# Patient Record
Sex: FEMALE | Race: WHITE | Employment: OTHER | URBAN - METROPOLITAN AREA
[De-identification: names, ages, dates, MRNs, and addresses within clinical notes are randomized per-mention and may not be internally consistent; named-entity substitution may affect disease eponyms.]

---

## 2017-01-07 DIAGNOSIS — F41.1 GENERALIZED ANXIETY DISORDER: Primary | Chronic | ICD-10-CM

## 2017-01-09 RX ORDER — LORAZEPAM 1 MG/1
TABLET ORAL
Qty: 30 TABLET | Refills: 0 | Status: SHIPPED | OUTPATIENT
Start: 2017-01-09 | End: 2017-02-07

## 2017-01-09 NOTE — TELEPHONE ENCOUNTER
Controlled Substance Refill Request for LORazepam (ATIVAN) 1 MG tablet  Problem List Complete:  Yes    Last Written Prescription Date:  12/9/2016  Last Fill Quantity: 30,   # refills: 0    Last Office Visit with [facility] primary care provider: 11/10/2016    Clinic visit frequency required: Q 6  months     Future Office visit:   Next 5 appointments (look out 90 days)     Mar 03, 2017  8:15 AM   Return Visit with Nikita Cedillo MD   [facility] ([facility])    [address] [place] [id number] [phone]                  Controlled substance agreement on file: Yes:  Date 7/8/2015.     Processing:  [facility]ax Rx to [facility]   checked in past 6 months?  No, route to RN

## 2017-01-09 NOTE — TELEPHONE ENCOUNTER
Pt is due for follow up with PCP. Please help her schedule then route back to refill pool. Thank you.     Swati Cano RN

## 2017-01-09 NOTE — TELEPHONE ENCOUNTER
Routing refill request to provider for review/approval because:  Patient has been contacted to schedule, message left.  Gloria Nowak RN

## 2017-01-27 ENCOUNTER — TELEPHONE (OUTPATIENT)
Dept: FAMILY MEDICINE | Facility: CLINIC | Age: 77
End: 2017-01-27

## 2017-01-27 DIAGNOSIS — M51.369 DDD (DEGENERATIVE DISC DISEASE), LUMBAR: Chronic | ICD-10-CM

## 2017-01-27 DIAGNOSIS — Z79.899 CONTROLLED SUBSTANCE AGREEMENT SIGNED: Primary | Chronic | ICD-10-CM

## 2017-01-27 RX ORDER — OXYCODONE HYDROCHLORIDE 5 MG/1
5-10 TABLET ORAL 2 TIMES DAILY PRN
Qty: 120 TABLET | Refills: 0 | Status: SHIPPED | OUTPATIENT
Start: 2017-01-27 | End: 2017-02-07

## 2017-01-27 NOTE — TELEPHONE ENCOUNTER
Controlled Substance Refill Request for   oxyCODONE (ROXICODONE) 5 MG IR tablet 120 tablet 0 12/14/2016       Problem List Complete:  Yes   checked in past 6 months?  No, route to RN done on 6/7/16      Swati Wheeler MA

## 2017-01-27 NOTE — TELEPHONE ENCOUNTER
See below, pt is asking for refills for going out of town.    Checked  today, and no issues.  Mariela Asher RN

## 2017-01-27 NOTE — TELEPHONE ENCOUNTER
Reason for Call:  Medication or medication refill:    Name of the medication requested:oxyCODONE (ROXICODONE) 5 MG IR tablet    Other request: Pt is asking for 2-3 extra refills because will be going to [place] for a   Few months.     Can we leave a detailed message on this number? YES    Phone number patient can be reached at: Home number on file [phone] (home)    Best Time: anytime    Call taken on 1/27/2017 at 1:04 PM by Lily Escoto

## 2017-02-07 ENCOUNTER — OFFICE VISIT (OUTPATIENT)
Dept: FAMILY MEDICINE | Facility: CLINIC | Age: 77
End: 2017-02-07
Payer: COMMERCIAL

## 2017-02-07 ENCOUNTER — HOSPITAL ENCOUNTER (OUTPATIENT)
Dept: MAMMOGRAPHY | Facility: CLINIC | Age: 77
Discharge: HOME | End: 2017-02-07
Attending: INTERNAL MEDICINE | Admitting: INTERNAL MEDICINE
Payer: COMMERCIAL

## 2017-02-07 VITALS
HEIGHT: 65 IN | SYSTOLIC BLOOD PRESSURE: 139 MMHG | OXYGEN SATURATION: 98 % | WEIGHT: 179 LBS | RESPIRATION RATE: 16 BRPM | DIASTOLIC BLOOD PRESSURE: 76 MMHG | TEMPERATURE: 97.4 F | BODY MASS INDEX: 29.82 KG/M2 | HEART RATE: 56 BPM

## 2017-02-07 DIAGNOSIS — I44.7 LBBB (LEFT BUNDLE BRANCH BLOCK): ICD-10-CM

## 2017-02-07 DIAGNOSIS — I10 BENIGN ESSENTIAL HYPERTENSION: ICD-10-CM

## 2017-02-07 DIAGNOSIS — F41.1 GENERALIZED ANXIETY DISORDER: Primary | Chronic | ICD-10-CM

## 2017-02-07 DIAGNOSIS — Z12.31 VISIT FOR SCREENING MAMMOGRAM: ICD-10-CM

## 2017-02-07 DIAGNOSIS — E78.5 HYPERLIPIDEMIA LDL GOAL <100: ICD-10-CM

## 2017-02-07 DIAGNOSIS — Z13.89 SCREENING FOR DIABETIC PERIPHERAL NEUROPATHY: ICD-10-CM

## 2017-02-07 DIAGNOSIS — M51.369 DDD (DEGENERATIVE DISC DISEASE), LUMBAR: Chronic | ICD-10-CM

## 2017-02-07 DIAGNOSIS — F51.01 PRIMARY INSOMNIA: Chronic | ICD-10-CM

## 2017-02-07 PROCEDURE — 99214 OFFICE O/P EST MOD 30 MIN: CPT | Performed by: INTERNAL MEDICINE

## 2017-02-07 PROCEDURE — G0202 SCR MAMMO BI INCL CAD: HCPCS

## 2017-02-07 PROCEDURE — 99207 C FOOT EXAM  NO CHARGE: CPT | Performed by: INTERNAL MEDICINE

## 2017-02-07 PROCEDURE — 77063 BREAST TOMOSYNTHESIS BI: CPT

## 2017-02-07 RX ORDER — LORAZEPAM 1 MG/1
.5-1 TABLET ORAL DAILY PRN
Qty: 30 TABLET | Refills: 0 | Status: SHIPPED | OUTPATIENT
Start: 2017-02-07 | End: 2017-05-18

## 2017-02-07 RX ORDER — OXYCODONE HYDROCHLORIDE 5 MG/1
5-10 TABLET ORAL 2 TIMES DAILY PRN
Qty: 120 TABLET | Refills: 0 | Status: SHIPPED | OUTPATIENT
Start: 2017-02-20 | End: 2017-02-07

## 2017-02-07 RX ORDER — OXYCODONE HYDROCHLORIDE 5 MG/1
5-10 TABLET ORAL 2 TIMES DAILY PRN
Qty: 120 TABLET | Refills: 0 | Status: SHIPPED | OUTPATIENT
Start: 2017-03-20 | End: 2017-05-18

## 2017-02-07 RX ORDER — LORAZEPAM 1 MG/1
TABLET ORAL
Qty: 30 TABLET | Refills: 0 | Status: CANCELLED | OUTPATIENT
Start: 2017-02-07

## 2017-02-07 RX ORDER — TRAZODONE HYDROCHLORIDE 100 MG/1
TABLET ORAL
Qty: 90 TABLET | Refills: 3 | Status: SHIPPED | OUTPATIENT
Start: 2017-02-07 | End: 2017-12-05

## 2017-02-07 RX ORDER — HYDROCHLOROTHIAZIDE 25 MG/1
25 TABLET ORAL DAILY
Qty: 90 TABLET | Refills: 3 | Status: SHIPPED | OUTPATIENT
Start: 2017-02-07 | End: 2018-05-10

## 2017-02-07 RX ORDER — EZETIMIBE 10 MG/1
10 TABLET ORAL DAILY
Qty: 90 TABLET | Refills: 3 | Status: SHIPPED | OUTPATIENT
Start: 2017-02-07 | End: 2017-08-02

## 2017-02-07 RX ORDER — METOPROLOL SUCCINATE 100 MG/1
100 TABLET, EXTENDED RELEASE ORAL DAILY
Qty: 90 TABLET | Refills: 3 | Status: SHIPPED | OUTPATIENT
Start: 2017-02-07 | End: 2018-03-08

## 2017-02-07 RX ORDER — ESCITALOPRAM OXALATE 20 MG/1
20 TABLET ORAL DAILY
Qty: 90 TABLET | Refills: 3 | Status: SHIPPED | OUTPATIENT
Start: 2017-02-07 | End: 2018-02-02

## 2017-02-07 ASSESSMENT — ANXIETY QUESTIONNAIRES
1. FEELING NERVOUS, ANXIOUS, OR ON EDGE: MORE THAN HALF THE DAYS
7. FEELING AFRAID AS IF SOMETHING AWFUL MIGHT HAPPEN: NOT AT ALL
3. WORRYING TOO MUCH ABOUT DIFFERENT THINGS: SEVERAL DAYS
5. BEING SO RESTLESS THAT IT IS HARD TO SIT STILL: NOT AT ALL
2. NOT BEING ABLE TO STOP OR CONTROL WORRYING: SEVERAL DAYS
6. BECOMING EASILY ANNOYED OR IRRITABLE: NOT AT ALL
IF YOU CHECKED OFF ANY PROBLEMS ON THIS QUESTIONNAIRE, HOW DIFFICULT HAVE THESE PROBLEMS MADE IT FOR YOU TO DO YOUR WORK, TAKE CARE OF THINGS AT HOME, OR GET ALONG WITH OTHER PEOPLE: NOT DIFFICULT AT ALL
GAD7 TOTAL SCORE: 4

## 2017-02-07 ASSESSMENT — PATIENT HEALTH QUESTIONNAIRE - PHQ9: 5. POOR APPETITE OR OVEREATING: NOT AT ALL

## 2017-02-07 NOTE — PROGRESS NOTES
"  SUBJECTIVE:                                                    Dimple Baxter is a 76 year old female who presents to clinic today for the following health issues:      Alcoholic grandson moved out b/c doing better so that is a relief to her and her daughter whom she still lives with  Daughter has multiple sclerosis so Althea has to help her as well  Back pain flares up if lifts heavy things  Taking Oxycodone still 4 tablets per day - she admits they give her a \"boost of energy\" which we then discussed is not the reason to take them which she agrees  She and I had discussed tapering off of them at last OV but she says it was SO stressful at home with her grandson she didn't have the energy to work on a taper so continued on same dose  Now is going to [place] for a couple months later this week so needs Rx filled - will be with son who is reportedly stable and she is looking forward to it very much  Senna advice from pharmacist for periodic constipation - she is about to start  Generally feeling really pretty well  Will be starting Zetia now this calendar year as is generic - follows with cardiology      Depression and Anxiety Follow-Up    Status since last visit: Improved    Other associated symptoms:None    Complicating factors:     Significant life event: Yes, grandson moved out as noted above in HPI     Current substance abuse: None    PHQ-9 SCORE 1/26/2016 10/11/2016 2/7/2017   Total Score - - -   Total Score 0 5 3     BRITTNI-7 SCORE 6/23/2015 10/19/2015 2/7/2017   Total Score 0 - -   Total Score - 3 4        PHQ-9  English      PHQ-9   Any Language     GAD7         Amount of exercise or physical activity: walking    Problems taking medications regularly: No    Medication side effects: none    Diet: regular (no restrictions)      Problem list and histories reviewed & adjusted, as indicated.    ROS:  Detailed as above     OBJECTIVE:                                                    /76  Pulse 56  Temp 97.4 " " F (36.3  C) (Oral)  Resp 16  Ht 5' 4.75\" (1.645 m)  Wt 179 lb (81.2 kg)  SpO2 98%  BMI 30.02 kg/m2  Body mass index is 30.02 kg/(m^2).  MUCH brighter and mentally sharper   Seems to know her meds well  No ulcers or lesions on feet, normal monofilament testing  PHQ-9 SCORE 1/26/2016 10/11/2016 2/7/2017   Total Score - - -   Total Score 0 5 3     BRITTNI-7 SCORE 6/23/2015 10/19/2015 2/7/2017   Total Score 0 - -   Total Score - 3 4        ASSESSMENT/PLAN:                                                      1. Generalized anxiety disorder  Try to reduce Lorazepam - discussed  Continue Lexapro  - escitalopram (LEXAPRO) 20 MG tablet; Take 1 tablet (20 mg) by mouth daily  Dispense: 90 tablet; Refill: 3  - LORazepam (ATIVAN) 1 MG tablet; Take 0.5-1 tablets (0.5-1 mg) by mouth daily as needed for anxiety  Dispense: 30 tablet; Refill: 0    2. Benign essential hypertension  At goal off of Amlodipine which she felt led to dry mouth  - hydrochlorothiazide (HYDRODIURIL) 25 MG tablet; Take 1 tablet (25 mg) by mouth daily  Dispense: 90 tablet; Refill: 3  - metoprolol (TOPROL-XL) 100 MG 24 hr tablet; Take 1 tablet (100 mg) by mouth daily  Dispense: 90 tablet; Refill: 3    3. LBBB (left bundle branch block)  - metoprolol (TOPROL-XL) 100 MG 24 hr tablet; Take 1 tablet (100 mg) by mouth daily  Dispense: 90 tablet; Refill: 3  Follows with cardiology    4. Primary insomnia  - traZODone (DESYREL) 100 MG tablet; TAKE ONE TABLET BY MOUTH ONCE DAILY AT BEDTIME  Dispense: 90 tablet; Refill: 3    5. Screening for diabetic peripheral neuropathy  - FOOT EXAM  NO CHARGE [49448.114]    6. DDD (degenerative disc disease), lumbar  She is not ready to taper yet - see OV note last visit when she was considering taper  2 scripts given to her today  She is looking forward to being with her son in [place] for 2 months and so will give her 2 scripts (she will check with [place] pharmacy about fills) and then continue to work on taper when she comes back to " "[place]  - oxyCODONE (ROXICODONE) 5 MG IR tablet; Take 1-2 tablets (5-10 mg) by mouth 2 times daily as needed for breakthrough pain  Dispense: 120 tablet; Refill: 0    7. Hyperlipidemia LDL goal <100  Will restart it per cardiology as should be more affordable this year  - ezetimibe (ZETIA) 10 MG tablet; Take 1 tablet (10 mg) by mouth daily  Dispense: 90 tablet; Refill: 3    8. Visit for screening mammogram  Denies breast changes and she'd like a mammogram  - MA Screen Bilateral w/[place]; Future    Patient Instructions   One Lorazepam prescription given to you today  Two Oxycodone prescriptions given to you today  STAY OFF OF Amlodipine  START Zetia  Get calcium in your diet rather than supplement  Enjoy [place]  See me in the spring/early summer for \"physical\"        Sandra Mayer, DO  [facility]      "

## 2017-02-07 NOTE — NURSING NOTE
"Chief Complaint   Patient presents with     Depression     Anxiety     Pain Management       Initial /76 mmHg  Pulse 56  Temp(Src) 97.4  F (36.3  C) (Oral)  Resp 16  Ht 5' 4.75\" (1.645 m)  Wt 179 lb (81.194 kg)  BMI 30.00 kg/m2  SpO2 98% Estimated body mass index is 30 kg/(m^2) as calculated from the following:    Height as of this encounter: 5' 4.75\" (1.645 m).    Weight as of this encounter: 179 lb (81.194 kg).  Medication Reconciliation: complete   Michela Wong CMA (AAMA)      "

## 2017-02-07 NOTE — MR AVS SNAPSHOT
"              After Visit Summary   2/7/2017    Dimple Baxter    MRN: [id number]           Patient Information     Date Of Birth          1940        Visit Information        Provider Department      2/7/2017 1:30 PM Sandra Mayer,  [facility]        Today's Diagnoses     Generalized anxiety disorder    -  1     Benign essential hypertension         LBBB (left bundle branch block)         Primary insomnia         Screening for diabetic peripheral neuropathy         DDD (degenerative disc disease), lumbar         Hyperlipidemia LDL goal <100           Care Instructions    One Lorazepam prescription given to you today  Two Oxycodone prescriptions given to you today  STAY OFF OF Amlodipine  START Zetia  Get calcium in your diet rather than supplement  Enjoy [place]  See me in the spring/early summer for \"physical\"          Follow-ups after your visit        Your next 10 appointments already scheduled     Apr 26, 2017  9:30 AM   LAB with RU LAB   [facility] HEART [facility] ([facility])    [address]  [facility] [address] [phone]           Patient must bring picture ID.  Patient should be prepared to give a urine specimen  Please do not eat 10-12 hours before your appointment if you are coming in fasting for labs on lipids, cholesterol, or glucose (sugar).  Pregnant women should follow their Care Team instructions. Water with medications is okay. Do not drink coffee or other fluids.   If you have concerns about taking  your medications, please ask at office or if scheduling via EditGridt, send a message by clicking on Secure Messaging, Message Your Care Team.            Apr 27, 2017  9:15 AM   Return Visit with Nikita Cedillo MD   [facility]YSICIANS HEART AT [place] ([facility])    [address] [place] [address] [address]  [facility] [id number] [phone]              Who to contact     If you have questions or " "need follow up information about today's clinic visit or your schedule please contact [facility] directly at [phone].  Normal or non-critical lab and imaging results will be communicated to you by OneNamehart, letter or phone within 4 business days after the clinic has received the results. If you do not hear from us within 7 days, please contact the clinic through OneNamehart or phone. If you have a critical or abnormal lab result, we will notify you by phone as soon as possible.  Submit refill requests through StudyTube or call your pharmacy and they will forward the refill request to us. Please allow 3 business days for your refill to be completed.          Additional Information About Your Visit        OneNamehar[employer] Information     StudyTube lets you send messages to your doctor, view your test results, renew your prescriptions, schedule appointments and more. To sign up, go to [email or web address] . Click on \"Log in\" on the left side of the screen, which will take you to the Welcome page. Then click on \"Sign up Now\" on the right side of the page.     You will be asked to enter the access code listed below, as well as some personal information. Please follow the directions to create your username and password.     Your access code is: [email or web address]  Expires: 2017  2:20 PM     Your access code will  in 90 days. If you need help or a new code, please call your [place] clinic or [phone].        Care EveryWhere ID     This is your Care EveryWhere ID. This could be used by other organizations to access your [place] medical records  [id number]        Your Vitals Were     Pulse Temperature Respirations Height BMI (Body Mass Index) Pulse Oximetry    56 97.4  F (36.3  C) (Oral) 16 5' 4.75\" (1.645 m) 30.00 kg/m2 98%       Blood Pressure from Last 3 Encounters:   17 139/76   10/11/16 148/78   16 112/70    Weight from Last 3 Encounters:   17 179 lb (81.194 kg)   10/11/16 182 lb 4.8 oz " (82.691 kg)   07/26/16 179 lb (81.194 kg)              We Performed the Following     FOOT EXAM  NO CHARGE [90669.114]          Today's Medication Changes          These changes are accurate as of: 2/7/17  2:20 PM.  If you have any questions, ask your nurse or doctor.               Start taking these medicines.        Dose/Directions    oxyCODONE 5 MG IR tablet   Commonly known as:  ROXICODONE   Used for:  DDD (degenerative disc disease), lumbar   Started by:  Sandra Mayer DO        Dose:  5-10 mg   Start taking on:  3/20/2017   Take 1-2 tablets (5-10 mg) by mouth 2 times daily as needed for breakthrough pain   Quantity:  120 tablet   Refills:  0         These medicines have changed or have updated prescriptions.        Dose/Directions    LORazepam 1 MG tablet   Commonly known as:  ATIVAN   This may have changed:  See the new instructions.   Used for:  Generalized anxiety disorder   Changed by:  Sandra Mayer DO        Dose:  0.5-1 mg   Take 0.5-1 tablets (0.5-1 mg) by mouth daily as needed for anxiety   Quantity:  30 tablet   Refills:  0       traZODone 100 MG tablet   Commonly known as:  DESYREL   This may have changed:  See the new instructions.   Used for:  Primary insomnia   Changed by:  Sandra Mayer DO        TAKE ONE TABLET BY MOUTH ONCE DAILY AT BEDTIME   Quantity:  90 tablet   Refills:  3            Where to get your medicines      These medications were sent to [facility] [address] [place] [address] - [address] [place] [address]     Phone:  [phone]    - escitalopram 20 MG tablet  - ezetimibe 10 MG tablet  - hydrochlorothiazide 25 MG tablet  - metoprolol 100 MG 24 hr tablet  - traZODone 100 MG tablet      Some of these will need a paper prescription and others can be bought over the counter.  Ask your nurse if you have questions.     Bring a paper prescription for each of these medications    - LORazepam 1 MG tablet  - oxyCODONE 5 MG IR tablet              Primary Care Provider Office Phone # Fax #    Sandra Mayer [phone]       [facility] [address] [facility] [address]  [facility] [address]        Thank you!     Thank you for choosing [facility]  for your care. Our goal is always to provide you with excellent care. Hearing back from our patients is one way we can continue to improve our services. Please take a few minutes to complete the written survey that you may receive in the mail after your visit with us. Thank you!             Your Updated Medication List - Protect others around you: Learn how to safely use, store and throw away your medicines at www.disposemymeds.org.          This list is accurate as of: 2/7/17  2:20 PM.  Always use your most recent med list.                   Brand Name Dispense Instructions for use    acetaminophen 325 MG tablet    TYLENOL     Take 1-2 tablets by mouth every 6 hours as needed for pain.       aspirin 81 MG tablet     0    take 1 x daily with food       cholecalciferol 1000 UNIT tablet    vitamin D    100 tablet    Take 1 tablet (1,000 Units) by mouth daily       escitalopram 20 MG tablet    LEXAPRO    90 tablet    Take 1 tablet (20 mg) by mouth daily       ezetimibe 10 MG tablet    ZETIA    90 tablet    Take 1 tablet (10 mg) by mouth daily       famotidine 20 MG tablet    PEPCID     Take 1 tablet by mouth 2 times daily as needed.       fish oil-omega-3 fatty acids 1000 MG capsule     0    take three daily       fluticasone 50 MCG/ACT spray    FLONASE    16 g    Spray 2 sprays into both nostrils daily       hydrochlorothiazide 25 MG tablet    HYDRODIURIL    90 tablet    Take 1 tablet (25 mg) by mouth daily       LORazepam 1 MG tablet    ATIVAN    30 tablet    Take 0.5-1 tablets (0.5-1 mg) by mouth daily as needed for anxiety       metoprolol 100 MG 24 hr tablet    TOPROL-XL    90 tablet    Take 1 tablet (100 mg) by mouth daily       NITROSTAT 0.4 MG sublingual tablet   Generic  drug:  nitroglycerin     25 tablet    PLACE 1 TABLET UNDER THE TONGUE EVERY 5 MINUTES AS NEEDED UP TO 3 TABLETS PER EPISODE       oxyCODONE 5 MG IR tablet   Start taking on:  3/20/2017    ROXICODONE    120 tablet    Take 1-2 tablets (5-10 mg) by mouth 2 times daily as needed for breakthrough pain       quinapril 40 MG Tab    ACCUPRIL    90 tablet    Take 1 tablet (40 mg) by mouth daily       traZODone 100 MG tablet    DESYREL    90 tablet    TAKE ONE TABLET BY MOUTH ONCE DAILY AT BEDTIME       tretinoin 0.1 % cream    RETIN-A    20 g    Apply sparingly to face at bedtime; do not apply near eyes

## 2017-02-08 ASSESSMENT — PATIENT HEALTH QUESTIONNAIRE - PHQ9: SUM OF ALL RESPONSES TO PHQ QUESTIONS 1-9: 3

## 2017-02-08 ASSESSMENT — ANXIETY QUESTIONNAIRES: GAD7 TOTAL SCORE: 4

## 2017-03-17 DIAGNOSIS — R94.39 ABNORMAL STRESS TEST: ICD-10-CM

## 2017-03-17 RX ORDER — NITROGLYCERIN 0.4 MG/1
TABLET SUBLINGUAL
Qty: 25 TABLET | Refills: 1 | Status: SHIPPED | OUTPATIENT
Start: 2017-03-17 | End: 2019-07-16

## 2017-03-17 NOTE — TELEPHONE ENCOUNTER
NITROSTAT 0.4 MG sublingual tablet        Last Written Prescription Date: 6/1/2016  Last Fill Quantity: 25,  # refills: 0   Last Office Visit with [facility], [facility] or [facility] prescribing provider: 2/7/2017                                         Next 5 appointments (look out 90 days)     Apr 27, 2017  9:15 AM CDT   Return Visit with Nikita Cedillo MD   [facility] PHYSICIANS [facility] AT [place] ([facility])    [address] [place] [id number] [phone]

## 2017-03-17 NOTE — TELEPHONE ENCOUNTER
Prescription approved per [facility], [facility] or [facility] refill protocol.  Cathie Cunningham RN  [facility] [employer]

## 2017-04-11 ENCOUNTER — PRE VISIT (OUTPATIENT)
Dept: CARDIOLOGY | Facility: CLINIC | Age: 77
End: 2017-04-11

## 2017-05-18 DIAGNOSIS — M51.369 DDD (DEGENERATIVE DISC DISEASE), LUMBAR: Chronic | ICD-10-CM

## 2017-05-18 DIAGNOSIS — R94.39 ABNORMAL STRESS TEST: ICD-10-CM

## 2017-05-18 DIAGNOSIS — F41.1 GENERALIZED ANXIETY DISORDER: Chronic | ICD-10-CM

## 2017-05-18 DIAGNOSIS — Z79.899 CONTROLLED SUBSTANCE AGREEMENT SIGNED: Chronic | ICD-10-CM

## 2017-05-18 NOTE — TELEPHONE ENCOUNTER
Reason for call: Medication   If this is a refill request, has the caller requested the refill from the pharmacy already? N/A  Will the patient be using a [facility] [facility]? No  Name of the pharmacy and phone number for the current request: [facility] [address]    Name of the medication requested: LORazepam (ATIVAN) 1 MG tablet and oxyCODONE (ROXICODONE) 5 MG IR tablet    Phone Number Pt can be reached at: Home number on file [phone] (home)  Best Time: When meds are ready  Can we leave a detailed message on this number? YES

## 2017-05-19 NOTE — TELEPHONE ENCOUNTER
Controlled Substance Refill Request for Oxycodone  Problem List Complete:  Yes    Last Written Prescription Date:  3/20/2017  Last Fill Quantity: 120,   # refills: 0    Patient is followed by [facility] for ongoing prescription of pain medication.  All refills should be approved by this provider, or covering partner.    1) Medication: Oxycodone.   Maximum quantity per month: 60    2) Medication: Lorazepam  Maximum quantity per month: 30    Clinic visit frequency required: Q 6  months     Controlled substance agreement on file: Yes       Date(s): 6/23/15    Pain Clinic evaluation in the past: No    DIRE Total Score(s):  No flowsheet data found.    Last [facility] website verification: 1/27/17  Future Office visit:   Next 5 appointments (look out 90 days)     Aug 02, 2017 10:15 AM CDT   Return Visit with Nikita Cedillo MD   [facility]YSICIANS [facility] ([facility])    [address] [place], [place] [id number]  [facility] [address]   [phone]                Ativan      Last Written Prescription Date: 2/7/2017  Last Fill Quantity: 30,  # refills: 0   Last Office Visit with [facility], [facility] or [facility] prescribing provider: 2/7/2017                                         Next 5 appointments (look out 90 days)     Aug 02, 2017 10:15 AM CDT   Return Visit with Nikita Cedillo MD   [facility] PHYSICIANS [facility] [place] ([facility])    [address] [place], [place] [address]  [facility] [address]   [phone]

## 2017-05-22 RX ORDER — LORAZEPAM 1 MG/1
.5-1 TABLET ORAL DAILY PRN
Qty: 30 TABLET | Refills: 0 | Status: SHIPPED | OUTPATIENT
Start: 2017-05-22 | End: 2017-06-27

## 2017-05-22 RX ORDER — OXYCODONE HYDROCHLORIDE 5 MG/1
5-10 TABLET ORAL 2 TIMES DAILY PRN
Qty: 120 TABLET | Refills: 0 | Status: SHIPPED | OUTPATIENT
Start: 2017-05-22 | End: 2017-07-11

## 2017-06-27 DIAGNOSIS — F41.1 GENERALIZED ANXIETY DISORDER: Chronic | ICD-10-CM

## 2017-06-27 NOTE — TELEPHONE ENCOUNTER
Pending Prescriptions:                       Disp   Refills    LORazepam (ATIVAN) 1 MG tablet [Pharmacy *30 tab*0            Sig: TAKE ONE-HALF TO ONE TABLET BY MOUTH ONCE DAILY           AS NEEDED FOR  ANXIETY          Last Written Prescription Date:  5/22/17  Last Fill Quantity: 30,   # refills: 0  Last Office Visit with [facility], [facility] or [facility] prescribing provider: 2/7/17  Future Office visit:    Next 5 appointments (look out 90 days)     Aug 02, 2017 10:15 AM CDT   Return Visit with Nikita Cedillo MD   [facility] AT [place] ([facility])    [address] [place] [place] [address]   [phone]                   Routing refill request to provider for review/approval because:  Drug not on the [facility], [facility] or  [facility] refill protocol or controlled substance

## 2017-06-28 RX ORDER — LORAZEPAM 1 MG/1
TABLET ORAL
Qty: 30 TABLET | Refills: 0 | Status: SHIPPED | OUTPATIENT
Start: 2017-06-28 | End: 2017-09-07

## 2017-06-29 ENCOUNTER — TELEPHONE (OUTPATIENT)
Dept: FAMILY MEDICINE | Facility: CLINIC | Age: 77
End: 2017-06-29

## 2017-06-29 NOTE — TELEPHONE ENCOUNTER
Reason for call:  Other   [facility] called regarding (reason for call): Patient's Rx for LORazepam (ATIVAN) 1 MG tablet, they did not receive the authorization from yesterday. Please re-send.     Additional comments:  [facility] [address] [place], [place] - [address] [facility]    Phone number to reach pharmacy: Other phone number: [phone]    Best Time: With any questions.     Can we leave a detailed message on this number?  N/A

## 2017-07-03 ENCOUNTER — TELEPHONE (OUTPATIENT)
Dept: FAMILY MEDICINE | Facility: CLINIC | Age: 77
End: 2017-07-03

## 2017-07-03 DIAGNOSIS — F41.1 GENERALIZED ANXIETY DISORDER: Chronic | ICD-10-CM

## 2017-07-03 RX ORDER — LORAZEPAM 1 MG/1
TABLET ORAL
Qty: 30 TABLET | Refills: 0 | Status: CANCELLED | OUTPATIENT
Start: 2017-07-03

## 2017-07-03 NOTE — TELEPHONE ENCOUNTER
Reason for call:  Medication   If this is a refill request, has the caller requested the refill from the pharmacy already? Yes  Will the patient be using a [place] [facility]? No  Name of the pharmacy and phone number for the current request: [facility] [address] [place] [address]     Name of the medication requested: LORazepam (ATIVAN) 1 MG tablet    Other request: Pharmacy still has not received the faxed request for this medication. Please call and give a verbal.    Phone number to reach Pharmacy:  Other phone number: [phone]  Best Time:  Today    Can we leave a detailed message on this number?  YES

## 2017-07-03 NOTE — TELEPHONE ENCOUNTER
LORazepam (ATIVAN) 1 MG tablet 30 tablet 0 6/28/2017           Last Written Prescription Date: 6/28/17  Last Fill Quantity: 30,  # refills: 0   Last Office Visit with [facility], [facility] or [facility] prescribing provider: 2/7/17                                         Next 5 appointments (look out 90 days)     Aug 02, 2017 10:15 AM CDT   Return Visit with Nikita Cedillo MD   [facility] PHYSICIANS [facility] AT [place] ([facility])    [address] [place] [id number] [phone]

## 2017-07-05 NOTE — TELEPHONE ENCOUNTER
Calling back for refill of Lorazepam     [facility] [address] [place], [place] - [address] [facility]  [phone]

## 2017-07-05 NOTE — TELEPHONE ENCOUNTER
Spoke with [facility], who did not receive the fax for Lorazepam we sent on 6-. Faxed rx again, pharmacy notified.  Joy De Anda MA

## 2017-07-06 NOTE — TELEPHONE ENCOUNTER
Verbally called in script for Ativan for patient to [facility] pharmacy pharmacist dc.  He confirms they are having problems with incoming fax.  Judy Maurer RN

## 2017-07-06 NOTE — TELEPHONE ENCOUNTER
Please call pharmacy and give a verbal for the Lorazepam, they are having trouble with their inbound faxes. Pharmacy is [facility] in [place] @ [phone], ask for Carla

## 2017-07-11 ENCOUNTER — PRE VISIT (OUTPATIENT)
Dept: CARDIOLOGY | Facility: CLINIC | Age: 77
End: 2017-07-11

## 2017-07-11 DIAGNOSIS — M51.369 DDD (DEGENERATIVE DISC DISEASE), LUMBAR: Chronic | ICD-10-CM

## 2017-07-11 NOTE — TELEPHONE ENCOUNTER
Reason for Call:  Other prescription    Detailed comments: Pt would like a refill on her current oxycodone medication. Once this prescription is ready to be picked up, please give pt a call so she can come to the clinic and  the written prescription.    Phone Number Patient can be reached at: Home number on file [phone] (home)    Best Time:     Can we leave a detailed message on this number? YES    Call taken on 7/11/2017 at 10:14 AM by Pamela Lott

## 2017-07-12 RX ORDER — OXYCODONE HYDROCHLORIDE 5 MG/1
5-10 TABLET ORAL 2 TIMES DAILY PRN
Qty: 120 TABLET | Refills: 0 | Status: SHIPPED | OUTPATIENT
Start: 2017-07-12 | End: 2017-08-15

## 2017-07-12 NOTE — TELEPHONE ENCOUNTER
Controlled Substance Refill Request for oxyCODONE (ROXICODONE) 5 MG IR tablet    Problem List Complete:  Yes    Last Written Prescription Date:  5/22/2017  Last Fill Quantity: 120,   # refills: 0    Last Office Visit with [facility] primary care provider: 2/7/2017    Clinic visit frequency required: Q 6  months     Future Office visit:   Next 5 appointments (look out 90 days)     Aug 02, 2017 10:15 AM CDT   Return Visit with Nikita Cedillo MD   [facility] ([facility])    [address] [place] [address]   [phone]                  Controlled substance agreement on file: Yes:  Date 7/8/2015.     Processing:  Patient will  in clinic   checked in past 6 months?  Yes 5/22/2017

## 2017-08-01 DIAGNOSIS — I44.7 LBBB (LEFT BUNDLE BRANCH BLOCK): ICD-10-CM

## 2017-08-01 DIAGNOSIS — E78.5 HYPERLIPIDEMIA LDL GOAL <70: ICD-10-CM

## 2017-08-01 DIAGNOSIS — I10 BENIGN ESSENTIAL HYPERTENSION: ICD-10-CM

## 2017-08-01 LAB
ALT SERPL W P-5'-P-CCNC: 68 U/L (ref 0–50)
ANION GAP SERPL CALCULATED.3IONS-SCNC: 3 MMOL/L (ref 3–14)
BUN SERPL-MCNC: 22 MG/DL (ref 7–30)
CALCIUM SERPL-MCNC: 9.1 MG/DL (ref 8.5–10.1)
CHLORIDE SERPL-SCNC: 103 MMOL/L (ref 94–109)
CHOLEST SERPL-MCNC: 233 MG/DL
CO2 SERPL-SCNC: 34 MMOL/L (ref 20–32)
CREAT SERPL-MCNC: 1.3 MG/DL (ref 0.52–1.04)
GFR SERPL CREATININE-BSD FRML MDRD: 40 ML/MIN/1.7M2
GLUCOSE SERPL-MCNC: 132 MG/DL (ref 70–99)
HDLC SERPL-MCNC: 27 MG/DL
LDLC SERPL CALC-MCNC: 140 MG/DL
NONHDLC SERPL-MCNC: 206 MG/DL
POTASSIUM SERPL-SCNC: 3.7 MMOL/L (ref 3.4–5.3)
SODIUM SERPL-SCNC: 140 MMOL/L (ref 133–144)
TRIGL SERPL-MCNC: 331 MG/DL

## 2017-08-01 PROCEDURE — 84460 ALANINE AMINO (ALT) (SGPT): CPT | Performed by: INTERNAL MEDICINE

## 2017-08-01 PROCEDURE — 80061 LIPID PANEL: CPT | Performed by: INTERNAL MEDICINE

## 2017-08-01 PROCEDURE — 80048 BASIC METABOLIC PNL TOTAL CA: CPT | Performed by: INTERNAL MEDICINE

## 2017-08-01 PROCEDURE — 36415 COLL VENOUS BLD VENIPUNCTURE: CPT | Performed by: INTERNAL MEDICINE

## 2017-08-02 ENCOUNTER — OFFICE VISIT (OUTPATIENT)
Dept: CARDIOLOGY | Facility: CLINIC | Age: 77
End: 2017-08-02
Attending: INTERNAL MEDICINE
Payer: COMMERCIAL

## 2017-08-02 VITALS
DIASTOLIC BLOOD PRESSURE: 78 MMHG | HEIGHT: 65 IN | SYSTOLIC BLOOD PRESSURE: 165 MMHG | WEIGHT: 186 LBS | BODY MASS INDEX: 30.99 KG/M2 | HEART RATE: 60 BPM

## 2017-08-02 DIAGNOSIS — E78.5 HYPERLIPIDEMIA LDL GOAL <70: ICD-10-CM

## 2017-08-02 DIAGNOSIS — I10 BENIGN ESSENTIAL HYPERTENSION: ICD-10-CM

## 2017-08-02 DIAGNOSIS — I44.7 LBBB (LEFT BUNDLE BRANCH BLOCK): ICD-10-CM

## 2017-08-02 PROCEDURE — 99214 OFFICE O/P EST MOD 30 MIN: CPT | Performed by: INTERNAL MEDICINE

## 2017-08-02 RX ORDER — AMLODIPINE BESYLATE 5 MG/1
5 TABLET ORAL DAILY
Qty: 90 TABLET | Refills: 3 | Status: SHIPPED | OUTPATIENT
Start: 2017-08-02 | End: 2017-12-28

## 2017-08-02 NOTE — MR AVS SNAPSHOT
After Visit Summary   8/2/2017    Dimple Baxter    MRN: [id number]           Patient Information     Date Of Birth          1940        Visit Information        Provider Department      8/2/2017 10:15 AM Nikita Ceidllo MD [facility]        Today's Diagnoses     LBBB (left bundle branch block)        Benign essential hypertension        Hyperlipidemia LDL goal <70           Follow-ups after your visit        Additional Services     Follow-Up with Cardiac Advanced Practice Provider           Follow-Up with Cardiologist                 Future tests that were ordered for you today     Open Future Orders        Priority Expected Expires Ordered    Basic metabolic panel Routine 11/30/2017 8/2/2018 8/2/2017    Lipid Profile Routine 11/30/2017 8/2/2018 8/2/2017    ALT Routine 11/30/2017 8/2/2018 8/2/2017    Echocardiogram Routine 11/30/2017 8/2/2018 8/2/2017    Follow-Up with Cardiologist Routine 11/30/2017 8/2/2018 8/2/2017    Basic metabolic panel Routine 9/1/2017 8/2/2018 8/2/2017    Follow-Up with Cardiac Advanced Practice Provider Routine 9/1/2017 8/2/2018 8/2/2017            Who to contact     If you have questions or need follow up information about today's clinic visit or your schedule please contact [facility] directly at [phone].  Normal or non-critical lab and imaging results will be communicated to you by MyChart, letter or phone within 4 business days after the clinic has received the results. If you do not hear from us within 7 days, please contact the clinic through MyChart or phone. If you have a critical or abnormal lab result, we will notify you by phone as soon as possible.  Submit refill requests through "[employer] or call your pharmacy and they will forward the refill request to us. Please allow 3 business days for your refill to be completed.          Additional Information About Your  "Visit        [employer] Information     [employer] lets you send messages to your doctor, view your test results, renew your prescriptions, schedule appointments and more. To sign up, go to [email or web address] . Click on \"Log in\" on the left side of the screen, which will take you to the Welcome page. Then click on \"Sign up Now\" on the right side of the page.     You will be asked to enter the access code listed below, as well as some personal information. Please follow the directions to create your username and password.     Your access code is: [email or web address]  Expires: 10/31/2017 11:12 AM     Your access code will  in 90 days. If you need help or a new code, please call your [facility] clinic or [phone].        Care EveryWhere ID     This is your Care EveryWhere ID. This could be used by other organizations to access your [facility] medical records  [id number]        Your Vitals Were     Pulse Height BMI (Body Mass Index)             60 1.645 m (5' 4.75\") 31.19 kg/m2          Blood Pressure from Last 3 Encounters:   17 165/78   17 139/76   10/11/16 148/78    Weight from Last 3 Encounters:   17 84.4 kg (186 lb)   17 81.2 kg (179 lb)   10/11/16 82.7 kg (182 lb 4.8 oz)              We Performed the Following     Follow-Up with Cardiologist          Today's Medication Changes          These changes are accurate as of: 17 11:12 AM.  If you have any questions, ask your nurse or doctor.               Start taking these medicines.        Dose/Directions    amLODIPine 5 MG tablet   Commonly known as:  NORVASC   Used for:  Benign essential hypertension   Started by:  Nikita Cedillo MD        Dose:  5 mg   Take 1 tablet (5 mg) by mouth daily   Quantity:  90 tablet   Refills:  3            Where to get your medicines      These medications were sent to [facility] [address] [place] [address] [id number] [place] [address]  [id number] [place] [address]     Phone:  [phone] "     amLODIPine 5 MG tablet                Primary Care Provider Office Phone # Fax #    Sandra Mayer [phone]       [facility] [address] [facility] [address]  [facility] [address]        Equal Access to Services     CLARI VEGA : Hadii aad ku hadasho Soomaali, waaxda luqadaha, qaybta kaalmada adeegyada, waxay idiin hayaan adeeg kharash lacece frausto. So [facility] [phone].    ATENCIÓN: Si habla español, tiene a estrada disposición servicios gratuitos de asistencia lingüística. Fionaame al [phone].    We comply with applicable federal civil rights laws and [place] laws. We do not discriminate on the basis of race, color, national origin, age, disability sex, sexual orientation or gender identity.            Thank you!     Thank you for choosing [facility] AT [place]  for your care. Our goal is always to provide you with excellent care. Hearing back from our patients is one way we can continue to improve our services. Please take a few minutes to complete the written survey that you may receive in the mail after your visit with us. Thank you!             Your Updated Medication List - Protect others around you: Learn how to safely use, store and throw away your medicines at www.disposemymeds.org.          This list is accurate as of: 8/2/17 11:12 AM.  Always use your most recent med list.                   Brand Name Dispense Instructions for use Diagnosis    acetaminophen 325 MG tablet    TYLENOL     Take 1-2 tablets by mouth every 6 hours as needed for pain.    Lumbago       amLODIPine 5 MG tablet    NORVASC    90 tablet    Take 1 tablet (5 mg) by mouth daily    Benign essential hypertension       aspirin 81 MG tablet     0    take 1 x daily with food        cholecalciferol 1000 UNIT tablet    vitamin D    100 tablet    Take 1 tablet (1,000 Units) by mouth daily    Postmenopausal       escitalopram 20 MG tablet    LEXAPRO    90 tablet    Take 1 tablet (20 mg) by  mouth daily    Generalized anxiety disorder       famotidine 20 MG tablet    PEPCID     Take 1 tablet by mouth 2 times daily as needed.        fish oil-omega-3 fatty acids 1000 MG capsule     0    take three daily    Other and unspecified hyperlipidemia       fluticasone 50 MCG/ACT spray    FLONASE    16 g    Spray 2 sprays into both nostrils daily    Seasonal allergic rhinitis, unspecified allergic rhinitis trigger       hydrochlorothiazide 25 MG tablet    HYDRODIURIL    90 tablet    Take 1 tablet (25 mg) by mouth daily    Benign essential hypertension       LORazepam 1 MG tablet    ATIVAN    30 tablet    TAKE ONE-HALF TO ONE TABLET BY MOUTH ONCE DAILY AS NEEDED FOR  ANXIETY    Generalized anxiety disorder       metoprolol 100 MG 24 hr tablet    TOPROL-XL    90 tablet    Take 1 tablet (100 mg) by mouth daily    Benign essential hypertension, LBBB (left bundle branch block)       NITROSTAT 0.4 MG sublingual tablet   Generic drug:  nitroGLYcerin     25 tablet    PLACE 1 TABLET UNDER THE TONGUE EVERY 5 MINUTES AS NEEDED. UP TO 3 TABLETS PER EPISODE    Abnormal stress test       oxyCODONE 5 MG IR tablet    ROXICODONE    120 tablet    Take 1-2 tablets (5-10 mg) by mouth 2 times daily as needed for breakthrough pain    DDD (degenerative disc disease), lumbar       quinapril 40 MG Tab    ACCUPRIL    90 tablet    Take 1 tablet (40 mg) by mouth daily    Type 2 diabetes mellitus with diabetic nephropathy, without long-term current use of insulin (H), Benign essential hypertension       traZODone 100 MG tablet    DESYREL    90 tablet    TAKE ONE TABLET BY MOUTH ONCE DAILY AT BEDTIME    Primary insomnia       tretinoin 0.1 % cream    RETIN-A    20 g    Apply sparingly to face at bedtime; do not apply near eyes    Wrinkles

## 2017-08-02 NOTE — PROGRESS NOTES
Office Visit     8/2/2017  [facility] AT [place]    Nikita Cedillo MD   Cardiology    LBBB (left bundle branch block) +2 more   Dx    Heart Problem , FU Cardiac testing , Results ; Referred by Nikita Cedillo MD   Reason for visit    Progress Notes      HISTORY OF PRESENT ILLNESS:  The patient is a 76-year-old overweight white female who presents to Cardiology Clinic for followup of an electrocardiogram showing left bundle branch block pattern, hypertension and hyperlipidemia.  She initially had an abnormal Cardiolite stress test followed by adenosine Cardiolite stress test in 2010 that was negative.  There was a questionable history of coronary disease with a negative family history of coronary disease and risk factors positive for hypertension, borderline diabetes mellitus, positive for hyperlipidemia, negative for recent cigarette smoking.  Since her last clinic visit, she apparently has done well.  She has had no significant symptoms of chest discomfort, shortness of breath, dizziness, palpitations, nausea, vomiting, diaphoresis or syncope.  She denies PND, orthopnea, fever, chills or sweats.  She has no documented history of myocardial infarction, congestive heart failure, congenital heart disease or heart murmur.  She does still have occasional lightheadedness if she moves too quickly.  Blood pressure has been problematic at times requiring adjustment of medications.       Her most recent echocardiogram demonstrated borderline concentric left ventricular hypertrophy with intact systolic function with right ventricle normal in structure, function and size.  The left atrium was mildly dilated.  There was mild aortic insufficiency, no pericardial effusion.  Pulmonary artery systolic pressures are mild to moderately increased.  There was mild tricuspid and mild aortic insufficiency noted.       MEDICATIONS:   1.  Lexapro 10 mg a day.   2.  Oxycodone as needed.   3.   Nitroglycerin p.r.n., which has not been used.   4.  Lorazepam 1 mg as needed.    6.  Metoprolol  mg a day.   7.  Hydrochlorothiazide 25 mg a day.   8.  Vitamin D 1000 units a day.   9.  Flonase as directed.   10.  Trazodone 100 mg at bedtime.   11.  Quinapril 40 mg a day.   12.  Pepcid 20 mg twice a day.   13.  Acetaminophen 650 mg every 6 hours as needed.   14.  Fish oil 1000 mg 3 times a day.   15.  Aspirin 81 mg a day.   16.  Zetia 10 mg a day.       LABORATORY DATA:  Demonstrates a cholesterol 233, HDL 27, , triglycerides 331, sodium 140, potassium 3.7, BUN 22, creatinine 1.3 and ALT of 68.  Again, the patient presents to Cardiology Clinic for followup of her hypertension and  hyperlipidemia in the setting of a left bundle branch block pattern.  She participates in activities without significant restriction.  She has no symptoms of PND, orthopnea, fevers, chills or sweats.    Since it was clinic visit, she has done reasonably well with no significant symptoms of chest discomfort, shortness of breath, palpitations, dizziness, or syncope; she occasionally has some epigastric discomfort but is relieved by antacids or Pepcid..       PHYSICAL EXAMINATION:   VITAL SIGNS:  Blood pressure 157/81 with a heart rate of 60-70 and regular.  Weight was 186 pounds, which is up approximately 7 pounds from previous clinic visit.   NECK:  Without jugular venous distention, carotid bruit or palpable thyroid.   CHEST:  Essentially clear to percussion and auscultation with decreased breath sounds at the bases.   CARDIAC:  Revealed a regular rhythm, an S4 gallop, a 1/6 systolic murmur at the left sternal border with minimal radiation.  No diastolic murmur, rub or S3.   EXTREMITIES:  Without cyanosis or edema.       CLINICAL IMPRESSION:   1.  Stable cardiac condition.   2.  Abnormal electrocardiogram showing left bundle branch block pattern.   3.  Hypertension - incomplete control   4.  Hyperlipidemia, not at goal but  off medications because of expense of Zetia.   5.  Overweight.       DISCUSSION:  The patient has done better since her last clinic visit.  Her blood pressure appears to be not adequately controlled, especially after discontinuation of amlodipine.   Her cholesterol still remains out of control because she was unable to afford the Zetia which should improve later this year when Zetia becomes generic.  She will need to continue avoiding caffeine and salt.  She has gained some weight and will attempt to lose 5-6 pounds over the next several months.  She will need close followup of serum lipids, basic metabolic panel and blood pressure.  Echocardiogram performed in four months to check left ventricular, function and valve function.   Otherwise, she appears to be doing well and is stable.       RECOMMENDATIONS:   1.  Continue present medications.   2.  Close followup of serum lipids, basic metabolic panel and blood pressure with follow-up with Suri Morrison in one month.   3.  Diet and exercise, avoiding caffeine and salt.   4.  Diabetic followup.   5.  Routine medical followup.   6.  Cardiology followup in 4 months  7.  Echocardiogram in four months.       cc:   Sandra Mayer, [facility]   [address], [address]   [place]  [address]           PRADIP ROONEY MD, FACC

## 2017-08-02 NOTE — LETTER
8/2/2017    Sandra Mayer, [facility]   [address]  [facility] [address]    RE: Dimple Baxter       Dear Colleague,    I had the pleasure of seeing Dimple Baxter in the [facility].    Office Visit     8/2/2017  [facility] AT [place]    Nikita Cedillo MD   Cardiology    LBBB (left bundle branch block) +2 more   Dx    Heart Problem , FU Cardiac testing , Results ; Referred by Nikita Cedillo MD   Reason for visit    Progress Notes      HISTORY OF PRESENT ILLNESS:  The patient is a 76-year-old overweight white female who presents to Cardiology Clinic for followup of an electrocardiogram showing left bundle branch block pattern, hypertension and hyperlipidemia.  She initially had an abnormal Cardiolite stress test followed by adenosine Cardiolite stress test in 2010 that was negative.  There was a questionable history of coronary disease with a negative family history of coronary disease and risk factors positive for hypertension, borderline diabetes mellitus, positive for hyperlipidemia, negative for recent cigarette smoking.  Since her last clinic visit, she apparently has done well.  She has had no significant symptoms of chest discomfort, shortness of breath, dizziness, palpitations, nausea, vomiting, diaphoresis or syncope.  She denies PND, orthopnea, fever, chills or sweats.  She has no documented history of myocardial infarction, congestive heart failure, congenital heart disease or heart murmur.  She does still have occasional lightheadedness if she moves too quickly.  Blood pressure has been problematic at times requiring adjustment of medications.       Her most recent echocardiogram demonstrated borderline concentric left ventricular hypertrophy with intact systolic function with right ventricle normal in structure, function and size.  The left atrium was mildly dilated.  There was mild aortic  insufficiency, no pericardial effusion.  Pulmonary artery systolic pressures are mild to moderately increased.  There was mild tricuspid and mild aortic insufficiency noted.       MEDICATIONS:   1.  Lexapro 10 mg a day.   2.  Oxycodone as needed.   3.  Nitroglycerin p.r.n., which has not been used.   4.  Lorazepam 1 mg as needed.    6.  Metoprolol  mg a day.   7.  Hydrochlorothiazide 25 mg a day.   8.  Vitamin D 1000 units a day.   9.  Flonase as directed.   10.  Trazodone 100 mg at bedtime.   11.  Quinapril 40 mg a day.   12.  Pepcid 20 mg twice a day.   13.  Acetaminophen 650 mg every 6 hours as needed.   14.  Fish oil 1000 mg 3 times a day.   15.  Aspirin 81 mg a day.   16.  Zetia 10 mg a day.       LABORATORY DATA:  Demonstrates a cholesterol 233, HDL 27, , triglycerides 331, sodium 140, potassium 3.7, BUN 22, creatinine 1.3 and ALT of 68.  Again, the patient presents to Cardiology Clinic for followup of her hypertension and  hyperlipidemia in the setting of a left bundle branch block pattern.  She participates in activities without significant restriction.  She has no symptoms of PND, orthopnea, fevers, chills or sweats.    Since it was clinic visit, she has done reasonably well with no significant symptoms of chest discomfort, shortness of breath, palpitations, dizziness, or syncope; she occasionally has some epigastric discomfort but is relieved by antacids or Pepcid..       PHYSICAL EXAMINATION:   VITAL SIGNS:  Blood pressure 157/81 with a heart rate of 60-70 and regular.  Weight was 186 pounds, which is up approximately 7 pounds from previous clinic visit.   NECK:  Without jugular venous distention, carotid bruit or palpable thyroid.   CHEST:  Essentially clear to percussion and auscultation with decreased breath sounds at the bases.   CARDIAC:  Revealed a regular rhythm, an S4 gallop, a 1/6 systolic murmur at the left sternal border with minimal radiation.  No diastolic murmur, rub or S3.    EXTREMITIES:  Without cyanosis or edema.       CLINICAL IMPRESSION:   1.  Stable cardiac condition.   2.  Abnormal electrocardiogram showing left bundle branch block pattern.   3.  Hypertension - incomplete control   4.  Hyperlipidemia, not at goal but off medications because of expense of Zetia.   5.  Overweight.       DISCUSSION:  The patient has done better since her last clinic visit.  Her blood pressure appears to be not adequately controlled, especially after discontinuation of amlodipine.   Her cholesterol still remains out of control because she was unable to afford the Zetia which should improve later this year when Zetia becomes generic.  She will need to continue avoiding caffeine and salt.  She has gained some weight and will attempt to lose 5-6 pounds over the next several months.  She will need close followup of serum lipids, basic metabolic panel and blood pressure.  Echocardiogram performed in four months to check left ventricular, function and valve function.   Otherwise, she appears to be doing well and is stable.       RECOMMENDATIONS:   1.  Continue present medications.   2.  Close followup of serum lipids, basic metabolic panel and blood pressure with follow-up with Suri Morrison in one month.   3.  Diet and exercise, avoiding caffeine and salt.   4.  Diabetic followup.   5.  Routine medical followup.   6.  Cardiology followup in 4 months  7.  Echocardiogram in four months.       cc:   Sandra Mayer, DO   [address], [address]   [place]  [address]           NIKITA CEDILLO MD, [facility]          Thank you for allowing me to participate in the care of your patient.    Sincerely,     Nikita Cedillo MD     [facility]

## 2017-08-15 ENCOUNTER — TELEPHONE (OUTPATIENT)
Dept: FAMILY MEDICINE | Facility: CLINIC | Age: 77
End: 2017-08-15

## 2017-08-15 DIAGNOSIS — M51.369 DDD (DEGENERATIVE DISC DISEASE), LUMBAR: Chronic | ICD-10-CM

## 2017-08-16 RX ORDER — OXYCODONE HYDROCHLORIDE 5 MG/1
5-10 TABLET ORAL 2 TIMES DAILY PRN
Qty: 120 TABLET | Refills: 0 | Status: SHIPPED | OUTPATIENT
Start: 2017-08-16 | End: 2017-09-18

## 2017-09-01 ENCOUNTER — OFFICE VISIT (OUTPATIENT)
Dept: CARDIOLOGY | Facility: CLINIC | Age: 77
End: 2017-09-01
Attending: INTERNAL MEDICINE
Payer: COMMERCIAL

## 2017-09-01 VITALS
DIASTOLIC BLOOD PRESSURE: 68 MMHG | HEIGHT: 65 IN | WEIGHT: 187.1 LBS | HEART RATE: 52 BPM | SYSTOLIC BLOOD PRESSURE: 122 MMHG | BODY MASS INDEX: 31.17 KG/M2

## 2017-09-01 DIAGNOSIS — I10 BENIGN ESSENTIAL HYPERTENSION: ICD-10-CM

## 2017-09-01 DIAGNOSIS — E78.5 HYPERLIPIDEMIA LDL GOAL <70: ICD-10-CM

## 2017-09-01 DIAGNOSIS — I44.7 LBBB (LEFT BUNDLE BRANCH BLOCK): ICD-10-CM

## 2017-09-01 LAB
ANION GAP SERPL CALCULATED.3IONS-SCNC: 14 MMOL/L (ref 6–17)
BUN SERPL-MCNC: 20 MG/DL (ref 7–30)
CALCIUM SERPL-MCNC: 9.8 MG/DL (ref 8.5–10.5)
CHLORIDE SERPL-SCNC: 101 MMOL/L (ref 98–107)
CO2 SERPL-SCNC: 29 MMOL/L (ref 23–29)
CREAT SERPL-MCNC: 1.25 MG/DL (ref 0.7–1.3)
GFR SERPL CREATININE-BSD FRML MDRD: 41 ML/MIN/1.7M2
GLUCOSE SERPL-MCNC: 139 MG/DL (ref 70–105)
POTASSIUM SERPL-SCNC: 4 MMOL/L (ref 3.5–5.1)
SODIUM SERPL-SCNC: 140 MMOL/L (ref 136–145)

## 2017-09-01 PROCEDURE — 36415 COLL VENOUS BLD VENIPUNCTURE: CPT | Performed by: INTERNAL MEDICINE

## 2017-09-01 PROCEDURE — 99214 OFFICE O/P EST MOD 30 MIN: CPT | Performed by: PHYSICIAN ASSISTANT

## 2017-09-01 PROCEDURE — 80048 BASIC METABOLIC PNL TOTAL CA: CPT | Performed by: INTERNAL MEDICINE

## 2017-09-01 RX ORDER — ROSUVASTATIN CALCIUM 5 MG/1
TABLET, COATED ORAL
Qty: 30 TABLET | Refills: 3 | Status: SHIPPED | OUTPATIENT
Start: 2017-09-01 | End: 2017-11-16

## 2017-09-01 NOTE — PROGRESS NOTES
PRIMARY CARDIOLOGIST:  Dr. Cedillo       REASON FOR VISIT:  Dyslipidemia, followup.      HISTORY OF PRESENT ILLNESS:  Ms. Baxter is a delightful 77-year-old woman who follows with our clinic for a history of left bundle branch block, hypertension, hyperlipidemia.  In 07/2009, she underwent a nuclear stress test and was found to have an anterior defect with some ischemia that may have been breast attenuation.  This was repeated in 2010 and at that point was a clearly normal stress test.  She does not have a personal history of coronary disease.  She has some small vessel atherosclerosis noted on MRA of the brain but has not had an MI.  She has had difficulties tolerating statins and she has markedly elevated LDL.  She previously has been on Zetia but stopped taking this when generic continued to be expensive.  She has recently checked the cost and it continues to cost $700 a month now that it is generic.        She comes in today and says she overall feels well.  She had some shortness of breath and chest discomfort around the time she saw Dr. Cedillo but that has completely resolved.  She has not been holding back.  She thinks she just feels better.  There is no orthopnea or PND.  She went to the [facility] and was able to walk around from 3 p.m. to 10 p.m. without difficulty.  She does not recall what her adverse effects were from the Crestor although on review of the records, she was started on what appears to be a 20 mg daily dose.  She is open to retrialing medications.  She fully admits she does not have a perfect diet and she is working on that as well.      SOCIAL HISTORY:  She has a 2-pack-year history of smoking, quit in 1963.  Occasional alcohol use.      PHYSICAL EXAMINATION:   GENERAL:  Well-developed, well-nourished, delightful woman in no acute distress.   HEENT:  Normocephalic, atraumatic.  Sclerae are anicteric.  Extraocular motions intact.     NECK:  Carotids are without bruit.   HEART:  Regular with a  very quiet 1/6 diastolic murmur heard best at the apex.   LUNGS:  Clear.  No wheezes, rales or rhonchi.   EXTREMITIES:  Without peripheral edema, 2+ radial, dorsalis pedis and posterior tibialis pulses bilaterally.      ASSESSMENT AND PLAN:   1.  Hyperlipidemia, likely some degree familial but also continued improvement in her diet.  We discussed options today.  Clearly, $700 a month for Zetia is not attainable for her.  Keeping her untreated does not seem wise given how high her LDL is at 140.  What we will do is try a very low dose of Crestor 2.5 mg twice a week on  and  and we should this see some results with this.  If she can tolerate it, I would go to an every-other-day dose and then hopefully eventually at one point 2.5 mg every day or even 5 mg every day.  I have had good success slowly up-titrating medications.  If she does have adverse effects, I would even try 2.5 mg of Crestor week.  She will stay off Zetia at this time and this can reconsidered as prices improved.   2.  Hypertension, well controlled.   3.  GERD, per primary.      Thank you for allowing me to participate in this delightful patient's care.  We will have her follow up with a lipid panel with ALT in 2 months.  If she is tolerating the med, I will go up to 2.5 mg every other day.  If not, will cut back.  She will see Dr. Cedillo in 3 months.      Suri Doyle PA-C      cc:   Nikita Cedillo MD, [facility] at [address], [address]   [place] [address]      Sandra Mayer, [facility]   [address]   [place] [address]         SURI DOYLE PA-C             D: 2017 11:26   T: 2017 12:34   MT: NEERAJ      Name:     ALEJANDRO GAMEZ   MRN:      [id number]        Account:      [id number]   :      1940           Service Date: 2017      Document: [id number]
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
## 2017-09-01 NOTE — LETTER
9/1/2017    Sandra Brunilda Mayer, DO  [address]  [facility] [address]    RE: Dimple BALDO Baxter       Dear Colleague,    I had the pleasure of seeing Dimple Baxter in the [facility].    PRIMARY CARDIOLOGIST:  Dr. Cedillo       REASON FOR VISIT:  Dyslipidemia, followup.      Ms. Baxter is a delightful 77-year-old woman who follows with our clinic for a history of left bundle branch block, hypertension, hyperlipidemia.  In 07/2009, she underwent a nuclear stress test and was found to have an anterior defect with some ischemia that may have been breast attenuation.  This was repeated in 2010 and at that point was a clearly normal stress test.  She does not have a personal history of coronary disease.  She has some small vessel atherosclerosis noted on MRA of the brain but has not had an MI.  She has had difficulties tolerating statins and she has markedly elevated LDL.  She previously has been on Zetia but stopped taking this when generic continued to be expensive.  She has recently checked the cost and it continues to cost $700 a month now that it is generic.        She comes in today and says she overall feels well.  She had some shortness of breath and chest discomfort around the time she saw Dr. Cedillo but that has completely resolved.  She has not been holding back.  She thinks she just feels better.  There is no orthopnea or PND.  She went to the state [facility] and was able to walk around from 3 p.m. to 10 p.m. without difficulty.  She does not recall what her adverse effects were from the Crestor although on review of the records, she was started on what appears to be a 20 mg daily dose.  She is open to retrialing medications.  She fully admits she does not have a perfect diet and she is working on that as well.      SOCIAL HISTORY:  She has a 2-pack-year history of smoking, quit in 1963.  Occasional alcohol use.      PHYSICAL EXAMINATION:   GENERAL:  Well-developed, well-nourished,  delightful woman in no acute distress.   HEENT:  Normocephalic, atraumatic.  Sclerae are anicteric.  Extraocular motions intact.     NECK:  Carotids are without bruit.   HEART:  Regular with a very quiet 1/6 diastolic murmur heard best at the apex.   LUNGS:  Clear.  No wheezes, rales or rhonchi.   EXTREMITIES:  Without peripheral edema, 2+ radial, dorsalis pedis and posterior tibialis pulses bilaterally.     Outpatient Encounter Prescriptions as of 9/1/2017   Medication Sig Dispense Refill     rosuvastatin (CRESTOR) 5 MG tablet Take 1/2 tablet at night twice a week on Mondays and Thursdays. 30 tablet 3     [DISCONTINUED] oxyCODONE (ROXICODONE) 5 MG IR tablet Take 1-2 tablets (5-10 mg) by mouth 2 times daily as needed for breakthrough pain (Due for office visit please) 120 tablet 0     amLODIPine (NORVASC) 5 MG tablet Take 1 tablet (5 mg) by mouth daily 90 tablet 3     [DISCONTINUED] LORazepam (ATIVAN) 1 MG tablet TAKE ONE-HALF TO ONE TABLET BY MOUTH ONCE DAILY AS NEEDED FOR  ANXIETY 30 tablet 0     NITROSTAT 0.4 MG sublingual tablet PLACE 1 TABLET UNDER THE TONGUE EVERY 5 MINUTES AS NEEDED. UP TO 3 TABLETS PER EPISODE 25 tablet 1     escitalopram (LEXAPRO) 20 MG tablet Take 1 tablet (20 mg) by mouth daily 90 tablet 3     hydrochlorothiazide (HYDRODIURIL) 25 MG tablet Take 1 tablet (25 mg) by mouth daily 90 tablet 3     metoprolol (TOPROL-XL) 100 MG 24 hr tablet Take 1 tablet (100 mg) by mouth daily 90 tablet 3     traZODone (DESYREL) 100 MG tablet TAKE ONE TABLET BY MOUTH ONCE DAILY AT BEDTIME 90 tablet 3     fluticasone (FLONASE) 50 MCG/ACT nasal spray Spray 2 sprays into both nostrils daily 16 g 5     quinapril (ACCUPRIL) 40 MG tablet Take 1 tablet (40 mg) by mouth daily 90 tablet 3     cholecalciferol (VITAMIN D) 1000 UNIT tablet Take 1 tablet (1,000 Units) by mouth daily 100 tablet 3     tretinoin (RETIN-A) 0.1 % cream Apply sparingly to face at bedtime; do not apply near eyes 20 g 1     famotidine (PEPCID) 20  MG tablet Take 1 tablet by mouth 2 times daily as needed.       acetaminophen (TYLENOL) 325 MG tablet Take 1-2 tablets by mouth every 6 hours as needed for pain.       FISH OIL 1000 MG OR CAPS take three daily  0 0     ASPIRIN 81 MG OR TABS take 1 x daily with food 0 0     No facility-administered encounter medications on file as of 9/1/2017.         ASSESSMENT AND PLAN:   1.  Hyperlipidemia, likely some degree familial but also continued improvement in her diet.  We discussed options today.  Clearly, $700 a month for Zetia is not attainable for her.  Keeping her untreated does not seem wise given how high her LDL is at 140.  What we will do is try a very low dose of Crestor 2.5 mg twice a week on Mondays and Thursdays and we should this see some results with this.  If she can tolerate it, I would go to an every-other-day dose and then hopefully eventually at one point 2.5 mg every day or even 5 mg every day.  I have had good success slowly up-titrating medications.  If she does have adverse effects, I would even try 2.5 mg of Crestor week.  She will stay off Zetia at this time and this can reconsidered as prices improved.   2.  Hypertension, well controlled.   3.  GERD, per primary.      Thank you for allowing me to participate in this delightful patient's care.  We will have her follow up with a lipid panel with ALT in 2 months.  If she is tolerating the med, I will go up to 2.5 mg every other day.  If not, will cut back.  She will see Dr. Cedillo in 3 months.     Sincerely,    Suri Morrison PA-C     [facility]    cc:   Nikita Cedillo MD, [facility] at [address], [address]   [place] [address]

## 2017-09-01 NOTE — MR AVS SNAPSHOT
After Visit Summary   9/1/2017    Dimple Baxter    MRN: [id number]           Patient Information     Date Of Birth          1940        Visit Information        Provider Department      9/1/2017 10:50 AM More, Suri Garsia PA-C [facility] [place]        Today's Diagnoses     LBBB (left bundle branch block)        Benign essential hypertension        Hyperlipidemia LDL goal <70          Care Instructions    [facility] for [facility] into [facility] today.    We discussed: that we'll try a very low dose of crestor/ rosuvastatin to help with your cholesterol.  Take 1/2 tablet on Monday and Thursday nights.  IF you get side effects like muscle aches please call.      Results: cholesterol is too high, kidney function looks good!  Component      Latest Ref Rng & Units 8/1/2017 9/1/2017   Sodium      136 - 145 mmol/L  140   Potassium      3.5 - 5.1 mmol/L  4.0   Chloride      98 - 107 mmol/L  101   Carbon Dioxide      23 - 29 mmol/L  29   Anion Gap      6 - 17 mmol/L  14   Glucose      70 - 105 mg/dL  139 (H)   Urea Nitrogen      7 - 30 mg/dL  20   Creatinine      0.70 - 1.30 mg/dL  1.25   GFR Estimate      >60 mL/min/1.7m2  41 (L)   GFR Estimate If Black      >60 mL/min/1.7m2  50 (L)   Calcium      8.5 - 10.5 mg/dL  9.8   Cholesterol      <200 mg/dL 233 (H)    Triglycerides      <150 mg/dL 331 (H)    HDL Cholesterol      >49 mg/dL 27 (L)    LDL Cholesterol Calculated      <100 mg/dL 140 (H)    Non HDL Cholesterol      <130 mg/dL 206 (H)    ALT      0 - 50 U/L 68 (H)      Follow up:cholesterol check in 2 months, and Dr. Cedillo in 3 months.     Please call my nurse at [phone] with any questions or concerns.    Scheduling phone number: [phone]  Reminder: Please bring in all current medications, over the counter supplements and vitamin bottles to your next appointment.                  Follow-ups after your visit        Additional Services  "    Follow-Up with Cardiologist                 Future tests that were ordered for you today     Open Future Orders        Priority Expected Expires Ordered    Follow-Up with Cardiologist Routine 2017    Basic metabolic panel Routine 2017    Lipid panel reflex to direct LDL Routine 10/31/2017 2018 2017    ALT Routine 10/31/2017 2018 2017            Who to contact     If you have questions or need follow up information about today's clinic visit or your schedule please contact [facility] [place] directly at [phone].  Normal or non-critical lab and imaging results will be communicated to you by MyChart, letter or phone within 4 business days after the clinic has received the results. If you do not hear from us within 7 days, please contact the clinic through [employer] or phone. If you have a critical or abnormal lab result, we will notify you by phone as soon as possible.  Submit refill requests through [employer] or call your pharmacy and they will forward the refill request to us. Please allow 3 business days for your refill to be completed.          Additional Information About Your Visit        MyChart Information     [employer] lets you send messages to your doctor, view your test results, renew your prescriptions, schedule appointments and more. To sign up, go to [email or web address] . Click on \"Log in\" on the left side of the screen, which will take you to the Welcome page. Then click on \"Sign up Now\" on the right side of the page.     You will be asked to enter the access code listed below, as well as some personal information. Please follow the directions to create your username and password.     Your access code is: [email or web address]  Expires: 10/31/2017 11:12 AM     Your access code will  in 90 days. If you need help or a new code, please call your [place] clinic or [phone].        Care EveryWhere ID     " "This is your Care EveryWhere ID. This could be used by other organizations to access your [place] medical records  [id number]        Your Vitals Were     Pulse Height BMI (Body Mass Index)             52 1.645 m (5' 4.75\") 31.38 kg/m2          Blood Pressure from Last 3 Encounters:   09/01/17 122/68   08/02/17 165/78   02/07/17 139/76    Weight from Last 3 Encounters:   09/01/17 84.9 kg (187 lb 1.6 oz)   08/02/17 84.4 kg (186 lb)   02/07/17 81.2 kg (179 lb)              We Performed the Following     Follow-Up with Cardiac Advanced Practice Provider          Today's Medication Changes          These changes are accurate as of: 9/1/17 11:14 AM.  If you have any questions, ask your nurse or doctor.               Start taking these medicines.        Dose/Directions    rosuvastatin 5 MG tablet   Commonly known as:  CRESTOR   Used for:  Hyperlipidemia LDL goal <70   Started by:  Suri Morrison PA-C        Take 1/2 tablet at night twice a week on Mondays and Thursdays.   Quantity:  30 tablet   Refills:  3            Where to get your medicines      These medications were sent to [facility] [address] [place] [address] [id number] [place] [address]  [address] [facility] [address]     Phone:  [phone]     rosuvastatin 5 MG tablet                Primary Care Provider Office Phone # Fax #    Sandra Mayer,  [phone] [address] [facility] [address]  [facility] [address]        Equal Access to Services     KAROL VEGA AH: Hadii martha ku hadasho Soomaali, waaxda luqadaha, qaybta kaalmada adeegyada, yvette frausto. So [facility] [phone].    ATENCIÓN: Si habla español, tiene a estrada disposición servicios gratuitos de asistencia lingüística. Llame al [phone].    We comply with applicable federal civil rights laws and [place] laws. We do not discriminate on the basis of race, color, national origin, age, disability sex, sexual orientation or gender identity.          "   Thank you!     Thank you for choosing [facility] [place]  for your care. Our goal is always to provide you with excellent care. Hearing back from our patients is one way we can continue to improve our services. Please take a few minutes to complete the written survey that you may receive in the mail after your visit with us. Thank you!             Your Updated Medication List - Protect others around you: Learn how to safely use, store and throw away your medicines at www.disposemymeds.org.          This list is accurate as of: 9/1/17 11:14 AM.  Always use your most recent med list.                   Brand Name Dispense Instructions for use Diagnosis    acetaminophen 325 MG tablet    TYLENOL     Take 1-2 tablets by mouth every 6 hours as needed for pain.    Lumbago       amLODIPine 5 MG tablet    NORVASC    90 tablet    Take 1 tablet (5 mg) by mouth daily    Benign essential hypertension       aspirin 81 MG tablet     0    take 1 x daily with food        cholecalciferol 1000 UNIT tablet    vitamin D    100 tablet    Take 1 tablet (1,000 Units) by mouth daily    Postmenopausal       escitalopram 20 MG tablet    LEXAPRO    90 tablet    Take 1 tablet (20 mg) by mouth daily    Generalized anxiety disorder       famotidine 20 MG tablet    PEPCID     Take 1 tablet by mouth 2 times daily as needed.        fish oil-omega-3 fatty acids 1000 MG capsule     0    take three daily    Other and unspecified hyperlipidemia       fluticasone 50 MCG/ACT spray    FLONASE    16 g    Spray 2 sprays into both nostrils daily    Seasonal allergic rhinitis, unspecified allergic rhinitis trigger       hydrochlorothiazide 25 MG tablet    HYDRODIURIL    90 tablet    Take 1 tablet (25 mg) by mouth daily    Benign essential hypertension       LORazepam 1 MG tablet    ATIVAN    30 tablet    TAKE ONE-HALF TO ONE TABLET BY MOUTH ONCE DAILY AS NEEDED FOR  ANXIETY    Generalized anxiety disorder       metoprolol 100  MG 24 hr tablet    TOPROL-XL    90 tablet    Take 1 tablet (100 mg) by mouth daily    Benign essential hypertension, LBBB (left bundle branch block)       NITROSTAT 0.4 MG sublingual tablet   Generic drug:  nitroGLYcerin     25 tablet    PLACE 1 TABLET UNDER THE TONGUE EVERY 5 MINUTES AS NEEDED. UP TO 3 TABLETS PER EPISODE    Abnormal stress test       oxyCODONE 5 MG IR tablet    ROXICODONE    120 tablet    Take 1-2 tablets (5-10 mg) by mouth 2 times daily as needed for breakthrough pain (Due for office visit please)    DDD (degenerative disc disease), lumbar       quinapril 40 MG Tab    ACCUPRIL    90 tablet    Take 1 tablet (40 mg) by mouth daily    Type 2 diabetes mellitus with diabetic nephropathy, without long-term current use of insulin (H), Benign essential hypertension       rosuvastatin 5 MG tablet    CRESTOR    30 tablet    Take 1/2 tablet at night twice a week on Mondays and Thursdays.    Hyperlipidemia LDL goal <70       traZODone 100 MG tablet    DESYREL    90 tablet    TAKE ONE TABLET BY MOUTH ONCE DAILY AT BEDTIME    Primary insomnia       tretinoin 0.1 % cream    RETIN-A    20 g    Apply sparingly to face at bedtime; do not apply near eyes    Wrinkles

## 2017-09-01 NOTE — PATIENT INSTRUCTIONS
Thanks for coming into [facility] today.    We discussed: that we'll try a very low dose of crestor/ rosuvastatin to help with your cholesterol.  Take 1/2 tablet on Monday and Thursday nights.  IF you get side effects like muscle aches please call.      Results: cholesterol is too high, kidney function looks good!  Component      Latest Ref Rng & Units 8/1/2017 9/1/2017   Sodium      136 - 145 mmol/L  140   Potassium      3.5 - 5.1 mmol/L  4.0   Chloride      98 - 107 mmol/L  101   Carbon Dioxide      23 - 29 mmol/L  29   Anion Gap      6 - 17 mmol/L  14   Glucose      70 - 105 mg/dL  139 (H)   Urea Nitrogen      7 - 30 mg/dL  20   Creatinine      0.70 - 1.30 mg/dL  1.25   GFR Estimate      >60 mL/min/1.7m2  41 (L)   GFR Estimate If Black      >60 mL/min/1.7m2  50 (L)   Calcium      8.5 - 10.5 mg/dL  9.8   Cholesterol      <200 mg/dL 233 (H)    Triglycerides      <150 mg/dL 331 (H)    HDL Cholesterol      >49 mg/dL 27 (L)    LDL Cholesterol Calculated      <100 mg/dL 140 (H)    Non HDL Cholesterol      <130 mg/dL 206 (H)    ALT      0 - 50 U/L 68 (H)      Follow up:cholesterol check in 2 months, and Dr. Cedillo in 3 months.     Please call my nurse at [phone] with any questions or concerns.    Scheduling phone number: [phone]  Reminder: Please bring in all current medications, over the counter supplements and vitamin bottles to your next appointment.

## 2017-09-18 DIAGNOSIS — M51.369 DDD (DEGENERATIVE DISC DISEASE), LUMBAR: Chronic | ICD-10-CM

## 2017-09-18 RX ORDER — OXYCODONE HYDROCHLORIDE 5 MG/1
5-10 TABLET ORAL 2 TIMES DAILY PRN
Qty: 120 TABLET | Refills: 0 | Status: SHIPPED | OUTPATIENT
Start: 2017-09-18 | End: 2017-10-26

## 2017-09-18 NOTE — TELEPHONE ENCOUNTER
Rx in outgoing fax box for her to come   Does need OV scheduled before more refills - please let her know to schedule when she picks up Rx

## 2017-09-18 NOTE — TELEPHONE ENCOUNTER
Fax received but no pharmacy name is listed - pended as Written Prescription Requested  Patient was notified on last refill 8-16-17 that OV was due, nothing is scheduled    Controlled Substance Refill Request for oxycodone  Problem List Complete:  Yes    Last Written Prescription Date:  8-16-17  Last Fill Quantity: 120,   # refills: 0    Last Office Visit with [facility] primary care provider: 2-7-17 Moreno    Clinic visit frequency required: Q 6  months     Future Office visit:   Next 5 appointments (look out 90 days)     Nov 28, 2017  1:45 PM CST   Return Visit with Nikita Cedillo MD   [facility] ([facility])    [address] [place] [address] [phone]                  Controlled substance agreement on file: Yes:  Date 6- Moreno.     Processing:  Patient will  in clinic     checked in past 6 months?  Yes 5-22-17  RT Jeremias (R)

## 2017-10-26 DIAGNOSIS — M51.369 DDD (DEGENERATIVE DISC DISEASE), LUMBAR: Chronic | ICD-10-CM

## 2017-10-27 RX ORDER — OXYCODONE HYDROCHLORIDE 5 MG/1
5-10 TABLET ORAL 2 TIMES DAILY PRN
Qty: 120 TABLET | Refills: 0 | Status: SHIPPED | OUTPATIENT
Start: 2017-10-27 | End: 2017-12-05

## 2017-10-27 NOTE — TELEPHONE ENCOUNTER
Future OV: 12/5/17      Controlled Substance Refill Request for Pending Prescriptions:                       Disp   Refills    oxyCODONE (ROXICODONE) 5 MG IR tablet     120 ta*0            Sig: Take 1-2 tablets (5-10 mg) by mouth 2 times daily           as needed for breakthrough pain (Due for office           visit please before more refills)      Problem List Complete:  Yes    Last Written Prescription Date:  9/18/17  Last Fill Quantity: 120,   # refills: 0    Last Office Visit with [facility] primary care provider: 2/7/17 Moreno    Clinic visit frequency required: Q 6  months     Future Office visit:   Next 5 appointments (look out 90 days)     Nov 28, 2017  1:45 PM CST   Return Visit with Nikita Cedillo MD   [facility] [place] ([facility])    [address] [place] [id number] [phone]            Dec 05, 2017 12:00 PM CST   PHYSICAL with Sandra Mayer,    [facility] ([address] [address] [phone]                  Controlled substance agreement on file: Yes:  Date 6/13/17.     Processing:  Patient will  in clinic     checked in past 6 months?  Yes 5/22/17  RT Jc(R)

## 2017-10-30 ENCOUNTER — TELEPHONE (OUTPATIENT)
Dept: FAMILY MEDICINE | Facility: CLINIC | Age: 77
End: 2017-10-30

## 2017-10-30 DIAGNOSIS — E78.5 HYPERLIPIDEMIA LDL GOAL <70: ICD-10-CM

## 2017-10-30 LAB
ALT SERPL W P-5'-P-CCNC: 84 U/L (ref 0–50)
CHOLEST SERPL-MCNC: 195 MG/DL
HDLC SERPL-MCNC: 27 MG/DL
LDLC SERPL CALC-MCNC: 112 MG/DL
NONHDLC SERPL-MCNC: 168 MG/DL
TRIGL SERPL-MCNC: 281 MG/DL

## 2017-10-30 PROCEDURE — 80061 LIPID PANEL: CPT | Performed by: PHYSICIAN ASSISTANT

## 2017-10-30 PROCEDURE — 36415 COLL VENOUS BLD VENIPUNCTURE: CPT | Performed by: PHYSICIAN ASSISTANT

## 2017-10-30 PROCEDURE — 84460 ALANINE AMINO (ALT) (SGPT): CPT | Performed by: PHYSICIAN ASSISTANT

## 2017-10-30 NOTE — TELEPHONE ENCOUNTER
Reason for Call:  Other Medication request      Detailed comments: Oxy   The patient has an appointment set up for 12/5 first available   But she still needs her medeication  Please call her and advise  The pharmacy called today    Phone Number Patient can be reached at: Home number on file [phone] (home)    Best Time: anytime    Can we leave a detailed message on this number? YES    Call taken on 10/30/2017 at 9:44 AM by Tasneem Hyde

## 2017-10-30 NOTE — TELEPHONE ENCOUNTER
Per documentation on 10/27/17, Rx is at  ready for .  Left another message on patient's VM with this information.  Judith Delgado RN

## 2017-11-01 ENCOUNTER — PRE VISIT (OUTPATIENT)
Dept: CARDIOLOGY | Facility: CLINIC | Age: 77
End: 2017-11-01

## 2017-11-06 ENCOUNTER — CARE COORDINATION (OUTPATIENT)
Dept: CARDIOLOGY | Facility: CLINIC | Age: 77
End: 2017-11-06

## 2017-11-06 DIAGNOSIS — E78.5 HYPERLIPIDEMIA LDL GOAL <70: ICD-10-CM

## 2017-11-06 NOTE — PROGRESS NOTES
Call attempted to reach patient to discuss Lipid/ALT results and Suri Morrison's recommendations: Cholesterol dropped a bunch with just twice weekly crestor!  LDL went from 140 to 112, total went from 233 to 195!  Great news.  I'd like her to try every other day dosing or even everyday of 2.5 mg if she can.  Repeat lipids and alt in 3 months.     Suri Morrison PA-C 11/6/2017 7:12 AM     Left message with call back number. Message sent to board that patient is to return call. Jamila Donnelly RN 2:19 PM 11/06/17

## 2017-11-08 DIAGNOSIS — F41.1 GENERALIZED ANXIETY DISORDER: Chronic | ICD-10-CM

## 2017-11-08 RX ORDER — LORAZEPAM 1 MG/1
TABLET ORAL
Qty: 30 TABLET | Refills: 0 | Status: SHIPPED | OUTPATIENT
Start: 2017-11-08 | End: 2017-12-05

## 2017-11-08 RX ORDER — LORAZEPAM 1 MG/1
TABLET ORAL
Qty: 30 TABLET | Refills: 0 | OUTPATIENT
Start: 2017-11-08

## 2017-11-09 NOTE — TELEPHONE ENCOUNTER
Pharmacy ordered 2 Lorazepam    Pending Prescriptions:                       Disp   Refills    LORazepam (ATIVAN) 1 MG tablet [Pharmacy *30 tab*0            Sig: TAKE 0.5-1 TABLETS BY MOUTH ONCE DAILY AS NEEDED           FOR ANXIETY    LORazepam (ATIVAN) 1 MG tablet [Pharmacy *30 tab*0            Sig: TAKE ONE HALF TO ONE TABLET BY MOUTH ONCE DAILY           AS NEEDED FOR  ANXIETY    LOV: 2/7/17 Moreno      Last Written Prescription Date:  9/7/17  Last Fill Quantity: 30,   # refills: 0  Future Office visit:    Next 5 appointments (look out 90 days)     Nov 28, 2017  1:45 PM CST   Return Visit with Nikita Cedillo MD   [facility] ([facility])    [address]  [facility] [address] [phone]            Dec 05, 2017 12:00 PM CST   PHYSICAL with Sandra Mayer DO   [facility]    [address] [address] [phone]                   Routing refill request to provider for review/approval because:  Drug not on the [facility], [facility] or [facility] refill protocol or controlled substance    Lisa Oliver, RT(R)

## 2017-11-16 RX ORDER — ROSUVASTATIN CALCIUM 5 MG/1
TABLET, COATED ORAL
Qty: 30 TABLET | Refills: 3 | COMMUNITY
Start: 2017-11-16 | End: 2017-12-28

## 2017-11-16 NOTE — PROGRESS NOTES
Received VM from pt requesting return call to review cholesterol lab results.      Called and spoke with pt.  Reviewed that EDI Pereira reviewed lipid panel done 10/30 showed improvement in LDL and total cholesterol on twice weekly crestor.  Also reviewed that Randall recommends pt try every other day dosing or even everyday dosing of 2.5mg crestor if she can and then repeat lipids and ALT in 3 months.  Pt verbalized understanding and agrees to increase crestor 2.5mg to every other day for one month and then if going well, will increased to 2.5mg daily. Med list updated to reflect 2.5mg every other day dosing.  Pt agrees to repeat FLP and ALT on 2/16/18 at 8:00am and she is aware to fast.  RADHA Painter 2:15 PM 11/16/2017

## 2017-11-21 ENCOUNTER — HOSPITAL ENCOUNTER (OUTPATIENT)
Dept: CARDIOLOGY | Facility: CLINIC | Age: 77
Discharge: HOME | End: 2017-11-21
Attending: INTERNAL MEDICINE | Admitting: INTERNAL MEDICINE
Payer: COMMERCIAL

## 2017-11-21 DIAGNOSIS — I44.7 LBBB (LEFT BUNDLE BRANCH BLOCK): ICD-10-CM

## 2017-11-21 DIAGNOSIS — E78.5 HYPERLIPIDEMIA LDL GOAL <70: ICD-10-CM

## 2017-11-21 DIAGNOSIS — I10 BENIGN ESSENTIAL HYPERTENSION: ICD-10-CM

## 2017-11-21 PROCEDURE — 93306 TTE W/DOPPLER COMPLETE: CPT

## 2017-11-21 PROCEDURE — 93306 TTE W/DOPPLER COMPLETE: CPT | Mod: 26 | Performed by: INTERNAL MEDICINE

## 2017-11-28 ENCOUNTER — OFFICE VISIT (OUTPATIENT)
Dept: CARDIOLOGY | Facility: CLINIC | Age: 77
End: 2017-11-28
Attending: PHYSICIAN ASSISTANT
Payer: COMMERCIAL

## 2017-11-28 VITALS
BODY MASS INDEX: 30.2 KG/M2 | HEART RATE: 58 BPM | DIASTOLIC BLOOD PRESSURE: 76 MMHG | SYSTOLIC BLOOD PRESSURE: 144 MMHG | WEIGHT: 187.9 LBS | HEIGHT: 66 IN

## 2017-11-28 DIAGNOSIS — I10 BENIGN ESSENTIAL HYPERTENSION: ICD-10-CM

## 2017-11-28 DIAGNOSIS — I44.7 LBBB (LEFT BUNDLE BRANCH BLOCK): Primary | ICD-10-CM

## 2017-11-28 DIAGNOSIS — E78.5 HYPERLIPIDEMIA LDL GOAL <70: ICD-10-CM

## 2017-11-28 DIAGNOSIS — I44.7 LBBB (LEFT BUNDLE BRANCH BLOCK): ICD-10-CM

## 2017-11-28 LAB
ANION GAP SERPL CALCULATED.3IONS-SCNC: 10.3 MMOL/L (ref 6–17)
BUN SERPL-MCNC: 16 MG/DL (ref 7–30)
CALCIUM SERPL-MCNC: 9.6 MG/DL (ref 8.5–10.5)
CHLORIDE SERPL-SCNC: 100 MMOL/L (ref 98–107)
CO2 SERPL-SCNC: 32 MMOL/L (ref 23–29)
CREAT SERPL-MCNC: 1.1 MG/DL (ref 0.7–1.3)
GFR SERPL CREATININE-BSD FRML MDRD: 48 ML/MIN/1.7M2
GLUCOSE SERPL-MCNC: 120 MG/DL (ref 70–105)
POTASSIUM SERPL-SCNC: 4.3 MMOL/L (ref 3.5–5.1)
SODIUM SERPL-SCNC: 138 MMOL/L (ref 136–145)

## 2017-11-28 PROCEDURE — 80048 BASIC METABOLIC PNL TOTAL CA: CPT | Performed by: INTERNAL MEDICINE

## 2017-11-28 PROCEDURE — 36415 COLL VENOUS BLD VENIPUNCTURE: CPT | Performed by: INTERNAL MEDICINE

## 2017-11-28 PROCEDURE — 99214 OFFICE O/P EST MOD 30 MIN: CPT | Performed by: INTERNAL MEDICINE

## 2017-11-28 NOTE — MR AVS SNAPSHOT
After Visit Summary   11/28/2017    Dimple Baxter    MRN: [id number]           Patient Information     Date Of Birth          1940        Visit Information        Provider Department      11/28/2017 1:45 PM Nikita Cedillo MD [facility]        Today's Diagnoses     LBBB (left bundle branch block)    -  1    Benign essential hypertension        Hyperlipidemia LDL goal <70           Follow-ups after your visit        Additional Services     Follow-Up with Cardiac Advanced Practice Provider           Follow-Up with Cardiologist                 Your next 10 appointments already scheduled     Dec 05, 2017 12:00 PM CST   PHYSICAL with Sandra Mayer, DO   [facility]    [address] [facility] [address] [address] [phone]              Future tests that were ordered for you today     Open Future Orders        Priority Expected Expires Ordered    Basic metabolic panel Routine 3/28/2018 11/28/2018 11/28/2017    Follow-Up with Cardiologist Routine 3/28/2018 11/28/2018 11/28/2017    Basic metabolic panel Routine 12/28/2017 11/28/2018 11/28/2017    Lipid Profile Routine 12/28/2017 11/28/2018 11/28/2017    ALT Routine 12/28/2017 11/28/2018 11/28/2017    Follow-Up with Cardiac Advanced Practice Provider Routine 12/28/2017 11/28/2018 11/28/2017            Who to contact     If you have questions or need follow up information about today's clinic visit or your schedule please contact [facility] directly at [phone].  Normal or non-critical lab and imaging results will be communicated to you by MyChart, letter or phone within 4 business days after the clinic has received the results. If you do not hear from us within 7 days, please contact the clinic through MyChart or phone. If you have a critical or abnormal lab result, we will notify you by phone as soon as possible.  Submit  "refill requests through [employer] or call your pharmacy and they will forward the refill request to us. Please allow 3 business days for your refill to be completed.          Additional Information About Your Visit        [employer] Information     [employer] lets you send messages to your doctor, view your test results, renew your prescriptions, schedule appointments and more. To sign up, go to [email or web address] . Click on \"Log in\" on the left side of the screen, which will take you to the Welcome page. Then click on \"Sign up Now\" on the right side of the page.     You will be asked to enter the access code listed below, as well as some personal information. Please follow the directions to create your username and password.     Your access code is: [email or web address]  Expires: 2018  2:19 PM     Your access code will  in 90 days. If you need help or a new code, please call your [facility] clinic or [phone].        Care EveryWhere ID     This is your Care EveryWhere ID. This could be used by other organizations to access your [facility] medical records  [id number]        Your Vitals Were     Pulse Height BMI (Body Mass Index)             58 1.676 m (5' 6\") 30.33 kg/m2          Blood Pressure from Last 3 Encounters:   17 144/76   17 122/68   17 165/78    Weight from Last 3 Encounters:   17 85.2 kg (187 lb 14.4 oz)   17 84.9 kg (187 lb 1.6 oz)   17 84.4 kg (186 lb)              We Performed the Following     Follow-Up with Cardiologist        Primary Care Provider Office Phone # Fax #    Sandra Brunilda Mayer,  [phone] [address] [address]        Equal Access to Services     CLARI VEGA : Erik leono Socuco, waaxda luqadaha, qaybta kaalmada adeegyajohn, yvette rfausto. [facility] [phone].    ATENCIÓN: Si habla español, tiene a estrada disposición servicios gratuitos de asistencia lingüística. Llame al " [phone].    We comply with applicable federal civil rights laws and [place] laws. We do not discriminate on the basis of race, color, national origin, age, disability, sex, sexual orientation, or gender identity.            Thank you!     Thank you for choosing [facility]  for your care. Our goal is always to provide you with excellent care. Hearing back from our patients is one way we can continue to improve our services. Please take a few minutes to complete the written survey that you may receive in the mail after your visit with us. Thank you!             Your Updated Medication List - Protect others around you: Learn how to safely use, store and throw away your medicines at www.disposemymeds.org.          This list is accurate as of: 11/28/17  2:19 PM.  Always use your most recent med list.                   Brand Name Dispense Instructions for use Diagnosis    acetaminophen 325 MG tablet    TYLENOL     Take 1-2 tablets by mouth every 6 hours as needed for pain.    Lumbago       amLODIPine 5 MG tablet    NORVASC    90 tablet    Take 1 tablet (5 mg) by mouth daily    Benign essential hypertension       aspirin 81 MG tablet     0    take 1 x daily with food        cholecalciferol 1000 UNIT tablet    vitamin D3    100 tablet    Take 1 tablet (1,000 Units) by mouth daily    Postmenopausal       CRESTOR 5 MG tablet   Generic drug:  rosuvastatin     30 tablet    Take 1/2 tablet at night every other day.    Hyperlipidemia LDL goal <70       escitalopram 20 MG tablet    LEXAPRO    90 tablet    Take 1 tablet (20 mg) by mouth daily    Generalized anxiety disorder       famotidine 20 MG tablet    PEPCID     Take 1 tablet by mouth 2 times daily as needed.        fish oil-omega-3 fatty acids 1000 MG capsule     0    take three daily    Other and unspecified hyperlipidemia       fluticasone 50 MCG/ACT spray    FLONASE    16 g    Spray 2 sprays into both nostrils daily    Seasonal  allergic rhinitis, unspecified allergic rhinitis trigger       hydrochlorothiazide 25 MG tablet    HYDRODIURIL    90 tablet    Take 1 tablet (25 mg) by mouth daily    Benign essential hypertension       LORazepam 1 MG tablet    ATIVAN    30 tablet    TAKE 0.5-1 TABLETS BY MOUTH ONCE DAILY AS NEEDED FOR ANXIETY    Generalized anxiety disorder       metoprolol 100 MG 24 hr tablet    TOPROL-XL    90 tablet    Take 1 tablet (100 mg) by mouth daily    Benign essential hypertension, LBBB (left bundle branch block)       NITROSTAT 0.4 MG sublingual tablet   Generic drug:  nitroGLYcerin     25 tablet    PLACE 1 TABLET UNDER THE TONGUE EVERY 5 MINUTES AS NEEDED. UP TO 3 TABLETS PER EPISODE    Abnormal stress test       oxyCODONE IR 5 MG tablet    ROXICODONE    120 tablet    Take 1-2 tablets (5-10 mg) by mouth 2 times daily as needed for breakthrough pain (Due for office visit please before more refills)    DDD (degenerative disc disease), lumbar       quinapril 40 MG Tab    ACCUPRIL    90 tablet    Take 1 tablet (40 mg) by mouth daily    Type 2 diabetes mellitus with diabetic nephropathy, without long-term current use of insulin (H), Benign essential hypertension       traZODone 100 MG tablet    DESYREL    90 tablet    TAKE ONE TABLET BY MOUTH ONCE DAILY AT BEDTIME    Primary insomnia       tretinoin 0.1 % cream    RETIN-A    20 g    Apply sparingly to face at bedtime; do not apply near eyes    Wrinkles

## 2017-11-28 NOTE — PROGRESS NOTES
HISTORY OF PRESENT ILLNESS:  The patient is a 77-year-old overweight white female who presents to Cardiology Clinic for followup electrocardiogram showing left bundle branch block pattern, hypertension and hyperlipidemia.  She initially had an abnormal Cardiolite stress test followed by an adenosine Cardiolite stress test in 2010 that was negative.  There was questionable history of coronary disease but negative family history of coronary disease.  Risk factors positive for hypertension, borderline diabetes mellitus, positive for hyperlipidemia, negative for recent cigarette smoking.      Since last clinic visit, she has apparently done well.  She has had no significant symptoms of chest discomfort, shortness of breath, dizziness, palpitations, nausea, vomiting, diaphoresis or syncope.  She denies PND, orthopnea, fevers, chills or sweats.  She has no documented history of myocardial infarction, congestive heart failure, congenital heart disease or heart murmur.  She does have occasional lightheadedness if she moves too quickly.  Blood pressure control has been problematic at times requiring multiple adjustments of medications.      Echocardiography was just done prior to this visit and showed intact left ventricular function, ejection fraction 60-65%, normal size right ventricle in size and function, mildly dilated left atrium, mild tricuspid insufficiency and no significant change from previous study.      MEDICATIONS:   1.  Rosuvastatin 2.5 mg every other day.   2.  Lorazepam 0.5 to 1.0 mg as needed.   3.  Oxycodone as needed.      4.  Amlodipine 5 mg a day.   5.  Nitroglycerin 0.4 mg sublingually p.r.n.   6.  Lexapro 20 mg a day.   7.  Hydrochlorothiazide 25 mg a day.   8.  Metoprolol  mg a day.   9.  Trazodone 100 mg at bedtime.   10.  Flonase inhaler as directed.   11.  Quinapril 40 mg a day.   12.  Vitamin D 1000 units a day.   13.  Famotidine 20 mg twice a day.     14.  Acetaminophen 650 mg every 6  hours as needed.   15.  Fish oil 1000 mg 3 times daily.   16.  Aspirin 81 mg a day.      LABORATORY DATA:  Demonstrated cholesterol 195, HDL 27, , triglycerides 281.  Sodium 138, potassium 4.3, BUN 16, creatinine 1.10.  ALT is 84.  It should be noted that these labs were done in October when she was apparently ill with an upper respiratory tract infection and not feeling well and not taking in her normal diet with her medications.        She has recovered from that illness and is feeling better at this time.  Denies symptoms of significant chest discomfort, shortness of breath, palpitations, dizziness or syncope.  She has had isolated epigastric discomfort that is relieved by antacids and Pepcid.      PHYSICAL EXAMINATION:   VITAL SIGNS:  Blood pressure 144/76, her heart rate 58 and regular, weight was 187 pounds which is stable.   NECK:  Without jugular venous distention, carotid bruit or palpable thyroid.   CHEST:  Essentially clear to percussion and auscultation with decreased breath sounds at the bases.   CARDIAC:  Regular rhythm, soft S4 gallop, 1/6 systolic murmur at the left sternal border with minimal radiation.  No diastolic murmur, rub or S3.   EXTREMITIES:  Without cyanosis or edema.      CLINICAL IMPRESSION:   1.  Stable cardiac condition.   2.  Abnormal electrocardiogram showing left bundle branch block pattern.   3.  Hypertension with slight elevation of systolic blood pressure.   4.  Hyperlipidemia, improved but not at goal, because of expense of Zetia.   5.  Overweight.      DISCUSSION:  The patient has done reasonably well since last clinic visit.  Blood pressure is improved but not as well controlled as one may want.  She still cannot afford the Zetia at this time even with it having recently gone generic.  She will continue to avoid caffeine and salt.  She would need close followup of serum lipids, basic metabolic panel and blood pressure.  Otherwise, she appears to be reasonably stable and  doing well.      RECOMMENDATIONS:   1.  Continue present medications.   2.  Close followup of serum lipids, basic metabolic panel and blood pressure with follow up with Suri Morrison in 1-2 months.   3.  Diet and exercise, avoiding caffeine and salt.   4.  Diabetic followup.   5.  Routine medical followup.   6.  Cardiology followup in 4-6 months.      cc:      Sandra Mayer, [facility]   [address]   [place] [address]         PRADIP ROONEY MD, [facility]             D: 2017 15:14   T: 2017 16:48   MT: GERBER      Name:     ALEJANDRO GAMEZ   MRN:      [id number]        Account:      [id number]   :      1940           Service Date: 2017      Document: [id number]

## 2017-11-28 NOTE — LETTER
11/28/2017    Sandra Worley Mayer, DO  [address]  [facility] [address]    RE: Dimple Baxter       Dear Colleague,    I had the pleasure of seeing Dimple Baxter in the [facility].    The patient is a 77-year-old overweight white female who presents to Cardiology Clinic for followup electrocardiogram showing left bundle branch block pattern, hypertension and hyperlipidemia.  She initially had an abnormal Cardiolite stress test followed by an adenosine Cardiolite stress test in 2010 that was negative.  There was questionable history of coronary disease but negative family history of coronary disease.  Risk factors positive for hypertension, borderline diabetes mellitus, positive for hyperlipidemia, negative for recent cigarette smoking.      Since last clinic visit, she has apparently done well.  She has had no significant symptoms of chest discomfort, shortness of breath, dizziness, palpitations, nausea, vomiting, diaphoresis or syncope.  She denies PND, orthopnea, fevers, chills or sweats.  She has no documented history of myocardial infarction, congestive heart failure, congenital heart disease or heart murmur.  She does have occasional lightheadedness if she moves too quickly.  Blood pressure control has been problematic at times requiring multiple adjustments of medications.      Echocardiography was just done prior to this visit and showed intact left ventricular function, ejection fraction 60-65%, normal size right ventricle in size and function, mildly dilated left atrium, mild tricuspid insufficiency and no significant change from previous study.      MEDICATIONS:   1.  Rosuvastatin 2.5 mg every other day.   2.  Lorazepam 0.5 to 1.0 mg as needed.   3.  Oxycodone as needed.      4.  Amlodipine 5 mg a day.   5.  Nitroglycerin 0.4 mg sublingually p.r.n.   6.  Lexapro 20 mg a day.   7.  Hydrochlorothiazide 25 mg a day.   8.  Metoprolol  mg a day.   9.  Trazodone 100  mg at bedtime.   10.  Flonase inhaler as directed.   11.  Quinapril 40 mg a day.   12.  Vitamin D 1000 units a day.   13.  Famotidine 20 mg twice a day.     14.  Acetaminophen 650 mg every 6 hours as needed.   15.  Fish oil 1000 mg 3 times daily.   16.  Aspirin 81 mg a day.      LABORATORY DATA:  Demonstrated cholesterol 195, HDL 27, , triglycerides 281.  Sodium 138, potassium 4.3, BUN 16, creatinine 1.10.  ALT is 84.  It should be noted that these labs were done in October when she was apparently ill with an upper respiratory tract infection and not feeling well and not taking in her normal diet with her medications.        She has recovered from that illness and is feeling better at this time.  Denies symptoms of significant chest discomfort, shortness of breath, palpitations, dizziness or syncope.  She has had isolated epigastric discomfort that is relieved by antacids and Pepcid.      PHYSICAL EXAMINATION:   VITAL SIGNS:  Blood pressure 144/76, her heart rate 58 and regular, weight was 187 pounds which is stable.   NECK:  Without jugular venous distention, carotid bruit or palpable thyroid.   CHEST:  Essentially clear to percussion and auscultation with decreased breath sounds at the bases.   CARDIAC:  Regular rhythm, soft S4 gallop, 1/6 systolic murmur at the left sternal border with minimal radiation.  No diastolic murmur, rub or S3.   EXTREMITIES:  Without cyanosis or edema.      CLINICAL IMPRESSION:   1.  Stable cardiac condition.   2.  Abnormal electrocardiogram showing left bundle branch block pattern.   3.  Hypertension with slight elevation of systolic blood pressure.   4.  Hyperlipidemia, improved but not at goal, because of expense of Zetia.   5.  Overweight.      DISCUSSION:  The patient has done reasonably well since last clinic visit.  Blood pressure is improved but not as well controlled as one may want.  She still cannot afford the Zetia at this time even with it having recently gone  generic.  She will continue to avoid caffeine and salt.  She would need close followup of serum lipids, basic metabolic panel and blood pressure.  Otherwise, she appears to be reasonably stable and doing well.      RECOMMENDATIONS:   1.  Continue present medications.   2.  Close followup of serum lipids, basic metabolic panel and blood pressure with follow up with Suri Morrison in 1-2 months.   3.  Diet and exercise, avoiding caffeine and salt.   4.  Diabetic followup.   5.  Routine medical followup.   6.  Cardiology followup in 4-6 months.          Again, thank you for allowing me to participate in the care of your patient.      Sincerely,    Nikita Cedillo MD     [facility]

## 2017-12-05 ENCOUNTER — OFFICE VISIT (OUTPATIENT)
Dept: FAMILY MEDICINE | Facility: CLINIC | Age: 77
End: 2017-12-05
Payer: COMMERCIAL

## 2017-12-05 VITALS
OXYGEN SATURATION: 96 % | HEIGHT: 66 IN | HEART RATE: 54 BPM | SYSTOLIC BLOOD PRESSURE: 126 MMHG | WEIGHT: 183 LBS | BODY MASS INDEX: 29.41 KG/M2 | TEMPERATURE: 98.3 F | DIASTOLIC BLOOD PRESSURE: 72 MMHG

## 2017-12-05 DIAGNOSIS — F41.1 GENERALIZED ANXIETY DISORDER: Chronic | ICD-10-CM

## 2017-12-05 DIAGNOSIS — F51.01 PRIMARY INSOMNIA: Chronic | ICD-10-CM

## 2017-12-05 DIAGNOSIS — M51.369 DDD (DEGENERATIVE DISC DISEASE), LUMBAR: Primary | Chronic | ICD-10-CM

## 2017-12-05 DIAGNOSIS — M25.561 RIGHT KNEE PAIN, UNSPECIFIED CHRONICITY: ICD-10-CM

## 2017-12-05 DIAGNOSIS — I10 BENIGN ESSENTIAL HYPERTENSION: ICD-10-CM

## 2017-12-05 DIAGNOSIS — E11.21 TYPE 2 DIABETES MELLITUS WITH DIABETIC NEPHROPATHY, WITHOUT LONG-TERM CURRENT USE OF INSULIN (H): Chronic | ICD-10-CM

## 2017-12-05 PROCEDURE — 99214 OFFICE O/P EST MOD 30 MIN: CPT | Performed by: INTERNAL MEDICINE

## 2017-12-05 RX ORDER — QUINAPRIL 40 MG/1
40 TABLET ORAL DAILY
Qty: 90 TABLET | Refills: 3 | Status: SHIPPED | OUTPATIENT
Start: 2017-12-05 | End: 2018-01-04

## 2017-12-05 RX ORDER — OXYCODONE HYDROCHLORIDE 5 MG/1
5-10 TABLET ORAL 2 TIMES DAILY PRN
Qty: 120 TABLET | Refills: 0 | Status: SHIPPED | OUTPATIENT
Start: 2017-12-05 | End: 2018-01-04

## 2017-12-05 RX ORDER — TRAZODONE HYDROCHLORIDE 100 MG/1
TABLET ORAL
Qty: 90 TABLET | Refills: 3 | Status: SHIPPED | OUTPATIENT
Start: 2017-12-05 | End: 2019-02-08

## 2017-12-05 RX ORDER — LORAZEPAM 1 MG/1
TABLET ORAL
Qty: 30 TABLET | Refills: 0 | Status: SHIPPED | OUTPATIENT
Start: 2017-12-05 | End: 2018-04-17

## 2017-12-05 ASSESSMENT — ANXIETY QUESTIONNAIRES
GAD7 TOTAL SCORE: 2
1. FEELING NERVOUS, ANXIOUS, OR ON EDGE: NOT AT ALL
6. BECOMING EASILY ANNOYED OR IRRITABLE: NOT AT ALL
7. FEELING AFRAID AS IF SOMETHING AWFUL MIGHT HAPPEN: NOT AT ALL
2. NOT BEING ABLE TO STOP OR CONTROL WORRYING: SEVERAL DAYS
3. WORRYING TOO MUCH ABOUT DIFFERENT THINGS: SEVERAL DAYS
5. BEING SO RESTLESS THAT IT IS HARD TO SIT STILL: NOT AT ALL
IF YOU CHECKED OFF ANY PROBLEMS ON THIS QUESTIONNAIRE, HOW DIFFICULT HAVE THESE PROBLEMS MADE IT FOR YOU TO DO YOUR WORK, TAKE CARE OF THINGS AT HOME, OR GET ALONG WITH OTHER PEOPLE: NOT DIFFICULT AT ALL

## 2017-12-05 ASSESSMENT — PATIENT HEALTH QUESTIONNAIRE - PHQ9
5. POOR APPETITE OR OVEREATING: NOT AT ALL
SUM OF ALL RESPONSES TO PHQ QUESTIONS 1-9: 3

## 2017-12-05 NOTE — MR AVS SNAPSHOT
After Visit Summary   12/5/2017    Dimple Baxter    MRN: [id number]           Patient Information     Date Of Birth          1940        Visit Information        Provider Department      12/5/2017 12:00 PM Sandra Mayer,  [facility]        Today's Diagnoses     DDD (degenerative disc disease), lumbar    -  1    Right knee pain, unspecified chronicity        Generalized anxiety disorder        Type 2 diabetes mellitus with diabetic nephropathy, without long-term current use of insulin (H)        Benign essential hypertension        Primary insomnia          Care Instructions    I handed you the Oxycodone and Lorazepam scripts  Call for refills  TRY to cut the Lorazepam in half to see if that is still as effective as the whole pill  Continue going to [facility] for activity  If your knee gets worse, call us for an orthopedic referral to consider a cortisone injection  Topical pain therapy can help your hands as needed (example: IcyHot or BenGay); occasionally an Ibuprofen(Advil) or Aleve(Naproxen) can be taken if significant pain but that can have an affect on kidneys, stomach lining and blood pressure  Ask with your cardiology labs to have MY LABS drawn too  Follow up in 6 months or sooner as needed          Follow-ups after your visit        Your next 10 appointments already scheduled     Dec 28, 2017  2:10 PM CST   LAB with [facility]   [facility] [place] ([facility])    [address] [place] [address] [phone]           Please do not eat 10-12 hours before your appointment if you are coming in fasting for labs on lipids, cholesterol, or glucose (sugar). This does not apply to pregnant women. Water, hot tea and black coffee (with nothing added) are okay. Do not drink other fluids, diet soda or chew gum.            Dec 28, 2017  3:10 PM CST   Return Visit with Suri Morrison PA-C   [facility]  "[facility] ([facility])    [address] [place], [place] [id number]  [place] [address] [phone]              Future tests that were ordered for you today     Open Future Orders        Priority Expected Expires Ordered    HEMOGLOBIN A1C Routine 2017    Albumin Random Urine Quantitative with Creat Ratio Routine 2017            Who to contact     If you have questions or need follow up information about today's clinic visit or your schedule please contact [facility] directly at [phone].  Normal or non-critical lab and imaging results will be communicated to you by [employer], letter or phone within 4 business days after the clinic has received the results. If you do not hear from us within 7 days, please contact the clinic through [employer] or phone. If you have a critical or abnormal lab result, we will notify you by phone as soon as possible.  Submit refill requests through [employer] or call your pharmacy and they will forward the refill request to us. Please allow 3 business days for your refill to be completed.          Additional Information About Your Visit        [employer] Information     [employer] lets you send messages to your doctor, view your test results, renew your prescriptions, schedule appointments and more. To sign up, go to [email or web address] . Click on \"Log in\" on the left side of the screen, which will take you to the Welcome page. Then click on \"Sign up Now\" on the right side of the page.     You will be asked to enter the access code listed below, as well as some personal information. Please follow the directions to create your username and password.     Your access code is: [email or web address]  Expires: 2018  2:19 PM     Your access code will  in 90 days. If you need help or a new code, please call your [facility] or [phone].        Care EveryWhere ID     This is your Care EveryWhere ID. This could be " "used by other organizations to access your [facility] medical records  [id number]        Your Vitals Were     Pulse Temperature Height Pulse Oximetry BMI (Body Mass Index)       54 98.3  F (36.8  C) (Oral) 5' 6\" (1.676 m) 96% 29.54 kg/m2        Blood Pressure from Last 3 Encounters:   12/05/17 126/72   11/28/17 144/76   09/01/17 122/68    Weight from Last 3 Encounters:   12/05/17 183 lb (83 kg)   11/28/17 187 lb 14.4 oz (85.2 kg)   09/01/17 187 lb 1.6 oz (84.9 kg)              We Performed the Following     DEPRESSION ACTION PLAN (DAP)          Today's Medication Changes          These changes are accurate as of: 12/5/17 12:51 PM.  If you have any questions, ask your nurse or doctor.               These medicines have changed or have updated prescriptions.        Dose/Directions    LORazepam 1 MG tablet   Commonly known as:  ATIVAN   This may have changed:  See the new instructions.   Used for:  Generalized anxiety disorder   Changed by:  Sandra Mayer DO        TAKE 0.5-1 TABLETS BY MOUTH ONCE DAILY AS NEEDED FOR ANXIETY   Quantity:  30 tablet   Refills:  0       oxyCODONE IR 5 MG tablet   Commonly known as:  ROXICODONE   This may have changed:  reasons to take this   Used for:  DDD (degenerative disc disease), lumbar   Changed by:  Sandra Mayer DO        Dose:  5-10 mg   Take 1-2 tablets (5-10 mg) by mouth 2 times daily as needed for breakthrough pain   Quantity:  120 tablet   Refills:  0            Where to get your medicines      These medications were sent to [facility] [address] [place] [address] [id number] [place] [address]  [id number] [place] [address]     Phone:  [phone]     quinapril 40 MG Tab    traZODone 100 MG tablet         Some of these will need a paper prescription and others can be bought over the counter.  Ask your nurse if you have questions.     Bring a paper prescription for each of these medications     LORazepam 1 MG tablet    oxyCODONE IR 5 MG tablet             "    Primary Care Provider Office Phone # Fax #    Sandra Mayer [phone] [address] [facility] [address]  [facility] [address]        Equal Access to Services     CLARI VEGA : Hadii martha ku hadrubyo Soomaali, waaxda luqadaha, qaybta kaalmada adeegyada, yvette alfonson grady tovar laIvettesuman frausto. So [facility] [phone].    ATENCIÓN: Si habla español, tiene a estrada disposición servicios gratuitos de asistencia lingüística. Llame al [phone].    We comply with applicable federal civil rights laws and [place] laws. We do not discriminate on the basis of race, color, national origin, age, disability, sex, sexual orientation, or gender identity.            Thank you!     Thank you for choosing [facility]  for your care. Our goal is always to provide you with excellent care. Hearing back from our patients is one way we can continue to improve our services. Please take a few minutes to complete the written survey that you may receive in the mail after your visit with us. Thank you!             Your Updated Medication List - Protect others around you: Learn how to safely use, store and throw away your medicines at www.disposemymeds.org.          This list is accurate as of: 12/5/17 12:51 PM.  Always use your most recent med list.                   Brand Name Dispense Instructions for use Diagnosis    acetaminophen 325 MG tablet    TYLENOL     Take 1-2 tablets by mouth every 6 hours as needed for pain.    Lumbago       amLODIPine 5 MG tablet    NORVASC    90 tablet    Take 1 tablet (5 mg) by mouth daily    Benign essential hypertension       aspirin 81 MG tablet     0    take 1 x daily with food        cholecalciferol 1000 UNIT tablet    vitamin D3    100 tablet    Take 1 tablet (1,000 Units) by mouth daily    Postmenopausal       CRESTOR 5 MG tablet   Generic drug:  rosuvastatin     30 tablet    Take 1/2 tablet at night every other day.    Hyperlipidemia LDL goal <70       escitalopram 20 MG  tablet    LEXAPRO    90 tablet    Take 1 tablet (20 mg) by mouth daily    Generalized anxiety disorder       famotidine 20 MG tablet    PEPCID     Take 1 tablet by mouth 2 times daily as needed.        fish oil-omega-3 fatty acids 1000 MG capsule     0    take three daily    Other and unspecified hyperlipidemia       fluticasone 50 MCG/ACT spray    FLONASE    16 g    Spray 2 sprays into both nostrils daily    Seasonal allergic rhinitis, unspecified allergic rhinitis trigger       hydrochlorothiazide 25 MG tablet    HYDRODIURIL    90 tablet    Take 1 tablet (25 mg) by mouth daily    Benign essential hypertension       LORazepam 1 MG tablet    ATIVAN    30 tablet    TAKE 0.5-1 TABLETS BY MOUTH ONCE DAILY AS NEEDED FOR ANXIETY    Generalized anxiety disorder       metoprolol 100 MG 24 hr tablet    TOPROL-XL    90 tablet    Take 1 tablet (100 mg) by mouth daily    Benign essential hypertension, LBBB (left bundle branch block)       NITROSTAT 0.4 MG sublingual tablet   Generic drug:  nitroGLYcerin     25 tablet    PLACE 1 TABLET UNDER THE TONGUE EVERY 5 MINUTES AS NEEDED. UP TO 3 TABLETS PER EPISODE    Abnormal stress test       oxyCODONE IR 5 MG tablet    ROXICODONE    120 tablet    Take 1-2 tablets (5-10 mg) by mouth 2 times daily as needed for breakthrough pain    DDD (degenerative disc disease), lumbar       quinapril 40 MG Tab    ACCUPRIL    90 tablet    Take 1 tablet (40 mg) by mouth daily    Type 2 diabetes mellitus with diabetic nephropathy, without long-term current use of insulin (H), Benign essential hypertension       traZODone 100 MG tablet    DESYREL    90 tablet    TAKE ONE TABLET BY MOUTH ONCE DAILY AT BEDTIME    Primary insomnia       tretinoin 0.1 % cream    RETIN-A    20 g    Apply sparingly to face at bedtime; do not apply near eyes    Wrinkles

## 2017-12-05 NOTE — NURSING NOTE
"Chief Complaint   Patient presents with     Follow Up For     Chronic pain syndrome (Chronic), Hypertension, Hyperlipidemia        Initial /74 (BP Location: Right arm, Cuff Size: Adult Large)  Pulse 54  Temp 98.3  F (36.8  C) (Oral)  Ht 5' 6\" (1.676 m)  Wt 183 lb (83 kg)  SpO2 96%  BMI 29.54 kg/m2 Estimated body mass index is 29.54 kg/(m^2) as calculated from the following:    Height as of this encounter: 5' 6\" (1.676 m).    Weight as of this encounter: 183 lb (83 kg).  Medication Reconciliation: complete       Glory Christopher CMA      "

## 2017-12-05 NOTE — LETTER
My Depression Action Plan  Name: Dimple Baxter   Date of Birth 1940  Date: 12/5/2017    My doctor: Sandra Mayer   My clinic: [address] [address] [phone]          GREEN    ZONE   Good Control    What it looks like:     Things are going generally well. You have normal up s and down s. You may even feel depressed from time to time, but bad moods usually last less than a day.   What you need to do:  1. Continue to care for yourself (see self care plan)  2. Check your depression survival kit and update it as needed  3. Follow your physician s recommendations including any medication.  4. Do not stop taking medication unless you consult with your physician first.           YELLOW         ZONE Getting Worse    What it looks like:     Depression is starting to interfere with your life.     It may be hard to get out of bed; you may be starting to isolate yourself from others.    Symptoms of depression are starting to last most all day and this has happened for several days.     You may have suicidal thoughts but they are not constant.   What you need to do:     1. Call your care team, your response to treatment will improve if you keep your care team informed of your progress. Yellow periods are signs an adjustment may need to be made.     2. Continue your self-care, even if you have to fake it!    3. Talk to someone in your support network    4. Open up your depression survival kit           RED    ZONE Medical Alert - Get Help    What it looks like:     Depression is seriously interfering with your life.     You may experience these or other symptoms: You can t get out of bed most days, can t work or engage in other necessary activities, you have trouble taking care of basic hygiene, or basic responsibilities, thoughts of suicide or death that will not go away, self-injurious behavior.     What you need to do:  1. Call your care team and request  a same-day appointment. If they are not available (weekends or after hours) call your local crisis line, emergency room or 911.      Electronically signed by: Sandra Mayer, December 5, 2017    Depression Self Care Plan / Survival Kit    Self-Care for Depression  Here s the deal. Your body and mind are really not as separate as most people think.  What you do and think affects how you feel and how you feel influences what you do and think. This means if you do things that people who feel good do, it will help you feel better.  Sometimes this is all it takes.  There is also a place for medication and therapy depending on how severe your depression is, so be sure to consult with your medical provider and/ or Behavioral Health Consultant if your symptoms are worsening or not improving.     In order to better manage my stress, I will:    Exercise  Get some form of exercise, every day. This will help reduce pain and release endorphins, the  feel good  chemicals in your brain. This is almost as good as taking antidepressants!  This is not the same as joining a gym and then never going! (they count on that by the way ) It can be as simple as just going for a walk or doing some gardening, anything that will get you moving.      Hygiene   Maintain good hygiene (Get out of bed in the morning, Make your bed, Brush your teeth, Take a shower, and Get dressed like you were going to work, even if you are unemployed).  If your clothes don't fit try to get ones that do.    Diet  I will strive to eat foods that are good for me, drink plenty of water, and avoid excessive sugar, caffeine, alcohol, and other mood-altering substances.  Some foods that are helpful in depression are: complex carbohydrates, B vitamins, flaxseed, fish or fish oil, fresh fruits and vegetables.    Psychotherapy  I agree to participate in Individual Therapy (if recommended).    Medication  If prescribed medications, I agree to take them.  Missing doses can  result in serious side effects.  I understand that drinking alcohol, or other illicit drug use, may cause potential side effects.  I will not stop my medication abruptly without first discussing it with my provider.    Staying Connected With Others  I will stay in touch with my friends, family members, and my primary care provider/team.    Use your imagination  Be creative.  We all have a creative side; it doesn t matter if it s oil painting, sand castles, or mud pies! This will also kick up the endorphins.    Witness Beauty  (AKA stop and smell the roses) Take a look outside, even in mid-winter. Notice colors, textures. Watch the squirrels and birds.     Service to others  Be of service to others.  There is always someone else in need.  By helping others we can  get out of ourselves  and remember the really important things.  This also provides opportunities for practicing all the other parts of the program.    Humor  Laugh and be silly!  Adjust your TV habits for less news and crime-drama and more comedy.    Control your stress  Try breathing deep, massage therapy, biofeedback, and meditation. Find time to relax each day.     My support system    Clinic Contact:  Phone number:    Contact 1:  Phone number:    Contact 2:  Phone number:    Alevism/:  Phone number:    Therapist:  Phone number:    Local crisis center:    Phone number:    Other community support:  Phone number:

## 2017-12-05 NOTE — PROGRESS NOTES
"  SUBJECTIVE:   Dimple Baxter is a 77 year old female who presents for Preventive Visit.  {PVP to remind patient that this is not necessarily a physical exam; physical exam may or may not be done:946767::\"click delete button to remove this line now\"}  {PVP to inform patient that additional E&M charge may apply, if additional problems addressed:497224::\"click delete button to remove this line now\"}  Are you in the first 12 months of your Medicare Part B coverage?  {No Yes:419549::\"No\"}    Healthy Habits:    Do you get at least three servings of calcium containing foods daily (dairy, green leafy vegetables, etc.)? {YES/NO, DAIRY INTAKE:578792::\"yes\"}    Amount of exercise or daily activities, outside of work: {AMOUNT EXERCISE:324694}    Problems taking medications regularly {Yes /No default:839047::\"No\"}    Medication side effects: {Yes /No default.:018663::\"No\"}    Have you had an eye exam in the past two years? {YESNOBLANK:275184}    Do you see a dentist twice per year? {YESNOBLANK:181183}    Do you have sleep apnea, excessive snoring or daytime drowsiness?{YESNOBLANK:869690}    {AWV Cognitive Screenin}    {Outside tests to abstract? :600465}    {additional problems to add (Optional):564146}    Reviewed and updated as needed this visit by clinical staff         Reviewed and updated as needed this visit by Provider        Social History   Substance Use Topics     Smoking status: Former Smoker     Packs/day: 1.00     Years: 2.00     Types: Cigarettes     Quit date: 1963     Smokeless tobacco: Never Used     Alcohol use 0.0 oz/week     0 Standard drinks or equivalent per week      Comment: occ       {ETOH AUDIT:544349}     Today's PHQ-2 Score:   PHQ-2 (  [employer]) 2017 10/11/2016   Q1: Little interest or pleasure in doing things 0 1   Q2: Feeling down, depressed or hopeless 0 1   PHQ-2 Score 0 2     {PHQ-2 LOOK IN ASSESSMENTS (Optional) :456290}  Do you feel safe in your environment - " "{YES/NO/NA:965197}    Do you have a Health Care Directive?: {HEALTHCARE DIRECTIVE STATUS:726827}    Current providers sharing in care for this patient include:   Patient Care Team:  Sandra Mayer DO as PCP - General (Internal Medicine)      Hearing impairment: {NO/YES:928777}    Ability to successfully perform activities of daily living: {YES/NO (MEDICARE):972129::\"Yes, no assistance needed\"}     Fall risk:  {Document Fall Risk in the Assessments Section of the Navigator:841834}    Home safety:  {IPPE SAFETY CONCERNS:429341::\"none identified\"}  {If any of the above assessments are answered yes, consider ordering appropriate referrals (Optional):356656::\"click delete button to remove this line now\"}    The following health maintenance items are reviewed in Epic and correct as of today:  Health Maintenance   Topic Date Due     URINE DRUG SCREEN Q1 YR  04/30/1955     EYE EXAM Q1 YEAR  09/15/2014     A1C Q6 MO  04/11/2017     PHQ-9 Q6 MONTHS  08/07/2017     INFLUENZA VACCINE (SYSTEM ASSIGNED)  09/01/2017     MICROALBUMIN Q1 YEAR  10/11/2017     DEPRESSION ACTION PLAN Q1 YR  10/11/2017     ADVANCE DIRECTIVE PLANNING Q5 YRS  11/06/2017     FOOT EXAM Q1 YEAR  02/07/2018     FALL RISK ASSESSMENT  02/07/2018     BRITTNI QUESTIONNAIRE 1 YEAR  02/07/2018     COLONOSCOPY Q5 YR  08/12/2018     TSH W/ FREE T4 REFLEX Q2 YEAR  10/11/2018     LIPID MONITORING Q1 YEAR  10/30/2018     CREATININE Q1 YEAR  11/28/2018     MAMMO Q2 YR  02/07/2019     TETANUS IMMUNIZATION (SYSTEM ASSIGNED)  07/02/2023     MIGRAINE ACTION PLAN  Completed     DEXA SCAN SCREENING (SYSTEM ASSIGNED)  Completed     PNEUMOCOCCAL  Completed     {Chronicprobdata (Optional):889261}    {Decision Support (Optional):682218}    ROS:  {ROS COMP:138756}    OBJECTIVE:   There were no vitals taken for this visit. Estimated body mass index is 30.33 kg/(m^2) as calculated from the following:    Height as of 11/28/17: 5' 6\" (1.676 m).    Weight as of 11/28/17: 187 lb 14.4 oz " "(85.2 kg).  EXAM:   {Exam :338048}    ASSESSMENT / PLAN:   {Diag Picklist:936545}    End of Life Planning:  Patient currently has an advanced directive: { :719472}    COUNSELING:  {Medicare Counselin}    {BP Counseling- Complete if BP >= 120/80  (Optional):713419}    Estimated body mass index is 30.33 kg/(m^2) as calculated from the following:    Height as of 17: 5' 6\" (1.676 m).    Weight as of 17: 187 lb 14.4 oz (85.2 kg).  {Weight Management Plan -- Complete if patient has an abnormal BMI (Optional):764598}   reports that she quit smoking about 54 years ago. Her smoking use included Cigarettes. She has a 2.00 pack-year smoking history. She has never used smokeless tobacco.  {Tobacco Cessation -- Complete if patient is a smoker (Optional):477225}    Appropriate preventive services were discussed with this patient, including applicable screening as appropriate for cardiovascular disease, diabetes, osteopenia/osteoporosis, and glaucoma.  As appropriate for age/gender, discussed screening for colorectal cancer, prostate cancer, breast cancer, and cervical cancer. Checklist reviewing preventive services available has been given to the patient.    Reviewed patients plan of care and provided an AVS. The {CarePlan:533838} for Dimple meets the Care Plan requirement. This Care Plan has been established and reviewed with the {PATIENT, FAMILY MEMBER, CAREGIVER:468720}.    Counseling Resources:  ATP IV Guidelines  Pooled Cohorts Equation Calculator  Breast Cancer Risk Calculator  FRAX Risk Assessment  ICSI Preventive Guidelines  Dietary Guidelines for Americans, 2010  USDA's MyPlate  ASA Prophylaxis  Lung CA Screening    Sandra Mayer, DO  [facility]  "

## 2017-12-05 NOTE — PROGRESS NOTES
"  SUBJECTIVE:   Dimple Baxter is a 77 year old female who presents to clinic today for the following health issues:    Chronic Pain Follow-Up         PHQ-9 SCORE 10/11/2016 2/7/2017 12/5/2017   Total Score - - -   Total Score 5 3 3     BRITTNI-7 SCORE 10/19/2015 2/7/2017 12/5/2017   Total Score - - -   Total Score 3 4 2     Encounter-Level CSA - 06/23/2015:          Controlled Substance Agreement - Scan on 7/8/2015  9:35 AM : CONTROLLED SUBSTANCE AGREEMENT 06/23/15 (below)            Encounter-Level CSA - 06/23/2015:          Controlled Substance Agreement - Scan on 3/15/2017  8:58 AM : CONTROLLED SUBSTANCE AGREEMENT (below)                Mood is good and grandson is getting treatment for alcohol and in half-way house  Daughter with MS and lost her job in March so Althea didn't go to [place] as she wanted to be with her daughter  Feels the pain medication is helpful for pain and helps her function and gives her relief  PRN Senna for constipation  1/2 tablet Crestor every other night without significant myalgias - Rx per cardiology  Takes Oxycodone 2-4 tablets per day based on pain  Active at [facility] with [employer] and walking the track - at most up to 5 times per week  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    ROS:  Detailed as above     OBJECTIVE:     /72  Pulse 54  Temp 98.3  F (36.8  C) (Oral)  Ht 5' 6\" (1.676 m)  Wt 183 lb (83 kg)  SpO2 96%  BMI 29.54 kg/m2  Body mass index is 29.54 kg/(m^2).  Alert, brighter appearing than previous visit, NAD  DIP hand nodes  Mild swelling right medial knee without erythema  BRITTNI-7 SCORE 10/19/2015 2/7/2017 12/5/2017   Total " Score - - -   Total Score 3 4 2       PHQ-9 SCORE 10/11/2016 2/7/2017 12/5/2017   Total Score - - -   Total Score 5 3 3       ASSESSMENT/PLAN:       1. DDD (degenerative disc disease), lumbar  Glad despite her pain it doesn't seem to get her down and she goes to exercise regularly   She says that she had wanted to taper off of oxycodone in the past b/c of news reports but then realized how much it helps her function  - oxyCODONE IR (ROXICODONE) 5 MG tablet; Take 1-2 tablets (5-10 mg) by mouth 2 times daily as needed for breakthrough pain  Dispense: 120 tablet; Refill: 0    2. Right knee pain, unspecified chronicity  OA therapy per patient instructions below    3. Generalized anxiety disorder  Discussed trying 1/2 pill and she will  - LORazepam (ATIVAN) 1 MG tablet; TAKE 0.5-1 TABLETS BY MOUTH ONCE DAILY AS NEEDED FOR ANXIETY  Dispense: 30 tablet; Refill: 0    4. Type 2 diabetes mellitus with diabetic nephropathy, without long-term current use of insulin (H)  Due for labs  - HEMOGLOBIN A1C; Future  - Albumin Random Urine Quantitative with Creat Ratio; Future  - quinapril (ACCUPRIL) 40 MG Tab; Take 1 tablet (40 mg) by mouth daily  Dispense: 90 tablet; Refill: 3    5. Benign essential hypertension  At goal on multiple meds - follows with cardiology  - quinapril (ACCUPRIL) 40 MG Tab; Take 1 tablet (40 mg) by mouth daily  Dispense: 90 tablet; Refill: 3    6. Primary insomnia  Sleeping ok  - traZODone (DESYREL) 100 MG tablet; TAKE ONE TABLET BY MOUTH ONCE DAILY AT BEDTIME  Dispense: 90 tablet; Refill: 3    Patient Instructions   I handed you the Oxycodone and Lorazepam scripts  Call for refills  TRY to cut the Lorazepam in half to see if that is still as effective as the whole pill  Continue going to [facility] for activity  If your knee gets worse, call us for an orthopedic referral to consider a cortisone injection  Topical pain therapy can help your hands as needed (example: IcyHot or BenGay); occasionally an  Ibuprofen(Advil) or Aleve(Naproxen) can be taken if significant pain but that can have an affect on kidneys, stomach lining and blood pressure  Ask with your cardiology labs to have MY LABS drawn too  Follow up in 6 months or sooner as needed    MDM: >25 minutes spent with patient, over 50% time counseling, coordinating care and explaining about nature of the patient's conditions as noted above (ovidio medications).    Sandra Mayer,   [facility]

## 2017-12-05 NOTE — PATIENT INSTRUCTIONS
I handed you the Oxycodone and Lorazepam scripts  Call for refills  TRY to cut the Lorazepam in half to see if that is still as effective as the whole pill  Continue going to [facility] for activity  If your knee gets worse, call us for an orthopedic referral to consider a cortisone injection  Topical pain therapy can help your hands as needed (example: IcyHot or BenGay); occasionally an Ibuprofen(Advil) or Aleve(Naproxen) can be taken if significant pain but that can have an affect on kidneys, stomach lining and blood pressure  Ask with your cardiology labs to have MY LABS drawn too  Follow up in 6 months or sooner as needed

## 2017-12-06 ASSESSMENT — ANXIETY QUESTIONNAIRES: GAD7 TOTAL SCORE: 2

## 2017-12-28 ENCOUNTER — OFFICE VISIT (OUTPATIENT)
Dept: CARDIOLOGY | Facility: CLINIC | Age: 77
End: 2017-12-28
Attending: INTERNAL MEDICINE
Payer: COMMERCIAL

## 2017-12-28 VITALS
HEART RATE: 55 BPM | WEIGHT: 185.6 LBS | DIASTOLIC BLOOD PRESSURE: 73 MMHG | HEIGHT: 66 IN | SYSTOLIC BLOOD PRESSURE: 179 MMHG | BODY MASS INDEX: 29.83 KG/M2

## 2017-12-28 DIAGNOSIS — E78.2 MIXED HYPERLIPIDEMIA: ICD-10-CM

## 2017-12-28 DIAGNOSIS — I10 BENIGN ESSENTIAL HYPERTENSION: ICD-10-CM

## 2017-12-28 DIAGNOSIS — I44.7 LEFT BUNDLE BRANCH BLOCK: Primary | Chronic | ICD-10-CM

## 2017-12-28 DIAGNOSIS — E78.5 HYPERLIPIDEMIA LDL GOAL <70: ICD-10-CM

## 2017-12-28 DIAGNOSIS — I27.20 PULMONARY HYPERTENSION (H): ICD-10-CM

## 2017-12-28 DIAGNOSIS — I44.7 LBBB (LEFT BUNDLE BRANCH BLOCK): ICD-10-CM

## 2017-12-28 LAB
ALT SERPL W P-5'-P-CCNC: 31 U/L (ref 5–30)
ANION GAP SERPL CALCULATED.3IONS-SCNC: 9.6 MMOL/L (ref 6–17)
BUN SERPL-MCNC: 20 MG/DL (ref 7–30)
CALCIUM SERPL-MCNC: 10.2 MG/DL (ref 8.5–10.5)
CHLORIDE SERPL-SCNC: 100 MMOL/L (ref 98–107)
CHOLEST SERPL-MCNC: 158 MG/DL
CO2 SERPL-SCNC: 32 MMOL/L (ref 23–29)
CREAT SERPL-MCNC: 1.6 MG/DL (ref 0.7–1.3)
GFR SERPL CREATININE-BSD FRML MDRD: 31 ML/MIN/1.7M2
GLUCOSE SERPL-MCNC: 100 MG/DL (ref 70–105)
HDLC SERPL-MCNC: 32 MG/DL
LDLC SERPL CALC-MCNC: 82 MG/DL
NONHDLC SERPL-MCNC: 126 MG/DL
POTASSIUM SERPL-SCNC: 3.6 MMOL/L (ref 3.5–5.1)
SODIUM SERPL-SCNC: 138 MMOL/L (ref 136–145)
TRIGL SERPL-MCNC: 221 MG/DL

## 2017-12-28 PROCEDURE — 80061 LIPID PANEL: CPT | Performed by: INTERNAL MEDICINE

## 2017-12-28 PROCEDURE — 84460 ALANINE AMINO (ALT) (SGPT): CPT | Performed by: INTERNAL MEDICINE

## 2017-12-28 PROCEDURE — 36415 COLL VENOUS BLD VENIPUNCTURE: CPT | Performed by: INTERNAL MEDICINE

## 2017-12-28 PROCEDURE — 80048 BASIC METABOLIC PNL TOTAL CA: CPT | Performed by: INTERNAL MEDICINE

## 2017-12-28 PROCEDURE — 99214 OFFICE O/P EST MOD 30 MIN: CPT | Performed by: PHYSICIAN ASSISTANT

## 2017-12-28 RX ORDER — AMLODIPINE BESYLATE 5 MG/1
5 TABLET ORAL DAILY
Qty: 30 TABLET | Refills: 3 | Status: SHIPPED | OUTPATIENT
Start: 2017-12-28 | End: 2018-08-24

## 2017-12-28 RX ORDER — ROSUVASTATIN CALCIUM 5 MG/1
2.5 TABLET, COATED ORAL AT BEDTIME
Qty: 90 TABLET | Refills: 3 | Status: SHIPPED | OUTPATIENT
Start: 2017-12-28 | End: 2019-02-08

## 2017-12-28 NOTE — MR AVS SNAPSHOT
After Visit Summary   12/28/2017    Dimple Baxter    MRN: [id number]           Patient Information     Date Of Birth          1940        Visit Information        Provider Department      12/28/2017 3:10 PM Tamiko, Suri Garsia PA-C [facility]   [place]        Today's Diagnoses     Left bundle branch block    -  1    Benign essential hypertension        Mixed hyperlipidemia        Pulmonary hypertension        Hyperlipidemia LDL goal <70        LBBB (left bundle branch block)          Care Instructions    Thanks for coming into [facility] clinic today.    We discussed: blood pressure is running too high.    Go back to exercising at the [facility].      Medication changes:  Start taking amlodipine 5 mg once a day in the morning.  Continue other medications.      Results: we'll call with your lab results.      Follow up: next week to recheck blood pressure.       Please call my nurse at [phone] with any questions or concerns.    Scheduling phone number: [phone]  Reminder: Please bring in all current medications, over the counter supplements and vitamin bottles to your next appointment.              Follow-ups after your visit        Additional Services     Follow-Up with Cardiac Advanced Practice Provider                 Future tests that were ordered for you today     Open Future Orders        Priority Expected Expires Ordered    Follow-Up with Cardiac Advanced Practice Provider Routine 1/4/2018 12/28/2018 12/28/2017            Who to contact     If you have questions or need follow up information about today's clinic visit or your schedule please contact [facility]   [facility] directly at [phone].  Normal or non-critical lab and imaging results will be communicated to you by MyChart, letter or phone within 4 business days after the clinic has received the results. If you do not hear from us within 7 days,  "please contact the clinic through [employer]ing[employer] or phone. If you have a critical or abnormal lab result, we will notify you by phone as soon as possible.  Submit refill requests through [employer]ing[employer] or call your pharmacy and they will forward the refill request to us. Please allow 3 business days for your refill to be completed.          Additional Information About Your Visit        Care EveryWhere ID     This is your Care EveryWhere ID. This could be used by other organizations to access your [facility] medical records  [id number]        Your Vitals Were     Pulse Height BMI (Body Mass Index)             55 1.676 m (5' 6\") 29.96 kg/m2          Blood Pressure from Last 3 Encounters:   12/28/17 179/73   12/05/17 126/72   11/28/17 144/76    Weight from Last 3 Encounters:   12/28/17 84.2 kg (185 lb 9.6 oz)   12/05/17 83 kg (183 lb)   11/28/17 85.2 kg (187 lb 14.4 oz)              We Performed the Following     Follow-Up with Cardiac Advanced Practice Provider          Today's Medication Changes          These changes are accurate as of: 12/28/17  3:34 PM.  If you have any questions, ask your nurse or doctor.               Start taking these medicines.        Dose/Directions    amLODIPine 5 MG tablet   Commonly known as:  NORVASC   Used for:  Benign essential hypertension   Started by:  Suri Morrison PA-C        Dose:  5 mg   Take 1 tablet (5 mg) by mouth daily   Quantity:  30 tablet   Refills:  3         These medicines have changed or have updated prescriptions.        Dose/Directions    rosuvastatin 5 MG tablet   Commonly known as:  CRESTOR   This may have changed:    - how much to take  - how to take this  - when to take this  - additional instructions   Used for:  Hyperlipidemia LDL goal <70   Changed by:  Suri Morrison PA-C        Dose:  2.5 mg   Take 0.5 tablets (2.5 mg) by mouth At Bedtime   Quantity:  90 tablet   Refills:  3            Where to get your medicines      These medications were sent to " [id number] - [place], [place] - [address] [address]  [address] [address], [facility] [address]     Phone:  [phone]     amLODIPine 5 MG tablet    rosuvastatin 5 MG tablet                Primary Care Provider Office Phone # Fax #    Sandra Mayer [phone] [address] [facility]   [facility] [address]        Equal Access to Services     CLARI VEGA : Hadii aad ku hadasho Soomaali, waaxda luqadaha, qaybta kaalmada adeegyada, waxay idiin hayaan adeeg kharash la'aan . So [facility] [phone].    ATENCIÓN: Si habla español, tiene a estrada disposición servicios gratuitos de asistencia lingüística. Aline al [phone].    We comply with applicable federal civil rights laws and [place] laws. We do not discriminate on the basis of race, color, national origin, age, disability, sex, sexual orientation, or gender identity.            Thank you!     Thank you for choosing [facility]  for your care. Our goal is always to provide you with excellent care. Hearing back from our patients is one way we can continue to improve our services. Please take a few minutes to complete the written survey that you may receive in the mail after your visit with us. Thank you!             Your Updated Medication List - Protect others around you: Learn how to safely use, store and throw away your medicines at www.disposemymeds.org.          This list is accurate as of: 12/28/17  3:34 PM.  Always use your most recent med list.                   Brand Name Dispense Instructions for use Diagnosis    acetaminophen 325 MG tablet    TYLENOL     Take 1-2 tablets by mouth every 6 hours as needed for pain.    Lumbago       amLODIPine 5 MG tablet    NORVASC    30 tablet    Take 1 tablet (5 mg) by mouth daily    Benign essential hypertension       aspirin 81 MG tablet     0    take 1 x daily with food        cholecalciferol 1000 UNIT tablet    vitamin D3    100 tablet    Take 1  tablet (1,000 Units) by mouth daily    Postmenopausal       escitalopram 20 MG tablet    LEXAPRO    90 tablet    Take 1 tablet (20 mg) by mouth daily    Generalized anxiety disorder       famotidine 20 MG tablet    PEPCID     Take 1 tablet by mouth 2 times daily as needed.        fish oil-omega-3 fatty acids 1000 MG capsule     0    take three daily    Other and unspecified hyperlipidemia       fluticasone 50 MCG/ACT spray    FLONASE    16 g    Spray 2 sprays into both nostrils daily    Seasonal allergic rhinitis, unspecified allergic rhinitis trigger       hydrochlorothiazide 25 MG tablet    HYDRODIURIL    90 tablet    Take 1 tablet (25 mg) by mouth daily    Benign essential hypertension       LORazepam 1 MG tablet    ATIVAN    30 tablet    TAKE 0.5-1 TABLETS BY MOUTH ONCE DAILY AS NEEDED FOR ANXIETY    Generalized anxiety disorder       metoprolol 100 MG 24 hr tablet    TOPROL-XL    90 tablet    Take 1 tablet (100 mg) by mouth daily    Benign essential hypertension, LBBB (left bundle branch block)       NITROSTAT 0.4 MG sublingual tablet   Generic drug:  nitroGLYcerin     25 tablet    PLACE 1 TABLET UNDER THE TONGUE EVERY 5 MINUTES AS NEEDED. UP TO 3 TABLETS PER EPISODE    Abnormal stress test       oxyCODONE IR 5 MG tablet    ROXICODONE    120 tablet    Take 1-2 tablets (5-10 mg) by mouth 2 times daily as needed for breakthrough pain    DDD (degenerative disc disease), lumbar       quinapril 40 MG Tab    ACCUPRIL    90 tablet    Take 1 tablet (40 mg) by mouth daily    Type 2 diabetes mellitus with diabetic nephropathy, without long-term current use of insulin (H), Benign essential hypertension       rosuvastatin 5 MG tablet    CRESTOR    90 tablet    Take 0.5 tablets (2.5 mg) by mouth At Bedtime    Hyperlipidemia LDL goal <70       traZODone 100 MG tablet    DESYREL    90 tablet    TAKE ONE TABLET BY MOUTH ONCE DAILY AT BEDTIME    Primary insomnia       tretinoin 0.1 % cream    RETIN-A    20 g    Apply sparingly  to face at bedtime; do not apply near eyes    Wrinkles

## 2017-12-28 NOTE — LETTER
12/28/2017      Sandra Mayer, DO  [address]  [facility] [address]      RE: Dimple BLAND Vee       Dear Colleague,    I had the pleasure of seeing Dimple Baxter in the [facility].    PRIMARY CARDIOLOGIST:  Dr. Cedillo.      REASON FOR VISIT:  Dyslipidemia and hypertension followup.      HISTORY OF PRESENT ILLNESS:  Ms. Baxter is a delightful 77-year-old woman who follows with our clinic for history of left bundle branch block, hypertension, hyperlipidemia and an abnormal stress test.  In 07/2009 she underwent a nuclear stress test which had an anterior defect versus breast attenuation.  This was repeated in 2010 and at that point it was normal.  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  She has had a difficult time tolerating statins and now has been on just 2.5 mg of Crestor daily.  Zetia has become cost prohibitive at about $700 a month.      Today, she comes in feeling well.  She gets a little short of breath at the top of the stairs but nothing more than she would expect.  She denies chest pain, orthopnea or PND.  She has not had pedal edema.  She is doing okay on the Crestor.      SOCIAL HISTORY:  She is under quite a bit of stress.  She has a daughter who has MS and is suffering with what sounds like some depression and delusions.  She has a grandson that they also live together that is an alcoholic.  She has a 2-pack-year history of smoking, quit in 1963.  Occasional alcohol use.  She is not exercising at this time, although has intentions to go back to the [facility].      PHYSICAL EXAMINATION:     VITAL SIGNS:  Blood pressure 179/73, pulse 55, weight 185, BMI of 30.    GENERAL:  She is normocephalic, atraumatic, in no acute distress.   HEENT:  Sclerae are anicteric, no xanthelasma.   HEART:  Regular in rate and rhythm with a 1/6 systolic murmur heard best at the left  sternal border.  Her carotids are without bruit.   LUNGS:  Clear bilaterally.   EXTREMITIES:  Without peripheral edema.      ASSESSMENT AND PLAN:   1.  Hypertension, markedly elevated today.  The patient is currently maintained on quinapril 40 mg, Toprol- mg and hydrochlorothiazide 25 mg daily.  She denies any pain today.  She is under significant stress at home, but she says that this is not even that high of a level for her.  She was previously on amlodipine, and she stopped it for unclear reasons -- she does not recall why.  At this point, we are going to restart her on 5 mg of amlodipine and, given that her blood pressure is so high, have her seen back in clinic next week.  There is no room to increase her Toprol, as her heart rate is already 55.  If she remains elevated, could consider 10 mg of amlodipine versus switching her to carvedilol 12.5 mg b.i.d.   2.  Hyperlipidemia.  Labs are still pending.  Will continue Crestor 2.5 mg daily for now.   3.  GERD per primary.      We will see her back in 1 week to reassess blood pressures.  Labs are still pending.  We will address those when they return.      Thank you for allowing me to participate in her care.       Outpatient Encounter Prescriptions as of 12/28/2017   Medication Sig Dispense Refill     amLODIPine (NORVASC) 5 MG tablet Take 1 tablet (5 mg) by mouth daily 30 tablet 3     rosuvastatin (CRESTOR) 5 MG tablet Take 0.5 tablets (2.5 mg) by mouth At Bedtime 90 tablet 3     LORazepam (ATIVAN) 1 MG tablet TAKE 0.5-1 TABLETS BY MOUTH ONCE DAILY AS NEEDED FOR ANXIETY 30 tablet 0     traZODone (DESYREL) 100 MG tablet TAKE ONE TABLET BY MOUTH ONCE DAILY AT BEDTIME 90 tablet 3     [DISCONTINUED] oxyCODONE IR (ROXICODONE) 5 MG tablet Take 1-2 tablets (5-10 mg) by mouth 2 times daily as needed for breakthrough pain 120 tablet 0     [DISCONTINUED] quinapril (ACCUPRIL) 40 MG Tab Take 1 tablet (40 mg) by mouth daily 90 tablet 3     NITROSTAT 0.4 MG sublingual  tablet PLACE 1 TABLET UNDER THE TONGUE EVERY 5 MINUTES AS NEEDED. UP TO 3 TABLETS PER EPISODE 25 tablet 1     escitalopram (LEXAPRO) 20 MG tablet Take 1 tablet (20 mg) by mouth daily 90 tablet 3     hydrochlorothiazide (HYDRODIURIL) 25 MG tablet Take 1 tablet (25 mg) by mouth daily 90 tablet 3     metoprolol (TOPROL-XL) 100 MG 24 hr tablet Take 1 tablet (100 mg) by mouth daily 90 tablet 3     fluticasone (FLONASE) 50 MCG/ACT nasal spray Spray 2 sprays into both nostrils daily 16 g 5     cholecalciferol (VITAMIN D) 1000 UNIT tablet Take 1 tablet (1,000 Units) by mouth daily 100 tablet 3     tretinoin (RETIN-A) 0.1 % cream Apply sparingly to face at bedtime; do not apply near eyes 20 g 1     famotidine (PEPCID) 20 MG tablet Take 1 tablet by mouth 2 times daily as needed.       acetaminophen (TYLENOL) 325 MG tablet Take 1-2 tablets by mouth every 6 hours as needed for pain.       FISH OIL 1000 MG OR CAPS take three daily  0 0     ASPIRIN 81 MG OR TABS take 1 x daily with food 0 0     [DISCONTINUED] rosuvastatin (CRESTOR) 5 MG tablet Take 1/2 tablet at night every other day. 30 tablet 3     [DISCONTINUED] amLODIPine (NORVASC) 5 MG tablet Take 1 tablet (5 mg) by mouth daily 90 tablet 3     No facility-administered encounter medications on file as of 12/28/2017.        Again, thank you for allowing me to participate in the care of your patient.      Sincerely,    Suri Morrison PA-C     [facility]
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
## 2017-12-28 NOTE — PATIENT INSTRUCTIONS
Thanks for coming into [facility] today.    We discussed: blood pressure is running too high.    Go back to exercising at the [facility].      Medication changes:  Start taking amlodipine 5 mg once a day in the morning.  Continue other medications.      Results: we'll call with your lab results.      Follow up: next week to recheck blood pressure.       Please call my nurse at [phone] with any questions or concerns.    Scheduling phone number: [phone]  Reminder: Please bring in all current medications, over the counter supplements and vitamin bottles to your next appointment.

## 2018-01-04 ENCOUNTER — TELEPHONE (OUTPATIENT)
Dept: FAMILY MEDICINE | Facility: CLINIC | Age: 78
End: 2018-01-04

## 2018-01-04 ENCOUNTER — CARE COORDINATION (OUTPATIENT)
Dept: CARDIOLOGY | Facility: CLINIC | Age: 78
End: 2018-01-04

## 2018-01-04 DIAGNOSIS — N28.9 RENAL INSUFFICIENCY: Primary | ICD-10-CM

## 2018-01-04 DIAGNOSIS — E11.21 TYPE 2 DIABETES MELLITUS WITH DIABETIC NEPHROPATHY, WITHOUT LONG-TERM CURRENT USE OF INSULIN (H): Chronic | ICD-10-CM

## 2018-01-04 DIAGNOSIS — I10 BENIGN ESSENTIAL HYPERTENSION: ICD-10-CM

## 2018-01-04 DIAGNOSIS — M51.369 DDD (DEGENERATIVE DISC DISEASE), LUMBAR: Chronic | ICD-10-CM

## 2018-01-04 RX ORDER — QUINAPRIL 40 MG/1
20 TABLET ORAL DAILY
Qty: 90 TABLET | Refills: 3 | COMMUNITY
Start: 2018-01-04 | End: 2019-02-08

## 2018-01-04 NOTE — TELEPHONE ENCOUNTER
Controlled Substance Refill Request for oxyCODONE IR (ROXICODONE) 5 MG tablet  Problem List Complete:  Yes    Patient is followed by COLLEEN VALDES for ongoing prescription of pain medication.  All refills should be approved by this provider, or covering partner.     1) Medication: Oxycodone.   Maximum quantity per month: 60     2) Medication: Lorazepam  Maximum quantity per month: 30     Clinic visit frequency required: Q 6  months      Controlled substance agreement on file: Yes       Date(s): 6/23/15     Pain Clinic evaluation in the past: No     DIRE Total Score(s):  No flowsheet data found.     Last [facility] website verification: 5/22/17   [email or web address]    Last Written Prescription Date:  12/5/17  Last Fill Quantity: 120 tablet,   # refills: 0     Last Office Visit with [facility] [place] provider: 12/5/17    Future Office visit:   Next 5 appointments (look out 90 days)     Jan 09, 2018  1:10 PM CST   Return Visit with MARCIAL Ceballos   [facility] ([facility])    [address] [place] [id number] [phone]                  Controlled substance agreement on file: No. Does have 2014 agreement with Irma Bell     Processing:  Patient will  in clinic     checked in past 6 months?  No, route to RN

## 2018-01-04 NOTE — PROGRESS NOTES
Called and spoke with pt.  Reviewed that Helen recommends she decrease Accupril (quinapril) to 20mg (1/2 tab) daily since Creat did jump from 1.10 to 1.60.  Pt verbalized understanding and agrees with this plan.  Med list updated in Epic. Also reviewed that if she happens to be out in the next few days, check BP on automatic cuff at drug/grocery store and call to let up know- clinic number provided.  Also reviewed that repeat BMP on 1/9 recommended and she agrees to lab appt at 12:10pm on 1/9 prior to OV with Helen at 1:10pm. Inocencio RN 1:32 PM 1/4/2018

## 2018-01-04 NOTE — PROGRESS NOTES
Unfortunately, the fact that she doesn't have a home BP cuff makes this a little more difficult. Especially in the light of the extreme temps.  IF she happens to leave her home in the next few days for errands, see if she can stop in a [employer], grocery store, etc and check her BP on one of those cuffs and call us.  If not, we will have to just check it next week when she comes in.    In the meantime. Let's cut down the Accupril to 20mg daily. Since she's only taken the amlodipine for 2 days I'm hesitant to increase the dose right away, and per Suri's note, we don't have much room to go on the BB due to her HR.   Either way, recheck a BMP prior to her appt with me again please. I put orders in, she just needs to be scheduled for a lab draw. I'll make further decisions on BP meds when she comes early next week.     Thanks,  Neelima

## 2018-01-04 NOTE — PROGRESS NOTES
Per EDI Cervantes, pt was supposed to have one week f/u appt today, 1/4/18; however, appt has been rescheduled to 1/9/18 due to pt not able to start car today.      Per chart review, pt was most recently seen by EDI Pereira on 12/28/17 for dyslipidemia and hypertension followup- pt's BP was 179/73 and was advised to restart amlodipine 5mg daily (continue quinapirl 40mg daily, Toprol XL 100mg daily, HCTZ 25mg daily and Crestor 2.5mg daily) and f/u in one week.  Pt had BMP and Lipids done on 12/28 (per Dr. Cedillo's order) that were not available for Randall to review at OV and note states that pt will be called with results.  Per chart review, it does not appear that pt was notified of results.      Called and spoke with pt and reviewed labs from 12/28/17 with her.  Pt states she was not able to  and start amlodipine 5mg daily until 1/2/18.  She denies noticing any side effects since restarting amlodipine. She does not have a home BP cuff, so has not been able to check her BP.  Discussed with pt that will review with Helen and call if any recommendations prior to OV on 1/9/18.  She verbalized understanding and agrees with this plan.     Will route to Helen for review, as Randall is out of the office this week and Dr. Cedillo is rounding in the hospital this week.      Component      Latest Ref Rng & Units 10/30/2017 11/28/2017 12/28/2017   Sodium      136 - 145 mmol/L  138 138   Potassium      3.5 - 5.1 mmol/L  4.3 3.6   Chloride      98 - 107 mmol/L  100 100   Carbon Dioxide      23 - 29 mmol/L  32 (H) 32 (H)   Anion Gap      6 - 17 mmol/L  10.3 9.6   Glucose      70 - 105 mg/dL  120 (H) 100   Urea Nitrogen      7 - 30 mg/dL  16 20   Creatinine      0.70 - 1.30 mg/dL  1.10 1.60 (H)   GFR Estimate      >60 mL/min/1.7m2  48 (L) 31 (L)   GFR Estimate If Black      >60 mL/min/1.7m2  58 (L) 38 (L)   Calcium      8.5 - 10.5 mg/dL  9.6 10.2   Cholesterol      <200 mg/dL 195  158   Triglycerides      <150 mg/dL 281 (H)   221 (H)   HDL Cholesterol      >49 mg/dL 27 (L)  32 (L)   LDL Cholesterol Calculated      <100 mg/dL 112 (H)  82   Non HDL Cholesterol      <130 mg/dL 168 (H)  126   ALT      5 - 30 U/L 84 (H)  31 (H)

## 2018-01-05 RX ORDER — OXYCODONE HYDROCHLORIDE 5 MG/1
5-10 TABLET ORAL 2 TIMES DAILY PRN
Qty: 120 TABLET | Refills: 0 | Status: SHIPPED | OUTPATIENT
Start: 2018-01-05 | End: 2018-02-07

## 2018-01-09 NOTE — PROGRESS NOTES
Per schedule review, Randlal's first opening for non-CORE pt is 1/30/18.      Called and spoke with pt.  Reviewed that she missed lab appt and OV this afternoon.  Pt states she forgot all about appts and is willing to come tomorrow for appt with Helen at 11:20am and lab at 10:20am.    Route to Helen as JAROD.    RADHA Painter 2:11 PM 1/9/2018

## 2018-01-10 ENCOUNTER — OFFICE VISIT (OUTPATIENT)
Dept: CARDIOLOGY | Facility: CLINIC | Age: 78
End: 2018-01-10
Payer: COMMERCIAL

## 2018-01-10 VITALS
HEIGHT: 66 IN | HEART RATE: 60 BPM | WEIGHT: 184 LBS | BODY MASS INDEX: 29.57 KG/M2 | SYSTOLIC BLOOD PRESSURE: 110 MMHG | DIASTOLIC BLOOD PRESSURE: 56 MMHG

## 2018-01-10 DIAGNOSIS — I27.20 PULMONARY HYPERTENSION (H): ICD-10-CM

## 2018-01-10 DIAGNOSIS — I10 BENIGN ESSENTIAL HYPERTENSION: Primary | ICD-10-CM

## 2018-01-10 DIAGNOSIS — N28.9 RENAL INSUFFICIENCY: ICD-10-CM

## 2018-01-10 DIAGNOSIS — E78.2 MIXED HYPERLIPIDEMIA: ICD-10-CM

## 2018-01-10 LAB
ANION GAP SERPL CALCULATED.3IONS-SCNC: 13.8 MMOL/L (ref 6–17)
BUN SERPL-MCNC: 17 MG/DL (ref 7–30)
CALCIUM SERPL-MCNC: 10 MG/DL (ref 8.5–10.5)
CHLORIDE SERPL-SCNC: 101 MMOL/L (ref 98–107)
CO2 SERPL-SCNC: 29 MMOL/L (ref 23–29)
CREAT SERPL-MCNC: 1.29 MG/DL (ref 0.7–1.3)
GFR SERPL CREATININE-BSD FRML MDRD: 40 ML/MIN/1.7M2
GLUCOSE SERPL-MCNC: 181 MG/DL (ref 70–105)
POTASSIUM SERPL-SCNC: 3.8 MMOL/L (ref 3.5–5.1)
SODIUM SERPL-SCNC: 140 MMOL/L (ref 136–145)

## 2018-01-10 PROCEDURE — 36415 COLL VENOUS BLD VENIPUNCTURE: CPT | Performed by: PHYSICIAN ASSISTANT

## 2018-01-10 PROCEDURE — 99213 OFFICE O/P EST LOW 20 MIN: CPT | Performed by: PHYSICIAN ASSISTANT

## 2018-01-10 PROCEDURE — 80048 BASIC METABOLIC PNL TOTAL CA: CPT | Performed by: PHYSICIAN ASSISTANT

## 2018-01-10 RX ORDER — NICOTINE POLACRILEX 2 MG
GUM BUCCAL DAILY
COMMUNITY
Start: 2018-01-10 | End: 2018-04-09

## 2018-01-10 RX ORDER — MV-MIN/FOLIC/VIT K/LYCOP/COQ10 200-100MCG
1 CAPSULE ORAL DAILY
COMMUNITY
Start: 2018-01-10 | End: 2018-04-09
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
## 2018-01-10 NOTE — PATIENT INSTRUCTIONS
Visit Summary:    Today we discussed:   Your blood pressure and labs look better.     Medication changes:    NONE    Test results:   Results for ALEJANDRO GAMEZ (MRN [id number]) as of 1/10/2018 11:40   Ref. Range 1/10/2018 10:38   Sodium Latest Ref Range: 136 - 145 mmol/L 140   Potassium Latest Ref Range: 3.5 - 5.1 mmol/L 3.8   Chloride Latest Ref Range: 98 - 107 mmol/L 101   Carbon Dioxide Latest Ref Range: 23 - 29 mmol/L 29   Urea Nitrogen Latest Ref Range: 7 - 30 mg/dL 17   Creatinine Latest Ref Range: 0.70 - 1.30 mg/dL 1.29   GFR Estimate Latest Ref Range: >60 mL/min/1.7m2 40         Calcium Latest Ref Range: 8.5 - 10.5 mg/dL 10.0       Follow up:   In March with Dr. Cedillo as previously planned    Please call my nurse Jacqui at [phone] with any questions or concerns. Scheduling phone number: [phone] if needed.

## 2018-01-10 NOTE — MR AVS SNAPSHOT
After Visit Summary   1/10/2018    Dimple Baxter    MRN: [id number]           Patient Information     Date Of Birth          1940        Visit Information        Provider Department      1/10/2018 11:20 AM Neelima Bose PA [facility]        Today's Diagnoses     Benign essential hypertension    -  1    Mixed hyperlipidemia        Pulmonary hypertension        Left ventricular diastolic dysfunction          Care Instructions    Visit Summary:    Today we discussed:   Your blood pressure and labs look better.     Medication changes:    NONE    Test results:   Results for DIMPLE BAXTER (MRN [id number]) as of 1/10/2018 11:40   Ref. Range 1/10/2018 10:38   Sodium Latest Ref Range: 136 - 145 mmol/L 140   Potassium Latest Ref Range: 3.5 - 5.1 mmol/L 3.8   Chloride Latest Ref Range: 98 - 107 mmol/L 101   Carbon Dioxide Latest Ref Range: 23 - 29 mmol/L 29   Urea Nitrogen Latest Ref Range: 7 - 30 mg/dL 17   Creatinine Latest Ref Range: 0.70 - 1.30 mg/dL 1.29   GFR Estimate Latest Ref Range: >60 mL/min/1.7m2 40         Calcium Latest Ref Range: 8.5 - 10.5 mg/dL 10.0       Follow up:   In March with Dr. Cedillo as previously planned    Please call my nurse Jacqui at [phone] with any questions or concerns. Scheduling phone number: [phone] if needed.                 Follow-ups after your visit        Who to contact     If you have questions or need follow up information about today's clinic visit or your schedule please contact [facility] directly at [phone].  Normal or non-critical lab and imaging results will be communicated to you by MyChart, letter or phone within 4 business days after the clinic has received the results. If you do not hear from us within 7 days, please contact the clinic through MyChart or phone. If you have a critical or abnormal lab result, we will notify you by phone as soon as  "possible.  Submit refill requests through [employer] or call your pharmacy and they will forward the refill request to us. Please allow 3 business days for your refill to be completed.          Additional Information About Your Visit        Care EveryWhere ID     This is your Care EveryWhere ID. This could be used by other organizations to access your [place] medical records  [id number]        Your Vitals Were     Pulse Height BMI (Body Mass Index)             60 1.676 m (5' 6\") 29.7 kg/m2          Blood Pressure from Last 3 Encounters:   01/10/18 110/56   12/28/17 179/73   12/05/17 126/72    Weight from Last 3 Encounters:   01/10/18 83.5 kg (184 lb)   12/28/17 84.2 kg (185 lb 9.6 oz)   12/05/17 83 kg (183 lb)              Today, you had the following     No orders found for display       Primary Care Provider Office Phone # Fax #    Sandra Mayer,  [phone] [address] [facility] [address]  [facility] [address]        Equal Access to Services     [facility]: Hadii aad ku hadasho Soomaali, waaxda luqadaha, qaybta kaalmada adeegyada, waxay blair haysuman tatum . So [facility] [phone].    ATENCIÓN: Si habla español, tiene a estrada disposición servicios gratuitos de asistencia lingüística. Aline al [phone].    We comply with applicable federal civil rights laws and [place] laws. We do not discriminate on the basis of race, color, national origin, age, disability, sex, sexual orientation, or gender identity.            Thank you!     Thank you for choosing [facility]  for your care. Our goal is always to provide you with excellent care. Hearing back from our patients is one way we can continue to improve our services. Please take a few minutes to complete the written survey that you may receive in the mail after your visit with us. Thank you!             Your Updated Medication List - Protect others around you: Learn how to safely use, store and " throw away your medicines at www.disposemymeds.org.          This list is accurate as of: 1/10/18 11:45 AM.  Always use your most recent med list.                   Brand Name Dispense Instructions for use Diagnosis    acetaminophen 325 MG tablet    TYLENOL     Take 1-2 tablets by mouth every 6 hours as needed for pain.    Lumbago       amLODIPine 5 MG tablet    NORVASC    30 tablet    Take 1 tablet (5 mg) by mouth daily    Benign essential hypertension       aspirin 81 MG tablet     0    take 1 x daily with food        cholecalciferol 1000 UNIT tablet    vitamin D3    100 tablet    Take 1 tablet (1,000 Units) by mouth daily    Postmenopausal       escitalopram 20 MG tablet    LEXAPRO    90 tablet    Take 1 tablet (20 mg) by mouth daily    Generalized anxiety disorder       famotidine 20 MG tablet    PEPCID     Take 1 tablet by mouth 2 times daily as needed.        fish oil-omega-3 fatty acids 1000 MG capsule     0    take three daily    Other and unspecified hyperlipidemia       fluticasone 50 MCG/ACT spray    FLONASE    16 g    Spray 2 sprays into both nostrils daily    Seasonal allergic rhinitis, unspecified allergic rhinitis trigger       hydrochlorothiazide 25 MG tablet    HYDRODIURIL    90 tablet    Take 1 tablet (25 mg) by mouth daily    Benign essential hypertension       LORazepam 1 MG tablet    ATIVAN    30 tablet    TAKE 0.5-1 TABLETS BY MOUTH ONCE DAILY AS NEEDED FOR ANXIETY    Generalized anxiety disorder       metoprolol 100 MG 24 hr tablet    TOPROL-XL    90 tablet    Take 1 tablet (100 mg) by mouth daily    Benign essential hypertension, LBBB (left bundle branch block)       NITROSTAT 0.4 MG sublingual tablet   Generic drug:  nitroGLYcerin     25 tablet    PLACE 1 TABLET UNDER THE TONGUE EVERY 5 MINUTES AS NEEDED. UP TO 3 TABLETS PER EPISODE    Abnormal stress test       oxyCODONE IR 5 MG tablet    ROXICODONE    120 tablet    Take 1-2 tablets (5-10 mg) by mouth 2 times daily as needed for  breakthrough pain    DDD (degenerative disc disease), lumbar       quinapril 40 MG Tab    ACCUPRIL    90 tablet    Take 0.5 tablets (20 mg) by mouth daily    Type 2 diabetes mellitus with diabetic nephropathy, without long-term current use of insulin (H), Benign essential hypertension       rosuvastatin 5 MG tablet    CRESTOR    90 tablet    Take 0.5 tablets (2.5 mg) by mouth At Bedtime    Hyperlipidemia LDL goal <70       traZODone 100 MG tablet    DESYREL    90 tablet    TAKE ONE TABLET BY MOUTH ONCE DAILY AT BEDTIME    Primary insomnia       tretinoin 0.1 % cream    RETIN-A    20 g    Apply sparingly to face at bedtime; do not apply near eyes    Wrinkles

## 2018-02-02 DIAGNOSIS — F41.1 GENERALIZED ANXIETY DISORDER: Chronic | ICD-10-CM

## 2018-02-02 NOTE — TELEPHONE ENCOUNTER
"Last Written Prescription Date:  2/7/17  Last Fill Quantity: 90,  # refills: 3   Last Office Visit with [facility] provider:  12/5/17   Future Office Visit:    Next 5 appointments (look out 90 days)     Apr 09, 2018 10:45 AM CDT   Return Visit with Nikita Cedillo MD   [facility] ([facility])    [address] [place] [id number] [phone]                 escitalopram (LEXAPRO) 20 MG tablet 90 tablet 3 2/7/2017       Requested Prescriptions   Pending Prescriptions Disp Refills     escitalopram (LEXAPRO) 20 MG tablet [Pharmacy Med Name: ESCITALOPRAM 20MG TAB] 90 tablet 3     Sig: TAKE ONE TABLET BY MOUTH ONCE DAILY.    SSRIs Protocol Passed    2/2/2018 12:09 PM       Passed - PHQ-9 score less than 5 in past 6 months    The PHQ-9 criteria is meant to fail. It requires a PHQ-9 score review         Passed - Recent or future visit with authorizing provider    Patient had office visit in the last year or has a visit in the next 30 days with authorizing provider.  See \"Patient Info\" tab in inbasket, or \"Choose Columns\" in Meds & Orders section of the refill encounter.            Passed - Patient is age 18 or older       Passed - No active pregnancy on record       Passed - No positive pregnancy test in last 12 months       Passed - Recent (6 mo) or future visit with authorizing provider's specialty    Patient had office visit in the last 6 months or has a visit in the next 30 days with authorizing provider.  See \"Patient Info\" tab in inbasket, or \"Choose Columns\" in Meds & Orders section of the refill encounter.            PHQ-9 SCORE 10/11/2016 2/7/2017 12/5/2017   Total Score - - -   Total Score 5 3 3       "

## 2018-02-05 RX ORDER — ESCITALOPRAM OXALATE 20 MG/1
TABLET ORAL
Qty: 30 TABLET | Refills: 0 | Status: SHIPPED | OUTPATIENT
Start: 2018-02-05 | End: 2018-03-20

## 2018-02-05 NOTE — TELEPHONE ENCOUNTER
Routing refill request to provider for review/approval because:  Drug interaction warning  Also due for visit- pended 1 month and added in pharm comments to schedule  Please authorize if appropriate.  Thanks,  Janelle Denton RN

## 2018-02-07 ENCOUNTER — TELEPHONE (OUTPATIENT)
Dept: FAMILY MEDICINE | Facility: CLINIC | Age: 78
End: 2018-02-07

## 2018-02-07 DIAGNOSIS — M51.369 DDD (DEGENERATIVE DISC DISEASE), LUMBAR: Chronic | ICD-10-CM

## 2018-02-07 RX ORDER — OXYCODONE HYDROCHLORIDE 5 MG/1
5-10 TABLET ORAL 2 TIMES DAILY PRN
Qty: 120 TABLET | Refills: 0 | Status: SHIPPED | OUTPATIENT
Start: 2018-02-07 | End: 2018-03-08

## 2018-02-07 NOTE — TELEPHONE ENCOUNTER
Reason for Call:  Medication or medication refill:    Do you use a [facility]?  Name of the pharmacy and phone number for the current request:      Name of the medication requested: oxycodone    Other request: patient said she called pharmacy last week to request    Can we leave a detailed message on this number? YES    Phone number patient can be reached at: Cell number on file:    Telephone Information:   Mobile [phone]       Best Time: asap    Call taken on 2/7/2018 at 1:42 PM by Tara Henriquez

## 2018-02-07 NOTE — TELEPHONE ENCOUNTER
Controlled Substance Refill Request for   Last Written Prescription Date:  01/05/2018  Last Fill Quantity: 120,  # refills: 0   Last Office Visit with [facility] provider:  12/05/2017  Future Office Visit:    Next 5 appointments (look out 90 days)     Apr 09, 2018 10:45 AM CDT   Return Visit with Nikita Cedillo MD   [facility] ([facility])    [address] [place] [address] [id number] [phone]                 Problem List Complete:  Yes   checked in past 6 months?  No, route to RN

## 2018-02-08 NOTE — TELEPHONE ENCOUNTER
Left a detail message to pt to let her know RX is ready to  with  staff.    Glory Christopher ,CMA

## 2018-03-08 DIAGNOSIS — I44.7 LBBB (LEFT BUNDLE BRANCH BLOCK): ICD-10-CM

## 2018-03-08 DIAGNOSIS — I10 BENIGN ESSENTIAL HYPERTENSION: ICD-10-CM

## 2018-03-08 DIAGNOSIS — M51.369 DDD (DEGENERATIVE DISC DISEASE), LUMBAR: Chronic | ICD-10-CM

## 2018-03-08 DIAGNOSIS — G89.4 CHRONIC PAIN SYNDROME: Chronic | ICD-10-CM

## 2018-03-08 RX ORDER — METOPROLOL SUCCINATE 100 MG/1
TABLET, EXTENDED RELEASE ORAL
Qty: 90 TABLET | Refills: 3 | Status: SHIPPED | OUTPATIENT
Start: 2018-03-08 | End: 2019-02-08

## 2018-03-08 NOTE — TELEPHONE ENCOUNTER
Reason for Call:  Medication or medication refill:    Do you use a [facility]?  Name of the pharmacy and phone number for the current request:  PT will  Rx     Name of the medication requested: oxyCODONE IR (ROXICODONE) 5 MG tablet    Other request: Requesting 2 prescriptions each for 1 month supply   Pt will be out of town     Can we leave a detailed message on this number? YES    Phone number patient can be reached at: Cell number on file:    Telephone Information:   Mobile [phone]       Best Time: any    Call taken on 3/8/2018 at 5:01 PM by Lily Escoto

## 2018-03-08 NOTE — TELEPHONE ENCOUNTER
Prescription approved per [facility] Refill Protocol.  Judy Maurer RN

## 2018-03-08 NOTE — TELEPHONE ENCOUNTER
Controlled Substance Refill Request for  2 separate rx's since going out of town.    I did not call pt to ask details of where going and when leaving and returning.      oxyCODONE IR (ROXICODONE) 5 MG tablet 120 tablet 0 2/7/2018  No   Sig: Take 1-2 tablets (5-10 mg) by mouth 2 times daily as needed for breakthrough pain     Associated Diagnoses   DDD (degenerative disc disease), lumbar [M51.36]             Problem List Complete:  No   (Chronic pain listed but no detail.  DDD listed but just connected to a refill request)      PROVIDER TO CONSIDER COMPLETION OF PROBLEM LIST AND OVERVIEW/CONTROLLED SUBSTANCE AGREEMENT    Last Written Prescription Date:  02/07/2018  Last Fill Quantity: 120,   # refills: 0    Last Office Visit with [facility] primary care provider: 12/05/2017    Future Office visit:   Next 5 appointments (look out 90 days)     Apr 09, 2018 10:45 AM CDT   Return Visit with Nikita Cedillo MD   [facility] ([facility])    [address] [place] [phone]                Patient is followed by Sandra Mayer DO for ongoing prescription of pain medication.  All refills should only be approved by this provider, or covering partner.    1) Medication: Oxycodone.   Maximum quantity per month: 60     2) Medication: Lorazepam  Maximum quantity per month: 30     Clinic visit frequency required: Q 6  months     Controlled substance agreement:  Encounter-Level CSA - 06/23/2015:          Controlled Substance Agreement - Scan on 7/8/2015  9:35 AM : CONTROLLED SUBSTANCE AGREEMENT 06/23/15 (below)            Encounter-Level CSA - 06/23/2015:          Controlled Substance Agreement - Scan on 3/15/2017  8:58 AM : CONTROLLED SUBSTANCE AGREEMENT (below)               Clinic visit frequency required: Q 6  months      Last [facility] website verification: 3/9/2018

## 2018-03-09 RX ORDER — OXYCODONE HYDROCHLORIDE 5 MG/1
5-10 TABLET ORAL 2 TIMES DAILY PRN
Qty: 120 TABLET | Refills: 0 | Status: SHIPPED | OUTPATIENT
Start: 2018-03-09 | End: 2018-03-09

## 2018-03-09 RX ORDER — OXYCODONE HYDROCHLORIDE 5 MG/1
5-10 TABLET ORAL 2 TIMES DAILY PRN
Qty: 120 TABLET | Refills: 0 | Status: SHIPPED | OUTPATIENT
Start: 2018-04-08 | End: 2018-05-10

## 2018-03-20 DIAGNOSIS — F41.1 GENERALIZED ANXIETY DISORDER: Chronic | ICD-10-CM

## 2018-03-20 NOTE — TELEPHONE ENCOUNTER
"Last Written Prescription Date:  2/05/18  Last Fill Quantity: 30 tablet,  # refills: 0   Last office visit: 12/5/2017 with prescribing provider:  [place]   Future Office Visit:   Next 5 appointments (look out 90 days)     Apr 09, 2018 10:45 AM CDT   Return Visit with Nikita Cedillo MD   [facility] ([facility] [place] [facility])    [address] [place] [address] [phone]                 *Note to pharmacy when last filled: Due for annual visit    Requested Prescriptions   Pending Prescriptions Disp Refills     escitalopram (LEXAPRO) 20 MG tablet [Pharmacy Med Name: ESCITALOPRAM 20MG TAB] 30 tablet 0     Sig: TAKE 1 TABLET BY MOUTH ONCE DAILY    SSRIs Protocol Passed    3/20/2018 11:55 AM       Passed - Recent (12 mo) or future (30 days) visit within the authorizing provider's specialty    Patient had office visit in the last 12 months or has a visit in the next 30 days with authorizing provider or within the authorizing provider's specialty.  See \"Patient Info\" tab in inbasket, or \"Choose Columns\" in Meds & Orders section of the refill encounter.           Passed - Patient is age 18 or older       Passed - No active pregnancy on record       Passed - No positive pregnancy test in last 12 months        PHQ-9 SCORE 10/11/2016 2/7/2017 12/5/2017   Total Score - - -   Total Score 5 3 3       "

## 2018-03-22 RX ORDER — ESCITALOPRAM OXALATE 20 MG/1
TABLET ORAL
Qty: 90 TABLET | Refills: 1 | Status: SHIPPED | OUTPATIENT
Start: 2018-03-22 | End: 2018-08-24

## 2018-03-22 NOTE — TELEPHONE ENCOUNTER
"Patient was seen by PCP 3 months ago.  Pharmacy asking for #90 to promote \"better adherence\".  Prescription approved per [facility] Refill Protocol.  Judith Delgado RN    "

## 2018-03-23 ENCOUNTER — PRE VISIT (OUTPATIENT)
Dept: CARDIOLOGY | Facility: CLINIC | Age: 78
End: 2018-03-23

## 2018-04-09 ENCOUNTER — OFFICE VISIT (OUTPATIENT)
Dept: CARDIOLOGY | Facility: CLINIC | Age: 78
End: 2018-04-09
Attending: INTERNAL MEDICINE
Payer: COMMERCIAL

## 2018-04-09 VITALS
DIASTOLIC BLOOD PRESSURE: 77 MMHG | HEART RATE: 67 BPM | WEIGHT: 189.1 LBS | BODY MASS INDEX: 30.52 KG/M2 | SYSTOLIC BLOOD PRESSURE: 129 MMHG

## 2018-04-09 DIAGNOSIS — I44.7 LBBB (LEFT BUNDLE BRANCH BLOCK): ICD-10-CM

## 2018-04-09 DIAGNOSIS — I10 BENIGN ESSENTIAL HYPERTENSION: ICD-10-CM

## 2018-04-09 DIAGNOSIS — E78.5 HYPERLIPIDEMIA LDL GOAL <70: ICD-10-CM

## 2018-04-09 LAB
ALT SERPL W P-5'-P-CCNC: 33 U/L (ref 5–30)
ANION GAP SERPL CALCULATED.3IONS-SCNC: 13.7 MMOL/L (ref 6–17)
BUN SERPL-MCNC: 15 MG/DL (ref 7–30)
CALCIUM SERPL-MCNC: 9.7 MG/DL (ref 8.5–10.5)
CHLORIDE SERPL-SCNC: 98 MMOL/L (ref 98–107)
CHOLEST SERPL-MCNC: 149 MG/DL
CO2 SERPL-SCNC: 27 MMOL/L (ref 23–29)
CREAT SERPL-MCNC: 1.23 MG/DL (ref 0.7–1.3)
GFR SERPL CREATININE-BSD FRML MDRD: 42 ML/MIN/1.7M2
GLUCOSE SERPL-MCNC: 220 MG/DL (ref 70–105)
HDLC SERPL-MCNC: 34 MG/DL
LDLC SERPL CALC-MCNC: 87 MG/DL
NONHDLC SERPL-MCNC: 115 MG/DL
POTASSIUM SERPL-SCNC: 3.7 MMOL/L (ref 3.5–5.1)
SODIUM SERPL-SCNC: 135 MMOL/L (ref 136–145)
TRIGL SERPL-MCNC: 142 MG/DL

## 2018-04-09 PROCEDURE — 80048 BASIC METABOLIC PNL TOTAL CA: CPT | Performed by: INTERNAL MEDICINE

## 2018-04-09 PROCEDURE — 99214 OFFICE O/P EST MOD 30 MIN: CPT | Performed by: INTERNAL MEDICINE

## 2018-04-09 PROCEDURE — 36415 COLL VENOUS BLD VENIPUNCTURE: CPT | Performed by: INTERNAL MEDICINE

## 2018-04-09 PROCEDURE — 80061 LIPID PANEL: CPT | Performed by: INTERNAL MEDICINE

## 2018-04-09 PROCEDURE — 84460 ALANINE AMINO (ALT) (SGPT): CPT | Performed by: INTERNAL MEDICINE

## 2018-04-09 NOTE — LETTER
4/9/2018    Sandra Brunilda Mayer, DO  [address]  [facility] [address]    RE: Dimple Baxter       Dear Colleague,    I had the pleasure of seeing Dimple Baxter in the [facility].    Service Date: 04/09/2018      HISTORY OF PRESENT ILLNESS:  The patient is a 77-year-old overweight white female who presents to Cardiology Clinic for followup with electrocardiogram showing a left bundle branch block pattern, hypertension, hyperlipidemia.  She initially had an abnormal Cardiolite stress test followed by an adenosine Cardiolite stress test in 2010 that was negative.  There is a questionable history of coronary disease but negative family history of coronary disease.  Risk factors positive for coronary disease include hypertension, diabetes mellitus, hyperlipidemia.  There is no history of cigarette smoking.  Since her last clinic visit, she has had some adjustment of her medications with lability of blood pressure as well as renal function and liver function.  She does not describe benji symptoms of chest discomfort, shortness of breath, dizziness, palpitations, nausea, vomiting, diaphoresis or syncope.  She denies PND, orthopnea, fevers, chills or sweats.  She has no documented history of myocardial infarction, congestive heart failure, congenital heart disease or heart murmur.  She does have occasional lightheadedness if she moves too quickly.  Blood pressure has been problematic at times requiring numerous medication adjustments.  Echocardiography has shown intact left ventricular function, ejection fraction of 60%-65% with normal-sized right ventricle with intact function, mildly dilated left atrium, mild tricuspid insufficiency and no significant change from previous study.      MEDICATIONS:  Include:    1.  Lexapro 20 mg a day.   2.  Oxycodone as needed.   3.  Metoprolol succinate 100 mg a day.   4.  Accupril 20 mg a day.   5.  Amlodipine 5 mg a day.   6.  Rosuvastatin 2.5 mg  at bedtime.   7.  Lorazepam 0.5 to 1 mg a day as needed.   8.  Trazodone 100 mg at bedtime.   9.  Nitroglycerin 0.4 mg sublingual p.r.n.   10.  Hydrochlorothiazide 25 mg a day.   11.  Flonase as directed.   12.  Vitamin D 1000 units a day.   13.  Famotidine 20 mg twice a day.   14.  Acetaminophen 325-650 mg every 6 hours.    15.  Fish oil 3000 mg a day.   16.  Aspirin 81 mg a day.      LABORATORY DATA:  Demonstrated cholesterol 149, HDL 34, LDL 87, triglyceride 142.  Sodium 135, potassium 3.7, BUN 15, creatinine 1.23.  ALT 33.      PHYSICAL EXAMINATION:   VITAL SIGNS:  Showed a blood pressure of 129/77 with a heart rate of 60-70 and regular.  Weight was 189 pounds which was up 2 pounds from previous weight.   NECK:  Without jugular venous distention, carotid bruit or palpable thyroid.   CHEST:  Essentially clear to percussion and auscultation with slight decreased breath sounds at the bases.   CARDIAC:  Revealed a regular rhythm, soft S4 gallop, a 1/6 to 2/6 systolic murmur at the left sternal border with minimal radiation towards the apex.  No diastolic murmur, rub or S3.   EXTREMITIES:  Without cyanosis or edema.      CLINICAL IMPRESSION:   1.  Stable cardiac condition.   2.  Abnormal electrocardiogram showing left bundle branch block pattern.   3.  Hypertension, adequate control.   4.  Hyperlipidemia, stable but not quite at goal.   5.  Overweight.   6.  History of mild renal insufficiency, improved.    7.  Elevated liver function tests, improved.        DISCUSSION:  The patient has done reasonably well from the last clinic visit.  She continues to have lability of liver and renal function as well as her blood pressure.  She would benefit potentially from the addition of Zetia to lower her LDL cholesterol but she is reluctant because of cough and side effects.  She continues to try to avoid caffeine and salt.  She needs close followup of serum lipids, basic metabolic panel and blood pressure.  She is moving to  [place], [place] and will establish medical care at that point this summer.  Otherwise, she appears to be reasonably stable and doing well.      RECOMMENDATIONS:   1.  Continue present medications.   2.  Close followup of serum lipids, basic metabolic panel and blood pressure.   3.  Diet and exercise, avoiding caffeine and salt.   4.  Diabetic followup.   5.  Routine medical followup.   6.  Cardiology followup in 6 months.      cc:   Sandra Mayer, [facility]   [address], [address] [address]         PRADIP ROONEY MD, [facility]             D: 2018   T: 2018   MT: NEERAJ      Name:     ALEJANDRO GAMEZ   MRN:      [id number]        Account:      [id number]   :      1940           Service Date: 2018      Document: [id number]       Thank you for allowing me to participate in the care of your patient.      Sincerely,     Pradip Rooney MD     [facility]

## 2018-04-09 NOTE — MR AVS SNAPSHOT
"              After Visit Summary   2018    Dimple Baxter    MRN: [id number]           Patient Information     Date Of Birth          1940        Visit Information        Provider Department      2018 10:45 AM Nikita Cedillo MD [facility]        Today's Diagnoses     Benign essential hypertension        Hyperlipidemia LDL goal <70        LBBB (left bundle branch block)           Follow-ups after your visit        Who to contact     If you have questions or need follow up information about today's clinic visit or your schedule please contact [facility] directly at [phone].  Normal or non-critical lab and imaging results will be communicated to you by Accionhart, letter or phone within 4 business days after the clinic has received the results. If you do not hear from us within 7 days, please contact the clinic through Accionhart or phone. If you have a critical or abnormal lab result, we will notify you by phone as soon as possible.  Submit refill requests through ClickScanShare or call your pharmacy and they will forward the refill request to us. Please allow 3 business days for your refill to be completed.          Additional Information About Your Visit        MyChart Information     ClickScanShare lets you send messages to your doctor, view your test results, renew your prescriptions, schedule appointments and more. To sign up, go to [email or web address] . Click on \"Log in\" on the left side of the screen, which will take you to the Welcome page. Then click on \"Sign up Now\" on the right side of the page.     You will be asked to enter the access code listed below, as well as some personal information. Please follow the directions to create your username and password.     Your access code is: [email or web address]  Expires: 2018  8:07 AM     Your access code will  in 90 days. If you need help or a new code, please call your " [facility] or [phone].        Care EveryWhere ID     This is your Care EveryWhere ID. This could be used by other organizations to access your [place] medical records  [id number]        Your Vitals Were     Pulse BMI (Body Mass Index)                67 30.52 kg/m2           Blood Pressure from Last 3 Encounters:   04/09/18 129/77   01/10/18 110/56   12/28/17 179/73    Weight from Last 3 Encounters:   04/09/18 85.8 kg (189 lb 1.6 oz)   01/10/18 83.5 kg (184 lb)   12/28/17 84.2 kg (185 lb 9.6 oz)              We Performed the Following     Follow-Up with Cardiologist        Primary Care Provider Office Phone # Fax #    Sandra Mayer,  [phone] [address]  [place] [address]        Equal Access to Services     [facility]: Hadii aad ku hadasho Soomaali, waaxda luqadaha, qaybta kaalmada adeegyada, waxay idiin hayaan grady tatum . So [facility] [phone].    ATENCIÓN: Si habla español, tiene a estrada disposición servicios gratuitos de asistencia lingüística. Llame al [phone].    We comply with applicable federal civil rights laws and [place] laws. We do not discriminate on the basis of race, color, national origin, age, disability, sex, sexual orientation, or gender identity.            Thank you!     Thank you for choosing [facility]  for your care. Our goal is always to provide you with excellent care. Hearing back from our patients is one way we can continue to improve our services. Please take a few minutes to complete the written survey that you may receive in the mail after your visit with us. Thank you!             Your Updated Medication List - Protect others around you: Learn how to safely use, store and throw away your medicines at www.disposemymeds.org.          This list is accurate as of 4/9/18 11:59 PM.  Always use your most recent med list.                   Brand Name Dispense Instructions for use  Diagnosis    acetaminophen 325 MG tablet    TYLENOL     Take 1-2 tablets by mouth every 6 hours as needed for pain.    Lumbago       amLODIPine 5 MG tablet    NORVASC    30 tablet    Take 1 tablet (5 mg) by mouth daily    Benign essential hypertension       aspirin 81 MG tablet     0    take 1 x daily with food        cholecalciferol 1000 UNIT tablet    vitamin D3    100 tablet    Take 1 tablet (1,000 Units) by mouth daily    Postmenopausal       escitalopram 20 MG tablet    LEXAPRO    90 tablet    TAKE 1 TABLET BY MOUTH ONCE DAILY    Generalized anxiety disorder       famotidine 20 MG tablet    PEPCID     Take 1 tablet by mouth 2 times daily as needed.        fish oil-omega-3 fatty acids 1000 MG capsule     0    take three daily    Other and unspecified hyperlipidemia       fluticasone 50 MCG/ACT spray    FLONASE    16 g    Spray 2 sprays into both nostrils daily    Seasonal allergic rhinitis, unspecified allergic rhinitis trigger       hydrochlorothiazide 25 MG tablet    HYDRODIURIL    90 tablet    Take 1 tablet (25 mg) by mouth daily    Benign essential hypertension       LORazepam 1 MG tablet    ATIVAN    30 tablet    TAKE 0.5-1 TABLETS BY MOUTH ONCE DAILY AS NEEDED FOR ANXIETY    Generalized anxiety disorder       metoprolol succinate 100 MG 24 hr tablet    TOPROL-XL    90 tablet    TAKE ONE TABLET BY MOUTH ONCE DAILY    Benign essential hypertension, LBBB (left bundle branch block)       NITROSTAT 0.4 MG sublingual tablet   Generic drug:  nitroGLYcerin     25 tablet    PLACE 1 TABLET UNDER THE TONGUE EVERY 5 MINUTES AS NEEDED. UP TO 3 TABLETS PER EPISODE    Abnormal stress test       oxyCODONE IR 5 MG tablet    ROXICODONE    120 tablet    Take 1-2 tablets (5-10 mg) by mouth 2 times daily as needed for breakthrough pain    DDD (degenerative disc disease), lumbar       quinapril 40 MG Tab    ACCUPRIL    90 tablet    Take 0.5 tablets (20 mg) by mouth daily    Type 2 diabetes mellitus with diabetic nephropathy,  without long-term current use of insulin (H), Benign essential hypertension       rosuvastatin 5 MG tablet    CRESTOR    90 tablet    Take 0.5 tablets (2.5 mg) by mouth At Bedtime    Hyperlipidemia LDL goal <70       traZODone 100 MG tablet    DESYREL    90 tablet    TAKE ONE TABLET BY MOUTH ONCE DAILY AT BEDTIME    Primary insomnia       tretinoin 0.1 % cream    RETIN-A    20 g    Apply sparingly to face at bedtime; do not apply near eyes    Wrinkles

## 2018-04-09 NOTE — PROGRESS NOTES
Service Date: 04/09/2018      HISTORY OF PRESENT ILLNESS:  The patient is a 77-year-old overweight white female who presents to Cardiology Clinic for followup with electrocardiogram showing a left bundle branch block pattern, hypertension, hyperlipidemia.  She initially had an abnormal Cardiolite stress test followed by an adenosine Cardiolite stress test in 2010 that was negative.  There is a questionable history of coronary disease but negative family history of coronary disease.  Risk factors positive for coronary disease include hypertension, diabetes mellitus, hyperlipidemia.  There is no history of cigarette smoking.  Since her last clinic visit, she has had some adjustment of her medications with lability of blood pressure as well as renal function and liver function.  She does not describe benji symptoms of chest discomfort, shortness of breath, dizziness, palpitations, nausea, vomiting, diaphoresis or syncope.  She denies PND, orthopnea, fevers, chills or sweats.  She has no documented history of myocardial infarction, congestive heart failure, congenital heart disease or heart murmur.  She does have occasional lightheadedness if she moves too quickly.  Blood pressure has been problematic at times requiring numerous medication adjustments.  Echocardiography has shown intact left ventricular function, ejection fraction of 60%-65% with normal-sized right ventricle with intact function, mildly dilated left atrium, mild tricuspid insufficiency and no significant change from previous study.      MEDICATIONS:  Include:    1.  Lexapro 20 mg a day.   2.  Oxycodone as needed.   3.  Metoprolol succinate 100 mg a day.   4.  Accupril 20 mg a day.   5.  Amlodipine 5 mg a day.   6.  Rosuvastatin 2.5 mg at bedtime.   7.  Lorazepam 0.5 to 1 mg a day as needed.   8.  Trazodone 100 mg at bedtime.   9.  Nitroglycerin 0.4 mg sublingual p.r.n.   10.  Hydrochlorothiazide 25 mg a day.   11.  Flonase as directed.   12.  Vitamin D  1000 units a day.   13.  Famotidine 20 mg twice a day.   14.  Acetaminophen 325-650 mg every 6 hours.    15.  Fish oil 3000 mg a day.   16.  Aspirin 81 mg a day.      LABORATORY DATA:  Demonstrated cholesterol 149, HDL 34, LDL 87, triglyceride 142.  Sodium 135, potassium 3.7, BUN 15, creatinine 1.23.  ALT 33.      PHYSICAL EXAMINATION:   VITAL SIGNS:  Showed a blood pressure of 129/77 with a heart rate of 60-70 and regular.  Weight was 189 pounds which was up 2 pounds from previous weight.   NECK:  Without jugular venous distention, carotid bruit or palpable thyroid.   CHEST:  Essentially clear to percussion and auscultation with slight decreased breath sounds at the bases.   CARDIAC:  Revealed a regular rhythm, soft S4 gallop, a 1/6 to 2/6 systolic murmur at the left sternal border with minimal radiation towards the apex.  No diastolic murmur, rub or S3.   EXTREMITIES:  Without cyanosis or edema.      CLINICAL IMPRESSION:   1.  Stable cardiac condition.   2.  Abnormal electrocardiogram showing left bundle branch block pattern.   3.  Hypertension, adequate control.   4.  Hyperlipidemia, stable but not quite at goal.   5.  Overweight.   6.  History of mild renal insufficiency, improved.    7.  Elevated liver function tests, improved.        DISCUSSION:  The patient has done reasonably well from the last clinic visit.  She continues to have lability of liver and renal function as well as her blood pressure.  She would benefit potentially from the addition of Zetia to lower her LDL cholesterol but she is reluctant because of cough and side effects.  She continues to try to avoid caffeine and salt.  She needs close followup of serum lipids, basic metabolic panel and blood pressure.  She is moving to [place], [place] and will establish medical care at that point this summer.  Otherwise, she appears to be reasonably stable and doing well.      RECOMMENDATIONS:   1.  Continue present medications.   2.  Close followup of  serum lipids, basic metabolic panel and blood pressure.   3.  Diet and exercise, avoiding caffeine and salt.   4.  Diabetic followup.   5.  Routine medical followup.   6.  Cardiology followup in 6 months.      cc:   Sandra Mayer, [facility]   [address], [facility] [address]   [place] [address]         PRADIP ROONEY MD, [facility]             D: 2018   T: 2018   MT: NEERAJ      Name:     ALEJANDRO GAMEZ   MRN:      [id number]        Account:      [id number]   :      1940           Service Date: 2018      Document: [id number]

## 2018-04-11 ENCOUNTER — TELEPHONE (OUTPATIENT)
Dept: CARDIOLOGY | Facility: CLINIC | Age: 78
End: 2018-04-11

## 2018-04-11 NOTE — TELEPHONE ENCOUNTER
Call from patient, she is moving to [place] and needs a copy of her office visit dictation from 4/9/18 with Dr. Cedillo. Report mailed to home address.

## 2018-04-17 DIAGNOSIS — F41.1 GENERALIZED ANXIETY DISORDER: Chronic | ICD-10-CM

## 2018-04-18 RX ORDER — LORAZEPAM 1 MG/1
TABLET ORAL
Qty: 30 TABLET | Refills: 0 | Status: SHIPPED | OUTPATIENT
Start: 2018-04-18 | End: 2018-05-23

## 2018-04-18 NOTE — TELEPHONE ENCOUNTER
Pending Prescriptions:                       Disp   Refills    LORazepam (ATIVAN) 1 MG tablet [Pharmacy *30 tab*0            Sig: TAKE ONE-HALF TO ONE TABLET BY MOUTH ONCE DAILY           AS NEEDED FOR ANXIETY          Last Written Prescription Date:  12-5-17  Last Fill Quantity: 30,   # refills: 0  Last Office Visit: 12-5-17 [facility]  Future Office visit:       Routing refill request to provider for review/approval because:  Drug not on the [facility], P or [facility] refill protocol or controlled substance    RT Jeremias (R)

## 2018-05-10 DIAGNOSIS — I10 BENIGN ESSENTIAL HYPERTENSION: ICD-10-CM

## 2018-05-10 DIAGNOSIS — M51.369 DDD (DEGENERATIVE DISC DISEASE), LUMBAR: Chronic | ICD-10-CM

## 2018-05-10 RX ORDER — OXYCODONE HYDROCHLORIDE 5 MG/1
5-10 TABLET ORAL 2 TIMES DAILY PRN
Qty: 120 TABLET | Refills: 0 | Status: SHIPPED | OUTPATIENT
Start: 2018-05-10 | End: 2019-02-08

## 2018-05-10 RX ORDER — HYDROCHLOROTHIAZIDE 25 MG/1
TABLET ORAL
Qty: 90 TABLET | Refills: 1 | Status: SHIPPED | OUTPATIENT
Start: 2018-05-10 | End: 2019-02-08

## 2018-05-10 NOTE — TELEPHONE ENCOUNTER
Controlled Substance Refill Request for Oxycodone  Problem List Complete:  Yes    Last Written Prescription Date:  04/08/18  Last Fill Quantity: 120,   # refills: 0    Last Office Visit with [facility] primary care provider: 12/05/17    Clinic visit frequency required: Q 3 months     Future Office visit:     Controlled substance agreement on file: Yes:  Date 06/23/15.     Processing:  Patient will  in clinic   checked in past 6 months?  Yes 03/09/18     Lily Wolf MA

## 2018-05-10 NOTE — TELEPHONE ENCOUNTER
"Last Written Prescription Date:  2/07/17  Last Fill Quantity: 90 tablet,  # refills: 3   Last office visit: 12/5/2017 with prescribing provider:  [facility]   Future Office Visit:      Requested Prescriptions   Pending Prescriptions Disp Refills     hydrochlorothiazide (HYDRODIURIL) 25 MG tablet [Pharmacy Med Name: HYDROCHLOROT 25MG   TAB] 90 tablet 3     Sig: TAKE ONE TABLET BY MOUTH ONCE DAILY    Diuretics (Including Combos) Protocol Failed    5/10/2018 12:41 PM       Failed - Normal serum sodium on file in past 12 months    Recent Labs   Lab Test  04/09/18   0948   NA  135*             Passed - Blood pressure under 140/90 in past 12 months    BP Readings from Last 3 Encounters:   04/09/18 129/77   01/10/18 110/56   12/28/17 179/73                Passed - Recent (12 mo) or future (30 days) visit within the authorizing provider's specialty    Patient had office visit in the last 12 months or has a visit in the next 30 days with authorizing provider or within the authorizing provider's specialty.  See \"Patient Info\" tab in inbasket, or \"Choose Columns\" in Meds & Orders section of the refill encounter.           Passed - Patient is age 18 or older       Passed - No active pregancy on record       Passed - Normal serum creatinine on file in past 12 months    Recent Labs   Lab Test  04/09/18   0948   CR  1.23             Passed - Normal serum potassium on file in past 12 months    Recent Labs   Lab Test  04/09/18 0948   POTASSIUM  3.7                   Passed - No positive pregnancy test in past 12 months          "

## 2018-05-10 NOTE — TELEPHONE ENCOUNTER
Rx in outgoing fax box to prepare for patient . Since she seemed so confused about the process please advise OV in the coming weeks - ok for a block.

## 2018-05-10 NOTE — TELEPHONE ENCOUNTER
Reason for Call:  Medication or medication refill:    Do you use a [facility]?  Name of the pharmacy and phone number for the current request:    Pt will  in clinic    Name of the medication requested:   oxyCODONE IR (ROXICODONE) 5 MG tablet 120 tablet         Other request: Pt called and said that she had requested this just the other day.  There was no note. She said she went to pharmacy to  and I explained that it is a controlled substance so that she will have to  in clinic and that we don't just send it to the pharmacy.  She said we told her to  at pharmacy?  Informed pt that the only way to  at pharmacy was to have it mailed.  She said that she will  in clinic.  She just seemed rather confused as to what she was trying to ask for.      Can we leave a detailed message on this number? YES    Phone number patient can be reached at: Home number on file [phone] (home)    Best Time: Any    Call taken on 5/10/2018 at 12:45 PM by Susanne Pelayo

## 2018-05-10 NOTE — TELEPHONE ENCOUNTER
Routing refill request to provider for review/approval because:  Labs out of range:  Last sodium LOW     Azeb FRANKLIN RN

## 2018-05-23 ENCOUNTER — TELEPHONE (OUTPATIENT)
Dept: FAMILY MEDICINE | Facility: CLINIC | Age: 78
End: 2018-05-23

## 2018-05-23 DIAGNOSIS — F41.1 GENERALIZED ANXIETY DISORDER: Chronic | ICD-10-CM

## 2018-05-23 RX ORDER — LORAZEPAM 1 MG/1
TABLET ORAL
Qty: 10 TABLET | Refills: 0 | Status: SHIPPED | OUTPATIENT
Start: 2018-05-23 | End: 2018-08-29

## 2018-05-23 NOTE — TELEPHONE ENCOUNTER
Small quantity Rx approved in outgoing fax box. She was advised a couple weeks ago needed OV. Please call her to get scheduled - ok for a block.

## 2018-05-23 NOTE — TELEPHONE ENCOUNTER
LORazepam (ATIVAN) 1 MG tablet 30 tablet 0 4/18/2018           Last Written Prescription Date:  04/18/2018  Last Fill Quantity: 30,   # refills: 0  Last Office Visit: 12/05/2017  Future Office visit:  Unknown     Routing refill request to provider for review/approval because:  Drug not on the [facility], P or [facility] refill protocol or controlled substance

## 2018-05-24 NOTE — TELEPHONE ENCOUNTER
Pt states she called last week about a ov and she was told nothing open until July. I told her I could help her with that and she said well next week I'm going to [place] so I'll have to think about it.  Dr Moreno OLIVERA as she was just prescribed a short refill of her meds.    Thank you.  Josseline

## 2018-08-23 ENCOUNTER — TELEPHONE (OUTPATIENT)
Dept: FAMILY MEDICINE | Facility: CLINIC | Age: 78
End: 2018-08-23

## 2018-08-23 DIAGNOSIS — I10 BENIGN ESSENTIAL HYPERTENSION: ICD-10-CM

## 2018-08-23 DIAGNOSIS — M51.369 DDD (DEGENERATIVE DISC DISEASE), LUMBAR: Chronic | ICD-10-CM

## 2018-08-23 DIAGNOSIS — F41.1 GENERALIZED ANXIETY DISORDER: Chronic | ICD-10-CM

## 2018-08-23 NOTE — TELEPHONE ENCOUNTER
Reason for Call:  Medication or medication refill:    Do you use a [facility]?  Name of the pharmacy and phone number for the current request:     [facility] [address] [place] [address]. [address]  #[phone]  (I couldn't find it in our system to add)      Name of the medication requested: Escitalopram, Amlodipine, Lorazepam, Oxycodone    Other request: Please Mail the Oxycodone to the Pharmacy     Can we leave a detailed message on this number? YES    Phone number patient can be reached at: Cell Phone # [phone]    Best Time: anytime    Call taken on 8/23/2018 at 3:10 PM by Juan Anderson

## 2018-08-24 RX ORDER — OXYCODONE HYDROCHLORIDE 5 MG/1
5-10 TABLET ORAL 2 TIMES DAILY PRN
Qty: 120 TABLET | Refills: 0 | Status: CANCELLED | OUTPATIENT
Start: 2018-08-24

## 2018-08-24 RX ORDER — LORAZEPAM 1 MG/1
TABLET ORAL
Qty: 10 TABLET | Refills: 0 | Status: CANCELLED | OUTPATIENT
Start: 2018-08-24

## 2018-08-24 RX ORDER — AMLODIPINE BESYLATE 5 MG/1
5 TABLET ORAL DAILY
Qty: 90 TABLET | Refills: 0 | Status: SHIPPED | OUTPATIENT
Start: 2018-08-24 | End: 2019-02-08

## 2018-08-24 RX ORDER — ESCITALOPRAM OXALATE 20 MG/1
20 TABLET ORAL DAILY
Qty: 90 TABLET | Refills: 0 | Status: SHIPPED | OUTPATIENT
Start: 2018-08-24 | End: 2019-02-08

## 2018-08-24 NOTE — TELEPHONE ENCOUNTER
Reason for Call:  Medication or medication refill:    Do you use a [facility]?  Name of the pharmacy and phone number for the current request:       [facility] [address] [place] [address] - [address] [place] [address]      Name of the medication requested:   LORazepam (ATIVAN) 1 MG tablet 10 tablet 0 5/23/2018  No   Sig: TAKE 1/2 TO 1 (ONE-HALF TO ONE) TABLET BY MOUTH ONCE DAILY AS NEEDED FOR ANXIETY   Class: Local Print   Notes to Pharmacy: Due for office visit   Order: [id number]     amLODIPine (NORVASC) 5 MG tablet 30 tablet 3 12/28/2017  --   Sig - Route: Take 1 tablet (5 mg) by mouth daily - Oral   Class: E-Prescribe   Order: [id number]   E-Prescribing Status: Receipt confirmed by pharmacy (12/28/2017  3:31 PM CST)     oxyCODONE IR (ROXICODONE) 5 MG tablet 120 tablet 0 5/10/2018  No   Sig - Route: Take 1-2 tablets (5-10 mg) by mouth 2 times daily as needed for breakthrough pain - Oral   Class: Local Print   Order: [id number]     escitalopram (LEXAPRO) 20 MG tablet 90 tablet 1 3/22/2018  No   Sig: TAKE 1 TABLET BY MOUTH ONCE DAILY   Class: E-Prescribe   Order: [id number]   E-Prescribing Status: Receipt confirmed by pharmacy (3/22/2018  9:16 AM CDT)         Other request: Pt is in [place] right now taking care of son with liver disease.  Expressed that I would put in request, however she may need an OV first.  She didn't really understand why she just can't get her meds.  Explained that that was just a possibility and someone would let her know.      Can we leave a detailed message on this number? YES    Phone number patient can be reached at: Cell number on file:    Telephone Information:   Mobile [phone]       Best Time: Any     Call taken on 8/24/2018 at 11:06 AM by Susanne Pelayo

## 2018-08-24 NOTE — TELEPHONE ENCOUNTER
Called patient, does she need a full supply to [place], or a short supply to get her through.     Also, pt is due for OV for refills (especially on controlled's) noted that she went to  in May/June. Is she returning?     Jenelle RAZA RN

## 2018-08-24 NOTE — TELEPHONE ENCOUNTER
To PCP:     Pt is overdue for OV, but per notes below: Pt is in [place] right now taking care of son with liver disease.  Expressed that I would put in request, however she may need an OV first.  She didn't really understand why she just can't get her meds.      Reviewed- No issues (thought unsure if this records out of state RX fills)    Amlodipine (last RX'd by [facility] and in last encounter, move to [place] was mentioned)      Please see pended medications and advise.     Last OV with PCP 12/5/17    Thank you,   Jenelle RAZA, RN

## 2018-08-24 NOTE — TELEPHONE ENCOUNTER
Rx sent in for Norvasc and Lexapro. I don't feel comfortable Rx Lorazepam and Oxycodone as she is overdue for f/u and she was offered OV when she called in this spring; plus doesn't appear to have a time frame as to when she will be back in [place]. Also, historically when she was in [place] she was refilling Lorazepam and Oxycodone monthly (and now not since May) so she has either: 1) dramatically reduced her dosing (which would be nice) or 2) she has had filled elsewhere that we are not able to track since its in [place]. If she is going to be in [place], suggest she have provider there monitoring.

## 2018-08-29 RX ORDER — LORAZEPAM 1 MG/1
TABLET ORAL
Qty: 15 TABLET | Refills: 0 | Status: SHIPPED | OUTPATIENT
Start: 2018-08-29 | End: 2020-03-13

## 2018-08-29 NOTE — TELEPHONE ENCOUNTER
Short term Lorazepam script in outgoing fax bin; please let her know we'll be faxing it.    Advise f/u when back in [place] but also I won't feel comfortable continuing to prescribe out-state much longer. I do hope all works out with her son!

## 2018-08-29 NOTE — TELEPHONE ENCOUNTER
"FYI PCP   Patient returned call   Informed of below message   She has reduced dosing on Lorazepam/Oxycodone  She is doing okay without Oxycodone (does not need refill)  She was requesting Lorazepam refill as her son is in the hospital \"something is wrong with his liver\" and they are unsure if it's cancer. Did advise she establish care with provider there if needing to continue medication (but patient states she does not plan to establish care with new provider there)    Azeb FRANKLIN RN    "

## 2018-10-13 ENCOUNTER — HEALTH MAINTENANCE LETTER (OUTPATIENT)
Age: 78
End: 2018-10-13

## 2019-02-08 ENCOUNTER — OFFICE VISIT (OUTPATIENT)
Dept: INTERNAL MEDICINE | Facility: CLINIC | Age: 79
End: 2019-02-08
Payer: COMMERCIAL

## 2019-02-08 VITALS
HEART RATE: 87 BPM | SYSTOLIC BLOOD PRESSURE: 128 MMHG | BODY MASS INDEX: 30.33 KG/M2 | WEIGHT: 188.7 LBS | DIASTOLIC BLOOD PRESSURE: 80 MMHG | RESPIRATION RATE: 15 BRPM | TEMPERATURE: 98.2 F | HEIGHT: 66 IN | OXYGEN SATURATION: 97 %

## 2019-02-08 DIAGNOSIS — E78.5 HYPERLIPIDEMIA LDL GOAL <70: ICD-10-CM

## 2019-02-08 DIAGNOSIS — I10 BENIGN ESSENTIAL HYPERTENSION: ICD-10-CM

## 2019-02-08 DIAGNOSIS — M51.369 DDD (DEGENERATIVE DISC DISEASE), LUMBAR: Primary | Chronic | ICD-10-CM

## 2019-02-08 DIAGNOSIS — I44.7 LBBB (LEFT BUNDLE BRANCH BLOCK): ICD-10-CM

## 2019-02-08 DIAGNOSIS — I27.20 PULMONARY HYPERTENSION (H): ICD-10-CM

## 2019-02-08 DIAGNOSIS — F41.1 GENERALIZED ANXIETY DISORDER: Chronic | ICD-10-CM

## 2019-02-08 DIAGNOSIS — F51.01 PRIMARY INSOMNIA: Chronic | ICD-10-CM

## 2019-02-08 DIAGNOSIS — E11.21 TYPE 2 DIABETES MELLITUS WITH DIABETIC NEPHROPATHY, WITHOUT LONG-TERM CURRENT USE OF INSULIN (H): Chronic | ICD-10-CM

## 2019-02-08 PROCEDURE — 99214 OFFICE O/P EST MOD 30 MIN: CPT | Performed by: INTERNAL MEDICINE

## 2019-02-08 RX ORDER — AMLODIPINE BESYLATE 5 MG/1
5 TABLET ORAL DAILY
Qty: 90 TABLET | Refills: 1 | Status: SHIPPED | OUTPATIENT
Start: 2019-02-08 | End: 2019-08-19

## 2019-02-08 RX ORDER — CETIRIZINE HYDROCHLORIDE 10 MG/1
10 TABLET ORAL PRN
COMMUNITY
End: 2021-10-25

## 2019-02-08 RX ORDER — ROSUVASTATIN CALCIUM 5 MG/1
2.5 TABLET, COATED ORAL AT BEDTIME
Qty: 45 TABLET | Refills: 3 | Status: SHIPPED | OUTPATIENT
Start: 2019-02-08 | End: 2019-07-30

## 2019-02-08 RX ORDER — HYDROCHLOROTHIAZIDE 25 MG/1
25 TABLET ORAL DAILY
Qty: 90 TABLET | Refills: 1 | Status: SHIPPED | OUTPATIENT
Start: 2019-02-08 | End: 2019-08-19

## 2019-02-08 RX ORDER — QUINAPRIL 40 MG/1
20 TABLET ORAL DAILY
Qty: 90 TABLET | Refills: 1 | Status: SHIPPED | OUTPATIENT
Start: 2019-02-08 | End: 2020-03-13

## 2019-02-08 RX ORDER — TRAZODONE HYDROCHLORIDE 100 MG/1
TABLET ORAL
Qty: 90 TABLET | Refills: 3 | Status: SHIPPED | OUTPATIENT
Start: 2019-02-08 | End: 2019-07-19

## 2019-02-08 RX ORDER — METOPROLOL SUCCINATE 100 MG/1
100 TABLET, EXTENDED RELEASE ORAL DAILY
Qty: 90 TABLET | Refills: 1 | Status: SHIPPED | OUTPATIENT
Start: 2019-02-08 | End: 2019-08-24

## 2019-02-08 RX ORDER — GLIPIZIDE 2.5 MG/1
TABLET, EXTENDED RELEASE ORAL
COMMUNITY
Start: 2018-11-15 | End: 2019-02-08

## 2019-02-08 RX ORDER — GLIPIZIDE 2.5 MG/1
2.5 TABLET, EXTENDED RELEASE ORAL DAILY
Qty: 90 TABLET | Refills: 1 | Status: SHIPPED | OUTPATIENT
Start: 2019-02-08 | End: 2019-08-19

## 2019-02-08 RX ORDER — LORAZEPAM 1 MG/1
TABLET ORAL
Qty: 15 TABLET | Refills: 0 | Status: CANCELLED | OUTPATIENT
Start: 2019-02-08

## 2019-02-08 ASSESSMENT — ANXIETY QUESTIONNAIRES
2. NOT BEING ABLE TO STOP OR CONTROL WORRYING: NOT AT ALL
6. BECOMING EASILY ANNOYED OR IRRITABLE: NOT AT ALL
1. FEELING NERVOUS, ANXIOUS, OR ON EDGE: NOT AT ALL
GAD7 TOTAL SCORE: 1
5. BEING SO RESTLESS THAT IT IS HARD TO SIT STILL: NOT AT ALL
3. WORRYING TOO MUCH ABOUT DIFFERENT THINGS: NOT AT ALL
IF YOU CHECKED OFF ANY PROBLEMS ON THIS QUESTIONNAIRE, HOW DIFFICULT HAVE THESE PROBLEMS MADE IT FOR YOU TO DO YOUR WORK, TAKE CARE OF THINGS AT HOME, OR GET ALONG WITH OTHER PEOPLE: NOT DIFFICULT AT ALL
7. FEELING AFRAID AS IF SOMETHING AWFUL MIGHT HAPPEN: NOT AT ALL

## 2019-02-08 ASSESSMENT — MIFFLIN-ST. JEOR: SCORE: 1343.72

## 2019-02-08 ASSESSMENT — PATIENT HEALTH QUESTIONNAIRE - PHQ9: 5. POOR APPETITE OR OVEREATING: SEVERAL DAYS

## 2019-02-08 NOTE — NURSING NOTE
"/80   Pulse 87   Temp 98.2  F (36.8  C) (Oral)   Resp 15   Ht 1.67 m (5' 5.75\")   Wt 85.6 kg (188 lb 11.2 oz)   SpO2 97%   BMI 30.69 kg/m    Patient in for annual Medicare Visit.  Anneliese Carter CMA    "

## 2019-02-08 NOTE — PROGRESS NOTES
SUBJECTIVE:   Dimple Baxter is a 78 year old female who presents to clinic today for the following health issues:    Diabetes Follow-up      Patient is checking blood sugars: not at all, does not have glucometer    Diabetic concerns: None     Symptoms of hypoglycemia (low blood sugar): none     Paresthesias (numbness or burning in feet) or sores: No     Date of last diabetic eye exam: 3yrs ago     BP Readings from Last 2 Encounters:   02/08/19 128/80   04/09/18 129/77     Hemoglobin A1C (%)   Date Value   10/11/2016 6.0   10/19/2015 6.7 (H)     LDL Cholesterol Calculated (mg/dL)   Date Value   04/09/2018 87   12/28/2017 82       Hyperlipidemia Follow-Up      Rate your low fat/cholesterol diet?: good    Taking statin?  Yes, no muscle aches from statin    Other lipid medications/supplements?:  none    Hypertension Follow-up      Outpatient blood pressures are not being checked.    Low Salt Diet: low salt    Insomnia Follow up; on Trazodone 100 mg prn with symptom relief      Anxiety Follow-Up    Status since last visit: No change    Other associated symptoms:None    Complicating factors:   Significant life event: No   Current substance abuse: None  Depression symptoms: No  BRITTNI-7 SCORE 2/7/2017 12/5/2017 2/8/2019   Total Score - - -   Total Score 4 2 1       Degenerative disc disease/chronic pain ; stable on prn OTC Tylenol. Not on narcotics at this time .        Patient Active Problem List   Diagnosis     Allergic rhinitis     Esophageal reflux     Benign essential hypertension     Generalized anxiety disorder     Fatty liver     Colon polyp     Advanced directives, counseling/discussion     Mixed hyperlipidemia     Pulmonary hypertension (H)     Left bundle branch block     Type 2 diabetes mellitus with diabetic nephropathy; goal HgbA1c < 7%     DDD (degenerative disc disease), lumbar     Lumbar spondylosis     Insomnia     Recurrent left knee instability     Chronic midline low back pain without sciatica      Left ventricular diastolic dysfunction     Tricuspid regurgitation     Episodic tension-type headache, not intractable     Chronic pain syndrome     Past Surgical History:   Procedure Laterality Date     C NONSPECIFIC PROCEDURE      Hysterectomy/ Right Oophorectomy     C NONSPECIFIC PROCEDURE      Right Carpal Tunnel Surgery     C NONSPECIFIC PROCEDURE      Cholecystectomy     C NONSPECIFIC PROCEDURE      cor angio; nl     C NONSPECIFIC PROCEDURE      lasik     COLONOSCOPY  2013    Procedure: COMBINED COLONOSCOPY, SINGLE BIOPSY/POLYPECTOMY BY BIOPSY;;  Surgeon: Marshall Logan MD;  Location: [facility]     HYSTERECTOMY, PAP NO LONGER INDICATED         Social History     Tobacco Use     Smoking status: Former Smoker     Packs/day: 1.00     Years: 2.00     Pack years: 2.00     Types: Cigarettes     Start date:      Last attempt to quit: 1963     Years since quittin.1     Smokeless tobacco: Never Used   Substance Use Topics     Alcohol use: Yes     Alcohol/week: 0.0 oz     Comment: occ     Family History   Problem Relation Age of Onset     Family History Negative Daughter      Cerebrovascular Disease Mother      Alcoholism Father      Family History Negative Brother      Multiple Sclerosis Daughter      Lymphoma Daughter      Family History Negative Son          Current Outpatient Medications   Medication Sig Dispense Refill     acetaminophen (TYLENOL) 325 MG tablet Take 1-2 tablets by mouth every 6 hours as needed for pain.       amLODIPine (NORVASC) 5 MG tablet Take 1 tablet (5 mg) by mouth daily 90 tablet 1     ASPIRIN 81 MG OR TABS take 1 x daily with food 0 0     blood glucose monitoring (NO BRAND SPECIFIED) meter device kit Use to test blood sugar once daily  as directed. 1 kit 0     cetirizine (ZYRTEC) 10 MG tablet Take 10 mg by mouth as needed for allergies       cholecalciferol (VITAMIN D) 1000 UNIT tablet Take 1 tablet (1,000 Units) by mouth daily 100 tablet 3     FISH OIL 1000 MG OR  CAPS take three daily  0 0     glipiZIDE (GLUCOTROL XL) 2.5 MG 24 hr tablet Take 1 tablet (2.5 mg) by mouth daily 90 tablet 1     hydrochlorothiazide (HYDRODIURIL) 25 MG tablet Take 1 tablet (25 mg) by mouth daily 90 tablet 1     LORazepam (ATIVAN) 1 MG tablet TAKE 1/2 TO 1 (ONE-HALF TO ONE) TABLET BY MOUTH ONCE DAILY AS NEEDED FOR ANXIETY 15 tablet 0     metoprolol succinate ER (TOPROL-XL) 100 MG 24 hr tablet Take 1 tablet (100 mg) by mouth daily 90 tablet 1     NITROSTAT 0.4 MG sublingual tablet PLACE 1 TABLET UNDER THE TONGUE EVERY 5 MINUTES AS NEEDED. UP TO 3 TABLETS PER EPISODE 25 tablet 1     quinapril (ACCUPRIL) 40 MG tablet Take 0.5 tablets (20 mg) by mouth daily 90 tablet 1     rosuvastatin (CRESTOR) 5 MG tablet Take 0.5 tablets (2.5 mg) by mouth At Bedtime 45 tablet 3     traZODone (DESYREL) 100 MG tablet TAKE ONE TABLET BY MOUTH ONCE DAILY AT BEDTIME 90 tablet 3     famotidine (PEPCID) 20 MG tablet Take 1 tablet by mouth 2 times daily as needed.       fluticasone (FLONASE) 50 MCG/ACT nasal spray Spray 2 sprays into both nostrils daily (Patient not taking: Reported on 2/8/2019) 16 g 5       Reviewed and updated as needed this visit by clinical staff  Tobacco  Allergies  Meds  Med Hx  Surg Hx  Fam Hx  Soc Hx      Reviewed and updated as needed this visit by Provider         ROS:  CONSTITUTIONAL: NEGATIVE for fever, chills, change in weight  EYES: NEGATIVE for vision changes or irritation  ENT/MOUTH: NEGATIVE for ear, mouth and throat problems  RESP: NEGATIVE for significant cough or SOB  CV: NEGATIVE for chest pain, palpitations or peripheral edema  GI: NEGATIVE for nausea, abdominal pain, heartburn, or change in bowel habits  MUSCULOSKELETAL: h/o back arthritis   NEURO: NEGATIVE for weakness, dizziness or paresthesias  ENDOCRINE: NEGATIVE for temperature intolerance, skin/hair changes and Hx diabetes  PSYCHIATRIC: HX anxiety and HX depression  ROS otherwise negative    OBJECTIVE:                       "                              /80   Pulse 87   Temp 98.2  F (36.8  C) (Oral)   Resp 15   Ht 1.67 m (5' 5.75\")   Wt 85.6 kg (188 lb 11.2 oz)   SpO2 97%   BMI 30.69 kg/m    Body mass index is 30.69 kg/m .   GENERAL: healthy, alert, well nourished, well hydrated, no distress  EYES: Eyes grossly normal to inspection, extraocular movements - intact, and PERRL  HENT: ear canals- normal; TMs- normal; Nose- normal; Mouth- no ulcers, no lesions  NECK: no tenderness, no adenopathy, no asymmetry, no masses, no stiffness;   RESP: lungs clear to auscultation - no rales, no rhonchi, no wheezes  CV: regular rates and rhythm,    MS: extremities- no gross deformities noted, no edema, no calf tenderness  NEURO: strength and tone- normal, sensory exam- grossly normal, mentation- intact, speech- normal, reflexes- symmetric  Diabetic foot exam: normal DP and PT pulses, no trophic changes or ulcerative lesions, normal sensory exam and normal monofilament exam        ASSESSMENT/PLAN:                                                         (E11.21) Type 2 diabetes mellitus with diabetic nephropathy, without long-term current use of insulin (H)  Plan:not checking BS, Glucometer prescribed and advsed to check BS atleast 1-2 x week. Refilled  glipiZIDE (GLUCOTROL XL) 2.5 MG 24 hr tablet, .explained clearly about the medication,insructions and side effects.        OPHTHALMOLOGY ADULT REFERRAL, blood glucose monitoring (NO BRAND SPECIFIED) meter device         kit            (I10) Benign essential hypertension  Comment: BP controlled   Plan: refilled amLODIPine (NORVASC) 5 MG tablet, hydrochlorothiazide (HYDRODIURIL) 25 MG tablet,  metoprolol succinate ER (TOPROL-XL) 100 MG 24 hr tablet, quinapril (ACCUPRIL) 40 MG tablet.explained clearly about the medication,insructions and side effects.Advised to follow low salt diet and exercise and f/u in 6 mths.             (F41.1) Generalized anxiety disorder  Plan: stable , takes lorazepam " infrequently    (I44.7) LBBB (left bundle branch block)  Plan: metoprolol succinate ER (TOPROL-XL) 100 MG 24         hr tablet            (E78.5) Hyperlipidemia LDL goal <70  Plan: rosuvastatin (CRESTOR) 5 MG tablet refilled. explained clearly about the medication,insructions and side effects.       (M51.36) DDD (degenerative disc disease), lumbar     Plan: takes OTC Tylenol with pain control.     (F51.01) Primary insomnia  Plan: traZODone (DESYREL) 100 MG tablet refilled.explained clearly about the medication,insructions and side effects.    (I27.20) Pulmonary hypertension (H)       Du Phillips MD  [facility]

## 2019-02-08 NOTE — PATIENT INSTRUCTIONS
Preventive Health Recommendations    See your health care provider every year to    Review health changes.     Discuss preventive care.      Review your medicines if your doctor has prescribed any.      You no longer need a yearly Pap test unless you've had an abnormal Pap test in the past 10 years. If you have vaginal symptoms, such as bleeding or discharge, be sure to talk with your provider about a Pap test.      Every 1 to 2 years, have a mammogram.  If you are over 69, talk with your health care provider about whether or not you want to continue having screening mammograms.      Every 10 years, have a colonoscopy. Or, have a yearly FIT test (stool test). These exams will check for colon cancer.       Have a cholesterol test every 5 years, or more often if your doctor advises it.       Have a diabetes test (fasting glucose) every three years. If you are at risk for diabetes, you should have this test more often.       At age 65, have a bone density scan (DEXA) to check for osteoporosis (brittle bone disease).    Shots:    Get a flu shot each year.    Get a tetanus shot every 10 years.    Talk to your doctor about your pneumonia vaccines. There are now two you should receive - Pneumovax (PPSV 23) and Prevnar (PCV 13).    Talk to your pharmacist about the shingles vaccine.    Talk to your doctor about the hepatitis B vaccine.    Nutrition:     Eat at least 5 servings of fruits and vegetables each day.      Eat whole-grain bread, whole-wheat pasta and brown rice instead of white grains and rice.      Get adequate Calcium and Vitamin D.     Lifestyle    Exercise at least 150 minutes a week (30 minutes a day, 5 days a week). This will help you control your weight and prevent disease.      Limit alcohol to one drink per day.      No smoking.       Wear sunscreen to prevent skin cancer.       See your dentist twice a year for an exam and cleaning.      See your eye doctor every 1 to 2 years to screen for conditions  such as glaucoma, macular degeneration and cataracts.    Personalized Prevention Plan  You are due for the preventive services outlined below.  Your care team is available to assist you in scheduling these services.  If you have already completed any of these items, please share that information with your care team to update in your medical record.  Health Maintenance Due   Topic Date Due     URINE DRUG SCREEN Q1 YR  04/30/1955     Zoster (Shingles) Vaccine (2 of 3) 11/16/2010     Eye Exam - yearly  09/15/2014     A1C (Diabetes) Lab - every 6 months  04/11/2017     Microalbumin Lab - yearly  10/11/2017     Diabetic Foot Exam - yearly  02/07/2018     Depression Assessment - every 6 months  06/05/2018     Colonoscopy - every 5 years  08/12/2018     Flu Vaccine (1) 09/01/2018     Thyroid Function Lab (TSH) - every 2 years  10/11/2018     FALL RISK ASSESSMENT  12/05/2018     BRITTNI QUESTIONNAIRE 1 YEAR  12/05/2018     Mammogram - every 2 years  02/07/2019

## 2019-02-10 ASSESSMENT — PATIENT HEALTH QUESTIONNAIRE - PHQ9: SUM OF ALL RESPONSES TO PHQ QUESTIONS 1-9: 3

## 2019-02-11 ENCOUNTER — TELEPHONE (OUTPATIENT)
Dept: INTERNAL MEDICINE | Facility: CLINIC | Age: 79
End: 2019-02-11

## 2019-02-11 DIAGNOSIS — E11.21 TYPE 2 DIABETES MELLITUS WITH DIABETIC NEPHROPATHY (H): Primary | Chronic | ICD-10-CM

## 2019-02-11 RX ORDER — LANCETS
EACH MISCELLANEOUS
Qty: 100 EACH | Refills: 3 | Status: SHIPPED | OUTPATIENT
Start: 2019-02-11 | End: 2021-10-25

## 2019-02-11 RX ORDER — LANCING DEVICE/LANCETS
KIT MISCELLANEOUS
Qty: 1 EACH | Refills: 0 | Status: SHIPPED | OUTPATIENT
Start: 2019-02-11 | End: 2021-10-25

## 2019-02-11 ASSESSMENT — ANXIETY QUESTIONNAIRES: GAD7 TOTAL SCORE: 1

## 2019-02-11 NOTE — TELEPHONE ENCOUNTER
Last OV 2/8/19    Prescription approved per [facility] Refill Protocol.

## 2019-02-11 NOTE — TELEPHONE ENCOUNTER
Fax received from [facility] in [facility] requesting new RX's for Ana María Plus Strips and Softclix Lancets.  Brand and specific qty and refills must be included.

## 2019-02-16 ENCOUNTER — OFFICE VISIT (OUTPATIENT)
Dept: URGENT CARE | Facility: URGENT CARE | Age: 79
End: 2019-02-16
Payer: COMMERCIAL

## 2019-02-16 VITALS
BODY MASS INDEX: 30.67 KG/M2 | TEMPERATURE: 97.4 F | DIASTOLIC BLOOD PRESSURE: 76 MMHG | WEIGHT: 188.6 LBS | OXYGEN SATURATION: 93 % | SYSTOLIC BLOOD PRESSURE: 125 MMHG | HEART RATE: 76 BPM

## 2019-02-16 DIAGNOSIS — R82.90 ABNORMAL FINDING IN URINE: ICD-10-CM

## 2019-02-16 DIAGNOSIS — N39.0 URINARY TRACT INFECTION WITHOUT HEMATURIA, SITE UNSPECIFIED: Primary | ICD-10-CM

## 2019-02-16 LAB

## 2019-02-16 PROCEDURE — 87186 SC STD MICRODIL/AGAR DIL: CPT | Performed by: FAMILY MEDICINE

## 2019-02-16 PROCEDURE — 87086 URINE CULTURE/COLONY COUNT: CPT | Performed by: FAMILY MEDICINE

## 2019-02-16 PROCEDURE — 81001 URINALYSIS AUTO W/SCOPE: CPT | Performed by: PHYSICIAN ASSISTANT

## 2019-02-16 PROCEDURE — 99213 OFFICE O/P EST LOW 20 MIN: CPT | Performed by: FAMILY MEDICINE

## 2019-02-16 PROCEDURE — 87088 URINE BACTERIA CULTURE: CPT | Performed by: FAMILY MEDICINE

## 2019-02-16 RX ORDER — FLUCONAZOLE 150 MG/1
150 TABLET ORAL ONCE
Qty: 1 TABLET | Refills: 0 | Status: SHIPPED | OUTPATIENT
Start: 2019-02-16 | End: 2019-03-29

## 2019-02-16 RX ORDER — SULFAMETHOXAZOLE/TRIMETHOPRIM 800-160 MG
1 TABLET ORAL 2 TIMES DAILY
Qty: 14 TABLET | Refills: 0 | Status: SHIPPED | OUTPATIENT
Start: 2019-02-16 | End: 2019-03-29

## 2019-02-16 NOTE — PROGRESS NOTES
SUBJECTIVE:   Dimple Baxter is a 78 year old female who presents to clinic today for the following health issues:      HPI     Here with daughter with increased urinary frequency and urgency.  No flank pain or fever.  Hs of simple UTIs in the past.  Tolerating po well.    Problem list and histories reviewed & adjusted, as indicated.  Additional history: as documented        Patient Active Problem List   Diagnosis     Allergic rhinitis     Esophageal reflux     Benign essential hypertension     Generalized anxiety disorder     Fatty liver     Colon polyp     Advanced directives, counseling/discussion     Mixed hyperlipidemia     Pulmonary hypertension (H)     Left bundle branch block     Type 2 diabetes mellitus with diabetic nephropathy; goal HgbA1c < 7%     DDD (degenerative disc disease), lumbar     Lumbar spondylosis     Insomnia     Recurrent left knee instability     Chronic midline low back pain without sciatica     Left ventricular diastolic dysfunction     Tricuspid regurgitation     Episodic tension-type headache, not intractable     Chronic pain syndrome     Past Surgical History:   Procedure Laterality Date     C NONSPECIFIC PROCEDURE      Hysterectomy/ Right Oophorectomy     C NONSPECIFIC PROCEDURE      Right Carpal Tunnel Surgery     C NONSPECIFIC PROCEDURE      Cholecystectomy     C NONSPECIFIC PROCEDURE      cor angio; nl     C NONSPECIFIC PROCEDURE      lasik     COLONOSCOPY  2013    Procedure: COMBINED COLONOSCOPY, SINGLE BIOPSY/POLYPECTOMY BY BIOPSY;;  Surgeon: Marshall Logan MD;  Location:  GI     HYSTERECTOMY, PAP NO LONGER INDICATED         Social History     Tobacco Use     Smoking status: Former Smoker     Packs/day: 1.00     Years: 2.00     Pack years: 2.00     Types: Cigarettes     Start date:      Last attempt to quit: 1963     Years since quittin.1     Smokeless tobacco: Never Used   Substance Use Topics     Alcohol use: Yes     Alcohol/week: 0.0 oz      Comment: occ     Family History   Problem Relation Age of Onset     Family History Negative Daughter      Cerebrovascular Disease Mother      Alcoholism Father      Family History Negative Brother      Multiple Sclerosis Daughter      Lymphoma Daughter      Family History Negative Son          Current Outpatient Medications   Medication Sig Dispense Refill     amLODIPine (NORVASC) 5 MG tablet Take 1 tablet (5 mg) by mouth daily 90 tablet 1     ASPIRIN 81 MG OR TABS take 1 x daily with food 0 0     fluconazole (DIFLUCAN) 150 MG tablet Take 1 tablet (150 mg) by mouth once for 1 dose At end of Abx course 1 tablet 0     glipiZIDE (GLUCOTROL XL) 2.5 MG 24 hr tablet Take 1 tablet (2.5 mg) by mouth daily 90 tablet 1     hydrochlorothiazide (HYDRODIURIL) 25 MG tablet Take 1 tablet (25 mg) by mouth daily 90 tablet 1     LORazepam (ATIVAN) 1 MG tablet TAKE 1/2 TO 1 (ONE-HALF TO ONE) TABLET BY MOUTH ONCE DAILY AS NEEDED FOR ANXIETY 15 tablet 0     metoprolol succinate ER (TOPROL-XL) 100 MG 24 hr tablet Take 1 tablet (100 mg) by mouth daily 90 tablet 1     NITROSTAT 0.4 MG sublingual tablet PLACE 1 TABLET UNDER THE TONGUE EVERY 5 MINUTES AS NEEDED. UP TO 3 TABLETS PER EPISODE 25 tablet 1     quinapril (ACCUPRIL) 40 MG tablet Take 0.5 tablets (20 mg) by mouth daily 90 tablet 1     rosuvastatin (CRESTOR) 5 MG tablet Take 0.5 tablets (2.5 mg) by mouth At Bedtime 45 tablet 3     sulfamethoxazole-trimethoprim (BACTRIM DS/SEPTRA DS) 800-160 MG tablet Take 1 tablet by mouth 2 times daily for 7 days 14 tablet 0     traZODone (DESYREL) 100 MG tablet TAKE ONE TABLET BY MOUTH ONCE DAILY AT BEDTIME 90 tablet 3     acetaminophen (TYLENOL) 325 MG tablet Take 1-2 tablets by mouth every 6 hours as needed for pain.       blood glucose (ACCU-CHEK TEJA) test strip Use to test blood sugar One times daily or as directed. 100 each 3     blood glucose (ACCU-CHEK SOFTCLIX) lancing device Lancing device to be used with lancets. 1 each 0     blood  glucose monitoring (NO BRAND SPECIFIED) meter device kit Use to test blood sugar once daily  as directed. 1 kit 0     blood glucose monitoring (SOFTCLIX) lancets Use to test blood sugar One times daily. 100 each 3     cetirizine (ZYRTEC) 10 MG tablet Take 10 mg by mouth as needed for allergies       cholecalciferol (VITAMIN D) 1000 UNIT tablet Take 1 tablet (1,000 Units) by mouth daily 100 tablet 3     famotidine (PEPCID) 20 MG tablet Take 1 tablet by mouth 2 times daily as needed.       FISH OIL 1000 MG OR CAPS take three daily  0 0     fluticasone (FLONASE) 50 MCG/ACT nasal spray Spray 2 sprays into both nostrils daily (Patient not taking: Reported on 2/8/2019) 16 g 5     Allergies   Allergen Reactions     Atorvastatin Calcium      myalgias     Cymbalta      Twitches, nausea     Neurontin [Gabapentin]      ankle swelling     Nortriptyline      ha, edema     Paroxetine      gi; wt gain     Rosuvastatin      myalgias     Seroquel [Quetiapine Fumarate]      insomnia/drowsiness     Topamax [Topiramate]      constipation     Wellbutrin [Bupropion Hcl]      shakiness, heartburn     Zoloft      fatigue     Recent Labs   Lab Test 04/09/18  0948 01/10/18  1038 12/28/17  1455  10/30/17  0822  10/11/16  0922  10/19/15  1622  05/11/15  1218  04/09/14  1051   A1C  --   --   --   --   --   --  6.0  --  6.7*  --  6.6*   < > 6.2*   LDL 87  --  82  --  112*   < >  --    < >  --   --   --    < >  --    HDL 34*  --  32*  --  27*   < >  --    < >  --   --   --    < >  --    TRIG 142  --  221*  --  281*   < >  --    < >  --   --   --    < >  --    ALT 33*  --  31*  --  84*   < >  --    < >  --    < >  --    < > 83*   CR 1.23 1.29 1.60*   < >  --    < >  --    < >  --    < >  --    < > 0.66   GFRESTIMATED 42* 40* 31*   < >  --    < >  --    < >  --    < >  --    < > 88   GFRESTBLACK 51* 48* 38*   < >  --    < >  --    < >  --    < >  --    < > >90   POTASSIUM 3.7 3.8 3.6   < >  --    < >  --    < >  --    < >  --    < > 4.5   TSH  --    --   --   --   --   --  1.87  --   --   --   --   --  1.91    < > = values in this interval not displayed.      BP Readings from Last 3 Encounters:   02/16/19 125/76   02/08/19 128/80   04/09/18 129/77    Wt Readings from Last 3 Encounters:   02/16/19 85.5 kg (188 lb 9.6 oz)   02/08/19 85.6 kg (188 lb 11.2 oz)   04/09/18 85.8 kg (189 lb 1.6 oz)                    ROS:  Constitutional, HEENT, cardiovascular, pulmonary, gi and gu systems are negative, except as otherwise noted.    OBJECTIVE:     /76   Pulse 76   Temp 97.4  F (36.3  C) (Oral)   Wt 85.5 kg (188 lb 9.6 oz)   SpO2 93%   BMI 30.67 kg/m    Body mass index is 30.67 kg/m .  GENERAL: healthy, alert and no distress  EYES: Eyes grossly normal to inspection, PERRL and conjunctivae and sclerae normal  NECK: no adenopathy, no asymmetry, masses, or scars and thyroid normal to palpation  ABDOMEN: soft, nontender, no hepatosplenomegaly, no masses and bowel sounds normal  MS: no gross musculoskeletal defects noted, no edema  SKIN: no suspicious lesions or rashes  NEURO: Normal strength and tone, mentation intact and speech normal  PSYCH: mentation appears normal, affect normal/bright    Diagnostic Test Results:  Results for orders placed or performed in visit on 02/16/19 (from the past 24 hour(s))   UA with Microscopic reflex to Culture   Result Value Ref Range    Color Urine Yellow     Appearance Urine Clear     Glucose Urine Negative NEG^Negative mg/dL    Bilirubin Urine Negative NEG^Negative    Ketones Urine Negative NEG^Negative mg/dL    Specific Gravity Urine 1.010 1.003 - 1.035    pH Urine 6.5 5.0 - 7.0 pH    Protein Albumin Urine Negative NEG^Negative mg/dL    Urobilinogen Urine 0.2 0.2 - 1.0 EU/dL    Nitrite Urine Negative NEG^Negative    Blood Urine Small (A) NEG^Negative    Leukocyte Esterase Urine Small (A) NEG^Negative    Source Midstream Urine     WBC Urine 10-25 (A) OTO5^0 - 5 /HPF    RBC Urine O - 2 OTO2^O - 2 /HPF    Squamous Epithelial /LPF  Urine Few FEW^Few /LPF    Bacteria Urine Moderate (A) NEG^Negative /HPF       ASSESSMENT/PLAN:     1. Urinary tract infection without hematuria, site unspecified    - UA with Microscopic reflex to Culture  - sulfamethoxazole-trimethoprim (BACTRIM DS/SEPTRA DS) 800-160 MG tablet; Take 1 tablet by mouth 2 times daily for 7 days  Dispense: 14 tablet; Refill: 0  - fluconazole (DIFLUCAN) 150 MG tablet; Take 1 tablet (150 mg) by mouth once for 1 dose At end of Abx course  Dispense: 1 tablet; Refill: 0    Treat with bactrim x 7 days.  Desires Diflucan x 1 as gets yeast infections form Abx use.  Continue with increased fluids.      2. Abnormal finding in urine    - Urine Culture Aerobic Bacterial    UC pending for sensitivities.    Una Lemos MD  [facility]

## 2019-02-18 LAB
BACTERIA SPEC CULT: ABNORMAL
SPECIMEN SOURCE: ABNORMAL

## 2019-02-19 ENCOUNTER — HOSPITAL ENCOUNTER (OUTPATIENT)
Dept: MAMMOGRAPHY | Facility: CLINIC | Age: 79
Discharge: HOME | End: 2019-02-19
Attending: INTERNAL MEDICINE | Admitting: INTERNAL MEDICINE
Payer: COMMERCIAL

## 2019-02-19 ENCOUNTER — OFFICE VISIT (OUTPATIENT)
Dept: INTERNAL MEDICINE | Facility: CLINIC | Age: 79
End: 2019-02-19
Payer: COMMERCIAL

## 2019-02-19 VITALS
TEMPERATURE: 97.6 F | OXYGEN SATURATION: 98 % | HEIGHT: 66 IN | WEIGHT: 186.5 LBS | HEART RATE: 77 BPM | RESPIRATION RATE: 14 BRPM | SYSTOLIC BLOOD PRESSURE: 124 MMHG | BODY MASS INDEX: 29.97 KG/M2 | DIASTOLIC BLOOD PRESSURE: 70 MMHG

## 2019-02-19 DIAGNOSIS — Z00.00 ROUTINE HISTORY AND PHYSICAL EXAMINATION OF ADULT: Primary | ICD-10-CM

## 2019-02-19 DIAGNOSIS — E11.21 TYPE 2 DIABETES MELLITUS WITH DIABETIC NEPHROPATHY, WITHOUT LONG-TERM CURRENT USE OF INSULIN (H): Chronic | ICD-10-CM

## 2019-02-19 DIAGNOSIS — Z12.31 VISIT FOR SCREENING MAMMOGRAM: ICD-10-CM

## 2019-02-19 DIAGNOSIS — Z78.0 ASYMPTOMATIC POSTMENOPAUSAL STATUS: ICD-10-CM

## 2019-02-19 DIAGNOSIS — Z12.31 ENCOUNTER FOR SCREENING MAMMOGRAM FOR BREAST CANCER: ICD-10-CM

## 2019-02-19 LAB
HBA1C MFR BLD: 6.8 % (ref 0–5.6)
HGB BLD-MCNC: 14.6 G/DL (ref 11.7–15.7)

## 2019-02-19 PROCEDURE — 83036 HEMOGLOBIN GLYCOSYLATED A1C: CPT | Performed by: INTERNAL MEDICINE

## 2019-02-19 PROCEDURE — 84443 ASSAY THYROID STIM HORMONE: CPT | Performed by: INTERNAL MEDICINE

## 2019-02-19 PROCEDURE — 80061 LIPID PANEL: CPT | Performed by: INTERNAL MEDICINE

## 2019-02-19 PROCEDURE — 82043 UR ALBUMIN QUANTITATIVE: CPT | Performed by: INTERNAL MEDICINE

## 2019-02-19 PROCEDURE — 77063 BREAST TOMOSYNTHESIS BI: CPT

## 2019-02-19 PROCEDURE — 80053 COMPREHEN METABOLIC PANEL: CPT | Performed by: INTERNAL MEDICINE

## 2019-02-19 PROCEDURE — G0439 PPPS, SUBSEQ VISIT: HCPCS | Performed by: INTERNAL MEDICINE

## 2019-02-19 PROCEDURE — 85018 HEMOGLOBIN: CPT | Performed by: INTERNAL MEDICINE

## 2019-02-19 PROCEDURE — 36415 COLL VENOUS BLD VENIPUNCTURE: CPT | Performed by: INTERNAL MEDICINE

## 2019-02-19 ASSESSMENT — MIFFLIN-ST. JEOR: SCORE: 1333.74

## 2019-02-19 NOTE — NURSING NOTE
"/70   Pulse 77   Temp 97.6  F (36.4  C) (Oral)   Resp 14   Ht 1.67 m (5' 5.75\")   Wt 84.6 kg (186 lb 8 oz)   SpO2 98%   BMI 30.33 kg/m    Patient in for Medicare Visit.  Anneliese Carter, SOCO    "

## 2019-02-19 NOTE — PATIENT INSTRUCTIONS
Preventive Health Recommendations    See your health care provider every year to    Review health changes.     Discuss preventive care.      Review your medicines if your doctor has prescribed any.      You no longer need a yearly Pap test unless you've had an abnormal Pap test in the past 10 years. If you have vaginal symptoms, such as bleeding or discharge, be sure to talk with your provider about a Pap test.      Every 1 to 2 years, have a mammogram.  If you are over 69, talk with your health care provider about whether or not you want to continue having screening mammograms.      Every 10 years, have a colonoscopy. Or, have a yearly FIT test (stool test). These exams will check for colon cancer.       Have a cholesterol test every 5 years, or more often if your doctor advises it.       Have a diabetes test (fasting glucose) every three years. If you are at risk for diabetes, you should have this test more often.       At age 65, have a bone density scan (DEXA) to check for osteoporosis (brittle bone disease).    Shots:    Get a flu shot each year.    Get a tetanus shot every 10 years.    Talk to your doctor about your pneumonia vaccines. There are now two you should receive - Pneumovax (PPSV 23) and Prevnar (PCV 13).    Talk to your pharmacist about the shingles vaccine.    Talk to your doctor about the hepatitis B vaccine.    Nutrition:     Eat at least 5 servings of fruits and vegetables each day.      Eat whole-grain bread, whole-wheat pasta and brown rice instead of white grains and rice.      Get adequate Calcium and Vitamin D.     Lifestyle    Exercise at least 150 minutes a week (30 minutes a day, 5 days a week). This will help you control your weight and prevent disease.      Limit alcohol to one drink per day.      No smoking.       Wear sunscreen to prevent skin cancer.       See your dentist twice a year for an exam and cleaning.      See your eye doctor every 1 to 2 years to screen for conditions  such as glaucoma, macular degeneration and cataracts.    Personalized Prevention Plan  You are due for the preventive services outlined below.  Your care team is available to assist you in scheduling these services.  If you have already completed any of these items, please share that information with your care team to update in your medical record.  Health Maintenance Due   Topic Date Due     URINE DRUG SCREEN Q1 YR  04/30/1955     Zoster (Shingles) Vaccine (2 of 3) 11/16/2010     Eye Exam - yearly  09/15/2014     A1C (Diabetes) Lab - every 6 months  04/11/2017     Microalbumin Lab - yearly  10/11/2017     Colonoscopy - every 5 years  08/12/2018     Flu Vaccine (1) 09/01/2018     Thyroid Function Lab (TSH) - every 2 years  10/11/2018     Mammogram - every 2 years  02/07/2019
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
## 2019-02-20 LAB
ALBUMIN SERPL-MCNC: 4.1 G/DL (ref 3.4–5)
ALP SERPL-CCNC: 67 U/L (ref 40–150)
ALT SERPL W P-5'-P-CCNC: 54 U/L (ref 0–50)
ANION GAP SERPL CALCULATED.3IONS-SCNC: 2 MMOL/L (ref 3–14)
AST SERPL W P-5'-P-CCNC: 54 U/L (ref 0–45)
BILIRUB SERPL-MCNC: 0.5 MG/DL (ref 0.2–1.3)
BUN SERPL-MCNC: 18 MG/DL (ref 7–30)
CALCIUM SERPL-MCNC: 9.3 MG/DL (ref 8.5–10.1)
CHLORIDE SERPL-SCNC: 100 MMOL/L (ref 94–109)
CHOLEST SERPL-MCNC: 201 MG/DL
CO2 SERPL-SCNC: 29 MMOL/L (ref 20–32)
CREAT SERPL-MCNC: 1.44 MG/DL (ref 0.52–1.04)
CREAT UR-MCNC: 209 MG/DL
GFR SERPL CREATININE-BSD FRML MDRD: 34 ML/MIN/{1.73_M2}
GLUCOSE SERPL-MCNC: 131 MG/DL (ref 70–99)
HDLC SERPL-MCNC: 31 MG/DL
LDLC SERPL CALC-MCNC: 118 MG/DL
MICROALBUMIN UR-MCNC: 13 MG/L
MICROALBUMIN/CREAT UR: 6.12 MG/G CR (ref 0–25)
NONHDLC SERPL-MCNC: 170 MG/DL
POTASSIUM SERPL-SCNC: 4.1 MMOL/L (ref 3.4–5.3)
PROT SERPL-MCNC: 8.1 G/DL (ref 6.8–8.8)
SODIUM SERPL-SCNC: 131 MMOL/L (ref 133–144)
TRIGL SERPL-MCNC: 261 MG/DL
TSH SERPL DL<=0.005 MIU/L-ACNC: 2.2 MU/L (ref 0.4–4)

## 2019-03-05 ENCOUNTER — TELEPHONE (OUTPATIENT)
Dept: INTERNAL MEDICINE | Facility: CLINIC | Age: 79
End: 2019-03-05

## 2019-03-05 DIAGNOSIS — E87.1 SODIUM SERUM DECREASED: Primary | ICD-10-CM

## 2019-03-05 DIAGNOSIS — R93.89 ABNORMAL CHEST X-RAY: Primary | ICD-10-CM

## 2019-03-05 NOTE — TELEPHONE ENCOUNTER
Patient requesting chest xray. Pt reported that she she had a chest xray done in November when she was in [place] and was told results were abnormal. That pt needed to have a repeat chest xray in a few months.     Patient unable to recall what was abnormal about the chest xray. Pt saw Dr Berto Wa[facility] with [facility].     Please advise?

## 2019-03-08 ENCOUNTER — ANCILLARY PROCEDURE (OUTPATIENT)
Dept: GENERAL RADIOLOGY | Facility: CLINIC | Age: 79
End: 2019-03-08
Attending: INTERNAL MEDICINE
Payer: COMMERCIAL

## 2019-03-08 DIAGNOSIS — R93.89 ABNORMAL CHEST X-RAY: ICD-10-CM

## 2019-03-08 PROCEDURE — 71046 X-RAY EXAM CHEST 2 VIEWS: CPT

## 2019-03-15 ENCOUNTER — TELEPHONE (OUTPATIENT)
Dept: INTERNAL MEDICINE | Facility: CLINIC | Age: 79
End: 2019-03-15

## 2019-03-15 NOTE — TELEPHONE ENCOUNTER
Panel Management Review      Patient has the following on her problem list:     Diabetes    ASA: Passed    Last A1C  Lab Results   Component Value Date    A1C 6.8 02/19/2019    A1C 6.0 10/11/2016    A1C 6.7 10/19/2015    A1C 6.6 05/11/2015    A1C 6.2 09/09/2014     A1C tested: MONITOR    Last LDL:    Lab Results   Component Value Date    CHOL 201 02/19/2019     Lab Results   Component Value Date    HDL 31 02/19/2019     Lab Results   Component Value Date     02/19/2019     Lab Results   Component Value Date    TRIG 261 02/19/2019     Lab Results   Component Value Date    CHOLHDLRATIO 4.6 05/08/2015     Lab Results   Component Value Date    NHDL 170 02/19/2019       Is the patient on a Statin? YES             Is the patient on Aspirin? YES    Medications     HMG CoA Reductase Inhibitors     rosuvastatin (CRESTOR) 5 MG tablet       Salicylates     ASPIRIN 81 MG OR TABS             Last three blood pressure readings:  BP Readings from Last 3 Encounters:   02/19/19 124/70   02/16/19 125/76   02/08/19 128/80       Date of last diabetes office visit: 2/19/19     Tobacco History:     History   Smoking Status     Former Smoker     Packs/day: 1.00     Years: 2.00     Types: Cigarettes     Start date: 1961     Quit date: 1/1/1963   Smokeless Tobacco     Never Used           IVD   ASA: MONITOR    Last LDL:    Lab Results   Component Value Date    CHOL 201 02/19/2019     Lab Results   Component Value Date    HDL 31 02/19/2019     Lab Results   Component Value Date     02/19/2019     Lab Results   Component Value Date    TRIG 261 02/19/2019        Lab Results   Component Value Date    CHOLHDLRATIO 4.6 05/08/2015        Is the patient on a Statin? YES   Is the patient on Aspirin? YES                  Medications     HMG CoA Reductase Inhibitors     rosuvastatin (CRESTOR) 5 MG tablet       Salicylates     ASPIRIN 81 MG OR TABS             Last three blood pressure readings:  BP Readings from Last 3 Encounters:    02/19/19 124/70   02/16/19 125/76   02/08/19 128/80        Tobacco History:     History   Smoking Status     Former Smoker     Packs/day: 1.00     Years: 2.00     Types: Cigarettes     Start date: 1961     Quit date: 1/1/1963   Smokeless Tobacco     Never Used       Hypertension   Last three blood pressure readings:  BP Readings from Last 3 Encounters:   02/19/19 124/70   02/16/19 125/76   02/08/19 128/80     Blood pressure: MONITOR    HTN Guidelines:  Age 18-59 BP range:  Less than 140/90  Age 60-85 with Diabetes:  Less than 140/90  Age 60-85 without Diabetes:  less than 150/90      Composite cancer screening  Chart review shows that this patient is due/due soon for the following Mammogram and Colonoscopy  Summary:    Patient is due/failing the following:   COLONOSCOPY and MAMMOGRAM    Action needed:   Patient needs office visit for mammogram and colonoscopy.    Type of outreach:    Discussed at OV.    Questions for provider review:    None                                                                                                                                    Anneliese Carter CMA       Chart routed to none .

## 2019-03-18 ENCOUNTER — TELEPHONE (OUTPATIENT)
Dept: INTERNAL MEDICINE | Facility: CLINIC | Age: 79
End: 2019-03-18

## 2019-03-18 DIAGNOSIS — J84.10 LUNG FIBROSIS (H): Primary | ICD-10-CM

## 2019-03-18 NOTE — TELEPHONE ENCOUNTER
Pt called back, pt understands VM from last week was to make her lab appt to have her sodium retested.     Pt would still like to discuss x-rays.

## 2019-03-18 NOTE — TELEPHONE ENCOUNTER
Pt got a call back last week, VM said something about taking a test or have tests performed.  Pt is unsure what this could be or what this is in regards to.     Pt would like a call back to also discuss her most recent x-ray results from 2 weeks ago.     PH: [phone]

## 2019-03-19 NOTE — TELEPHONE ENCOUNTER
Chest xray  -  Question of fibrotic changes noted on chest xray.  Certain risk factors are associated with fibrosis includes cigarette smoking, viral infection, environmental pollutants, chronic aspiration, genetic predisposition. Pt needs referral to pulmonary specialist and additional testing- CT chest, pl inform pt

## 2019-03-19 NOTE — TELEPHONE ENCOUNTER
Attempted to contact pt. Left message to call clinic.     Your provider has referred you to: [facility]: [facility] [phone]   [email or web address]

## 2019-03-20 DIAGNOSIS — E87.1 SODIUM SERUM DECREASED: ICD-10-CM

## 2019-03-20 PROCEDURE — 36415 COLL VENOUS BLD VENIPUNCTURE: CPT | Performed by: INTERNAL MEDICINE

## 2019-03-20 PROCEDURE — 84295 ASSAY OF SERUM SODIUM: CPT | Performed by: INTERNAL MEDICINE

## 2019-03-21 LAB — SODIUM SERPL-SCNC: 137 MMOL/L (ref 133–144)

## 2019-03-25 ENCOUNTER — TELEPHONE (OUTPATIENT)
Dept: INTERNAL MEDICINE | Facility: CLINIC | Age: 79
End: 2019-03-25

## 2019-03-25 DIAGNOSIS — Z12.11 ENCOUNTER FOR SCREENING COLONOSCOPY: Primary | ICD-10-CM

## 2019-03-25 NOTE — TELEPHONE ENCOUNTER
Panel Management Review      Patient has the following on her problem list:     Diabetes    ASA: UNKNOWN    Last A1C  Lab Results   Component Value Date    A1C 6.8 02/19/2019    A1C 6.0 10/11/2016    A1C 6.7 10/19/2015    A1C 6.6 05/11/2015    A1C 6.2 09/09/2014     A1C tested: MONITOR    Last LDL:    Lab Results   Component Value Date    CHOL 201 02/19/2019     Lab Results   Component Value Date    HDL 31 02/19/2019     Lab Results   Component Value Date     02/19/2019     Lab Results   Component Value Date    TRIG 261 02/19/2019     Lab Results   Component Value Date    CHOLHDLRATIO 4.6 05/08/2015     Lab Results   Component Value Date    NHDL 170 02/19/2019       Is the patient on a Statin? YES             Is the patient on Aspirin? YES    Medications     HMG CoA Reductase Inhibitors     rosuvastatin (CRESTOR) 5 MG tablet       Salicylates     ASPIRIN 81 MG OR TABS             Last three blood pressure readings:  BP Readings from Last 3 Encounters:   02/19/19 124/70   02/16/19 125/76   02/08/19 128/80       Date of last diabetes office visit: 2/19/19     Tobacco History:     History   Smoking Status     Former Smoker     Packs/day: 1.00     Years: 2.00     Types: Cigarettes     Start date: 1961     Quit date: 1/1/1963   Smokeless Tobacco     Never Used           IVD   ASA: MONITOR    Last LDL:    Lab Results   Component Value Date    CHOL 201 02/19/2019     Lab Results   Component Value Date    HDL 31 02/19/2019     Lab Results   Component Value Date     02/19/2019     Lab Results   Component Value Date    TRIG 261 02/19/2019        Lab Results   Component Value Date    CHOLHDLRATIO 4.6 05/08/2015        Is the patient on a Statin? YES   Is the patient on Aspirin? YES                  Medications     HMG CoA Reductase Inhibitors     rosuvastatin (CRESTOR) 5 MG tablet       Salicylates     ASPIRIN 81 MG OR TABS             Last three blood pressure readings:  BP Readings from Last 3 Encounters:    02/19/19 124/70   02/16/19 125/76   02/08/19 128/80        Tobacco History:     History   Smoking Status     Former Smoker     Packs/day: 1.00     Years: 2.00     Types: Cigarettes     Start date: 1961     Quit date: 1/1/1963   Smokeless Tobacco     Never Used       Hypertension   Last three blood pressure readings:  BP Readings from Last 3 Encounters:   02/19/19 124/70   02/16/19 125/76   02/08/19 128/80     Blood pressure: MONITOR    HTN Guidelines:  Age 18-59 BP range:  Less than 140/90  Age 60-85 with Diabetes:  Less than 140/90  Age 60-85 without Diabetes:  less than 150/90      Composite cancer screening  Chart review shows that this patient is due/due soon for the following Mammogram and Colonoscopy  Summary:    Patient is due/failing the following:   COLONOSCOPY and MAMMOGRAM    Action needed:   Patient needs office visit for colonoscopy.    Type of outreach:    Discussed at OV. Mammogram completed 2/19/19. Colonoscopy order placed.    Questions for provider review:    None                                                                                                                                    Anneliese Carter CMA       Chart routed to none .

## 2019-03-28 ENCOUNTER — TRANSFERRED RECORDS (OUTPATIENT)
Dept: HEALTH INFORMATION MANAGEMENT | Facility: CLINIC | Age: 79
End: 2019-03-28

## 2019-03-28 ENCOUNTER — HOSPITAL ENCOUNTER (OUTPATIENT)
Dept: LAB | Facility: CLINIC | Age: 79
Discharge: HOME | End: 2019-03-28
Attending: INTERNAL MEDICINE | Admitting: INTERNAL MEDICINE
Payer: COMMERCIAL

## 2019-03-28 DIAGNOSIS — J84.10 PULMONARY FIBROSIS (H): Primary | ICD-10-CM

## 2019-03-28 LAB
ALT SERPL W P-5'-P-CCNC: 61 U/L (ref 0–50)
AST SERPL W P-5'-P-CCNC: 46 U/L (ref 0–45)
BILIRUB SERPL-MCNC: 0.6 MG/DL (ref 0.2–1.3)
CRP SERPL-MCNC: 4.6 MG/L (ref 0–8)
ERYTHROCYTE [SEDIMENTATION RATE] IN BLOOD BY WESTERGREN METHOD: 19 MM/H (ref 0–30)
MISCELLANEOUS TEST: NORMAL

## 2019-03-28 PROCEDURE — 85652 RBC SED RATE AUTOMATED: CPT | Performed by: INTERNAL MEDICINE

## 2019-03-28 PROCEDURE — 82552 ASSAY OF CPK IN BLOOD: CPT | Performed by: INTERNAL MEDICINE

## 2019-03-28 PROCEDURE — 82247 BILIRUBIN TOTAL: CPT | Performed by: INTERNAL MEDICINE

## 2019-03-28 PROCEDURE — 82164 ANGIOTENSIN I ENZYME TEST: CPT | Performed by: INTERNAL MEDICINE

## 2019-03-28 PROCEDURE — 86431 RHEUMATOID FACTOR QUANT: CPT | Performed by: INTERNAL MEDICINE

## 2019-03-28 PROCEDURE — 86235 NUCLEAR ANTIGEN ANTIBODY: CPT | Performed by: INTERNAL MEDICINE

## 2019-03-28 PROCEDURE — 86038 ANTINUCLEAR ANTIBODIES: CPT | Performed by: INTERNAL MEDICINE

## 2019-03-28 PROCEDURE — 82550 ASSAY OF CK (CPK): CPT | Performed by: INTERNAL MEDICINE

## 2019-03-28 PROCEDURE — 84450 TRANSFERASE (AST) (SGOT): CPT | Performed by: INTERNAL MEDICINE

## 2019-03-28 PROCEDURE — 86653 ENCEPHALTIS ST LOUIS ANTBODY: CPT

## 2019-03-28 PROCEDURE — 86039 ANTINUCLEAR ANTIBODIES (ANA): CPT | Performed by: INTERNAL MEDICINE

## 2019-03-28 PROCEDURE — 86226 DNA ANTIBODY SINGLE STRAND: CPT | Performed by: INTERNAL MEDICINE

## 2019-03-28 PROCEDURE — 86255 FLUORESCENT ANTIBODY SCREEN: CPT | Performed by: INTERNAL MEDICINE

## 2019-03-28 PROCEDURE — 86653 ENCEPHALTIS ST LOUIS ANTBODY: CPT | Performed by: INTERNAL MEDICINE

## 2019-03-28 PROCEDURE — 86225 DNA ANTIBODY NATIVE: CPT | Performed by: INTERNAL MEDICINE

## 2019-03-28 PROCEDURE — 84460 ALANINE AMINO (ALT) (SGPT): CPT | Performed by: INTERNAL MEDICINE

## 2019-03-28 PROCEDURE — 82085 ASSAY OF ALDOLASE: CPT | Performed by: INTERNAL MEDICINE

## 2019-03-28 PROCEDURE — 86606 ASPERGILLUS ANTIBODY: CPT | Mod: 91 | Performed by: INTERNAL MEDICINE

## 2019-03-28 PROCEDURE — 84999 UNLISTED CHEMISTRY PROCEDURE: CPT

## 2019-03-28 PROCEDURE — 84999 UNLISTED CHEMISTRY PROCEDURE: CPT | Performed by: INTERNAL MEDICINE

## 2019-03-28 PROCEDURE — 36415 COLL VENOUS BLD VENIPUNCTURE: CPT | Performed by: INTERNAL MEDICINE

## 2019-03-28 PROCEDURE — 86331 IMMUNODIFFUSION OUCHTERLONY: CPT | Performed by: INTERNAL MEDICINE

## 2019-03-28 PROCEDURE — 86140 C-REACTIVE PROTEIN: CPT | Performed by: INTERNAL MEDICINE

## 2019-03-29 ENCOUNTER — OFFICE VISIT (OUTPATIENT)
Dept: URGENT CARE | Facility: URGENT CARE | Age: 79
End: 2019-03-29
Payer: COMMERCIAL

## 2019-03-29 VITALS
OXYGEN SATURATION: 93 % | BODY MASS INDEX: 31.15 KG/M2 | WEIGHT: 191.56 LBS | RESPIRATION RATE: 19 BRPM | HEART RATE: 77 BPM | DIASTOLIC BLOOD PRESSURE: 81 MMHG | SYSTOLIC BLOOD PRESSURE: 153 MMHG | TEMPERATURE: 97.3 F

## 2019-03-29 DIAGNOSIS — R09.89 CHEST CONGESTION: ICD-10-CM

## 2019-03-29 DIAGNOSIS — J98.01 ACUTE BRONCHOSPASM: ICD-10-CM

## 2019-03-29 DIAGNOSIS — R05.8 PRODUCTIVE COUGH: ICD-10-CM

## 2019-03-29 DIAGNOSIS — E11.21 TYPE 2 DIABETES MELLITUS WITH DIABETIC NEPHROPATHY, WITHOUT LONG-TERM CURRENT USE OF INSULIN (H): Primary | ICD-10-CM

## 2019-03-29 DIAGNOSIS — R06.2 WHEEZING: ICD-10-CM

## 2019-03-29 LAB
ACE SERPL-CCNC: <5 U/L (ref 9–67)
ALDOLASE SERPL-CCNC: 5.9 U/L (ref 1.5–8.1)
ANA PAT SER IF-IMP: ABNORMAL
ANA SER QL IF: POSITIVE
ANA TITR SER IF: ABNORMAL {TITER}
ANCA AB PATTERN SER IF-IMP: NORMAL
C-ANCA TITR SER IF: NORMAL {TITER}
DSDNA AB SER-ACNC: 3 IU/ML
ENA JO1 IGG SER-ACNC: <0.2 AI (ref 0–0.9)
ENA RNP IGG SER IA-ACNC: 0.6 AI (ref 0–0.9)
ENA SCL70 IGG SER IA-ACNC: <0.2 AI (ref 0–0.9)
ENA SM IGG SER-ACNC: <0.2 AI (ref 0–0.9)
RHEUMATOID FACT SER NEPH-ACNC: <20 IU/ML (ref 0–20)

## 2019-03-29 PROCEDURE — 99214 OFFICE O/P EST MOD 30 MIN: CPT | Mod: 25 | Performed by: PHYSICIAN ASSISTANT

## 2019-03-29 PROCEDURE — 94640 AIRWAY INHALATION TREATMENT: CPT | Performed by: PHYSICIAN ASSISTANT

## 2019-03-29 RX ORDER — AZITHROMYCIN 250 MG/1
TABLET, FILM COATED ORAL
Qty: 6 TABLET | Refills: 0 | Status: SHIPPED | OUTPATIENT
Start: 2019-03-29 | End: 2019-04-12

## 2019-03-29 RX ORDER — ALBUTEROL SULFATE 90 UG/1
2 AEROSOL, METERED RESPIRATORY (INHALATION) EVERY 6 HOURS
Qty: 8.5 G | Refills: 0 | Status: SHIPPED | OUTPATIENT
Start: 2019-03-29 | End: 2021-07-05

## 2019-03-29 RX ORDER — LEVALBUTEROL INHALATION SOLUTION 1.25 MG/3ML
1.25 SOLUTION RESPIRATORY (INHALATION) ONCE
Status: COMPLETED | OUTPATIENT
Start: 2019-03-29 | End: 2019-03-29

## 2019-03-29 RX ORDER — PREDNISONE 20 MG/1
20 TABLET ORAL 2 TIMES DAILY
Qty: 10 TABLET | Refills: 0 | Status: SHIPPED | OUTPATIENT
Start: 2019-03-29 | End: 2019-04-12

## 2019-03-29 RX ADMIN — LEVALBUTEROL INHALATION SOLUTION 1.25 MG: 1.25 SOLUTION RESPIRATORY (INHALATION) at 12:00
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
## 2019-03-31 LAB
CK BB CFR SERPL ELPH: 0 % (ref 0–0)
CK MACRO1 CFR SERPL: 0 % (ref 0–0)
CK MACRO2 CFR SERPL: 0 % (ref 0–0)
CK MB CFR SERPL ELPH: 0 % (ref 0–4)
CK MM CFR SERPL ELPH: 100 % (ref 96–100)
CK SERPL-CCNC: 92 U/L (ref 20–180)

## 2019-04-01 LAB
MISCELLANEOUS TEST: NORMAL
RESULT: NORMAL
SEND OUTS MISC TEST CODE: NORMAL
SEND OUTS MISC TEST SPECIMEN: NORMAL
TEST NAME: NORMAL

## 2019-04-02 LAB
A FLAVUS AB SER QL ID: NORMAL
A FUMIGATUS1 AB SER QL ID: NORMAL
A FUMIGATUS2 AB SER QL: NORMAL
A FUMIGATUS3 AB SER QL ID: NORMAL
A FUMIGATUS6 AB SER QL ID: NORMAL
A PULLULANS AB SER QL ID: NORMAL
LACEYELLA SACCHARI AB SER QL: NORMAL
PIGEON DROP IGE QN: NORMAL
S RECTIVIRGULA AB SER QL ID: NORMAL
S VIRIDIS AB SER QL: NORMAL
T CANDIDUS AB SER QL: NORMAL
T VULGARIS AB SER QL ID: NORMAL

## 2019-04-04 ENCOUNTER — HOSPITAL ENCOUNTER (OUTPATIENT)
Dept: CT IMAGING | Facility: CLINIC | Age: 79
Discharge: HOME | End: 2019-04-04
Attending: INTERNAL MEDICINE | Admitting: INTERNAL MEDICINE
Payer: COMMERCIAL

## 2019-04-04 DIAGNOSIS — R91.8 OTHER NONSPECIFIC ABNORMAL FINDING OF LUNG FIELD: ICD-10-CM

## 2019-04-04 DIAGNOSIS — I10 HYPERTENSION, ESSENTIAL: ICD-10-CM

## 2019-04-04 PROCEDURE — 71250 CT THORAX DX C-: CPT

## 2019-04-08 LAB — LAB SCANNED RESULT: ABNORMAL

## 2019-04-10 ENCOUNTER — HOSPITAL ENCOUNTER (OUTPATIENT)
Facility: CLINIC | Age: 79
Discharge: HOME | End: 2019-04-10
Attending: INTERNAL MEDICINE | Admitting: INTERNAL MEDICINE
Payer: COMMERCIAL

## 2019-04-10 VITALS
RESPIRATION RATE: 12 BRPM | OXYGEN SATURATION: 94 % | DIASTOLIC BLOOD PRESSURE: 78 MMHG | HEART RATE: 66 BPM | SYSTOLIC BLOOD PRESSURE: 113 MMHG

## 2019-04-10 LAB
COLONOSCOPY: NORMAL
GLUCOSE BLDC GLUCOMTR-MCNC: 104 MG/DL (ref 70–99)

## 2019-04-10 PROCEDURE — G0500 MOD SEDAT ENDO SERVICE >5YRS: HCPCS | Performed by: INTERNAL MEDICINE

## 2019-04-10 PROCEDURE — 45378 DIAGNOSTIC COLONOSCOPY: CPT | Performed by: INTERNAL MEDICINE

## 2019-04-10 PROCEDURE — 82962 GLUCOSE BLOOD TEST: CPT

## 2019-04-10 PROCEDURE — 25000128 H RX IP 250 OP 636: Performed by: INTERNAL MEDICINE

## 2019-04-10 PROCEDURE — G0105 COLORECTAL SCRN; HI RISK IND: HCPCS | Performed by: INTERNAL MEDICINE

## 2019-04-10 RX ORDER — LIDOCAINE 40 MG/G
CREAM TOPICAL
Status: DISCONTINUED | OUTPATIENT
Start: 2019-04-10 | End: 2019-04-10 | Stop reason: HOSPADM

## 2019-04-10 RX ORDER — FLUMAZENIL 0.1 MG/ML
0.2 INJECTION, SOLUTION INTRAVENOUS
Status: CANCELLED | OUTPATIENT
Start: 2019-04-10 | End: 2019-04-11

## 2019-04-10 RX ORDER — ONDANSETRON 2 MG/ML
4 INJECTION INTRAMUSCULAR; INTRAVENOUS EVERY 6 HOURS PRN
Status: CANCELLED | OUTPATIENT
Start: 2019-04-10

## 2019-04-10 RX ORDER — ONDANSETRON 4 MG/1
4 TABLET, ORALLY DISINTEGRATING ORAL EVERY 6 HOURS PRN
Status: CANCELLED | OUTPATIENT
Start: 2019-04-10

## 2019-04-10 RX ORDER — NALOXONE HYDROCHLORIDE 0.4 MG/ML
.1-.4 INJECTION, SOLUTION INTRAMUSCULAR; INTRAVENOUS; SUBCUTANEOUS
Status: CANCELLED | OUTPATIENT
Start: 2019-04-10 | End: 2019-04-11

## 2019-04-10 RX ORDER — ONDANSETRON 2 MG/ML
4 INJECTION INTRAMUSCULAR; INTRAVENOUS
Status: DISCONTINUED | OUTPATIENT
Start: 2019-04-10 | End: 2019-04-10 | Stop reason: HOSPADM

## 2019-04-10 RX ORDER — FENTANYL CITRATE 50 UG/ML
INJECTION, SOLUTION INTRAMUSCULAR; INTRAVENOUS PRN
Status: DISCONTINUED | OUTPATIENT
Start: 2019-04-10 | End: 2019-04-10 | Stop reason: HOSPADM
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
## 2019-04-10 NOTE — DISCHARGE INSTRUCTIONS
DIVERTICULOSIS  You have diverticulosis. This means that small pouches have formed in the wall of the colon (large intestine). Most often, this problem causes no symptoms. But the pouches in the colon are at risk for becoming infected or inflamed. When this happens, the condition is called diverticulitis.  The doctor will talk with you about how to manage your condition. Diet changes may be all that are needed to help control diverticulosis and prevent progression to diverticulitis. If you develop diverticulitis, other treatments will likely be needed.  HOME CARE  Medications: You may be told to take fiber supplements daily. Fiber adds bulk to the stool so that it passes through the colon more easily. Stool softeners may be recommended. You may also be given medications for pain relief. Be sure to take all medications as directed.  General Care:    Eat unprocessed foods that are high in fiber. Whole grains, fruits, and vegetables are good choices.    Drink 6 to 8 glasses of water daily.    Watch for changes in your bowel movements. Tell the doctor if you notice any changes.    Begin an exercise program. Ask your doctor how to get started.    Get plenty of rest and sleep.  FOLLOW UP as advised by the doctor or our staff. Regular visits may be needed to check on your health. Be sure to keep all your appointments.  GET PROMPT MEDICAL ATTENTION if any of the following occurs:    Fever of 100.6 F (38 C) or higher, or as directed by your healthcare provider    Severe cramps in the lower left side of the abdomen or pain that is getting progressively worse.    Tenderness in the lower left side of the abdomen or worsening pain throughout the abdomen    Diarrhea or constipation that does not improve within 24 hours    Nausea and vomiting    Bleeding from the rectum      [id number] [facility], [address] [place] [address], [place] [address]. All rights reserved. This information is not intended as a substitute for professional  medical care. Always follow your healthcare professional's instructions.

## 2019-04-10 NOTE — LETTER
March 27, 2019      Dimple Baxter  [id number]  [facility] [address]        Dear Dimple,       Thank you for choosing [facility]. You are scheduled for the following service.   Date:  4-10-19       Procedure: COLONOSCOPY  Doctor:   Chelsea         Arrival Time:  1330   *check in at Emergency/Endoscopy desk*  Procedure Time:  1400    Location:   [facility]        Endoscopy Department, First Floor (Enter through ER Doors) *         [address] [place], [place] [address] [phone] or [phone] ([facility]) to reschedule        NuLYTELY  PREP  Colonoscopy is the most accurate test to detect colon polyps and colon cancer; and the only test where polyps can be removed. During this procedure, a doctor examines the lining of your large intestine and rectum through a flexible tube.         Transportation  Arrange for a ride for the day of your procedures with a responsible adult.  A taxi ride is not an option unless you are accompanied by a responsible adult. If you fail to arrange transportation with a responsible adult, your procedure will be cancelled and rescheduled.    Fill your enclosed prescription for NuLYTELY  at your local pharmacy. Please call our office at [phone] if you did not receive a prescription.      PREPARATION FOR COLONOSCOPY    7 days before:    Discontinue fiber supplements and medications containing iron. This includes Metamucil  and Fibercon ; and multivitamins with iron.  3 days before:    Begin a low-fiber diet. A low-fiber diet helps making the cleanout more effective. For additional details on low-fiber diet, please refer to the table on the last page.  2 days before:    Continue the low-fiber diet.     Drink at least 8 glasses of water throughout the day.     Stop eating solid foods at 11:45 pm.  1 day before:    In the morning: begin a clear liquid diet (liquids you can see through).     Examples of a clear liquid  diet include: water, clear broth or bouillon, Gatorade, Pedialyte or Powerade, carbonated and non-carbonated soft drinks (Sprite , 7-Up , ginger ale), strained fruit juices without pulp (apple, white grape, white cranberry), Jell-O  and popsicles.     The following are not allowed on a clear liquid diet: red liquids, alcoholic beverages, , dairy products (milk, creamer, and yogurt), protein shakes,  juice with pulp and chewing tobacco.    At 4pm: drink 1 (one) 8 oz glass of NuLYTELY  solution every 15 minutes until the bottle (approximately 16 glasses of 8 oz) is gone. Keep the solution refrigerated. Do not drink any other liquids while you are drinking the NuLYTELY  solution.    Over the course of the evening, drink an additional   liter of clear liquids and continue clear liquid diet.    COLON CLEANSING TIPS: drink adequate amounts of fluids before and after your colon cleansing to prevent dehydration. Stay near a toilet because you will have diarrhea. Even if you are sitting on the toilet, continue to drink the cleansing solution every 15 minutes. If you feel nauseous or vomit, rinse your mouth with water, take a 15 to 30-minute-break and then continue drinking the solution. You will be uncomfortable until the stool has flushed from your colon (in about 2 to 4 hours). You may feel chilled.    DAY OF YOUR PROCEDURE  You may take all of your morning medications including blood pressure medications, blood thinners (if you have not been instructed to stop these by our office), methadone, and anti-seizure medications with sips of water 3 hours prior to your procedure or earlier. Do not take insulin or vitamins prior to your procedure. Continue the clear liquid diet.      2 hours prior:   o STOP consuming all liquids   o Do not take anything by mouth during this time.   o Allow extra time to travel to your procedure as you may need to stop and use a restroom along the way.  You are ready for the procedure, if you  followed all instructions and your stool is no longer formed, but clear or yellow liquid. If you are unsure whether your colon is clean, please call our office at [phone] before you leave for your appointment.    Bring the following to your procedure:  - Insurance Card/Photo ID.   - List of current medications including over-the-counter medications and supplements.   - Your rescue inhaler if you currently use one to control asthma.    Canceling or rescheduling your appointment:   If you must cancel or reschedule your appointment, please call [phone] as soon as possible.    COLONOSCOPY PRE-PROCEDURE CHECKLIST  If you have diabetes, ask your regular doctor for diet and medication restrictions.  If you take an anticoagulant or anti-platelet medication (such as Coumadin , Lovenox , Pradaxa , Xarelto , Eliquis , etc.), please call your primary doctor for advice on holding this medication.  If you take aspirin you may continue to do so.  If you are or may be pregnant, please discuss the risks and benefits of this procedure with your doctor.    What happens during a colonoscopy?    Plan to spend up to two hours, starting at registration time, at the endoscopy center the day of your procedure. The colonoscopy takes an average of 15 to 30 minutes. Recovery time is about 30 minutes.    Before the exam:    You will change into a gown.    Your medical history and medication list will be reviewed with you, unless that has been done over the phone prior to the procedure.     A nurse will insert an intravenous (IV) line into your hand or arm.    The doctor will meet with you and will give you a consent form to sign.    During the exam:     Medicine will be given through the IV line to help you relax.     Your heart rate and oxygen levels will be monitored. If your blood pressure is low, you may be given fluids through the IV line.     The doctor will insert a flexible hollow tube, called a colonoscope, into your rectum.  The scope will be advanced slowly through the large intestine (colon).    You may have a feeling of fullness or pressure.     If an abnormal tissue or a polyp is found, the doctor may remove it through the endoscope for closer examination, or biopsy. Tissue removal is painless.    After the exam:           Any tissue samples removed during the exam will be sent to a lab for evaluation. It may take 5-7 working days for you to be notified of the results.     A nurse will provide you with complete discharge instructions before you leave the endoscopy center. Be sure to ask the nurse for specific instructions if you take blood thinners such as Aspirin, Coumadin or Plavix.     The doctor will prepare a full report for you and for the physician who referred you for the procedure.     Your doctor will talk with you about the initial results of your exam.      Medication given during the exam will prohibit you from driving for the rest of the day.     Following the exam, you may resume your normal diet. Your first meal should be light, no greasy foods. Avoid alcohol until the next day.     You may resume your regular activities the day after the procedure.     LOW-FIBER DIET  Foods RECOMMENDED Foods to AVOID   Breads, Cereal, Rice and Pasta:   White bread, rolls, biscuits, croissant and shelbie toast.   Waffles, Belarusian toast and pancakes.   White rice, noodles, pasta, macaroni and peeled cooked potatoes.   Plain crackers and saltines.   Cooked cereals: farina, cream of rice.   Cold cereals: Puffed Rice , Rice Krispies , Corn Flakes  and Special K    Breads, Cereal, Rice and Pasta:   Breads or rolls with nuts, seeds or fruit.   Whole wheat, pumpernickel, rye breads and cornbread.   Potatoes with skin, brown or wild rice, and kasha (buckwheat).     Vegetables:   Tender cooked and canned vegetables without seeds: carrots, asparagus tips, green or wax beans, pumpkin, spinach, lima beans. Vegetables:   Raw or steamed  vegetables.   Vegetables with seeds.   Sauerkraut.   Winter squash, peas, broccoli, Brussel sprouts, cabbage, onions, cauliflower, baked beans, peas and corn.   Fruits:   Strained fruit juice.   Canned fruit, except pineapple.   Ripe bananas and melon. Fruits:   Prunes and prune juice.   Raw fruits.   Dried fruits: figs, dates and raisins.   Milk/Dairy:   Milk: plain or flavored.   Yogurt, custard and ice cream.   Cheese and cottage cheese Milk/Dairy:     Meat and other proteins:   ground, well-cooked tender beef, lamb, ham, veal, pork, fish, poultry and organ meats.   Eggs.   Peanut butter without nuts. Meat and other proteins:   Tough, fibrous meats with gristle.   Dry beans, peas and lentils.   Peanut butter with nuts.   Tofu.   Fats, Snack, Sweets, Condiments and Beverages:   Margarine, butter, oils, mayonnaise, sour cream and salad dressing, plain gravy.   Sugar, hard candy, clear jelly, honey and syrup.   Spices, cooked herbs, bouillon, broth and soups made with allowed vegetable, ketchup and mustard.   Coffee, tea and carbonated drinks.   Plain cakes, cookies and pretzels.   Gelatin, plain puddings, custard, ice cream, sherbet and popsicles. Fats, Snack, Sweets, Condiments and Beverages:   Nuts, seeds and coconut.   Jam, marmalade and preserves.   Pickles, olives, relish and horseradish.   All desserts containing nuts, seeds, dried fruit and coconut; or made from whole grains or bran.   Candy made with nuts or seeds.   Popcorn.       DIRECTIONS TO THE ENDOSCOPY DEPARTMENT    From the north ([facility])  Take [address] South, exit on [address] . Get into the left hand merline, turn left (east), go one-half mile to [address] and turn left. Go north to the first stoplight, take a right on [address] and follow it to the Emergency entrance.    From the south ([facility])  Take 35N to the 35E split and exit on [address]. On [address], turn left (west) to [address]. Turn right (north) on [address]. Go north to the first stoplight, take a right on [address] and follow it to the Emergency entrance.    From the east via 35E ([facility])  Take [address] south to [address] [place] [address] exit. Turn right on [address] [address]. Go west to [address]. Turn right (north) on [address]. Go to the first stoplight, take a right and follow on [address] to the Emergency entrance.    From the east via [address] ([facility])  Take [address] to [address]. Turn left (south) on [address] to [address]. Turn left (east) on [address] and follow it to the Emergency entrance.    From the west via [address] ([place], [place])  Take [address] east to [address]. Turn right (south) on [address] to [address]. Turn left (east) on [address] and follow it to the Emergency entrance.

## 2019-04-12 ENCOUNTER — OFFICE VISIT (OUTPATIENT)
Dept: FAMILY MEDICINE | Facility: CLINIC | Age: 79
End: 2019-04-12
Payer: COMMERCIAL

## 2019-04-12 ENCOUNTER — TELEPHONE (OUTPATIENT)
Dept: INTERNAL MEDICINE | Facility: CLINIC | Age: 79
End: 2019-04-12

## 2019-04-12 VITALS
HEIGHT: 66 IN | OXYGEN SATURATION: 95 % | HEART RATE: 71 BPM | WEIGHT: 188 LBS | SYSTOLIC BLOOD PRESSURE: 122 MMHG | DIASTOLIC BLOOD PRESSURE: 60 MMHG | TEMPERATURE: 98.2 F | BODY MASS INDEX: 30.22 KG/M2

## 2019-04-12 DIAGNOSIS — J20.9 ACUTE BRONCHITIS WITH SYMPTOMS > 10 DAYS: ICD-10-CM

## 2019-04-12 DIAGNOSIS — J98.01 BRONCHOSPASM: Primary | ICD-10-CM

## 2019-04-12 PROCEDURE — 99214 OFFICE O/P EST MOD 30 MIN: CPT | Performed by: NURSE PRACTITIONER

## 2019-04-12 RX ORDER — PREDNISONE 20 MG/1
40 TABLET ORAL DAILY
Qty: 10 TABLET | Refills: 0 | Status: SHIPPED | OUTPATIENT
Start: 2019-04-12 | End: 2019-04-17

## 2019-04-12 RX ORDER — DOXYCYCLINE HYCLATE 100 MG
100 TABLET ORAL 2 TIMES DAILY
Qty: 14 TABLET | Refills: 0 | Status: SHIPPED | OUTPATIENT
Start: 2019-04-12 | End: 2019-04-19

## 2019-04-12 ASSESSMENT — MIFFLIN-ST. JEOR: SCORE: 1340.54

## 2019-04-12 NOTE — TELEPHONE ENCOUNTER
Patient and daughter walked in to clinic and advised  that Dr. Phillips told the patient that she can just walk in at any time if she needs to be seen and that Dr. Phillips will work her in.    Dr. Phillips's scheduled is overbooked and closed for today.  I was able to fit patient in at [facility] to be seen.  Jacqui from  advised patient and they will head to [place] now.  Jacqui will also advise patient and daughter that in the future to please call first and we will do what we can for her to see Dr. Phillips, but there may be some days she is not in clinic or just too overbooked to work in another patient.

## 2019-04-12 NOTE — PATIENT INSTRUCTIONS
Dimple was seen today for uri.    Diagnoses and all orders for this visit:    Bronchospasm  -     predniSONE (DELTASONE) 20 MG tablet; Take 40 mg by mouth daily for 5 days.    Acute bronchitis with symptoms > 10 days  -     doxycycline hyclate (VIBRA-TABS) 100 MG tablet; Take 1 tablet (100 mg) by mouth 2 times daily for 7 days    Schedule Albuterol inhaler, 2 puffs every 4-6 hours until cough, shortness of breath resolves.        To ED with any worsening shortness of breath, wheezing or new onset fever.

## 2019-04-12 NOTE — PROGRESS NOTES
SUBJECTIVE:   Dimple Baxter is a 78 year old female who presents to clinic today for the following   health issues:        Acute Illness   Acute illness concerns: URI - was seen at  in [facility] see note 3/29/19  Onset: x 2 weeks    Fever: no     Chills/Sweats: YES- at night    Headache (location?): YES    Sinus Pressure:not sure    Conjunctivitis:  no    Ear Pain: no    Rhinorrhea: YES    Congestion: YES    Sore Throat: YES     Cough: YES-productive of yellow sputum - intermittently productive    Wheeze: YES    Decreased Appetite: no     Nausea: YES- last night    Vomiting: no     Diarrhea:  no     Dysuria/Freq.: no     Fatigue/Achiness: YES    Sick/Strep Exposure: YES- daughter sick, grand son was sick too, [employer] also.      Therapies Tried and outcome: albuterol, azithromycin and prednisone  - felt a little bit better but symptoms came back, tylenol   Completed Azithromycin and Prednisone on 4/3/19.      Patient reports improvement in symptoms initially after treatment, now with worsening of symptoms over the past 4-5 days.  +Cough, chest congestion and wheezing.  +Shortness of breath.  +Fatigue is worsening with illness.    No fever.        Additional history: as documented    Reviewed  and updated as needed this visit by clinical staff         Reviewed and updated as needed this visit by Provider         Patient Active Problem List   Diagnosis     Allergic rhinitis     Esophageal reflux     Benign essential hypertension     Generalized anxiety disorder     Fatty liver     Colon polyp     Advanced directives, counseling/discussion     Mixed hyperlipidemia     Pulmonary hypertension (H)     Left bundle branch block     Type 2 diabetes mellitus with diabetic nephropathy; goal HgbA1c < 7%     DDD (degenerative disc disease), lumbar     Lumbar spondylosis     Insomnia     Recurrent left knee instability     Chronic midline low back pain without sciatica     Left ventricular diastolic dysfunction     Tricuspid  regurgitation     Episodic tension-type headache, not intractable     Chronic pain syndrome     Past Surgical History:   Procedure Laterality Date     C NONSPECIFIC PROCEDURE      Hysterectomy/ Right Oophorectomy     C NONSPECIFIC PROCEDURE      Right Carpal Tunnel Surgery     C NONSPECIFIC PROCEDURE      Cholecystectomy     C NONSPECIFIC PROCEDURE      cor angio; nl     C NONSPECIFIC PROCEDURE      lasik     COLONOSCOPY  2013    Procedure: COMBINED COLONOSCOPY, SINGLE BIOPSY/POLYPECTOMY BY BIOPSY;;  Surgeon: Marshall Logan MD;  Location: [facility]     COLONOSCOPY Left 4/10/2019    Procedure: COLONOSCOPY;  Surgeon: Chico Tran MD;  Location: [facility]     HYSTERECTOMY, PAP NO LONGER INDICATED         Social History     Tobacco Use     Smoking status: Former Smoker     Packs/day: 1.00     Years: 2.00     Pack years: 2.00     Types: Cigarettes     Start date:      Last attempt to quit: 1963     Years since quittin.3     Smokeless tobacco: Never Used   Substance Use Topics     Alcohol use: Yes     Alcohol/week: 0.0 oz     Comment: occ     Family History   Problem Relation Age of Onset     Family History Negative Daughter      Cerebrovascular Disease Mother      Alcoholism Father      Family History Negative Brother      Multiple Sclerosis Daughter      Lymphoma Daughter      Family History Negative Son      Colon Cancer No family hx of          Current Outpatient Medications   Medication Sig Dispense Refill     doxycycline hyclate (VIBRA-TABS) 100 MG tablet Take 1 tablet (100 mg) by mouth 2 times daily for 7 days 14 tablet 0     predniSONE (DELTASONE) 20 MG tablet Take 40 mg by mouth daily for 5 days. 10 tablet 0     acetaminophen (TYLENOL) 325 MG tablet Take 1-2 tablets by mouth every 6 hours as needed for pain.       albuterol (PROVENTIL HFA) 108 (90 Base) MCG/ACT inhaler Inhale 2 puffs into the lungs every 6 hours 8.5 g 0     amLODIPine (NORVASC) 5 MG tablet Take 1 tablet (5 mg) by mouth  daily 90 tablet 1     ASPIRIN 81 MG OR TABS take 1 x daily with food 0 0     blood glucose (ACCU-CHEK TEJA) test strip Use to test blood sugar One times daily or as directed. 100 each 3     blood glucose (ACCU-CHEK SOFTCLIX) lancing device Lancing device to be used with lancets. 1 each 0     blood glucose monitoring (NO BRAND SPECIFIED) meter device kit Use to test blood sugar once daily  as directed. 1 kit 0     blood glucose monitoring (SOFTCLIX) lancets Use to test blood sugar One times daily. 100 each 3     cetirizine (ZYRTEC) 10 MG tablet Take 10 mg by mouth as needed for allergies       cholecalciferol (VITAMIN D) 1000 UNIT tablet Take 1 tablet (1,000 Units) by mouth daily 100 tablet 3     famotidine (PEPCID) 20 MG tablet Take 1 tablet by mouth 2 times daily as needed.       FISH OIL 1000 MG OR CAPS take three daily  0 0     fluticasone (FLONASE) 50 MCG/ACT nasal spray Spray 2 sprays into both nostrils daily 16 g 5     glipiZIDE (GLUCOTROL XL) 2.5 MG 24 hr tablet Take 1 tablet (2.5 mg) by mouth daily 90 tablet 1     hydrochlorothiazide (HYDRODIURIL) 25 MG tablet Take 1 tablet (25 mg) by mouth daily 90 tablet 1     LORazepam (ATIVAN) 1 MG tablet TAKE 1/2 TO 1 (ONE-HALF TO ONE) TABLET BY MOUTH ONCE DAILY AS NEEDED FOR ANXIETY 15 tablet 0     metoprolol succinate ER (TOPROL-XL) 100 MG 24 hr tablet Take 1 tablet (100 mg) by mouth daily 90 tablet 1     NITROSTAT 0.4 MG sublingual tablet PLACE 1 TABLET UNDER THE TONGUE EVERY 5 MINUTES AS NEEDED. UP TO 3 TABLETS PER EPISODE 25 tablet 1     quinapril (ACCUPRIL) 40 MG tablet Take 0.5 tablets (20 mg) by mouth daily 90 tablet 1     rosuvastatin (CRESTOR) 5 MG tablet Take 0.5 tablets (2.5 mg) by mouth At Bedtime 45 tablet 3     traZODone (DESYREL) 100 MG tablet TAKE ONE TABLET BY MOUTH ONCE DAILY AT BEDTIME 90 tablet 3     Allergies   Allergen Reactions     Atorvastatin Calcium      myalgias     Cymbalta      Twitches, nausea     Neurontin [Gabapentin]      ankle  "swelling     Nortriptyline      ha, edema     Paroxetine      gi; wt gain     Rosuvastatin      myalgias     Seroquel [Quetiapine Fumarate]      insomnia/drowsiness     Topamax [Topiramate]      constipation     Wellbutrin [Bupropion Hcl]      shakiness, heartburn     Zoloft      fatigue       ROS:  Constitutional, HEENT, cardiovascular, pulmonary, gi and gu systems are negative, except as otherwise noted.    OBJECTIVE:     /60 (BP Location: Right arm, Patient Position: Sitting, Cuff Size: Adult Regular)   Pulse 71   Temp 98.2  F (36.8  C) (Oral)   Ht 1.67 m (5' 5.75\")   Wt 85.3 kg (188 lb)   SpO2 95%   BMI 30.58 kg/m    Body mass index is 30.58 kg/m .    GENERAL: healthy, alert and no distress  EYES: Eyes grossly normal to inspection, PERRL and conjunctivae and sclerae normal  HENT: ear canals and TM's normal, nose and mouth without ulcers or lesions  NECK: no adenopathy, no asymmetry  RESP: posterior lungs with scattered inspiratory wheezes (primarily in bilateral upper lobes) and bilateral lower lung lobes with fine crackles.    CV: regular rate and rhythm, normal S1 S2  PSYCH: mentation appears normal, affect normal/bright      ASSESSMENT/PLAN:     Dimple was seen today for uri.    Diagnoses and all orders for this visit:    Bronchospasm  -     predniSONE (DELTASONE) 20 MG tablet; Take 40 mg by mouth daily for 5 days.    Acute bronchitis with symptoms > 10 days  Discussed chest x-ray - patient declined at today's visit.    Will treat with 2nd course of antibiotics.    -     doxycycline hyclate (VIBRA-TABS) 100 MG tablet; Take 1 tablet (100 mg) by mouth 2 times daily for 7 days      Schedule Albuterol inhaler, 2 puffs every 4-6 hours until cough, shortness of breath resolves.    Patient education completed regarding correct inhaler use.      To ED with any worsening shortness of breath, wheezing or new onset fever.      The visit was 25 minutes > 1/2 of the visit was spent in counseling and " coordination of care.       ANIBAL Delgado [facility]       No adenopathy or splenomegaly. No cervical or inguinal lymphadenopathy.

## 2019-04-16 ENCOUNTER — HOSPITAL ENCOUNTER (OUTPATIENT)
Dept: CARDIOLOGY | Facility: CLINIC | Age: 79
Discharge: HOME | End: 2019-04-16
Attending: INTERNAL MEDICINE | Admitting: INTERNAL MEDICINE
Payer: COMMERCIAL

## 2019-04-16 DIAGNOSIS — R91.8 OTHER NONSPECIFIC ABNORMAL FINDING OF LUNG FIELD: ICD-10-CM

## 2019-04-16 DIAGNOSIS — I10 ESSENTIAL HYPERTENSION: ICD-10-CM

## 2019-04-16 PROCEDURE — 93306 TTE W/DOPPLER COMPLETE: CPT | Mod: 26 | Performed by: INTERNAL MEDICINE

## 2019-04-16 PROCEDURE — 93306 TTE W/DOPPLER COMPLETE: CPT

## 2019-04-22 ENCOUNTER — TELEPHONE (OUTPATIENT)
Dept: CARDIOLOGY | Facility: CLINIC | Age: 79
End: 2019-04-22

## 2019-04-22 ENCOUNTER — TRANSFERRED RECORDS (OUTPATIENT)
Dept: HEALTH INFORMATION MANAGEMENT | Facility: CLINIC | Age: 79
End: 2019-04-22

## 2019-04-22 NOTE — TELEPHONE ENCOUNTER
----- Message from Gage Zamora MD sent at 4/22/2019  7:35 AM CDT -----  I do not know Ms. Baxter...  She used to see Dr. Cedillo.   Please let her know her echo- was OK...  I am not sure who she will follow with.  DI    4-49-19 @ 1035: call to pt with above results per Dr Zamora. Asked that pt call the office to discuss a following Cardiologist. She is due to be seen 4-2019 per Dr Cedillo's note from 4-2018. [email or web address]

## 2019-04-23 DIAGNOSIS — I27.21 PULMONARY ARTERY HYPERTENSION (H): Primary | ICD-10-CM

## 2019-04-23 DIAGNOSIS — K76.0 FATTY METAMORPHOSIS OF LIVER: ICD-10-CM

## 2019-04-23 DIAGNOSIS — J84.112 IDIOPATHIC PULMONARY FIBROSIS (H): ICD-10-CM

## 2019-05-03 ENCOUNTER — TELEPHONE (OUTPATIENT)
Dept: INTERNAL MEDICINE | Facility: CLINIC | Age: 79
End: 2019-05-03

## 2019-05-03 NOTE — TELEPHONE ENCOUNTER
Patient calls stating that Dr. Gould advised her to contact Dr. Phillips and have her taken off all/any medications that may cause Lupus.  Please advise.    Patient is also asking for alternative to Trazodone.  She is taking 1 1/2 tables and it is not working.  Patient states it had worked in the past but she is on an Antibiotic and wonders if this my be causing the Trazodone to be not as effective.  Please advise.    Patient states that it is on to leave a detailed message if she does not answer.

## 2019-05-10 NOTE — TELEPHONE ENCOUNTER
As far as I know Accupril does not cause lupus     The most common medicines known to cause drug-induced lupus erythematosus are:  Isoniazid   Hydralazine   Procainamide  Other less common drugs may also cause the condition. These may include:  Anti-seizure medicines   Capoten   Chlorpromazine   Tumor-necrosis factor (TNF) alpha inhibitors (such as etanercept, infliximab and adalimumab)   Methyldopa   Minocycline   Quinidine   Sulfasalazine   Levamisole, typically as a contaminant of cocaine

## 2019-05-10 NOTE — TELEPHONE ENCOUNTER
Call to patient and informed of primary care provider's message. Patient states she can try increasing the Trazodone but stated that the Pulmonologist, Dr. Gould questioned if the Accupril might be causing the drug induced lupus. Patient would like primary care provider's thoughts on this. Please advise,     Thank you

## 2019-05-10 NOTE — TELEPHONE ENCOUNTER
Pt calling to check on status of message below. She would like a call back at [phone]. OK to leave detailed message. Please advise. Thanks.

## 2019-05-20 ENCOUNTER — HOSPITAL ENCOUNTER (OUTPATIENT)
Dept: CT IMAGING | Facility: CLINIC | Age: 79
Discharge: HOME | End: 2019-05-20
Attending: INTERNAL MEDICINE | Admitting: INTERNAL MEDICINE
Payer: COMMERCIAL

## 2019-05-20 DIAGNOSIS — J84.10 PULMONARY FIBROSIS (H): ICD-10-CM

## 2019-05-20 DIAGNOSIS — I25.9 CHRONIC ISCHEMIC HEART DISEASE, UNSPECIFIED: ICD-10-CM

## 2019-05-20 DIAGNOSIS — I27.21 SECONDARY PULMONARY ARTERIAL HYPERTENSION (H): ICD-10-CM

## 2019-05-20 DIAGNOSIS — J84.112 IDIOPATHIC PULMONARY FIBROSIS (H): ICD-10-CM

## 2019-05-20 DIAGNOSIS — K76.0 FATTY (CHANGE OF) LIVER, NOT ELSEWHERE CLASSIFIED: ICD-10-CM

## 2019-05-20 PROCEDURE — 71250 CT THORAX DX C-: CPT

## 2019-05-22 ENCOUNTER — TRANSFERRED RECORDS (OUTPATIENT)
Dept: HEALTH INFORMATION MANAGEMENT | Facility: CLINIC | Age: 79
End: 2019-05-22

## 2019-06-11 ENCOUNTER — TRANSFERRED RECORDS (OUTPATIENT)
Dept: HEALTH INFORMATION MANAGEMENT | Facility: CLINIC | Age: 79
End: 2019-06-11

## 2019-06-17 ENCOUNTER — TRANSFERRED RECORDS (OUTPATIENT)
Dept: HEALTH INFORMATION MANAGEMENT | Facility: CLINIC | Age: 79
End: 2019-06-17

## 2019-07-12 DIAGNOSIS — R94.39 ABNORMAL STRESS TEST: ICD-10-CM

## 2019-07-12 RX ORDER — NITROGLYCERIN 0.4 MG/1
TABLET SUBLINGUAL
Start: 2019-07-12

## 2019-07-12 NOTE — TELEPHONE ENCOUNTER
"  NITROSTAT 0.4 MG sublingual tablet 25 tablet 1 3/17/2017       Last Written Prescription Date:  03/17/2017  Last Fill Quantity: 25,  # refills: 1   Last office visit: 4/12/2019 with prescribing provider:  Piedad   Future Office Visit:  Unknown    Requested Prescriptions   Pending Prescriptions Disp Refills     nitroGLYcerin (NITROSTAT) 0.4 MG sublingual tablet [Pharmacy Med Name: NITROGLYCER 0.4MG   SUB] 25 tablet 1     Sig: PLACE ONE TABLET UNDER THE TONGUE EVERY 5 MINUTES AS NEEDED. UP TO THREE TABLETS PER EPISODE       Nitrates Failed - 7/12/2019  2:00 PM        Failed - Recent (12 mo) or future (30 days) visit within the authorizing provider's specialty     Patient had office visit in the last 12 months or has a visit in the next 30 days with authorizing provider or within the authorizing provider's specialty.  See \"Patient Info\" tab in inbasket, or \"Choose Columns\" in Meds & Orders section of the refill encounter.              Failed - Sublingual nitro order needs review     If refill exceeds 1 bottle per month, please forward request to provider.           Passed - Blood pressure under 140/90 in past 12 months     BP Readings from Last 3 Encounters:   04/12/19 122/60   04/10/19 113/78   03/29/19 153/81                 Passed - Pt is not on erectile dysfunction medications        Passed - Medication is active on med list        Passed - Patient is age 18 or older          "

## 2019-07-12 NOTE — TELEPHONE ENCOUNTER
Former patient of Sandra Mayer.  Pharmacy informed Rx needs to be sent to Dr. Phillips - new PCP.  Eunice Carvajal RN

## 2019-07-16 DIAGNOSIS — R94.39 ABNORMAL STRESS TEST: ICD-10-CM

## 2019-07-16 NOTE — TELEPHONE ENCOUNTER
"Requested Prescriptions   Pending Prescriptions Disp Refills     nitroGLYcerin (NITROSTAT) 0.4 MG sublingual tablet  Last Written Prescription Date:  03/17/19  Last Fill Quantity: 25,  # refills: 1   Last office visit: 2/19/2019 with prescribing provider:  02/19/19   Future Office Visit:     25 tablet 1     Sig: For chest pain place 1 tablet under the tongue every 5 minutes for 3 doses. If symptoms persist 5 minutes after 1st dose call 911.       Nitrates Failed - 7/16/2019  8:18 AM        Failed - Sublingual nitro order needs review     If refill exceeds 1 bottle per month, please forward request to provider.           Passed - Blood pressure under 140/90 in past 12 months     BP Readings from Last 3 Encounters:   04/12/19 122/60   04/10/19 113/78   03/29/19 153/81                 Passed - Pt is not on erectile dysfunction medications        Passed - Recent (12 mo) or future (30 days) visit within the authorizing provider's specialty     Patient had office visit in the last 12 months or has a visit in the next 30 days with authorizing provider or within the authorizing provider's specialty.  See \"Patient Info\" tab in inbasket, or \"Choose Columns\" in Meds & Orders section of the refill encounter.              Passed - Medication is active on med list        Passed - Patient is age 18 or older          "

## 2019-07-17 RX ORDER — NITROGLYCERIN 0.4 MG/1
TABLET SUBLINGUAL
Qty: 25 TABLET | Refills: 1 | Status: SHIPPED | OUTPATIENT
Start: 2019-07-17 | End: 2020-10-23

## 2019-07-17 NOTE — TELEPHONE ENCOUNTER
Routing refill request to provider for review/approval because:  Has not been prescribed by primary care provider previously

## 2019-07-18 DIAGNOSIS — F51.01 PRIMARY INSOMNIA: Chronic | ICD-10-CM

## 2019-07-18 NOTE — TELEPHONE ENCOUNTER
"NEW DOSE OF 2 TABLETS AT BEDTIME.  PLEASE REFILL TODAY.  PATIENT IS UPSET THAT SHE CALLED THE PHARMACY ON MONDAY.    Requested Prescriptions   Pending Prescriptions Disp Refills     traZODone (DESYREL) 100 MG tablet  Last Written Prescription Date:  02/08/19  Last Fill Quantity: 90,  # refills: 3   Last office visit: 2/19/2019 with prescribing provider:  02/19/19   Future Office Visit:     90 tablet 3     Sig: TAKE ONE TABLET BY MOUTH ONCE DAILY AT BEDTIME       Serotonin Modulators Passed - 7/18/2019  9:14 AM        Passed - Recent (12 mo) or future (30 days) visit within the authorizing provider's specialty     Patient had office visit in the last 12 months or has a visit in the next 30 days with authorizing provider or within the authorizing provider's specialty.  See \"Patient Info\" tab in inbasket, or \"Choose Columns\" in Meds & Orders section of the refill encounter.              Passed - Medication is active on med list        Passed - Patient is age 18 or older        Passed - No active pregnancy on record        Passed - No positive pregnancy test in past 12 months            "

## 2019-07-18 NOTE — TELEPHONE ENCOUNTER
The notes had to increase to 50 mg to 2 tablets at night which would be 100 mg which is what she is actually on.  I do not think that she intended for her to take 200 mg.  I am going to hold this for her to review tomorrow.

## 2019-07-18 NOTE — TELEPHONE ENCOUNTER
On 5/10/19,   Per Dr Phillips's note, she states she can increase to 2 trazodone. She had 100 mg on her med list from 2/8/19, so not really sure what it means.     Du Phillips MD      12:25 PM   Note      I do not think any of the medications she is on currently can cause drug-induced lupus.  She can increase trazodone to 50 mg to 2 tablets at night as needed for sleep, please inform patient

## 2019-07-18 NOTE — TELEPHONE ENCOUNTER
Please clarify with the patient about who increased her medication dose.  I do not see any notes from Dr. Phillips that she had increased her dose to 200 mg.  That is a very high dose for her age.

## 2019-07-19 RX ORDER — TRAZODONE HYDROCHLORIDE 100 MG/1
TABLET ORAL
Qty: 90 TABLET | Refills: 1 | Status: SHIPPED | OUTPATIENT
Start: 2019-07-19 | End: 2020-03-13

## 2019-07-19 NOTE — TELEPHONE ENCOUNTER
Trazodone should be 50 mg 2 tabs daily for total of 100 mg daily, or 100 mg once daily ,   if she is still having sleep issues with 100 mg she can try OTC melatonin . Pl advise pt

## 2019-07-19 NOTE — TELEPHONE ENCOUNTER
Pharmacy calls for clarification. Advised them of Dr Phillips's recommendations. 50 mg, 2 tablets, to total 100 mg, (or one tablet of the 100 mg). Pharmacist, Olga Liida agrees with this. Also will relay can take Melatonin OTC if needed.

## 2019-07-30 DIAGNOSIS — E78.5 HYPERLIPIDEMIA LDL GOAL <70: ICD-10-CM

## 2019-07-30 RX ORDER — ROSUVASTATIN CALCIUM 5 MG/1
5 TABLET, COATED ORAL AT BEDTIME
Qty: 90 TABLET | Refills: 0 | Status: SHIPPED | OUTPATIENT
Start: 2019-07-30 | End: 2019-11-25

## 2019-07-30 NOTE — TELEPHONE ENCOUNTER
Fax from pharmacy for Crestor refill and states she is taking full tablet, 5 mg,  instead of 1/2 tablet.   Will fill x 1, put note on Rx, due for appt.       Notes recorded by Du Phillips MD on 3/5/2019 at 12:35 PM CST  A1c stable- continue glipizide   creatinine slightly elevated, continue to monitor and rpt in 6 months  Sodium slightly low , will rpt in 2 wks. If still low then we need to discontinue hydrochlorothiazide.  Lipids; slightly high cholesterol and triglycerides and LDL.  Advise to increase Crestor to 5 mg daily instated of taking 2.5 mg and rpt lipids in 3 months           Recent Labs   Lab Test 02/19/19  1017 04/09/18  0948  05/08/15  0845 09/08/14  0913   CHOL 201* 149   < > 195 198   HDL 31* 34*   < > 42* 32*   * 87   < > 122 118   TRIG 261* 142   < > 154* 242*   CHOLHDLRATIO  --   --   --  4.6* 6.2*    < > = values in this interval not displayed.     Sodium   Date Value Ref Range Status   03/20/2019 137 133 - 144 mmol/L Final     Lab Results   Component Value Date    A1C 6.8 02/19/2019    A1C 6.0 10/11/2016    A1C 6.7 10/19/2015    A1C 6.6 05/11/2015    A1C 6.2 09/09/2014

## 2019-08-19 DIAGNOSIS — E11.21 TYPE 2 DIABETES MELLITUS WITH DIABETIC NEPHROPATHY, WITHOUT LONG-TERM CURRENT USE OF INSULIN (H): Chronic | ICD-10-CM

## 2019-08-19 DIAGNOSIS — I10 BENIGN ESSENTIAL HYPERTENSION: ICD-10-CM

## 2019-08-19 DIAGNOSIS — E78.5 HYPERLIPIDEMIA LDL GOAL <70: Primary | ICD-10-CM

## 2019-08-19 NOTE — TELEPHONE ENCOUNTER
"Requested Prescriptions   Pending Prescriptions Disp Refills     amLODIPine (NORVASC) 5 MG tablet [Pharmacy Med Name: AMLODIPINE 5MG  Last Written Prescription Date:  2/8/2019  Last Fill Quantity: 90,  # refills: 1   Last office visit: 2/19/2019 with prescribing provider:     Future Office Visit:   TAB] 90 tablet 1     Sig: TAKE 1 TABLET BY MOUTH ONCE DAILY       Calcium Channel Blockers Protocol  Failed - 8/19/2019  9:17 AM        Failed - Normal serum creatinine on file in past 12 months     Recent Labs   Lab Test 02/19/19  1017   CR 1.44*             Passed - Blood pressure under 140/90 in past 12 months     BP Readings from Last 3 Encounters:   04/12/19 122/60   04/10/19 113/78   03/29/19 153/81                 Passed - Recent (12 mo) or future (30 days) visit within the authorizing provider's specialty     Patient had office visit in the last 12 months or has a visit in the next 30 days with authorizing provider or within the authorizing provider's specialty.  See \"Patient Info\" tab in inbasket, or \"Choose Columns\" in Meds & Orders section of the refill encounter.              Passed - Medication is active on med list        Passed - Patient is age 18 or older        Passed - No active pregnancy on record        Passed - No positive pregnancy test in past 12 months        hydrochlorothiazide (HYDRODIURIL) 25 MG tablet [Pharmacy Med Name:  Last Written Prescription Date:  2/8/2019  Last Fill Quantity: 90,  # refills: 1   Last office visit: 2/19/2019 with prescribing provider:     Future Office Visit:   HYDROCHLOROT 25MG   TAB] 90 tablet 1     Sig: TAKE 1 TABLET BY MOUTH ONCE DAILY       Diuretics (Including Combos) Protocol Failed - 8/19/2019  9:17 AM        Failed - Normal serum creatinine on file in past 12 months     Recent Labs   Lab Test 02/19/19  1017   CR 1.44*              Passed - Blood pressure under 140/90 in past 12 months     BP Readings from Last 3 Encounters:   04/12/19 122/60   04/10/19 113/78 " "  03/29/19 153/81                 Passed - Recent (12 mo) or future (30 days) visit within the authorizing provider's specialty     Patient had office visit in the last 12 months or has a visit in the next 30 days with authorizing provider or within the authorizing provider's specialty.  See \"Patient Info\" tab in inbasket, or \"Choose Columns\" in Meds & Orders section of the refill encounter.              Passed - Medication is active on med list        Passed - Patient is age 18 or older        Passed - No active pregancy on record        Passed - Normal serum potassium on file in past 12 months     Recent Labs   Lab Test 02/19/19  1017   POTASSIUM 4.1                    Passed - Normal serum sodium on file in past 12 months     Recent Labs   Lab Test 03/20/19  1012                 Passed - No positive pregnancy test in past 12 months        glipiZIDE (GLUCOTROL XL) 2.5 MG 24 hr tablet [Pharmacy Med Name: GLIPIZIDE  Last Written Prescription Date:  2/8/2019  Last Fill Quantity: 90,  # refills: 1   Last office visit: 2/19/2019 with prescribing provider:     Future Office Visit:   ER 2.5MG  TAB] 90 tablet 1     Sig: TAKE 1 TABLET BY MOUTH ONCE DAILY       Sulfonylurea Agents Failed - 8/19/2019  9:17 AM        Failed - Patient has documented A1c within the specified period of time.     If HgbA1C is 8 or greater, it needs to be on file within the past 3 months.  If less than 8, must be on file within the past 6 months.     Recent Labs   Lab Test 02/19/19  1017   A1C 6.8*             Failed - Patient has a recent creatinine (normal) within the past 12 mos.     Recent Labs   Lab Test 02/19/19  1017   CR 1.44*             Failed - Recent (6 mo) or future (30 days) visit within the authorizing provider's specialty     Patient had office visit in the last 6 months or has a visit in the next 30 days with authorizing provider or within the authorizing provider's specialty.  See \"Patient Info\" tab in inbasket, or \"Choose " "Columns\" in Meds & Orders section of the refill encounter.            Passed - Blood pressure less than 140/90 in past 6 months     BP Readings from Last 3 Encounters:   04/12/19 122/60   04/10/19 113/78   03/29/19 153/81                 Passed - Patient has documented LDL within the past 12 mos.     Recent Labs   Lab Test 02/19/19  1017   *             Passed - Patient has had a Microalbumin in the past 15 mos.     Recent Labs   Lab Test 02/19/19  1018   MICROL 13   UMALCR 6.12             Passed - Medication is active on med list        Passed - Patient is age 18 or older        Passed - No active pregnancy on record        Passed - Patient has not had a positive pregnancy test within the past 12 mos.        "

## 2019-08-19 NOTE — LETTER
[facility]  [address], [address]  [place], [place]  [address]                                            TEL:[phone]  FAX:[phone]         Dimple Baxter  [address] [place] [id number]          August 20, 2019    Dear Dimple,   We have received a refill request for your medications from your pharmacy and noticed you are due for diabetes follow-up. A fasting lab appointment needs to be scheduled a week before your scheduled office visit with your primary care provider. Please call [phone] to schedule these appointments at your earliest convenience.     Taking care of your health is important to us and ongoing visits with your provider are vital to your care. We look forward to seeing you in the near future.     Thank you,       [facility]

## 2019-08-20 RX ORDER — GLIPIZIDE 2.5 MG/1
TABLET, EXTENDED RELEASE ORAL
Qty: 30 TABLET | Refills: 0 | Status: SHIPPED | OUTPATIENT
Start: 2019-08-20 | End: 2019-09-23

## 2019-08-20 RX ORDER — AMLODIPINE BESYLATE 5 MG/1
TABLET ORAL
Qty: 30 TABLET | Refills: 0 | Status: SHIPPED | OUTPATIENT
Start: 2019-08-20 | End: 2019-09-23

## 2019-08-20 RX ORDER — HYDROCHLOROTHIAZIDE 25 MG/1
TABLET ORAL
Qty: 30 TABLET | Refills: 0 | Status: SHIPPED | OUTPATIENT
Start: 2019-08-20 | End: 2019-09-23

## 2019-08-20 NOTE — TELEPHONE ENCOUNTER
amLODIPine (NORVASC)  glipiZIDE (GLUCOTROL XL)  hydrochlorothiazide (HYDRODIURIL)     Per result note 2/19/19:  A1c stable- continue glipizide   Creatinine slightly elevated, continue to monitor and rpt in 6 months  Lipids; slightly high cholesterol and triglycerides and LDL.  Advise to increase Crestor to 5 mg daily instated of taking 2.5 mg and rpt lipids in 3 months     Lab orders placed, letter mailed to patient to remind her to schedule appointment.      Routing refill request to provider for review/approval because:  Labs out of range:  Creatinine, A1C  Patient over due for labs    Please advise,     Thank you

## 2019-08-20 NOTE — TELEPHONE ENCOUNTER
I have refilled medications for 1 month, patient is due for her diabetic appointment and labs, I have ordered labs in epic please advise to get labs before her appointment.

## 2019-08-24 DIAGNOSIS — I10 BENIGN ESSENTIAL HYPERTENSION: ICD-10-CM

## 2019-08-24 DIAGNOSIS — I44.7 LBBB (LEFT BUNDLE BRANCH BLOCK): ICD-10-CM

## 2019-08-26 NOTE — TELEPHONE ENCOUNTER
"Requested Prescriptions   Pending Prescriptions Disp Refills     metoprolol succinate ER (TOPROL-XL) 100 MG 24 hr tablet [Pharmacy Med Name: METOPROLOL ER 100MG TAB] 90 tablet 1     Sig: TAKE 1 TABLET BY MOUTH ONCE DAILY   Last Written Prescription Date:  02/08/2019  Last Fill Quantity: 90,  # refills: 01   Last office visit: 2/19/2019 with prescribing provider:     Future Office Visit:      Beta-Blockers Protocol Passed - 8/24/2019 10:25 AM        Passed - Blood pressure under 140/90 in past 12 months     BP Readings from Last 3 Encounters:   04/12/19 122/60   04/10/19 113/78   03/29/19 153/81                 Passed - Patient is age 6 or older        Passed - Recent (12 mo) or future (30 days) visit within the authorizing provider's specialty     Patient had office visit in the last 12 months or has a visit in the next 30 days with authorizing provider or within the authorizing provider's specialty.  See \"Patient Info\" tab in inbasket, or \"Choose Columns\" in Meds & Orders section of the refill encounter.              Passed - Medication is active on med list        "

## 2019-08-27 RX ORDER — METOPROLOL SUCCINATE 100 MG/1
TABLET, EXTENDED RELEASE ORAL
Qty: 90 TABLET | Refills: 1 | Status: SHIPPED | OUTPATIENT
Start: 2019-08-27 | End: 2020-02-12

## 2019-08-27 NOTE — TELEPHONE ENCOUNTER
Prescription approved per [facility] Refill Protocol. MARYANN Ford R.N.

## 2019-09-17 DIAGNOSIS — E78.5 HYPERLIPIDEMIA LDL GOAL <70: ICD-10-CM

## 2019-09-17 DIAGNOSIS — E11.21 TYPE 2 DIABETES MELLITUS WITH DIABETIC NEPHROPATHY, WITHOUT LONG-TERM CURRENT USE OF INSULIN (H): Chronic | ICD-10-CM

## 2019-09-17 LAB — HBA1C MFR BLD: 6.4 % (ref 0–5.6)

## 2019-09-17 PROCEDURE — 83036 HEMOGLOBIN GLYCOSYLATED A1C: CPT | Performed by: INTERNAL MEDICINE

## 2019-09-17 PROCEDURE — 36415 COLL VENOUS BLD VENIPUNCTURE: CPT | Performed by: INTERNAL MEDICINE

## 2019-09-17 PROCEDURE — 80061 LIPID PANEL: CPT | Performed by: INTERNAL MEDICINE

## 2019-09-17 PROCEDURE — 80053 COMPREHEN METABOLIC PANEL: CPT | Performed by: INTERNAL MEDICINE

## 2019-09-18 LAB
ALBUMIN SERPL-MCNC: 3.8 G/DL (ref 3.4–5)
ALP SERPL-CCNC: 57 U/L (ref 40–150)
ALT SERPL W P-5'-P-CCNC: 47 U/L (ref 0–50)
ANION GAP SERPL CALCULATED.3IONS-SCNC: 8 MMOL/L (ref 3–14)
AST SERPL W P-5'-P-CCNC: 37 U/L (ref 0–45)
BILIRUB SERPL-MCNC: 0.4 MG/DL (ref 0.2–1.3)
BUN SERPL-MCNC: 19 MG/DL (ref 7–30)
CALCIUM SERPL-MCNC: 9.3 MG/DL (ref 8.5–10.1)
CHLORIDE SERPL-SCNC: 104 MMOL/L (ref 94–109)
CHOLEST SERPL-MCNC: 154 MG/DL
CO2 SERPL-SCNC: 26 MMOL/L (ref 20–32)
CREAT SERPL-MCNC: 1.14 MG/DL (ref 0.52–1.04)
GFR SERPL CREATININE-BSD FRML MDRD: 45 ML/MIN/{1.73_M2}
GLUCOSE SERPL-MCNC: 130 MG/DL (ref 70–99)
HDLC SERPL-MCNC: 32 MG/DL
LDLC SERPL CALC-MCNC: 81 MG/DL
NONHDLC SERPL-MCNC: 122 MG/DL
POTASSIUM SERPL-SCNC: 3.8 MMOL/L (ref 3.4–5.3)
PROT SERPL-MCNC: 7.6 G/DL (ref 6.8–8.8)
SODIUM SERPL-SCNC: 138 MMOL/L (ref 133–144)
TRIGL SERPL-MCNC: 207 MG/DL

## 2019-09-23 ENCOUNTER — OFFICE VISIT (OUTPATIENT)
Dept: INTERNAL MEDICINE | Facility: CLINIC | Age: 79
End: 2019-09-23
Payer: COMMERCIAL

## 2019-09-23 VITALS
HEIGHT: 66 IN | DIASTOLIC BLOOD PRESSURE: 71 MMHG | WEIGHT: 184.5 LBS | OXYGEN SATURATION: 94 % | RESPIRATION RATE: 16 BRPM | TEMPERATURE: 98.2 F | SYSTOLIC BLOOD PRESSURE: 127 MMHG | HEART RATE: 75 BPM | BODY MASS INDEX: 29.65 KG/M2

## 2019-09-23 DIAGNOSIS — I10 BENIGN ESSENTIAL HYPERTENSION: ICD-10-CM

## 2019-09-23 DIAGNOSIS — N18.30 CKD (CHRONIC KIDNEY DISEASE) STAGE 3, GFR 30-59 ML/MIN (H): ICD-10-CM

## 2019-09-23 DIAGNOSIS — Z23 NEED FOR INFLUENZA VACCINATION: ICD-10-CM

## 2019-09-23 DIAGNOSIS — E11.21 TYPE 2 DIABETES MELLITUS WITH DIABETIC NEPHROPATHY, WITHOUT LONG-TERM CURRENT USE OF INSULIN (H): Chronic | ICD-10-CM

## 2019-09-23 DIAGNOSIS — E78.2 MIXED HYPERLIPIDEMIA: Primary | ICD-10-CM

## 2019-09-23 PROCEDURE — 90662 IIV NO PRSV INCREASED AG IM: CPT | Performed by: INTERNAL MEDICINE

## 2019-09-23 PROCEDURE — 99214 OFFICE O/P EST MOD 30 MIN: CPT | Mod: 25 | Performed by: INTERNAL MEDICINE

## 2019-09-23 PROCEDURE — 99207 C PAF COMPLETED  NO CHARGE: CPT | Performed by: INTERNAL MEDICINE

## 2019-09-23 PROCEDURE — G0008 ADMIN INFLUENZA VIRUS VAC: HCPCS | Performed by: INTERNAL MEDICINE

## 2019-09-23 RX ORDER — GLIPIZIDE 2.5 MG/1
2.5 TABLET, EXTENDED RELEASE ORAL DAILY
Qty: 90 TABLET | Refills: 1 | Status: SHIPPED | OUTPATIENT
Start: 2019-09-23 | End: 2020-03-13

## 2019-09-23 RX ORDER — AMLODIPINE BESYLATE 5 MG/1
5 TABLET ORAL DAILY
Qty: 90 TABLET | Refills: 1 | Status: SHIPPED | OUTPATIENT
Start: 2019-09-23 | End: 2020-03-13

## 2019-09-23 RX ORDER — HYDROCHLOROTHIAZIDE 25 MG/1
25 TABLET ORAL DAILY
Qty: 90 TABLET | Refills: 1 | Status: SHIPPED | OUTPATIENT
Start: 2019-09-23 | End: 2020-03-13

## 2019-09-23 ASSESSMENT — PATIENT HEALTH QUESTIONNAIRE - PHQ9: SUM OF ALL RESPONSES TO PHQ QUESTIONS 1-9: 1

## 2019-09-23 ASSESSMENT — MIFFLIN-ST. JEOR: SCORE: 1319.67

## 2019-09-23 NOTE — PROGRESS NOTES
Subjective     Dimple Baxter is a 79 year old female who presents to clinic today for the following health issues:    HPI     Diabetes Follow-up      How often are you checking your blood sugar? Once every two weeks, BS- 124    What time of day are you checking your blood sugars (select all that apply)?  After meals    Have you had any blood sugars above 200?  No    Have you had any blood sugars below 70?  No    What symptoms do you notice when your blood sugar is low?  None    What concerns do you have today about your diabetes? None     Do you have any of these symptoms? (Select all that apply)  No numbness or tingling in feet.  No redness, sores or blisters on feet.  No complaints of excessive thirst.  No reports of blurry vision.  No significant changes to weight.     Have you had a diabetic eye exam in the last 12 months? No      Hyperlipidemia Follow-Up      Are you having any of the following symptoms? (Select all that apply)  No complaints of shortness of breath, chest pain or pressure.  No increased sweating or nausea with activity.  No left-sided neck or arm pain.  No complaints of pain in calves when walking 1-2 blocks.    Are you regularly taking any medication or supplement to lower your cholesterol?   Yes- Rosuvastatin    Are you having muscle aches or other side effects that you think could be caused by your cholesterol lowering medication?  No    Hypertension Follow-up      Do you check your blood pressure regularly outside of the clinic? No     Are you following a low salt diet? Yes    Are your blood pressures ever more than 140 on the top number (systolic) OR more   than 90 on the bottom number (diastolic), for example 140/90? Yes    BP Readings from Last 2 Encounters:   04/12/19 122/60   04/10/19 113/78     Hemoglobin A1C (%)   Date Value   09/17/2019 6.4 (H)   02/19/2019 6.8 (H)     LDL Cholesterol Calculated (mg/dL)   Date Value   09/17/2019 81   02/19/2019 118 (H)         How many servings of  fruits and vegetables do you eat daily?  0-1    On average, how many sweetened beverages do you drink each day (soda, juice, sweet tea, etc)?   1    How many days per week do you miss taking your medication? 0       Patient Active Problem List   Diagnosis     Allergic rhinitis     Esophageal reflux     Benign essential hypertension     Generalized anxiety disorder     Fatty liver     Colon polyp     Advanced directives, counseling/discussion     Mixed hyperlipidemia     Pulmonary hypertension (H)     Left bundle branch block     Type 2 diabetes mellitus with diabetic nephropathy; goal HgbA1c < 7%     DDD (degenerative disc disease), lumbar     Lumbar spondylosis     Insomnia     Recurrent left knee instability     Chronic midline low back pain without sciatica     Left ventricular diastolic dysfunction     Tricuspid regurgitation     Episodic tension-type headache, not intractable     Chronic pain syndrome     CKD (chronic kidney disease) stage 3, GFR 30-59 ml/min (H)     Past Surgical History:   Procedure Laterality Date     C NONSPECIFIC PROCEDURE      Hysterectomy/ Right Oophorectomy     C NONSPECIFIC PROCEDURE      Right Carpal Tunnel Surgery     C NONSPECIFIC PROCEDURE      Cholecystectomy     C NONSPECIFIC PROCEDURE      cor angio; nl     C NONSPECIFIC PROCEDURE      lasik     COLONOSCOPY  2013    Procedure: COMBINED COLONOSCOPY, SINGLE BIOPSY/POLYPECTOMY BY BIOPSY;;  Surgeon: Marshall Logan MD;  Location:  GI     COLONOSCOPY Left 4/10/2019    Procedure: COLONOSCOPY;  Surgeon: Chico Tran MD;  Location:  GI     HYSTERECTOMY, PAP NO LONGER INDICATED         Social History     Tobacco Use     Smoking status: Former Smoker     Packs/day: 1.00     Years: 2.00     Pack years: 2.00     Types: Cigarettes     Start date:      Last attempt to quit: 1963     Years since quittin.7     Smokeless tobacco: Never Used   Substance Use Topics     Alcohol use: Yes     Alcohol/week: 0.0  standard drinks     Comment: occ     Family History   Problem Relation Age of Onset     Family History Negative Daughter      Cerebrovascular Disease Mother      Alcoholism Father      Family History Negative Brother      Multiple Sclerosis Daughter      Lymphoma Daughter      Family History Negative Son      Colon Cancer No family hx of          Current Outpatient Medications   Medication Sig Dispense Refill     acetaminophen (TYLENOL) 325 MG tablet Take 1-2 tablets by mouth every 6 hours as needed for pain.       albuterol (PROVENTIL HFA) 108 (90 Base) MCG/ACT inhaler Inhale 2 puffs into the lungs every 6 hours 8.5 g 0     amLODIPine (NORVASC) 5 MG tablet Take 1 tablet (5 mg) by mouth daily 90 tablet 1     ASPIRIN 81 MG OR TABS take 1 x daily with food 0 0     blood glucose (ACCU-CHEK TEJA) test strip Use to test blood sugar One times daily or as directed. 100 each 3     blood glucose (ACCU-CHEK SOFTCLIX) lancing device Lancing device to be used with lancets. 1 each 0     blood glucose monitoring (NO BRAND SPECIFIED) meter device kit Use to test blood sugar once daily  as directed. 1 kit 0     blood glucose monitoring (SOFTCLIX) lancets Use to test blood sugar One times daily. 100 each 3     cetirizine (ZYRTEC) 10 MG tablet Take 10 mg by mouth as needed for allergies       cholecalciferol (VITAMIN D) 1000 UNIT tablet Take 1 tablet (1,000 Units) by mouth daily 100 tablet 3     famotidine (PEPCID) 20 MG tablet Take 1 tablet by mouth 2 times daily as needed.       FISH OIL 1000 MG OR CAPS take three daily  0 0     fluticasone (FLONASE) 50 MCG/ACT nasal spray Spray 2 sprays into both nostrils daily 16 g 5     glipiZIDE (GLUCOTROL XL) 2.5 MG 24 hr tablet Take 1 tablet (2.5 mg) by mouth daily 90 tablet 1     hydrochlorothiazide (HYDRODIURIL) 25 MG tablet Take 1 tablet (25 mg) by mouth daily 90 tablet 1     LORazepam (ATIVAN) 1 MG tablet TAKE 1/2 TO 1 (ONE-HALF TO ONE) TABLET BY MOUTH ONCE DAILY AS NEEDED FOR ANXIETY 15  "tablet 0     metoprolol succinate ER (TOPROL-XL) 100 MG 24 hr tablet TAKE 1 TABLET BY MOUTH ONCE DAILY 90 tablet 1     nitroGLYcerin (NITROSTAT) 0.4 MG sublingual tablet PLACE 1 TABLET UNDER THE TONGUE EVERY 5 MINUTES AS NEEDED. UP TO 3 TABLETS PER EPISODE 25 tablet 1     quinapril (ACCUPRIL) 40 MG tablet Take 0.5 tablets (20 mg) by mouth daily 90 tablet 1     rosuvastatin (CRESTOR) 5 MG tablet Take 1 tablet (5 mg) by mouth At Bedtime 90 tablet 0     traZODone (DESYREL) 100 MG tablet TAKE ONE TABLET BY MOUTH ONCE DAILY AT BEDTIME 90 tablet 1         Reviewed and updated as needed this visit by Provider         Review of Systems   ROS COMP: CONSTITUTIONAL: NEGATIVE for fever, chills, change in weight  EYES: NEGATIVE for vision changes or irritation  ENT/MOUTH: NEGATIVE for ear, mouth and throat problems  RESP: NEGATIVE for significant cough or SOB  CV: NEGATIVE for chest pain, palpitations or peripheral edema  MUSCULOSKELETAL: NEGATIVE for significant arthralgias or myalgia  NEURO: NEGATIVE for weakness, dizziness or paresthesias  ENDOCRINE: NEGATIVE for temperature intolerance, skin/hair changes  Psych; negative  ROS otherwise negative      Objective    /71 (BP Location: Left arm, Patient Position: Sitting, Cuff Size: Adult Large)   Pulse 75   Temp 98.2  F (36.8  C) (Oral)   Resp 16   Ht 1.67 m (5' 5.75\")   Wt 83.7 kg (184 lb 8 oz)   SpO2 94%   BMI 30.01 kg/m    Body mass index is 30.01 kg/m .  Physical Exam   GENERAL: healthy, alert and no distress  EYES: Eyes grossly normal to inspection, PERRL and conjunctivae and sclerae normal  EYES: Eyes grossly normal to inspection  NECK: no adenopathy, no asymmetry, masses, or scars and thyroid normal to palpation  RESP: lungs clear to auscultation - no rales, rhonchi or wheezes  CV: regular rate and rhythm, normal S1 S2, no S3 or S4, no murmur, click or rub, no peripheral edema and peripheral pulses strong  MS: no gross musculoskeletal defects noted, no " "edema  NEURO: Normal strength and tone, mentation intact and speech normal  Diabetic foot exam: normal DP and PT pulses, no trophic changes or ulcerative lesions, normal sensory exam and normal monofilament exam          Assessment & Plan     (E11.21) Type 2 diabetes mellitus with diabetic nephropathy, without long-term current use of insulin (H)  Comment: A1c 6.4, diabetes under control  Plan: Refilled glipiZIDE (GLUCOTROL XL) 2.5 MG 24 hr tablet, as directed.explained clearly about the medication,insructions and side effects. Advised annual eye exam referral given .     OPHTHALMOLOGY ADULT REFERRAL            (I10) Benign essential hypertension  Comment: Blood pressure well controlled  Plan: amLODIPine (NORVASC) 5 MG tablet, hydrochlorothiazide (HYDRODIURIL) 25 MG tablet refilled as directed.explained clearly about the medication,insructions and side effects. Advised to follow low salt diet and exercise and f/u in 6 mths.           (E78.2) Mixed hyperlipidemia    Plan: LDL at goal ,cholesterol is improved, continue Crestor, liver function has normalized      (N18.3) CKD (chronic kidney disease) stage 3, GFR 30-59 ml/min (H)  CPlan: Creatinine has improved since before, avoid nephrotoxins, avoid NSAIDs monitor creatinine in 6 months.        (Z23) Need for influenza vaccination  Plan: Flu vaccine administered today      BMI:   Estimated body mass index is 30.01 kg/m  as calculated from the following:    Height as of this encounter: 1.67 m (5' 5.75\").    Weight as of this encounter: 83.7 kg (184 lb 8 oz).           FUTURE APPOINTMENTS:       - Make follow-up visit after next lab draw in 6 months        Du Phillips MD  [facility]        "

## 2019-11-25 DIAGNOSIS — E78.5 HYPERLIPIDEMIA LDL GOAL <70: ICD-10-CM

## 2019-11-26 RX ORDER — ROSUVASTATIN CALCIUM 5 MG/1
TABLET, COATED ORAL
Qty: 90 TABLET | Refills: 1 | Status: SHIPPED | OUTPATIENT
Start: 2019-11-26 | End: 2020-03-13

## 2019-11-26 NOTE — TELEPHONE ENCOUNTER
"Requested Prescriptions   Pending Prescriptions Disp Refills     rosuvastatin (CRESTOR) 5 MG tablet [Pharmacy Med Name: ROSUVASTATIN RAUL  Last Written Prescription Date:  7/30/2019  Last Fill Quantity: 90,  # refills: 0   Last office visit: 9/23/2019 with prescribing provider:     Future Office Visit:   5MG TAB] 90 tablet 0     Sig: TAKE 1 TABLET BY MOUTH AT BEDTIME . APPOINTMENT REQUIRED FOR FUTURE REFILLS       Statins Protocol Passed - 11/25/2019 10:26 AM        Passed - LDL on file in past 12 months     Recent Labs   Lab Test 09/17/19  0918   LDL 81             Passed - No abnormal creatine kinase in past 12 months     Recent Labs   Lab Test 03/28/19  1215   CKT 92                Passed - Recent (12 mo) or future (30 days) visit within the authorizing provider's specialty     Patient has had an office visit with the authorizing provider or a provider within the authorizing providers department within the previous 12 mos or has a future within next 30 days. See \"Patient Info\" tab in inbasket, or \"Choose Columns\" in Meds & Orders section of the refill encounter.              Passed - Medication is active on med list        Passed - Patient is age 18 or older        Passed - No active pregnancy on record        Passed - No positive pregnancy test in past 12 months        "

## 2019-11-26 NOTE — TELEPHONE ENCOUNTER
Routing refill request to provider for review/approval because:  Drug allergy/contraindication warning    Please advise, thanks.

## 2020-01-28 ENCOUNTER — TRANSFERRED RECORDS (OUTPATIENT)
Dept: HEALTH INFORMATION MANAGEMENT | Facility: CLINIC | Age: 80
End: 2020-01-28

## 2020-02-12 DIAGNOSIS — I10 BENIGN ESSENTIAL HYPERTENSION: ICD-10-CM

## 2020-02-12 DIAGNOSIS — I44.7 LBBB (LEFT BUNDLE BRANCH BLOCK): ICD-10-CM

## 2020-02-12 RX ORDER — METOPROLOL SUCCINATE 100 MG/1
TABLET, EXTENDED RELEASE ORAL
Qty: 90 TABLET | Refills: 1 | Status: SHIPPED | OUTPATIENT
Start: 2020-02-12 | End: 2020-09-02

## 2020-02-12 NOTE — TELEPHONE ENCOUNTER
"Requested Prescriptions   Pending Prescriptions Disp Refills     metoprolol succinate ER (TOPROL-XL) 100 MG 24 hr tablet [Pharmacy Med Name: Metoprolol Succinate  MG Oral Tablet Extended Release 24 Hour]  0     Sig: TAKE 1 TABLET BY MOUTH ONCE DAILY   Last Written Prescription Date:  08/27/2019  Last Fill Quantity: 90,  # refills: 01   Last office visit: 9/23/2019 with prescribing provider:     Future Office Visit:      Beta-Blockers Protocol Passed - 2/12/2020  7:37 AM        Passed - Blood pressure under 140/90 in past 12 months     BP Readings from Last 3 Encounters:   09/23/19 127/71   04/12/19 122/60   04/10/19 113/78                 Passed - Patient is age 6 or older        Passed - Recent (12 mo) or future (30 days) visit within the authorizing provider's specialty     Patient has had an office visit with the authorizing provider or a provider within the authorizing providers department within the previous 12 mos or has a future within next 30 days. See \"Patient Info\" tab in inbasket, or \"Choose Columns\" in Meds & Orders section of the refill encounter.              Passed - Medication is active on med list        "

## 2020-02-24 ENCOUNTER — HOSPITAL ENCOUNTER (OUTPATIENT)
Dept: GENERAL RADIOLOGY | Facility: CLINIC | Age: 80
Discharge: HOME | End: 2020-02-24
Attending: INTERNAL MEDICINE | Admitting: INTERNAL MEDICINE
Payer: COMMERCIAL

## 2020-02-24 ENCOUNTER — HOSPITAL ENCOUNTER (OUTPATIENT)
Dept: MAMMOGRAPHY | Facility: CLINIC | Age: 80
End: 2020-02-24
Attending: INTERNAL MEDICINE
Payer: COMMERCIAL

## 2020-02-24 DIAGNOSIS — R94.2 ABNORMAL RESULTS OF PULMONARY FUNCTION STUDIES: ICD-10-CM

## 2020-02-24 DIAGNOSIS — R05.9 COUGH: ICD-10-CM

## 2020-02-24 DIAGNOSIS — J84.112 IDIOPATHIC PULMONARY FIBROSIS (H): ICD-10-CM

## 2020-02-24 DIAGNOSIS — Z12.31 VISIT FOR SCREENING MAMMOGRAM: ICD-10-CM

## 2020-02-24 PROCEDURE — 71046 X-RAY EXAM CHEST 2 VIEWS: CPT

## 2020-02-24 PROCEDURE — 77063 BREAST TOMOSYNTHESIS BI: CPT

## 2020-03-13 ENCOUNTER — OFFICE VISIT (OUTPATIENT)
Dept: INTERNAL MEDICINE | Facility: CLINIC | Age: 80
End: 2020-03-13
Payer: COMMERCIAL

## 2020-03-13 VITALS
HEIGHT: 66 IN | RESPIRATION RATE: 16 BRPM | DIASTOLIC BLOOD PRESSURE: 62 MMHG | SYSTOLIC BLOOD PRESSURE: 98 MMHG | HEART RATE: 71 BPM | OXYGEN SATURATION: 96 % | WEIGHT: 190.9 LBS | BODY MASS INDEX: 30.68 KG/M2 | TEMPERATURE: 97.6 F

## 2020-03-13 DIAGNOSIS — N18.30 CKD (CHRONIC KIDNEY DISEASE) STAGE 3, GFR 30-59 ML/MIN (H): ICD-10-CM

## 2020-03-13 DIAGNOSIS — F51.01 PRIMARY INSOMNIA: Chronic | ICD-10-CM

## 2020-03-13 DIAGNOSIS — I27.21 SECONDARY PULMONARY ARTERIAL HYPERTENSION (H): ICD-10-CM

## 2020-03-13 DIAGNOSIS — E78.5 HYPERLIPIDEMIA LDL GOAL <70: ICD-10-CM

## 2020-03-13 DIAGNOSIS — I10 BENIGN ESSENTIAL HYPERTENSION: ICD-10-CM

## 2020-03-13 DIAGNOSIS — E11.21 TYPE 2 DIABETES MELLITUS WITH DIABETIC NEPHROPATHY, WITHOUT LONG-TERM CURRENT USE OF INSULIN (H): Chronic | ICD-10-CM

## 2020-03-13 LAB
ALBUMIN SERPL-MCNC: 3.6 G/DL (ref 3.4–5)
ALP SERPL-CCNC: 43 U/L (ref 40–150)
ALT SERPL W P-5'-P-CCNC: 93 U/L (ref 0–50)
ANION GAP SERPL CALCULATED.3IONS-SCNC: 8 MMOL/L (ref 3–14)
AST SERPL W P-5'-P-CCNC: 37 U/L (ref 0–45)
BILIRUB SERPL-MCNC: 0.5 MG/DL (ref 0.2–1.3)
BUN SERPL-MCNC: 33 MG/DL (ref 7–30)
CALCIUM SERPL-MCNC: 9.7 MG/DL (ref 8.5–10.1)
CHLORIDE SERPL-SCNC: 101 MMOL/L (ref 94–109)
CO2 SERPL-SCNC: 27 MMOL/L (ref 20–32)
CREAT SERPL-MCNC: 1.59 MG/DL (ref 0.52–1.04)
GFR SERPL CREATININE-BSD FRML MDRD: 30 ML/MIN/{1.73_M2}
GLUCOSE SERPL-MCNC: 219 MG/DL (ref 70–99)
HBA1C MFR BLD: 8.3 % (ref 0–5.6)
POTASSIUM SERPL-SCNC: 3.7 MMOL/L (ref 3.4–5.3)
PROT SERPL-MCNC: 7.5 G/DL (ref 6.8–8.8)
SODIUM SERPL-SCNC: 136 MMOL/L (ref 133–144)

## 2020-03-13 PROCEDURE — 99214 OFFICE O/P EST MOD 30 MIN: CPT | Performed by: INTERNAL MEDICINE

## 2020-03-13 PROCEDURE — 82043 UR ALBUMIN QUANTITATIVE: CPT | Performed by: INTERNAL MEDICINE

## 2020-03-13 PROCEDURE — 80053 COMPREHEN METABOLIC PANEL: CPT | Performed by: INTERNAL MEDICINE

## 2020-03-13 PROCEDURE — 36415 COLL VENOUS BLD VENIPUNCTURE: CPT | Performed by: INTERNAL MEDICINE

## 2020-03-13 PROCEDURE — 99207 ZZC FOOT EXAM  NO CHARGE: CPT | Performed by: INTERNAL MEDICINE

## 2020-03-13 PROCEDURE — 83036 HEMOGLOBIN GLYCOSYLATED A1C: CPT | Performed by: INTERNAL MEDICINE

## 2020-03-13 RX ORDER — ROSUVASTATIN CALCIUM 5 MG/1
TABLET, COATED ORAL
Qty: 90 TABLET | Refills: 1 | Status: SHIPPED | OUTPATIENT
Start: 2020-03-13 | End: 2020-11-05

## 2020-03-13 RX ORDER — HYDROCHLOROTHIAZIDE 25 MG/1
25 TABLET ORAL DAILY
Qty: 90 TABLET | Refills: 1 | Status: SHIPPED | OUTPATIENT
Start: 2020-03-13 | End: 2020-10-23

## 2020-03-13 RX ORDER — TRAZODONE HYDROCHLORIDE 100 MG/1
50 TABLET ORAL
Qty: 60 TABLET | Refills: 2 | Status: SHIPPED | OUTPATIENT
Start: 2020-03-13 | End: 2020-10-21

## 2020-03-13 RX ORDER — GLIPIZIDE 2.5 MG/1
2.5 TABLET, EXTENDED RELEASE ORAL DAILY
Qty: 90 TABLET | Refills: 1 | Status: SHIPPED | OUTPATIENT
Start: 2020-03-13 | End: 2020-09-10 | Stop reason: DRUGHIGH

## 2020-03-13 RX ORDER — QUINAPRIL 40 MG/1
20 TABLET ORAL DAILY
Qty: 45 TABLET | Refills: 1 | Status: SHIPPED | OUTPATIENT
Start: 2020-03-13 | End: 2020-09-10

## 2020-03-13 RX ORDER — TRAZODONE HYDROCHLORIDE 100 MG/1
50 TABLET ORAL
Qty: 60 TABLET | Refills: 2 | Status: SHIPPED | OUTPATIENT
Start: 2020-03-13 | End: 2020-03-13

## 2020-03-13 RX ORDER — AMLODIPINE BESYLATE 5 MG/1
5 TABLET ORAL DAILY
Qty: 90 TABLET | Refills: 1 | Status: CANCELLED | OUTPATIENT
Start: 2020-03-13

## 2020-03-13 ASSESSMENT — ANXIETY QUESTIONNAIRES
5. BEING SO RESTLESS THAT IT IS HARD TO SIT STILL: NOT AT ALL
3. WORRYING TOO MUCH ABOUT DIFFERENT THINGS: NOT AT ALL
GAD7 TOTAL SCORE: 0
IF YOU CHECKED OFF ANY PROBLEMS ON THIS QUESTIONNAIRE, HOW DIFFICULT HAVE THESE PROBLEMS MADE IT FOR YOU TO DO YOUR WORK, TAKE CARE OF THINGS AT HOME, OR GET ALONG WITH OTHER PEOPLE: NOT DIFFICULT AT ALL
6. BECOMING EASILY ANNOYED OR IRRITABLE: NOT AT ALL
7. FEELING AFRAID AS IF SOMETHING AWFUL MIGHT HAPPEN: NOT AT ALL
2. NOT BEING ABLE TO STOP OR CONTROL WORRYING: NOT AT ALL
1. FEELING NERVOUS, ANXIOUS, OR ON EDGE: NOT AT ALL

## 2020-03-13 ASSESSMENT — PATIENT HEALTH QUESTIONNAIRE - PHQ9: 5. POOR APPETITE OR OVEREATING: NOT AT ALL

## 2020-03-13 ASSESSMENT — MIFFLIN-ST. JEOR: SCORE: 1348.7

## 2020-03-13 NOTE — PROGRESS NOTES
Subjective     Dimple Baxter is a 79 year old female who presents to clinic today for the following health issues:    HPI     Diabetes Follow-up    How often are you checking your blood sugar? A few times a month,   What time of day are you checking your blood sugars (select all that apply)?  Before and after meals  Have you had any blood sugars above 200?  No  Have you had any blood sugars below 70?  No    What symptoms do you notice when your blood sugar is low?  None    What concerns do you have today about your diabetes? None     Do you have any of these symptoms? (Select all that apply)  No numbness or tingling in feet.  No redness, sores or blisters on feet.  No complaints of excessive thirst.  No reports of blurry vision.  No significant changes to weight.    Have you had a diabetic eye exam in the last 12 months? No     BP Readings from Last 2 Encounters:   09/23/19 127/71   04/12/19 122/60     Hemoglobin A1C (%)   Date Value   09/17/2019 6.4 (H)   02/19/2019 6.8 (H)     LDL Cholesterol Calculated (mg/dL)   Date Value   09/17/2019 81   02/19/2019 118 (H)        Hypertension Follow-up      Do you check your blood pressure regularly outside of the clinic? No     Are you following a low salt diet? Yes    Are your blood pressures ever more than 140 on the top number (systolic) OR more   than 90 on the bottom number (diastolic), for example 140/90? No      Hyperlipidemia Follow-Up      Are you regularly taking any medication or supplement to lower your cholesterol?   Yes- crestor    Are you having muscle aches or other side effects that you think could be caused by your cholesterol lowering medication?  Yes- cramps ankles and hands      Anxiety Follow-Up    How are you doing with your anxiety since your last visit? Resolved     Are you having other symptoms that might be associated with anxiety? No    Have you had a significant life event? No     Are you feeling depressed? No    Do you have any concerns  with your use of alcohol or other drugs? No       BRITTNI-7 SCORE 12/5/2017 2/8/2019 3/13/2020   Total Score - - -   Total Score 2 1 0         Chronic Kidney Disease Follow-up      Do you take any over the counter pain medicine?: No         How many servings of fruits and vegetables do you eat daily?  4 or more    On average, how many sweetened beverages do you drink each day (Examples: soda, juice, sweet tea, etc.  Do NOT count diet or artificially sweetened beverages)?   1    How many days per week do you exercise enough to make your heart beat faster? 3 or less    How many minutes a day do you exercise enough to make your heart beat faster? 9 or less    How many days per week do you miss taking your medication? 0         Patient Active Problem List   Diagnosis     Allergic rhinitis     Esophageal reflux     Benign essential hypertension     Generalized anxiety disorder     Fatty liver     Colon polyp     Advanced directives, counseling/discussion     Mixed hyperlipidemia     Pulmonary hypertension (H)     Left bundle branch block     Type 2 diabetes mellitus with diabetic nephropathy; goal HgbA1c < 7%     DDD (degenerative disc disease), lumbar     Lumbar spondylosis     Insomnia     Recurrent left knee instability     Chronic midline low back pain without sciatica     Left ventricular diastolic dysfunction     Tricuspid regurgitation     Episodic tension-type headache, not intractable     Chronic pain syndrome     CKD (chronic kidney disease) stage 3, GFR 30-59 ml/min (H)     Past Surgical History:   Procedure Laterality Date     C NONSPECIFIC PROCEDURE      Hysterectomy/ Right Oophorectomy     C NONSPECIFIC PROCEDURE      Right Carpal Tunnel Surgery     C NONSPECIFIC PROCEDURE      Cholecystectomy     C NONSPECIFIC PROCEDURE  8/03    cor angio; nl     C NONSPECIFIC PROCEDURE  2006    lasik     COLONOSCOPY  8/12/2013    Procedure: COMBINED COLONOSCOPY, SINGLE BIOPSY/POLYPECTOMY BY BIOPSY;;  Surgeon: Marshall Logan,  MD;  Location:  GI     COLONOSCOPY Left 4/10/2019    Procedure: COLONOSCOPY;  Surgeon: Chico Tran MD;  Location:  GI     HYSTERECTOMY, PAP NO LONGER INDICATED         Social History     Tobacco Use     Smoking status: Former Smoker     Packs/day: 1.00     Years: 2.00     Pack years: 2.00     Types: Cigarettes     Start date:      Last attempt to quit: 1963     Years since quittin.2     Smokeless tobacco: Never Used   Substance Use Topics     Alcohol use: Yes     Alcohol/week: 0.0 standard drinks     Comment: occ     Family History   Problem Relation Age of Onset     Family History Negative Daughter      Cerebrovascular Disease Mother      Alcoholism Father      Family History Negative Brother      Multiple Sclerosis Daughter      Lymphoma Daughter      Family History Negative Son      Colon Cancer No family hx of          Current Outpatient Medications   Medication Sig Dispense Refill     acetaminophen (TYLENOL) 325 MG tablet Take 1-2 tablets by mouth every 6 hours as needed for pain.       amLODIPine (NORVASC) 5 MG tablet Take 1 tablet (5 mg) by mouth daily 90 tablet 1     ASPIRIN 81 MG OR TABS take 1 x daily with food 0 0     blood glucose (ACCU-CHEK SOFTCLIX) lancing device Lancing device to be used with lancets. 1 each 0     blood glucose monitoring (NO BRAND SPECIFIED) meter device kit Use to test blood sugar once daily  as directed. 1 kit 0     blood glucose monitoring (SOFTCLIX) lancets Use to test blood sugar One times daily. 100 each 3     cetirizine (ZYRTEC) 10 MG tablet Take 10 mg by mouth as needed for allergies       cholecalciferol (VITAMIN D) 1000 UNIT tablet Take 1 tablet (1,000 Units) by mouth daily 100 tablet 3     famotidine (PEPCID) 20 MG tablet Take 1 tablet by mouth 2 times daily as needed.       fluticasone (FLONASE) 50 MCG/ACT nasal spray Spray 2 sprays into both nostrils daily 16 g 5     glipiZIDE (GLUCOTROL XL) 2.5 MG 24 hr tablet Take 1 tablet (2.5 mg) by mouth  "daily 90 tablet 1     hydrochlorothiazide (HYDRODIURIL) 25 MG tablet Take 1 tablet (25 mg) by mouth daily 90 tablet 1     metoprolol succinate ER (TOPROL-XL) 100 MG 24 hr tablet TAKE 1 TABLET BY MOUTH ONCE DAILY 90 tablet 1     quinapril (ACCUPRIL) 40 MG tablet Take 0.5 tablets (20 mg) by mouth daily 90 tablet 1     rosuvastatin (CRESTOR) 5 MG tablet TAKE 1 TABLET BY MOUTH AT BEDTIME . APPOINTMENT REQUIRED FOR FUTURE REFILLS 90 tablet 1     traZODone (DESYREL) 100 MG tablet TAKE ONE TABLET BY MOUTH ONCE DAILY AT BEDTIME 90 tablet 1     albuterol (PROVENTIL HFA) 108 (90 Base) MCG/ACT inhaler Inhale 2 puffs into the lungs every 6 hours 8.5 g 0     nitroGLYcerin (NITROSTAT) 0.4 MG sublingual tablet PLACE 1 TABLET UNDER THE TONGUE EVERY 5 MINUTES AS NEEDED. UP TO 3 TABLETS PER EPISODE (Patient not taking: Reported on 3/13/2020) 25 tablet 1       Reviewed and updated as needed this visit by Provider         Review of Systems   ROS COMP: CONSTITUTIONAL: NEGATIVE for fever, chills, change in weight  EYES: NEGATIVE for vision changes or irritation  ENT/MOUTH: NEGATIVE for ear, mouth and throat problems  RESP: NEGATIVE for significant cough or SOB  CV: NEGATIVE for chest pain, palpitations or peripheral edema  MUSCULOSKELETAL: NEGATIVE for significant arthralgias or myalgia  NEURO: NEGATIVE for weakness, dizziness or paresthesias  ENDOCRINE: NEGATIVE for temperature intolerance, skin/hair changes  PSYCHIATRIC: NEGATIVE for changes in mood or affect      Objective    BP 98/62   Pulse 71   Temp 97.6  F (36.4  C) (Oral)   Resp 16   Ht 1.67 m (5' 5.75\")   Wt 86.6 kg (190 lb 14.4 oz)   SpO2 96%   BMI 31.05 kg/m    Body mass index is 31.05 kg/m .  Physical Exam   GENERAL: healthy, alert and no distress  EYES: Eyes grossly normal to inspection, PERRL and conjunctivae and sclerae normal  NECK: no adenopathy, no asymmetry, masses, or scars and thyroid normal to palpation  RESP: lungs clear to auscultation - no rales, rhonchi " "or wheezes  CV: regular rate and rhythm, normal S1 S2, no S3 or S4, no murmur, click or rub, no peripheral edema and peripheral pulses strong  MS: no gross musculoskeletal defects noted, no edema  NEURO: Normal strength and tone, mentation intact and speech normal  Diabetic foot exam: normal DP and PT pulses, no trophic changes or ulcerative lesions, normal sensory exam and normal monofilament exam           Assessment & Plan     (E11.21) Type 2 diabetes mellitus with diabetic nephropathy, without long-term current use of insulin (H)  Plan: glipiZIDE (GLUCOTROL XL) 2.5 MG 24 hr tablet, refilled.explained clearly about the medication,insructions and side effects.          Comprehensive metabolic panel, Hemoglobin A1c,         Albumin Random Urine Quantitative with Creat         Ratio            (I10) Benign essential hypertension  Comment: BP low   Plan: discontinue Amlodipine, continue  hydrochlorothiazide (HYDRODIURIL) 25 MG tablet,        quinapril (ACCUPRIL) 40 MG tablet- 1/2 tab daily.and metoprolol, explained clearly about the medication,insructions and side effects.   f/u in 1 moth to recheck on BP        (E78.5) Hyperlipidemia LDL goal <70  Plan: rosuvastatin (CRESTOR) 5 MG tablet refilled.explained clearly about the medication,insructions and side effects.   check lipid panel in 09/20           (F51.01) Primary insomnia  Plan: stable traZODone (DESYREL) 100 MG tablet- 1/2 tab daily prn.explained clearly about the medication,insructions and side effects.  Advised not to drive or operate any machinery while on this med            (I27.21) Secondary pulmonary arterial hypertension (H)    (N18.3) CKD (chronic kidney disease) stage 3, GFR 30-59 ml/min (H)  Plan: monitor creatinine      BMI:   Estimated body mass index is 31.05 kg/m  as calculated from the following:    Height as of this encounter: 1.67 m (5' 5.75\").    Weight as of this encounter: 86.6 kg (190 lb 14.4 oz).   Weight management plan: Discussed " healthy diet and exercise guidelines        FUTURE APPOINTMENTS:       - Follow-up visit in 1 month         Du Phillips MD  [facility]

## 2020-03-13 NOTE — NURSING NOTE
"Vital signs:  Temp: 97.6  F (36.4  C) Temp src: Oral BP: 98/62 Pulse: 71   Resp: 16 SpO2: 96 %     Height: 167 cm (5' 5.75\") Weight: 86.6 kg (190 lb 14.4 oz)  Estimated body mass index is 31.05 kg/m  as calculated from the following:    Height as of this encounter: 1.67 m (5' 5.75\").    Weight as of this encounter: 86.6 kg (190 lb 14.4 oz).          "

## 2020-03-14 LAB
CREAT UR-MCNC: 207 MG/DL
MICROALBUMIN UR-MCNC: 38 MG/L
MICROALBUMIN/CREAT UR: 18.45 MG/G CR (ref 0–25)

## 2020-03-14 ASSESSMENT — ANXIETY QUESTIONNAIRES: GAD7 TOTAL SCORE: 0

## 2020-03-19 NOTE — PROGRESS NOTES
Crestor prescription came back as escribing error. Left detailed message for pharmacy with prescription information.

## 2020-04-20 ENCOUNTER — TELEPHONE (OUTPATIENT)
Dept: CARDIOLOGY | Facility: CLINIC | Age: 80
End: 2020-04-20

## 2020-05-18 ENCOUNTER — TELEPHONE (OUTPATIENT)
Dept: INTERNAL MEDICINE | Facility: CLINIC | Age: 80
End: 2020-05-18

## 2020-05-18 ENCOUNTER — VIRTUAL VISIT (OUTPATIENT)
Dept: INTERNAL MEDICINE | Facility: CLINIC | Age: 80
End: 2020-05-18
Payer: COMMERCIAL

## 2020-05-18 DIAGNOSIS — M54.50 BILATERAL LOW BACK PAIN WITHOUT SCIATICA, UNSPECIFIED CHRONICITY: Primary | ICD-10-CM

## 2020-05-18 PROCEDURE — 99214 OFFICE O/P EST MOD 30 MIN: CPT | Mod: 95 | Performed by: INTERNAL MEDICINE

## 2020-05-18 RX ORDER — CYCLOBENZAPRINE HCL 10 MG
10 TABLET ORAL 3 TIMES DAILY PRN
Qty: 30 TABLET | Refills: 0 | Status: SHIPPED | OUTPATIENT
Start: 2020-05-18 | End: 2020-07-24

## 2020-05-18 NOTE — PROGRESS NOTES
"Dimple Baxter is a 80 year old female who is being evaluated via a billable video visit.      The patient has been notified of following:     \"This video visit will be conducted via a call between you and your physician/provider. We have found that certain health care needs can be provided without the need for an in-person physical exam.  This service lets us provide the care you need with a video conversation.  If a prescription is necessary we can send it directly to your pharmacy.  If lab work is needed we can place an order for that and you can then stop by our lab to have the test done at a later time.    Video visits are billed at different rates depending on your insurance coverage.  Please reach out to your insurance provider with any questions.    If during the course of the call the physician/provider feels a video visit is not appropriate, you will not be charged for this service.\"    Patient has given verbal consent for Video visit? Yes    How would you like to obtain your AVS? Mail a copy    Patient would like the video invitation sent by: Text to cell phone: [phone]    Will anyone else be joining your video visit? No      Video Start Time: 11:35 AM     Subjective     Dimple Baxter is a 80 year old female who presents today via video visit for the following health issues:    HPI     Pt c/o low back pain since 4 days. , pain radiates to bilateral buttocks,, no tingling or numbness of phan LE  , no weakness in the legs, no h/o injury. Pt thinks she must have turned wrong or  Lifted  some thing not sure.   Pt is taking OTC Tylenol q 8 hrs w/o much symptom relief.        Patient Active Problem List   Diagnosis     Allergic rhinitis     Esophageal reflux     Benign essential hypertension     Generalized anxiety disorder     Fatty liver     Colon polyp     Advanced directives, counseling/discussion     Mixed hyperlipidemia     Pulmonary hypertension (H)     Left bundle branch block     Type 2 diabetes " mellitus with diabetic nephropathy; goal HgbA1c < 7%     DDD (degenerative disc disease), lumbar     Lumbar spondylosis     Insomnia     Recurrent left knee instability     Chronic midline low back pain without sciatica     Left ventricular diastolic dysfunction     Tricuspid regurgitation     Episodic tension-type headache, not intractable     Chronic pain syndrome     CKD (chronic kidney disease) stage 3, GFR 30-59 ml/min (H)     Secondary pulmonary arterial hypertension (H)     Past Surgical History:   Procedure Laterality Date     C NONSPECIFIC PROCEDURE      Hysterectomy/ Right Oophorectomy     C NONSPECIFIC PROCEDURE      Right Carpal Tunnel Surgery     C NONSPECIFIC PROCEDURE      Cholecystectomy     C NONSPECIFIC PROCEDURE      cor angio; nl     C NONSPECIFIC PROCEDURE      lasik     COLONOSCOPY  2013    Procedure: COMBINED COLONOSCOPY, SINGLE BIOPSY/POLYPECTOMY BY BIOPSY;;  Surgeon: Marshall Logan MD;  Location:  GI     COLONOSCOPY Left 4/10/2019    Procedure: COLONOSCOPY;  Surgeon: Chico Tran MD;  Location:  GI     HYSTERECTOMY, PAP NO LONGER INDICATED         Social History     Tobacco Use     Smoking status: Former Smoker     Packs/day: 1.00     Years: 2.00     Pack years: 2.00     Types: Cigarettes     Start date:      Last attempt to quit: 1963     Years since quittin.4     Smokeless tobacco: Never Used   Substance Use Topics     Alcohol use: Yes     Alcohol/week: 0.0 standard drinks     Comment: occ     Family History   Problem Relation Age of Onset     Family History Negative Daughter      Cerebrovascular Disease Mother      Alcoholism Father      Family History Negative Brother      Multiple Sclerosis Daughter      Lymphoma Daughter      Family History Negative Son      Colon Cancer No family hx of          Current Outpatient Medications   Medication Sig Dispense Refill     acetaminophen (TYLENOL) 325 MG tablet Take 1-2 tablets by mouth every 6 hours as needed  for pain.       albuterol (PROVENTIL HFA) 108 (90 Base) MCG/ACT inhaler Inhale 2 puffs into the lungs every 6 hours 8.5 g 0     ASPIRIN 81 MG OR TABS take 1 x daily with food 0 0     blood glucose (ACCU-CHEK SOFTCLIX) lancing device Lancing device to be used with lancets. 1 each 0     blood glucose monitoring (NO BRAND SPECIFIED) meter device kit Use to test blood sugar once daily  as directed. 1 kit 0     blood glucose monitoring (SOFTCLIX) lancets Use to test blood sugar One times daily. 100 each 3     cetirizine (ZYRTEC) 10 MG tablet Take 10 mg by mouth as needed for allergies       cholecalciferol (VITAMIN D) 1000 UNIT tablet Take 1 tablet (1,000 Units) by mouth daily 100 tablet 3     cyclobenzaprine (FLEXERIL) 10 MG tablet Take 1 tablet (10 mg) by mouth 3 times daily as needed for muscle spasms 30 tablet 0     famotidine (PEPCID) 20 MG tablet Take 1 tablet by mouth 2 times daily as needed.       fluticasone (FLONASE) 50 MCG/ACT nasal spray Spray 2 sprays into both nostrils daily 16 g 5     glipiZIDE (GLUCOTROL XL) 2.5 MG 24 hr tablet Take 1 tablet (2.5 mg) by mouth daily 90 tablet 1     hydrochlorothiazide (HYDRODIURIL) 25 MG tablet Take 1 tablet (25 mg) by mouth daily 90 tablet 1     metoprolol succinate ER (TOPROL-XL) 100 MG 24 hr tablet TAKE 1 TABLET BY MOUTH ONCE DAILY 90 tablet 1     nitroGLYcerin (NITROSTAT) 0.4 MG sublingual tablet PLACE 1 TABLET UNDER THE TONGUE EVERY 5 MINUTES AS NEEDED. UP TO 3 TABLETS PER EPISODE 25 tablet 1     quinapril (ACCUPRIL) 40 MG tablet Take 0.5 tablets (20 mg) by mouth daily 45 tablet 1     rosuvastatin (CRESTOR) 5 MG tablet TAKE 1 TABLET BY MOUTH AT BEDTIME 90 tablet 1     traZODone (DESYREL) 100 MG tablet Take 0.5 tablets (50 mg) by mouth nightly as needed for sleep 60 tablet 2       Reviewed and updated as needed this visit by Provider         Review of Systems   CONSTITUTIONAL: NEGATIVE for fever, chills, change in weight  ENT/MOUTH: NEGATIVE for ear, mouth and throat  "problems  RESP: NEGATIVE for significant cough or SOB  CV: NEGATIVE for chest pain, palpitations or peripheral edema  MUSCULOSKELETAL: back pain   NEURO: NEGATIVE for weakness, dizziness or paresthesias  PSYCHIATRIC: NEGATIVE for changes in mood or affect      Objective    There were no vitals taken for this visit.  Estimated body mass index is 31.05 kg/m  as calculated from the following:    Height as of 3/13/20: 1.67 m (5' 5.75\").    Weight as of 3/13/20: 86.6 kg (190 lb 14.4 oz).  Physical Exam     GENERAL: Healthy, alert and no distress  EYES: Eyes grossly normal to inspection.  No discharge or erythema, or obvious scleral/conjunctival abnormalities.  RESP: No audible wheeze, cough, or visible cyanosis.  No visible retractions or increased work of breathing.    MS:  Pain low back over low lumbar spine and across back     SKIN: Visible skin clear.    NEURO:    Mentation and speech appropriate for age.  PSYCH: Mentation appears normal, affect normal/bright, judgement and insight intact, normal speech and appearance well-groomed.          Assessment & Plan     (M54.5) Bilateral low back pain without sciatica, unspecified chronicity  (primary encounter diagnosis)  Plan: explained about the condition , patient was advised to use heat to lower back as needed as directed, and started on cyclobenzaprine (FLEXERIL) 10 MG tablet as directed PRN.explained clearly about the medication,insructions and side effects. Advised not to drive or operate any machinery while on this med.  Also explained about low back stretches, continue Tylenol as directed.  call  clinic prn if these symtoms worsen, fail to improve as anticipated, or if new symptoms develop.       No follow-ups on file.    Du Phillips MD  [facility]      Video-Visit Details    Type of service:  Video Visit    Video End Time:11;45    Originating Location (pt. Location): Home    Distant Location (provider location[facility]     Platform used for Video Visit: Doximas[employer]    No follow-ups on file.       Du Phillips MD

## 2020-06-06 ENCOUNTER — OFFICE VISIT (OUTPATIENT)
Dept: URGENT CARE | Facility: URGENT CARE | Age: 80
End: 2020-06-06
Payer: COMMERCIAL

## 2020-06-06 VITALS
HEART RATE: 84 BPM | RESPIRATION RATE: 20 BRPM | SYSTOLIC BLOOD PRESSURE: 160 MMHG | BODY MASS INDEX: 32.69 KG/M2 | DIASTOLIC BLOOD PRESSURE: 76 MMHG | TEMPERATURE: 99 F | WEIGHT: 201 LBS

## 2020-06-06 DIAGNOSIS — N30.01 ACUTE CYSTITIS WITH HEMATURIA: Primary | ICD-10-CM

## 2020-06-06 DIAGNOSIS — R30.0 DYSURIA: ICD-10-CM

## 2020-06-06 LAB
ALBUMIN UR-MCNC: NEGATIVE MG/DL
APPEARANCE UR: ABNORMAL
BACTERIA #/AREA URNS HPF: ABNORMAL /HPF
BILIRUB UR QL STRIP: NEGATIVE
COLOR UR AUTO: YELLOW
GLUCOSE UR STRIP-MCNC: 100 MG/DL
HGB UR QL STRIP: ABNORMAL
KETONES UR STRIP-MCNC: NEGATIVE MG/DL
LEUKOCYTE ESTERASE UR QL STRIP: ABNORMAL
NITRATE UR QL: NEGATIVE
NON-SQ EPI CELLS #/AREA URNS LPF: ABNORMAL /LPF
PH UR STRIP: 6 PH (ref 5–7)
RBC #/AREA URNS AUTO: ABNORMAL /HPF
SOURCE: ABNORMAL
SP GR UR STRIP: 1.01 (ref 1–1.03)
UROBILINOGEN UR STRIP-ACNC: 0.2 EU/DL (ref 0.2–1)
WBC #/AREA URNS AUTO: ABNORMAL /HPF

## 2020-06-06 PROCEDURE — 87088 URINE BACTERIA CULTURE: CPT | Performed by: PHYSICIAN ASSISTANT

## 2020-06-06 PROCEDURE — 87086 URINE CULTURE/COLONY COUNT: CPT | Performed by: PHYSICIAN ASSISTANT

## 2020-06-06 PROCEDURE — 87186 SC STD MICRODIL/AGAR DIL: CPT | Performed by: PHYSICIAN ASSISTANT

## 2020-06-06 PROCEDURE — 99213 OFFICE O/P EST LOW 20 MIN: CPT | Performed by: PHYSICIAN ASSISTANT

## 2020-06-06 PROCEDURE — 81001 URINALYSIS AUTO W/SCOPE: CPT | Performed by: FAMILY MEDICINE

## 2020-06-06 RX ORDER — NITROFURANTOIN 25; 75 MG/1; MG/1
100 CAPSULE ORAL 2 TIMES DAILY
Qty: 14 CAPSULE | Refills: 0 | Status: SHIPPED | OUTPATIENT
Start: 2020-06-06 | End: 2020-07-24

## 2020-06-06 RX ORDER — FLUCONAZOLE 150 MG/1
150 TABLET ORAL ONCE
Qty: 1 TABLET | Refills: 0 | Status: SHIPPED | OUTPATIENT
Start: 2020-06-06 | End: 2020-07-24
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
## 2020-06-08 ENCOUNTER — TELEPHONE (OUTPATIENT)
Dept: URGENT CARE | Facility: URGENT CARE | Age: 80
End: 2020-06-08

## 2020-06-08 DIAGNOSIS — N39.0 URINARY TRACT INFECTION WITHOUT HEMATURIA, SITE UNSPECIFIED: Primary | ICD-10-CM

## 2020-06-08 LAB
BACTERIA SPEC CULT: ABNORMAL
SPECIMEN SOURCE: ABNORMAL

## 2020-06-08 RX ORDER — CEFDINIR 300 MG/1
300 CAPSULE ORAL DAILY
Qty: 7 CAPSULE | Refills: 0
Start: 2020-06-08 | End: 2020-07-24

## 2020-06-08 NOTE — TELEPHONE ENCOUNTER
Please call pt and inform her that we need to switch her antibiotic and then call in Omnicef to her preferred Pharmacy.

## 2020-06-11 NOTE — TELEPHONE ENCOUNTER
Endocrinology  Piper Barnett M.D. Phone: [phone]   FAX: [phone]       Shanell Watson   YOB: 1950    Date of Visit:  6/11/2020    No Known Allergies  Outpatient Medications Marked as Taking for the 6/11/20 encounter (Office Visit) with Issa Pham MD   Medication Sig Dispense Refill    Empagliflozin-metFORMIN HCl ER (SYNJARDY XR)  MG TB24 Take 1 tablet by mouth daily 90 tablet 3    omeprazole (PRILOSEC) 20 MG delayed release capsule TAKE 1 CAPSULE BY MOUTH 2 TIMES A  capsule 1    LANTUS 100 UNIT/ML injection vial INJECT 50 UNITS UNDER THE SKIN IN THE MORNING BEFORE BREAKFAST 50 mL 2    TRUEPLUS INSULIN SYRINGE 31G X 5/16\" 1 ML MISC USE DAILY 100 each 3    Continuous Blood Gluc Sensor (FREESTYLE LA 14 DAY SENSOR) MISC USE ONE UNDER THE SKIN EVERY 14 DAYS 2 each 5    insulin lispro (HUMALOG KWIKPEN) 100 UNIT/ML pen INJECT 10 UNITS UNDER THE SKIN THREE TIMES A DAY BEFORE MEALS (Patient taking differently: Inject 4-6 Units into the skin 3 times daily (before meals) ) 30 mL 3    ACCU-CHEK FASTCLIX LANCETS MISC USE 8 TIMES DAILY AS DIRECTED 240 each 5    TRUEPLUS PEN NEEDLES 31G X 8 MM MISC USE FOR HUMALOG AND VICTOZA INJECTIONS FOUR TIMES A  each 5    atorvastatin (LIPITOR) 20 MG tablet TAKE 1 TABLET BY MOUTH ONE TIME DAILY 90 tablet 3    amLODIPine (NORVASC) 5 MG tablet TAKE 1 TABLET BY MOUTH ONE TIME A DAY 90 tablet 3    losartan (COZAAR) 100 MG tablet TAKE 1 TABLET BY MOUTH ONE TIME A DAY 90 tablet 3    Continuous Blood Gluc  (FREESTYLE LA 14 DAY READER) DORA 1 each by Does not apply route daily 1 Device 0    AGAMATRIX PRESTO TEST strip TEST BLOOD SUGAR 8 TIMES DAILY 800 each 3    Cholecalciferol (VITAMIN D3) 1000 UNITS CAPS Take 1,000 each by mouth daily.  aspirin EC 81 MG EC tablet Take 1 tablet by mouth daily.  30 tablet 11         Vitals:    06/11/20 1304   BP: 131/76   Site: Left Upper Arm   Position: Sitting   Cuff Size: I do not think any of the medications she is on currently can cause drug-induced lupus.  She can increase trazodone to 50 mg to 2 tablets at night as needed for sleep, please inform patient   for polydipsia. Psychiatric/Behavioral: Negative for depression. The patient is not nervous/anxious. Physical Exam   Constitutional: He is oriented to person, place, and time. He appears well-developed. No distress. Assessment/Plan      1. Type 2 DM     Gisselle Knight is a 78 y.o. male has Type 2 DM with obesity and insulin resistance. Uncontrolled. A1c of 8.7 %--->8.4 %--->7.8 %--->8.2 %--->7.9 % ---> 7.4 %---> 7.2 %---> 7.6 %---> 7.2 %---> 7.6 %---> 7.8 %--->8.1 %---> 8 %---> 8.6 %---> 8.1 %  ---> 8.6 %  ---> 9.6 % --->8.6 % --> 8.8 % ---> 8.7 %--->8 % ---> 8 % ---> 7.9 % ---> 7.9 % --> 8.3 %       Using freestyle danyell. Reviewed download. 05/29/20-06/10/    Using freestyle danyell sensor  Above average 34 %  In target 60 %   Below target 0    Average 169      Fasting sugars at goal.   BS high after meals. He has been able to overcome phobia of low sugars. Less concerned about hypoglycemia. -Decrease  lantus to 34 units QAM   -ContinueSynjardy to  mg daily.     -Increase Humalog 7 units with breakfast, 6 units with lunch and dinner meals + SSI    Advised to reduce carbs with breakfast and his sugars go high after breakfast        Saw Endo Diabetes educator , Curtis Cordero.     Advised follow-up with the ophthalmologist once a year. Last urine microalbumin/cr ratio high. On losartan     Discussed foot care. Pt on ASA. Former smoker. 2. Hypertension. BP at goal.     3. Hyperlipidemia. On statins. Labs in 07/18--> 01/19---> 03/19--> 02/20    HDL 46--> 43---> 38---> 39  ---> 100---> 178---> 83  LDL 45---> 66 ---> 48---> 62      4. Melanoma S/p surgery.

## 2020-06-11 NOTE — TELEPHONE ENCOUNTER
Call patient back today, and ask her to stop her current antibiotic, and a prescription   cefdinir (OMNICEF) 300 MG capsule  7 capsule  0  6/8/2020  6/15/2020  --    Sig - Route: Take 1 capsule (300 mg) by mouth daily for 7 days - Oral      Was call to the [facility] in [place] on [address]

## 2020-07-11 ENCOUNTER — OFFICE VISIT (OUTPATIENT)
Dept: URGENT CARE | Facility: URGENT CARE | Age: 80
End: 2020-07-11
Payer: COMMERCIAL

## 2020-07-11 VITALS
WEIGHT: 200 LBS | RESPIRATION RATE: 15 BRPM | TEMPERATURE: 97.6 F | SYSTOLIC BLOOD PRESSURE: 170 MMHG | BODY MASS INDEX: 32.53 KG/M2 | HEART RATE: 74 BPM | OXYGEN SATURATION: 96 % | DIASTOLIC BLOOD PRESSURE: 84 MMHG

## 2020-07-11 DIAGNOSIS — M54.41 ACUTE BILATERAL LOW BACK PAIN WITH BILATERAL SCIATICA: Primary | ICD-10-CM

## 2020-07-11 DIAGNOSIS — M54.42 ACUTE BILATERAL LOW BACK PAIN WITH BILATERAL SCIATICA: Primary | ICD-10-CM

## 2020-07-11 PROCEDURE — 99213 OFFICE O/P EST LOW 20 MIN: CPT | Performed by: PHYSICIAN ASSISTANT

## 2020-07-11 RX ORDER — HYDROCODONE BITARTRATE AND ACETAMINOPHEN 5; 325 MG/1; MG/1
1 TABLET ORAL EVERY 6 HOURS PRN
Qty: 9 TABLET | Refills: 0 | Status: SHIPPED | OUTPATIENT
Start: 2020-07-11 | End: 2020-07-24

## 2020-07-11 NOTE — PATIENT INSTRUCTIONS
Patient was educated on the natural course of injury which usually resolves within a few weeks.  Conservative measures discussed including rest, ice for 48 hours then heat after, exercises, and muscle relaxers. See your primary care provider if symptoms worsen or do not improve in 7 days.  Seek emergency care if you develop severe pain, weakness, or changes in bowel/bladder habits.
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
## 2020-07-23 ENCOUNTER — TELEPHONE (OUTPATIENT)
Dept: CARDIOLOGY | Facility: CLINIC | Age: 80
End: 2020-07-23

## 2020-07-23 NOTE — TELEPHONE ENCOUNTER

## 2020-07-24 ENCOUNTER — OFFICE VISIT (OUTPATIENT)
Dept: CARDIOLOGY | Facility: CLINIC | Age: 80
End: 2020-07-24
Payer: COMMERCIAL

## 2020-07-24 VITALS
WEIGHT: 199.4 LBS | SYSTOLIC BLOOD PRESSURE: 125 MMHG | HEIGHT: 66 IN | BODY MASS INDEX: 32.05 KG/M2 | HEART RATE: 74 BPM | DIASTOLIC BLOOD PRESSURE: 79 MMHG | OXYGEN SATURATION: 96 %

## 2020-07-24 DIAGNOSIS — E78.2 MIXED HYPERLIPIDEMIA: ICD-10-CM

## 2020-07-24 DIAGNOSIS — I10 ESSENTIAL HYPERTENSION: ICD-10-CM

## 2020-07-24 DIAGNOSIS — N18.30 CKD (CHRONIC KIDNEY DISEASE) STAGE 3, GFR 30-59 ML/MIN (H): ICD-10-CM

## 2020-07-24 DIAGNOSIS — R60.0 LOWER EXTREMITY EDEMA: ICD-10-CM

## 2020-07-24 DIAGNOSIS — I27.20 PULMONARY HYPERTENSION (H): Primary | ICD-10-CM

## 2020-07-24 PROCEDURE — 99204 OFFICE O/P NEW MOD 45 MIN: CPT | Performed by: INTERNAL MEDICINE

## 2020-07-24 RX ORDER — PHENOL 1.4 %
10 AEROSOL, SPRAY (ML) MUCOUS MEMBRANE AT BEDTIME
COMMUNITY

## 2020-07-24 ASSESSMENT — MIFFLIN-ST. JEOR: SCORE: 1382.25
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
## 2020-07-24 NOTE — PROGRESS NOTES
Service Date: 07/24/2020      REASON FOR CONSULTATION:  History of pulmonary hypertension and tricuspid valve regurgitation.      HISTORY OF PRESENT ILLNESS:  The patient is a very pleasant, 80-year-old female who was previously seen by Dr. Cedillo.  She is here to reestablish cardiovascular care.  She has a history of chronic left bundle branch block, hypertension, hyperlipidemia, diabetes and chronic kidney disease.  She recently moved back from [place].  She currently does not endorse any significant cardiopulmonary symptoms.  Specifically, she denies shortness of breath or chest pain.  She reports dependent edema, which has been more noticeable over the past 6 months.  No lower extremity tissue breakdown or ulcerations.      She had a remote stress test, which was negative for ischemia.  She had an echo last year, which showed preserved LV function with an EF of 60%-65% and mild to moderate tricuspid valve regurgitation.      Her most recent laboratory studies have shown worsening kidney function with a creatinine of 1.6 and a GFR of 30.  Her diabetes is also becoming poorly controlled with the most recent A1c being 8.3%.      REVIEW OF SYSTEMS:  A comprehensive review of systems was performed and essentially negative except for what is mentioned in the HPI.      MEDICATIONS:   1.  Aspirin 81 mg daily.   2.  Zyrtec 10 mg daily.   3.  Pepcid 20 mg daily.   4.  Glipizide 2.5 mg daily.   5.  Hydrochlorothiazide 25 mg daily.   6.  Metoprolol 100 mg daily.   7.  Quinapril 40 mg daily.     8.  Rosuvastatin 5 mg daily.   9.  Trazodone 50 mg nightly as needed.   10.  Nitroglycerin 0.4 mg sublingual p.r.n.      PAST MEDICAL HISTORY:     1.  Hypertension.   2.  Hyperlipidemia.   3.  Chronic kidney disease, stage 3.   4.  Lower extremity edema, chronic.    5.  Pulmonary hypertension.   6.  Diabetes.      SOCIAL HISTORY:  She denies tobacco use.  No excessive alcohol use.      FAMILY HISTORY:  No family history of  premature coronary artery disease.      PHYSICAL EXAMINATION:   VITAL SIGNS:  Blood pressure is 125/79.  Pulse is 74.   GENERAL:  She is resting, appears to be in no acute distress.   NECK:  Jugular pressure is normal.  Carotid upstrokes brisk.   LUNGS:  Clear to auscultation bilaterally.   HEART:  Normal S1, S2.  No murmurs, rubs or gallops appreciated.   ABDOMEN:  Soft, nontender.   EXTREMITIES:  Warm.  Trace edema bilaterally.   SKIN:  No rashes or lesions.   NEUROLOGIC:  No focal deficits.   VESSELS:  2+ radial, 2+ femoral.   HEENT:  Oropharynx clear.  Mucous membranes are moist.     PSYCHIATRIC:  Normal mood and affect.      IMPRESSION, REPORT, PLAN:   1.  Mild to moderate tricuspid valve regurgitation.   2.  Lower extremity edema, recent.   3.  Hypertension.   4.  Hyperlipidemia.   5.  Chronic left bundle branch block.   6.  Questionable history of pulmonary hypertension.   7.  Diabetes.      She is quite stable overall from a cardiac point of view.  She currently does not endorse any significant symptoms.  She has been noticing worsening lower extremity edema over the last 6 months.  On exam, she does have pitting edema bilaterally.  Her edema is likely due to chronic venous insufficiency.  However, we will have to investigate the cardiac causes as well.  We will obtain a transthoracic echocardiogram to reassess her tricuspid valve regurgitation and prior history of pulmonary hypertension.  If the echo shows no significant change, then I would recommend increased walking and compression stockings for her dependent edema.      She does have worsening renal dysfunction.  I have recommended that she see a nephrologist for closer followup.  I also recommended that she follow with her primary doctor closely with regard to management of her risk factors, especially her diabetes.      It was a pleasure seeing Ms. Baxter in the clinic this morning.  I will see her in approximately 1 year for followup, but sooner if  she develops symptoms.  I appreciate the opportunity to participate in her care.         MERCEDES MARR MD             D: 2020   T: 2020   MT: kiel      Name:     ALEJANDRO GAMEZ   MRN:      [id number]        Account:      [id number]   :      1940           Service Date: 2020      Document: [id number]
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
## 2020-07-24 NOTE — LETTER
7/24/2020      Du Phillips MD  [address] [facility] [address]      RE: Dimple Baxter       Dear Colleague,    I had the pleasure of seeing Dimple Baxter in the [facility].    Service Date: 07/24/2020      REASON FOR CONSULTATION:  History of pulmonary hypertension and tricuspid valve regurgitation.      HISTORY OF PRESENT ILLNESS:  The patient is a very pleasant, 80-year-old female who was previously seen by Dr. Cedillo.  She is here to reestablish cardiovascular care.  She has a history of chronic left bundle branch block, hypertension, hyperlipidemia, diabetes and chronic kidney disease.  She recently moved back from [place].  She currently does not endorse any significant cardiopulmonary symptoms.  Specifically, she denies shortness of breath or chest pain.  She reports dependent edema, which has been more noticeable over the past 6 months.  No lower extremity tissue breakdown or ulcerations.      She had a remote stress test, which was negative for ischemia.  She had an echo last year, which showed preserved LV function with an EF of 60%-65% and mild to moderate tricuspid valve regurgitation.      Her most recent laboratory studies have shown worsening kidney function with a creatinine of 1.6 and a GFR of 30.  Her diabetes is also becoming poorly controlled with the most recent A1c being 8.3%.      REVIEW OF SYSTEMS:  A comprehensive review of systems was performed and essentially negative except for what is mentioned in the HPI.      MEDICATIONS:   1.  Aspirin 81 mg daily.   2.  Zyrtec 10 mg daily.   3.  Pepcid 20 mg daily.   4.  Glipizide 2.5 mg daily.   5.  Hydrochlorothiazide 25 mg daily.   6.  Metoprolol 100 mg daily.   7.  Quinapril 40 mg daily.     8.  Rosuvastatin 5 mg daily.   9.  Trazodone 50 mg nightly as needed.   10.  Nitroglycerin 0.4 mg sublingual p.r.n.      PAST MEDICAL HISTORY:     1.  Hypertension.   2.  Hyperlipidemia.   3.  Chronic  kidney disease, stage 3.   4.  Lower extremity edema, chronic.    5.  Pulmonary hypertension.   6.  Diabetes.      SOCIAL HISTORY:  She denies tobacco use.  No excessive alcohol use.      FAMILY HISTORY:  No family history of premature coronary artery disease.      PHYSICAL EXAMINATION:   VITAL SIGNS:  Blood pressure is 125/79.  Pulse is 74.   GENERAL:  She is resting, appears to be in no acute distress.   NECK:  Jugular pressure is normal.  Carotid upstrokes brisk.   LUNGS:  Clear to auscultation bilaterally.   HEART:  Normal S1, S2.  No murmurs, rubs or gallops appreciated.   ABDOMEN:  Soft, nontender.   EXTREMITIES:  Warm.  Trace edema bilaterally.   SKIN:  No rashes or lesions.   NEUROLOGIC:  No focal deficits.   VESSELS:  2+ radial, 2+ femoral.   HEENT:  Oropharynx clear.  Mucous membranes are moist.     PSYCHIATRIC:  Normal mood and affect.      IMPRESSION, REPORT, PLAN:   1.  Mild to moderate tricuspid valve regurgitation.   2.  Lower extremity edema, recent.   3.  Hypertension.   4.  Hyperlipidemia.   5.  Chronic left bundle branch block.   6.  Questionable history of pulmonary hypertension.   7.  Diabetes.      She is quite stable overall from a cardiac point of view.  She currently does not endorse any significant symptoms.  She has been noticing worsening lower extremity edema over the last 6 months.  On exam, she does have pitting edema bilaterally.  Her edema is likely due to chronic venous insufficiency.  However, we will have to investigate the cardiac causes as well.  We will obtain a transthoracic echocardiogram to reassess her tricuspid valve regurgitation and prior history of pulmonary hypertension.  If the echo shows no significant change, then I would recommend increased walking and compression stockings for her dependent edema.      She does have worsening renal dysfunction.  I have recommended that she see a nephrologist for closer followup.  I also recommended that she follow with her primary  doctor closely with regard to management of her risk factors, especially her diabetes.      It was a pleasure seeing Ms. Baxter in the clinic this morning.  I will see her in approximately 1 year for followup, but sooner if she develops symptoms.  I appreciate the opportunity to participate in her care.         MERCEDES MARR MD             D: 2020   T: 2020   MT: kiel      Name:     ALEJANDRO BAXTER   MRN:      [id number]        Account:      [id number]   :      1940           Service Date: 2020      Document: [id number]         Outpatient Encounter Medications as of 2020   Medication Sig Dispense Refill     acetaminophen (TYLENOL) 325 MG tablet Take 1-2 tablets by mouth every 6 hours as needed for pain.       albuterol (PROVENTIL HFA) 108 (90 Base) MCG/ACT inhaler Inhale 2 puffs into the lungs every 6 hours 8.5 g 0     ASPIRIN 81 MG OR TABS take 1 x daily with food 0 0     blood glucose (ACCU-CHEK SOFTCLIX) lancing device Lancing device to be used with lancets. 1 each 0     blood glucose monitoring (NO BRAND SPECIFIED) meter device kit Use to test blood sugar once daily  as directed. 1 kit 0     blood glucose monitoring (SOFTCLIX) lancets Use to test blood sugar One times daily. 100 each 3     cetirizine (ZYRTEC) 10 MG tablet Take 10 mg by mouth as needed for allergies       cholecalciferol (VITAMIN D) 1000 UNIT tablet Take 1 tablet (1,000 Units) by mouth daily 100 tablet 3     famotidine (PEPCID) 20 MG tablet Take 1 tablet by mouth 2 times daily as needed.       fluticasone (FLONASE) 50 MCG/ACT nasal spray Spray 2 sprays into both nostrils daily 16 g 5     glipiZIDE (GLUCOTROL XL) 2.5 MG 24 hr tablet Take 1 tablet (2.5 mg) by mouth daily 90 tablet 1     hydrochlorothiazide (HYDRODIURIL) 25 MG tablet Take 1 tablet (25 mg) by mouth daily 90 tablet 1     melatonin 5 MG tablet Take 5 mg by mouth nightly as needed for sleep       metoprolol succinate ER (TOPROL-XL) 100 MG 24 hr tablet  TAKE 1 TABLET BY MOUTH ONCE DAILY 90 tablet 1     quinapril (ACCUPRIL) 40 MG tablet Take 0.5 tablets (20 mg) by mouth daily 45 tablet 1     rosuvastatin (CRESTOR) 5 MG tablet TAKE 1 TABLET BY MOUTH AT BEDTIME 90 tablet 1     traZODone (DESYREL) 100 MG tablet Take 0.5 tablets (50 mg) by mouth nightly as needed for sleep 60 tablet 2     nitroGLYcerin (NITROSTAT) 0.4 MG sublingual tablet PLACE 1 TABLET UNDER THE TONGUE EVERY 5 MINUTES AS NEEDED. UP TO 3 TABLETS PER EPISODE (Patient not taking: Reported on 7/24/2020) 25 tablet 1     [DISCONTINUED] cefdinir (OMNICEF) 300 MG capsule Take 1 capsule (300 mg) by mouth daily for 7 days 7 capsule 0     [DISCONTINUED] cyclobenzaprine (FLEXERIL) 10 MG tablet Take 1 tablet (10 mg) by mouth 3 times daily as needed for muscle spasms (Patient not taking: Reported on 6/6/2020) 30 tablet 0     [DISCONTINUED] fluconazole (DIFLUCAN) 150 MG tablet Take 1 tablet (150 mg) by mouth once for 1 dose 1 tablet 0     [DISCONTINUED] HYDROcodone-acetaminophen (NORCO) 5-325 MG tablet Take 1 tablet by mouth every 6 hours as needed for pain 9 tablet 0     [DISCONTINUED] nitroFURantoin macrocrystal-monohydrate (MACROBID) 100 MG capsule Take 1 capsule (100 mg) by mouth 2 times daily for 7 days 14 capsule 0     No facility-administered encounter medications on file as of 7/24/2020.        Again, thank you for allowing me to participate in the care of your patient.      Sincerely,    Flex Alonzo MD, MD     [facility]

## 2020-07-27 ENCOUNTER — TELEPHONE (OUTPATIENT)
Dept: CARDIOLOGY | Facility: CLINIC | Age: 80
End: 2020-07-27

## 2020-07-27 NOTE — TELEPHONE ENCOUNTER
Left a message for the patient with the contact information for Dr. Jai Almonte and [facility] as well as my direct contact information for additional questions.  Azeb Ventura RN  [facility]

## 2020-07-27 NOTE — TELEPHONE ENCOUNTER
Patient called stating that Dr. Alonzo was going to recommend a nephrologist to her, but she did not get the name or number of the physician. I told her I would ask Dr. Alonzo and call her back.  Azeb Ventura RN  [facility]

## 2020-07-30 ENCOUNTER — HOSPITAL ENCOUNTER (OUTPATIENT)
Dept: CARDIOLOGY | Facility: CLINIC | Age: 80
Discharge: HOME | End: 2020-07-30
Attending: INTERNAL MEDICINE | Admitting: INTERNAL MEDICINE
Payer: COMMERCIAL

## 2020-07-30 DIAGNOSIS — I27.20 PULMONARY HYPERTENSION (H): ICD-10-CM

## 2020-07-30 DIAGNOSIS — R60.0 LOWER EXTREMITY EDEMA: ICD-10-CM

## 2020-07-30 PROCEDURE — 93306 TTE W/DOPPLER COMPLETE: CPT

## 2020-07-30 PROCEDURE — 93306 TTE W/DOPPLER COMPLETE: CPT | Mod: 26 | Performed by: INTERNAL MEDICINE

## 2020-07-31 ENCOUNTER — OFFICE VISIT (OUTPATIENT)
Dept: URGENT CARE | Facility: URGENT CARE | Age: 80
End: 2020-07-31
Payer: COMMERCIAL

## 2020-07-31 VITALS
DIASTOLIC BLOOD PRESSURE: 80 MMHG | BODY MASS INDEX: 32.36 KG/M2 | WEIGHT: 199 LBS | OXYGEN SATURATION: 97 % | RESPIRATION RATE: 16 BRPM | TEMPERATURE: 97.5 F | HEART RATE: 89 BPM | SYSTOLIC BLOOD PRESSURE: 150 MMHG

## 2020-07-31 DIAGNOSIS — R30.0 DYSURIA: Primary | ICD-10-CM

## 2020-07-31 LAB

## 2020-07-31 PROCEDURE — 87086 URINE CULTURE/COLONY COUNT: CPT | Performed by: FAMILY MEDICINE

## 2020-07-31 PROCEDURE — 81001 URINALYSIS AUTO W/SCOPE: CPT | Performed by: FAMILY MEDICINE

## 2020-07-31 PROCEDURE — 99213 OFFICE O/P EST LOW 20 MIN: CPT | Performed by: FAMILY MEDICINE

## 2020-07-31 RX ORDER — CEPHALEXIN 500 MG/1
500 CAPSULE ORAL 2 TIMES DAILY
Qty: 14 CAPSULE | Refills: 0 | Status: SHIPPED | OUTPATIENT
Start: 2020-07-31 | End: 2020-08-07

## 2020-07-31 RX ORDER — FLUCONAZOLE 150 MG/1
150 TABLET ORAL ONCE
Qty: 1 TABLET | Refills: 0 | Status: SHIPPED | OUTPATIENT
Start: 2020-07-31 | End: 2020-07-31

## 2020-08-01 NOTE — PROGRESS NOTES
Subjective         HPI   Increased dysuria and urinary frequency this AM.  Hx of recurrent UTIs- last in .  Hx of DM Type 2.  No fever or chills or flank pain.  Tolerating po well.    Patient Active Problem List   Diagnosis     Allergic rhinitis     Esophageal reflux     Benign essential hypertension     Generalized anxiety disorder     Fatty liver     Colon polyp     Advanced directives, counseling/discussion     Mixed hyperlipidemia     Pulmonary hypertension (H)     Left bundle branch block     Type 2 diabetes mellitus with diabetic nephropathy; goal HgbA1c < 7%     DDD (degenerative disc disease), lumbar     Lumbar spondylosis     Insomnia     Recurrent left knee instability     Chronic midline low back pain without sciatica     Left ventricular diastolic dysfunction     Tricuspid regurgitation     Episodic tension-type headache, not intractable     Chronic pain syndrome     CKD (chronic kidney disease) stage 3, GFR 30-59 ml/min (H)     Secondary pulmonary arterial hypertension (H)     Past Surgical History:   Procedure Laterality Date     C NONSPECIFIC PROCEDURE      Hysterectomy/ Right Oophorectomy     C NONSPECIFIC PROCEDURE      Right Carpal Tunnel Surgery     C NONSPECIFIC PROCEDURE      Cholecystectomy     C NONSPECIFIC PROCEDURE      cor angio; nl     C NONSPECIFIC PROCEDURE      lasik     COLONOSCOPY  2013    Procedure: COMBINED COLONOSCOPY, SINGLE BIOPSY/POLYPECTOMY BY BIOPSY;;  Surgeon: Marshall Logan MD;  Location:  GI     COLONOSCOPY Left 4/10/2019    Procedure: COLONOSCOPY;  Surgeon: Chico Tran MD;  Location:  GI     HYSTERECTOMY, PAP NO LONGER INDICATED         Social History     Tobacco Use     Smoking status: Former Smoker     Packs/day: 1.00     Years: 2.00     Pack years: 2.00     Types: Cigarettes     Start date:      Last attempt to quit: 1963     Years since quittin.6     Smokeless tobacco: Never Used   Substance Use Topics     Alcohol use: Yes      Alcohol/week: 0.0 standard drinks     Comment: occ     Family History   Problem Relation Age of Onset     Family History Negative Daughter      Cerebrovascular Disease Mother      Alcoholism Father      Family History Negative Brother      Multiple Sclerosis Daughter      Lymphoma Daughter      Family History Negative Son      Colon Cancer No family hx of          Current Outpatient Medications   Medication Sig Dispense Refill     acetaminophen (TYLENOL) 325 MG tablet Take 1-2 tablets by mouth every 6 hours as needed for pain.       albuterol (PROVENTIL HFA) 108 (90 Base) MCG/ACT inhaler Inhale 2 puffs into the lungs every 6 hours 8.5 g 0     ASPIRIN 81 MG OR TABS take 1 x daily with food 0 0     blood glucose (ACCU-CHEK SOFTCLIX) lancing device Lancing device to be used with lancets. 1 each 0     blood glucose monitoring (NO BRAND SPECIFIED) meter device kit Use to test blood sugar once daily  as directed. 1 kit 0     blood glucose monitoring (SOFTCLIX) lancets Use to test blood sugar One times daily. 100 each 3     cephALEXin (KEFLEX) 500 MG capsule Take 1 capsule (500 mg) by mouth 2 times daily for 7 days 14 capsule 0     cetirizine (ZYRTEC) 10 MG tablet Take 10 mg by mouth as needed for allergies       cholecalciferol (VITAMIN D) 1000 UNIT tablet Take 1 tablet (1,000 Units) by mouth daily 100 tablet 3     famotidine (PEPCID) 20 MG tablet Take 1 tablet by mouth 2 times daily as needed.       fluconazole (DIFLUCAN) 150 MG tablet Take 1 tablet (150 mg) by mouth once for 1 dose 1 tablet 0     fluticasone (FLONASE) 50 MCG/ACT nasal spray Spray 2 sprays into both nostrils daily 16 g 5     glipiZIDE (GLUCOTROL XL) 2.5 MG 24 hr tablet Take 1 tablet (2.5 mg) by mouth daily 90 tablet 1     hydrochlorothiazide (HYDRODIURIL) 25 MG tablet Take 1 tablet (25 mg) by mouth daily 90 tablet 1     melatonin 5 MG tablet Take 5 mg by mouth nightly as needed for sleep       metoprolol succinate ER (TOPROL-XL) 100 MG 24 hr tablet TAKE  1 TABLET BY MOUTH ONCE DAILY 90 tablet 1     nitroGLYcerin (NITROSTAT) 0.4 MG sublingual tablet PLACE 1 TABLET UNDER THE TONGUE EVERY 5 MINUTES AS NEEDED. UP TO 3 TABLETS PER EPISODE 25 tablet 1     quinapril (ACCUPRIL) 40 MG tablet Take 0.5 tablets (20 mg) by mouth daily 45 tablet 1     rosuvastatin (CRESTOR) 5 MG tablet TAKE 1 TABLET BY MOUTH AT BEDTIME 90 tablet 1     traZODone (DESYREL) 100 MG tablet Take 0.5 tablets (50 mg) by mouth nightly as needed for sleep 60 tablet 2     Allergies   Allergen Reactions     Atorvastatin Calcium      myalgias     Cymbalta      Twitches, nausea     Neurontin [Gabapentin]      ankle swelling     Nortriptyline      ha, edema     Paroxetine      gi; wt gain     Rosuvastatin      myalgias     Seroquel [Quetiapine Fumarate]      insomnia/drowsiness     Topamax [Topiramate]      constipation     Wellbutrin [Bupropion Hcl]      shakiness, heartburn     Zoloft      fatigue     Recent Labs   Lab Test 03/13/20  0858 09/17/19  0918 03/28/19  1215 02/19/19  1017 04/09/18  0948  10/11/16  0922   A1C 8.3* 6.4*  --  6.8*  --   --  6.0   LDL  --  81  --  118* 87   < >  --    HDL  --  32*  --  31* 34*   < >  --    TRIG  --  207*  --  261* 142   < >  --    ALT 93* 47 61* 54* 33*   < >  --    CR 1.59* 1.14*  --  1.44* 1.23   < >  --    GFRESTIMATED 30* 45*  --  34* 42*   < >  --    GFRESTBLACK 35* 52*  --  40* 51*   < >  --    POTASSIUM 3.7 3.8  --  4.1 3.7   < >  --    TSH  --   --   --  2.20  --   --  1.87    < > = values in this interval not displayed.      BP Readings from Last 3 Encounters:   07/31/20 (!) 150/80   07/24/20 125/79   07/11/20 (!) 170/84    Wt Readings from Last 3 Encounters:   07/31/20 90.3 kg (199 lb)   07/24/20 90.4 kg (199 lb 6.4 oz)   07/11/20 90.7 kg (200 lb)                    Reviewed and updated as needed this visit by Provider         Review of Systems   ROS COMP: Constitutional, HEENT, cardiovascular, pulmonary, GI, , musculoskeletal, neuro, skin, endocrine and  psych systems are negative, except as otherwise noted.      Objective    BP (!) 150/80   Pulse 89   Temp 97.5  F (36.4  C) (Tympanic)   Resp 16   Wt 90.3 kg (199 lb)   SpO2 97%   BMI 32.36 kg/m      Physical Exam   GENERAL: healthy, alert and no distress  EYES: Eyes grossly normal to inspection, PERRL and conjunctivae and sclerae normal  HENT: ear canals and TM's normal, nose and mouth without ulcers or lesions  NECK: no adenopathy, no asymmetry, masses, or scars and thyroid normal to palpation  RESP: lungs clear to auscultation - no rales, rhonchi or wheezes  CV: regular rate and rhythm, normal S1 S2, no S3 or S4, no murmur, click or rub, no peripheral edema and peripheral pulses strong  ABDOMEN: soft, nontender, no hepatosplenomegaly, no masses and bowel sounds normal  MS: no gross musculoskeletal defects noted, no edema  SKIN: no suspicious lesions or rashes  NEURO: Normal strength and tone, mentation intact and speech normal  PSYCH: mentation appears normal, affect normal/bright    Diagnostic Test Results:  Labs reviewed in Epic  Results for orders placed or performed in visit on 07/31/20 (from the past 24 hour(s))   UA with Microscopic reflex to Culture    Specimen: Midstream Urine   Result Value Ref Range    Color Urine Yellow     Appearance Urine Clear     Glucose Urine Negative NEG^Negative mg/dL    Bilirubin Urine Negative NEG^Negative    Ketones Urine Negative NEG^Negative mg/dL    Specific Gravity Urine 1.025 1.003 - 1.035    pH Urine 6.0 5.0 - 7.0 pH    Protein Albumin Urine Negative NEG^Negative mg/dL    Urobilinogen Urine 0.2 0.2 - 1.0 EU/dL    Nitrite Urine Negative NEG^Negative    Blood Urine Trace (A) NEG^Negative    Leukocyte Esterase Urine Small (A) NEG^Negative    Source Midstream Urine     WBC Urine 25-50 (A) OTO5^0 - 5 /HPF    RBC Urine O - 2 OTO2^O - 2 /HPF    Squamous Epithelial /LPF Urine Few FEW^Few /LPF    Bacteria Urine Few (A) NEG^Negative /HPF     *Note: Due to a large number of  results and/or encounters for the requested time period, some results have not been displayed. A complete set of results can be found in Results Review.           Assessment & Plan     1. Dysuria    - UA with Microscopic reflex to Culture  - Urine Culture Aerobic Bacterial  - cephALEXin (KEFLEX) 500 MG capsule; Take 1 capsule (500 mg) by mouth 2 times daily for 7 days  Dispense: 14 capsule; Refill: 0  - fluconazole (DIFLUCAN) 150 MG tablet; Take 1 tablet (150 mg) by mouth once for 1 dose  Dispense: 1 tablet; Refill: 0     Treat with Keflex 500mg bid x 7 days.  Increased fluids.  UC pending for sensitivities.  Diflucan prn x 1.    Una Lemos MD  [facility]

## 2020-08-02 LAB
BACTERIA SPEC CULT: NORMAL
SPECIMEN SOURCE: NORMAL

## 2020-08-04 ENCOUNTER — TELEPHONE (OUTPATIENT)
Dept: CARDIOLOGY | Facility: CLINIC | Age: 80
End: 2020-08-04

## 2020-08-04 NOTE — TELEPHONE ENCOUNTER
Echo results noted, no change from previous echos. Per Dr. Alonzo-Her edema is likely due to chronic venous insufficiency.  If the echo shows no significant change, then I would recommend increased walking and compression stockings for her dependent edema.     This information was called out to patient, sharing Dr. Alonzo's recommendations. Patient verbalized understanding. Pt to follow-up in 1 year, instructed to call the clinic sooner should edema worsen.    Azeb Ventura RN  [facility]

## 2020-08-31 DIAGNOSIS — I10 BENIGN ESSENTIAL HYPERTENSION: ICD-10-CM

## 2020-08-31 DIAGNOSIS — I44.7 LBBB (LEFT BUNDLE BRANCH BLOCK): ICD-10-CM

## 2020-08-31 NOTE — LETTER
[facility]  [address], [address]  [place], [place]  [address]                                            TEL:[phone]  FAX:[phone]        Dimple Baxter  [address] [place] [id number]          September 2, 2020    Dear Dimple     We have received a refill request from your pharmacy for your medications. Many medications require routine follow-up with your provider and a review of your chart indicates that you will be due for an appointment in September 2020.      Our office is offering several options for appointments due to COVID-19 and we can see you in-person or do an appointment virtually by video or phone. We can even do the Annual Medicare Wellness exam by video if you wanted. Please call [phone] to schedule an appointment.  Your Metoprolol was refilled one time to allow you time to schedule.    We look forward to seeing you in the near future.       Thank you,      [facility]

## 2020-09-02 ENCOUNTER — OFFICE VISIT (OUTPATIENT)
Dept: URGENT CARE | Facility: URGENT CARE | Age: 80
End: 2020-09-02
Payer: COMMERCIAL

## 2020-09-02 VITALS
RESPIRATION RATE: 14 BRPM | WEIGHT: 202 LBS | SYSTOLIC BLOOD PRESSURE: 126 MMHG | BODY MASS INDEX: 32.47 KG/M2 | DIASTOLIC BLOOD PRESSURE: 74 MMHG | HEART RATE: 74 BPM | HEIGHT: 66 IN | TEMPERATURE: 98.6 F | OXYGEN SATURATION: 96 %

## 2020-09-02 DIAGNOSIS — N39.0 ACUTE UTI: Primary | ICD-10-CM

## 2020-09-02 DIAGNOSIS — R30.0 DYSURIA: ICD-10-CM

## 2020-09-02 DIAGNOSIS — R82.90 NONSPECIFIC FINDING ON EXAMINATION OF URINE: ICD-10-CM

## 2020-09-02 LAB
ALBUMIN UR-MCNC: NEGATIVE MG/DL
APPEARANCE UR: CLEAR
BILIRUB UR QL STRIP: NEGATIVE
COLOR UR AUTO: YELLOW
GLUCOSE UR STRIP-MCNC: 500 MG/DL
HGB UR QL STRIP: ABNORMAL
KETONES UR STRIP-MCNC: NEGATIVE MG/DL
LEUKOCYTE ESTERASE UR QL STRIP: NEGATIVE
NITRATE UR QL: NEGATIVE
NON-SQ EPI CELLS #/AREA URNS LPF: ABNORMAL /LPF
PH UR STRIP: 6 PH (ref 5–7)
RBC #/AREA URNS AUTO: ABNORMAL /HPF
SOURCE: ABNORMAL
SP GR UR STRIP: 1.02 (ref 1–1.03)
UROBILINOGEN UR STRIP-ACNC: 0.2 EU/DL (ref 0.2–1)
WBC #/AREA URNS AUTO: ABNORMAL /HPF

## 2020-09-02 PROCEDURE — 87086 URINE CULTURE/COLONY COUNT: CPT | Performed by: PHYSICIAN ASSISTANT

## 2020-09-02 PROCEDURE — 99214 OFFICE O/P EST MOD 30 MIN: CPT | Performed by: PHYSICIAN ASSISTANT

## 2020-09-02 PROCEDURE — 81001 URINALYSIS AUTO W/SCOPE: CPT | Performed by: FAMILY MEDICINE

## 2020-09-02 RX ORDER — METOPROLOL SUCCINATE 100 MG/1
TABLET, EXTENDED RELEASE ORAL
Qty: 90 TABLET | Refills: 0 | Status: SHIPPED | OUTPATIENT
Start: 2020-09-02 | End: 2020-10-21

## 2020-09-02 RX ORDER — CEFDINIR 300 MG/1
300 CAPSULE ORAL 2 TIMES DAILY
Qty: 20 CAPSULE | Refills: 0 | Status: SHIPPED | OUTPATIENT
Start: 2020-09-02 | End: 2020-09-10

## 2020-09-02 RX ORDER — FLUCONAZOLE 150 MG/1
150 TABLET ORAL ONCE
Qty: 1 TABLET | Refills: 0 | Status: SHIPPED | OUTPATIENT
Start: 2020-09-02 | End: 2020-09-02

## 2020-09-02 ASSESSMENT — MIFFLIN-ST. JEOR: SCORE: 1394.05

## 2020-09-02 NOTE — TELEPHONE ENCOUNTER
"Requested Prescriptions   Pending Prescriptions Disp Refills     metoprolol succinate ER (TOPROL-XL) 100 MG 24 hr tablet [Pharmacy Med Name: Metoprolol Succinate  MG Oral Tablet Extended Release 24 Hour] 90 tablet 0     Sig: Take 1 tablet by mouth once daily       Beta-Blockers Protocol Failed - 8/31/2020 11:44 AM        Failed - Blood pressure under 140/90 in past 12 months     BP Readings from Last 3 Encounters:   09/02/20 126/74   07/31/20 (!) 150/80   07/24/20 125/79                 Passed - Patient is age 6 or older        Passed - Recent (12 mo) or future (30 days) visit within the authorizing provider's specialty     Patient has had an office visit with the authorizing provider or a provider within the authorizing providers department within the previous 12 mos or has a future within next 30 days. See \"Patient Info\" tab in inbasket, or \"Choose Columns\" in Meds & Orders section of the refill encounter.              Passed - Medication is active on med list             Last virtual visit 5-18-20    Medication is being filled for 1 time refill only due to:  pt will be due in Sept 2020 for an appt.     Letter mailed.      "
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
## 2020-09-02 NOTE — PATIENT INSTRUCTIONS
Patient Education     Urinary Tract Infections in Women    Urinary tract infections (UTIs) are most often caused by bacteria. These bacteria enter the urinary tract. The bacteria may come from outside the body. Or they may travel from the skin outside the rectum or vagina into the urethra. Female anatomy makes it easy for bacteria from the bowel to enter a woman s urinary tract, which is the most common source of UTI. This means women develop UTIs more often than men. Pain in or around the urinary tract is a common UTI symptom. But the only way to know for sure if you have a UTI for the healthcare provider to test your urine. The two tests that may be done are the urinalysis and urine culture.  Types of UTIs    Cystitis. A bladder infection (cystitis) is the most common UTI in women. You may have urgent or frequent urination. You may also have pain, burning when you urinate, and bloody urine.    Urethritis. This is an inflamed urethra, which is the tube that carries urine from the bladder to outside the body. You may have lower stomach or back pain. You may also have urgent or frequent urination.    Pyelonephritis. This is a kidney infection. If not treated, it can be serious and damage your kidneys. In severe cases, you may need to stay in the hospital. You may have a fever and lower back pain.  Medicines to treat a UTI  Most UTIs are treated with antibiotics. These kill the bacteria. The length of time you need to take them depends on the type of infection. It may be as short as 3 days. If you have repeated UTIs, you may need a low-dose antibiotic for several months. Take antibiotics exactly as directed. Don t stop taking them until all of the medicine is gone. If you stop taking the antibiotic too soon, the infection may not go away. You may also develop a resistance to the antibiotic. This can make it much harder to treat.  Lifestyle changes to treat and prevent UTIs  The lifestyle changes below will help get  rid of your UTI. They may also help prevent future UTIs.    Drink plenty of fluids. This includes water, juice, or other caffeine-free drinks. Fluids help flush bacteria out of your body.    Empty your bladder. Always empty your bladder when you feel the urge to urinate. And always urinate before going to sleep. Urine that stays in your bladder can lead to infection. Try to urinate before and after sex as well.    Practice good personal hygiene. Wipe yourself from front to back after using the toilet. This helps keep bacteria from getting into the urethra.    Use condoms during sex. These help prevent UTIs caused by sexually transmitted bacteria. Also don't use spermicides during sex. These can increase the risk for UTIs. Choose other forms of birth control instead. For women who tend to get UTIs after sex, a low-dose of a preventive antibiotic may be used. Be sure to discuss this option with your healthcare provider.    Follow up with your healthcare provider as directed. He or she may test to make sure the infection has cleared. If needed, more treatment may be started.  Date Last Reviewed: 1/1/2017 2000-2019 [employer]. [address] [place] [address], [place] [address]. All rights reserved. This information is not intended as a substitute for professional medical care. Always follow your healthcare professional's instructions.

## 2020-09-03 ENCOUNTER — TELEPHONE (OUTPATIENT)
Dept: INTERNAL MEDICINE | Facility: CLINIC | Age: 80
End: 2020-09-03

## 2020-09-03 DIAGNOSIS — N39.0 ACUTE UTI: Primary | ICD-10-CM

## 2020-09-03 LAB
BACTERIA SPEC CULT: NORMAL
SPECIMEN SOURCE: NORMAL

## 2020-09-03 RX ORDER — CEPHALEXIN 500 MG/1
500 CAPSULE ORAL 2 TIMES DAILY
Qty: 14 CAPSULE | Refills: 0 | Status: SHIPPED | OUTPATIENT
Start: 2020-09-03 | End: 2020-09-10

## 2020-09-04 NOTE — TELEPHONE ENCOUNTER
Cephalexin twice a day for 7 days was sent to the pharmacy. This medication is actually in the same drug class as the cefdinir that was prescribed so GI symptoms may persist and/or could be due to a different cause. Recommend to continue with plan of care given at time of appointment and follow up with primary care provider in 1-2 days if symptoms worsen.

## 2020-09-04 NOTE — TELEPHONE ENCOUNTER
Patient was seen here at [facility] 9/2/2020 by Kenna Allen for UTI. She was placed on   cefdinir (OMNICEF) 300 MG capsule  20 capsule  0  9/2/2020 9/12/2020  --    Sig - Route: Take 1 capsule (300 mg) by mouth 2 times daily for 10 days - Oral    Sent to pharmacy as: Cefdinir 300 MG Oral Capsule (OMNICEF)       This medication is giving patient severe GI upset. Patient requesting she receive the previous antibiotic that she took for UTI then. Looks like last UTI seen here was 7/31/2020 and that medication was Cephalexin(Keflex) 500mg. Please advise on what next step is please. We can send new prescription to [facility](# attached to this msg.). Please call daughter also. Thank you.  Judy Hendricks LPN

## 2020-09-10 ENCOUNTER — OFFICE VISIT (OUTPATIENT)
Dept: INTERNAL MEDICINE | Facility: CLINIC | Age: 80
End: 2020-09-10
Payer: COMMERCIAL

## 2020-09-10 VITALS
OXYGEN SATURATION: 96 % | HEART RATE: 68 BPM | SYSTOLIC BLOOD PRESSURE: 126 MMHG | HEIGHT: 66 IN | DIASTOLIC BLOOD PRESSURE: 66 MMHG | RESPIRATION RATE: 18 BRPM | TEMPERATURE: 98.3 F | WEIGHT: 193 LBS | BODY MASS INDEX: 31.02 KG/M2

## 2020-09-10 DIAGNOSIS — I10 BENIGN ESSENTIAL HYPERTENSION: ICD-10-CM

## 2020-09-10 DIAGNOSIS — K64.9 HEMORRHOIDS, UNSPECIFIED HEMORRHOID TYPE: Primary | ICD-10-CM

## 2020-09-10 DIAGNOSIS — E11.21 TYPE 2 DIABETES MELLITUS WITH DIABETIC NEPHROPATHY, WITHOUT LONG-TERM CURRENT USE OF INSULIN (H): Chronic | ICD-10-CM

## 2020-09-10 LAB — HBA1C MFR BLD: 9 % (ref 0–5.6)

## 2020-09-10 PROCEDURE — 83036 HEMOGLOBIN GLYCOSYLATED A1C: CPT | Performed by: NURSE PRACTITIONER

## 2020-09-10 PROCEDURE — 36415 COLL VENOUS BLD VENIPUNCTURE: CPT | Performed by: NURSE PRACTITIONER

## 2020-09-10 PROCEDURE — 99214 OFFICE O/P EST MOD 30 MIN: CPT | Performed by: NURSE PRACTITIONER

## 2020-09-10 PROCEDURE — 80048 BASIC METABOLIC PNL TOTAL CA: CPT | Performed by: NURSE PRACTITIONER

## 2020-09-10 RX ORDER — QUINAPRIL 40 MG/1
20 TABLET ORAL DAILY
Qty: 45 TABLET | Refills: 1 | Status: SHIPPED | OUTPATIENT
Start: 2020-09-10 | End: 2020-12-05

## 2020-09-10 RX ORDER — GLIPIZIDE 5 MG/1
5 TABLET, FILM COATED, EXTENDED RELEASE ORAL DAILY
Qty: 90 TABLET | Refills: 1 | Status: SHIPPED | OUTPATIENT
Start: 2020-09-10 | End: 2021-01-27

## 2020-09-10 RX ORDER — HYDROCORTISONE ACETATE 25 MG/1
25 SUPPOSITORY RECTAL 2 TIMES DAILY
Qty: 28 SUPPOSITORY | Refills: 0 | Status: SHIPPED | OUTPATIENT
Start: 2020-09-10 | End: 2021-07-05

## 2020-09-10 ASSESSMENT — MIFFLIN-ST. JEOR: SCORE: 1353.22

## 2020-09-10 NOTE — PROGRESS NOTES
"Subjective     Dimple Baxter is a 80 year old female who presents to clinic today for the following health issues:    HPI   Chief Complaint   Patient presents with     Rectal Problem     pt c/o hemorrhoids onset x 1 week. pt had a reaction to med omnicef was vomiting and diarrhea.    Prep h helped in past and not working well   Had some bleeding possibly   Had for past week     Her A1C was high last check and note for follow up,but she never knew it was high  Will increase medication and recheck A1C  Her monitor has been broken so new monitor given so no glucose readings currently      HTN in good range       Review of Systems   Constitutional, HEENT, cardiovascular, pulmonary, GI, , musculoskeletal, neuro, skin, endocrine and psych systems are negative, except as otherwise noted.      Objective    /66   Pulse 68   Temp 98.3  F (36.8  C) (Oral)   Resp 18   Ht 1.67 m (5' 5.75\")   Wt 87.5 kg (193 lb)   LMP  (LMP Unknown)   SpO2 96%   BMI 31.39 kg/m    Body mass index is 31.39 kg/m .  Physical Exam   GENERAL: alert and no distress- here with daughter   RESP: lungs clear to auscultation - no rales, rhonchi or wheezes  CV: regular rate and rhythm, normal S1 S2, no S3 or S4, no murmur, click or rub, no peripheral edema and peripheral pulses strong  RECTAL (female): normal sphincter tone, no rectal masses  MS: no gross musculoskeletal defects noted, no edema  PSYCH: mentation appears normal, affect normal/bright    Lab         Assessment & Plan     Type 2 diabetes mellitus with diabetic nephropathy, without long-term current use of insulin (H)  Needs improvement   Will increase medication -lab to see where she is at   Follow up 3 months with Dr Phillips   - quinapril (ACCUPRIL) 40 MG tablet; Take 0.5 tablets (20 mg) by mouth daily  - Basic metabolic panel  - Hemoglobin A1c  - glipiZIDE (GLUCOTROL XL) 5 MG 24 hr tablet; Take 1 tablet (5 mg) by mouth daily    Benign essential hypertension  Stable in " "medication   - quinapril (ACCUPRIL) 40 MG tablet; Take 0.5 tablets (20 mg) by mouth daily  - Basic metabolic panel    Hemorrhoids, unspecified hemorrhoid type  Discussed   - hydrocortisone (ANUSOL-HC) 25 MG suppository; Place 1 suppository (25 mg) rectally 2 times daily  - COLORECTAL SURGERY REFERRAL     BMI:   Estimated body mass index is 31.39 kg/m  as calculated from the following:    Height as of this encounter: 1.67 m (5' 5.75\").    Weight as of this encounter: 87.5 kg (193 lb).           Patient Instructions   Lab in [address]    Anusol HC twice daily for 14 days     BALNEOL LOTION as needed rectal area     Increase glipizide XR to 5 mg daily     Depending on A1C, may need to check lab in 3 months     If hemorrhoids not improved make appointment with    [facility]: [facility] [place] [phone]       Return in about 3 months (around 12/10/2020) for Lab Work.    ANIBAL Fitzpatrick [facility]    "

## 2020-09-10 NOTE — PATIENT INSTRUCTIONS
Lab in [address]    Anusol HC twice daily for 14 days     BALNEOL LOTION as needed rectal area     Increase glipizide XR to 5 mg daily     Depending on A1C, may need to check lab in 3 months     If hemorrhoids not improved make appointment with    [facility]: [facility] [place] [phone]

## 2020-09-10 NOTE — NURSING NOTE
"Chief Complaint   Patient presents with     Rectal Problem     pt c/o hemorrhoids onset x 1 week. pt had a reaction to med omnicef was vomiting and diarrhea.     initial /66   Pulse 68   Temp 98.3  F (36.8  C) (Oral)   Resp 18   Ht 1.67 m (5' 5.75\")   Wt 87.5 kg (193 lb)   LMP  (LMP Unknown)   SpO2 96%   BMI 31.39 kg/m   Estimated body mass index is 31.39 kg/m  as calculated from the following:    Height as of this encounter: 1.67 m (5' 5.75\").    Weight as of this encounter: 87.5 kg (193 lb)..  bp completed using cuff size large  MAXIMO FAIRBANKS LPN  "

## 2020-09-11 LAB
ANION GAP SERPL CALCULATED.3IONS-SCNC: 7 MMOL/L (ref 3–14)
BUN SERPL-MCNC: 12 MG/DL (ref 7–30)
CALCIUM SERPL-MCNC: 9.8 MG/DL (ref 8.5–10.1)
CHLORIDE SERPL-SCNC: 96 MMOL/L (ref 94–109)
CO2 SERPL-SCNC: 29 MMOL/L (ref 20–32)
CREAT SERPL-MCNC: 1.01 MG/DL (ref 0.52–1.04)
GFR SERPL CREATININE-BSD FRML MDRD: 52 ML/MIN/{1.73_M2}
GLUCOSE SERPL-MCNC: 139 MG/DL (ref 70–99)
POTASSIUM SERPL-SCNC: 3.3 MMOL/L (ref 3.4–5.3)
SODIUM SERPL-SCNC: 132 MMOL/L (ref 133–144)

## 2020-10-21 ENCOUNTER — TELEPHONE (OUTPATIENT)
Dept: INTERNAL MEDICINE | Facility: CLINIC | Age: 80
End: 2020-10-21

## 2020-10-21 DIAGNOSIS — I10 BENIGN ESSENTIAL HYPERTENSION: ICD-10-CM

## 2020-10-21 DIAGNOSIS — E78.5 HYPERLIPIDEMIA LDL GOAL <70: ICD-10-CM

## 2020-10-21 DIAGNOSIS — R94.39 ABNORMAL STRESS TEST: ICD-10-CM

## 2020-10-21 DIAGNOSIS — E11.21 TYPE 2 DIABETES MELLITUS WITH DIABETIC NEPHROPATHY, WITHOUT LONG-TERM CURRENT USE OF INSULIN (H): Primary | Chronic | ICD-10-CM

## 2020-10-21 DIAGNOSIS — F51.01 PRIMARY INSOMNIA: Chronic | ICD-10-CM

## 2020-10-21 DIAGNOSIS — R74.8 ELEVATED LIVER ENZYMES: ICD-10-CM

## 2020-10-21 DIAGNOSIS — I44.7 LBBB (LEFT BUNDLE BRANCH BLOCK): ICD-10-CM

## 2020-10-21 RX ORDER — TRAZODONE HYDROCHLORIDE 100 MG/1
100 TABLET ORAL
Qty: 30 TABLET | Refills: 2 | Status: SHIPPED | OUTPATIENT
Start: 2020-10-21 | End: 2021-01-27

## 2020-10-21 NOTE — TELEPHONE ENCOUNTER
Patient calling  She states the trazodone is not working and she would like to take a full tablet instead of only half. If approved please send a new rx to [facility] in [place]. Ok to call and kimberly [phone]

## 2020-10-23 RX ORDER — METOPROLOL SUCCINATE 100 MG/1
100 TABLET, EXTENDED RELEASE ORAL DAILY
Qty: 90 TABLET | Refills: 3 | Status: SHIPPED | OUTPATIENT
Start: 2020-10-23 | End: 2021-11-10

## 2020-10-23 RX ORDER — HYDROCHLOROTHIAZIDE 25 MG/1
25 TABLET ORAL DAILY
Qty: 30 TABLET | Refills: 0 | Status: SHIPPED | OUTPATIENT
Start: 2020-10-23 | End: 2020-11-30

## 2020-10-23 RX ORDER — NITROGLYCERIN 0.4 MG/1
TABLET SUBLINGUAL
Qty: 25 TABLET | Refills: 1 | Status: SHIPPED | OUTPATIENT
Start: 2020-10-23 | End: 2021-07-05

## 2020-10-23 NOTE — TELEPHONE ENCOUNTER
Routing refill request to provider for review/approval because:  Labs out of range:  K+ 3.3 & Na 132 on 9/10/20  Labs not current:  Last LDL was 9/17/19

## 2020-10-23 NOTE — TELEPHONE ENCOUNTER
Patient sodium and potassium are low, I have filled 1 month of hydrochlorothiazide, please advise patient to see me in clinic next month and needs labs.  Patient is also overdue for lipids, please advise the lab lipid check before I refill Crestor

## 2020-10-29 NOTE — TELEPHONE ENCOUNTER
Patient aware of need for labs and appiontment - she will call back and schedule appointments AMPARO Oliver LPN

## 2020-11-04 ENCOUNTER — TRANSFERRED RECORDS (OUTPATIENT)
Dept: HEALTH INFORMATION MANAGEMENT | Facility: CLINIC | Age: 80
End: 2020-11-04

## 2020-11-05 RX ORDER — ROSUVASTATIN CALCIUM 5 MG/1
TABLET, COATED ORAL
Qty: 90 TABLET | Refills: 1 | Status: SHIPPED | OUTPATIENT
Start: 2020-11-05 | End: 2021-05-06

## 2020-11-12 DIAGNOSIS — I10 BENIGN ESSENTIAL HYPERTENSION: ICD-10-CM

## 2020-11-12 DIAGNOSIS — E11.21 TYPE 2 DIABETES MELLITUS WITH DIABETIC NEPHROPATHY, WITHOUT LONG-TERM CURRENT USE OF INSULIN (H): Chronic | ICD-10-CM

## 2020-11-12 DIAGNOSIS — E78.5 HYPERLIPIDEMIA LDL GOAL <70: ICD-10-CM

## 2020-11-12 LAB
ALBUMIN SERPL-MCNC: 3.9 G/DL (ref 3.4–5)
ALP SERPL-CCNC: 57 U/L (ref 40–150)
ALT SERPL W P-5'-P-CCNC: 101 U/L (ref 0–50)
ANION GAP SERPL CALCULATED.3IONS-SCNC: 4 MMOL/L (ref 3–14)
AST SERPL W P-5'-P-CCNC: 94 U/L (ref 0–45)
BILIRUB SERPL-MCNC: 0.6 MG/DL (ref 0.2–1.3)
BUN SERPL-MCNC: 17 MG/DL (ref 7–30)
CALCIUM SERPL-MCNC: 10.3 MG/DL (ref 8.5–10.1)
CHLORIDE SERPL-SCNC: 102 MMOL/L (ref 94–109)
CHOLEST SERPL-MCNC: 180 MG/DL
CO2 SERPL-SCNC: 29 MMOL/L (ref 20–32)
CREAT SERPL-MCNC: 1.03 MG/DL (ref 0.52–1.04)
GFR SERPL CREATININE-BSD FRML MDRD: 51 ML/MIN/{1.73_M2}
GLUCOSE SERPL-MCNC: 170 MG/DL (ref 70–99)
HBA1C MFR BLD: 7.8 % (ref 0–5.6)
HDLC SERPL-MCNC: 32 MG/DL
LDLC SERPL CALC-MCNC: 89 MG/DL
NONHDLC SERPL-MCNC: 148 MG/DL
POTASSIUM SERPL-SCNC: 4.1 MMOL/L (ref 3.4–5.3)
PROT SERPL-MCNC: 8 G/DL (ref 6.8–8.8)
SODIUM SERPL-SCNC: 135 MMOL/L (ref 133–144)
TRIGL SERPL-MCNC: 296 MG/DL

## 2020-11-12 PROCEDURE — 80061 LIPID PANEL: CPT | Performed by: INTERNAL MEDICINE

## 2020-11-12 PROCEDURE — 83036 HEMOGLOBIN GLYCOSYLATED A1C: CPT | Performed by: INTERNAL MEDICINE

## 2020-11-12 PROCEDURE — 36415 COLL VENOUS BLD VENIPUNCTURE: CPT | Performed by: INTERNAL MEDICINE

## 2020-11-12 PROCEDURE — 80053 COMPREHEN METABOLIC PANEL: CPT | Performed by: INTERNAL MEDICINE

## 2020-11-26 DIAGNOSIS — I10 BENIGN ESSENTIAL HYPERTENSION: ICD-10-CM

## 2020-11-30 RX ORDER — HYDROCHLOROTHIAZIDE 25 MG/1
25 TABLET ORAL DAILY
Qty: 30 TABLET | Refills: 4 | Status: SHIPPED | OUTPATIENT
Start: 2020-11-30 | End: 2021-03-31

## 2020-12-04 ENCOUNTER — OFFICE VISIT (OUTPATIENT)
Dept: INTERNAL MEDICINE | Facility: CLINIC | Age: 80
End: 2020-12-04
Payer: COMMERCIAL

## 2020-12-04 VITALS
RESPIRATION RATE: 18 BRPM | WEIGHT: 196 LBS | SYSTOLIC BLOOD PRESSURE: 150 MMHG | DIASTOLIC BLOOD PRESSURE: 80 MMHG | OXYGEN SATURATION: 97 % | BODY MASS INDEX: 31.88 KG/M2 | HEART RATE: 62 BPM

## 2020-12-04 DIAGNOSIS — E11.21 TYPE 2 DIABETES MELLITUS WITH DIABETIC NEPHROPATHY, WITHOUT LONG-TERM CURRENT USE OF INSULIN (H): Primary | Chronic | ICD-10-CM

## 2020-12-04 DIAGNOSIS — J84.10 PULMONARY FIBROSIS (H): ICD-10-CM

## 2020-12-04 DIAGNOSIS — I10 BENIGN ESSENTIAL HYPERTENSION: ICD-10-CM

## 2020-12-04 DIAGNOSIS — R35.0 URINARY FREQUENCY: ICD-10-CM

## 2020-12-04 DIAGNOSIS — E78.2 MIXED HYPERLIPIDEMIA: ICD-10-CM

## 2020-12-04 DIAGNOSIS — R74.8 ELEVATED LIVER ENZYMES: ICD-10-CM

## 2020-12-04 DIAGNOSIS — N18.31 STAGE 3A CHRONIC KIDNEY DISEASE (H): ICD-10-CM

## 2020-12-04 LAB
ALBUMIN UR-MCNC: NEGATIVE MG/DL
APPEARANCE UR: CLEAR
BILIRUB UR QL STRIP: NEGATIVE
COLOR UR AUTO: YELLOW
GLUCOSE UR STRIP-MCNC: NEGATIVE MG/DL
HGB UR QL STRIP: ABNORMAL
KETONES UR STRIP-MCNC: NEGATIVE MG/DL
LEUKOCYTE ESTERASE UR QL STRIP: NEGATIVE
NITRATE UR QL: NEGATIVE
NON-SQ EPI CELLS #/AREA URNS LPF: ABNORMAL /LPF
PH UR STRIP: 6.5 PH (ref 5–7)
RBC #/AREA URNS AUTO: ABNORMAL /HPF
SOURCE: ABNORMAL
SP GR UR STRIP: 1.01 (ref 1–1.03)
UROBILINOGEN UR STRIP-ACNC: 0.2 EU/DL (ref 0.2–1)
WBC #/AREA URNS AUTO: ABNORMAL /HPF

## 2020-12-04 PROCEDURE — G0008 ADMIN INFLUENZA VIRUS VAC: HCPCS | Performed by: INTERNAL MEDICINE

## 2020-12-04 PROCEDURE — 99215 OFFICE O/P EST HI 40 MIN: CPT | Mod: 25 | Performed by: INTERNAL MEDICINE

## 2020-12-04 PROCEDURE — 99207 PR FOOT EXAM NO CHARGE: CPT | Mod: 25 | Performed by: INTERNAL MEDICINE

## 2020-12-04 PROCEDURE — 90682 RIV4 VACC RECOMBINANT DNA IM: CPT | Performed by: INTERNAL MEDICINE

## 2020-12-04 PROCEDURE — 81001 URINALYSIS AUTO W/SCOPE: CPT | Performed by: INTERNAL MEDICINE

## 2020-12-04 NOTE — NURSING NOTE
Patient requested to have Flublok since she had a reaction to last years high dose flu vaccine.  Anneliese Carter, CMA
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
## 2020-12-05 PROBLEM — J84.10 PULMONARY FIBROSIS (H): Status: ACTIVE | Noted: 2020-12-05

## 2020-12-05 RX ORDER — QUINAPRIL 40 MG/1
40 TABLET ORAL DAILY
Qty: 90 TABLET | Refills: 0 | Status: SHIPPED | OUTPATIENT
Start: 2020-12-05 | End: 2021-01-13

## 2020-12-10 ENCOUNTER — HOSPITAL ENCOUNTER (OUTPATIENT)
Dept: ULTRASOUND IMAGING | Facility: CLINIC | Age: 80
Discharge: HOME | End: 2020-12-10
Attending: INTERNAL MEDICINE | Admitting: INTERNAL MEDICINE
Payer: COMMERCIAL

## 2020-12-10 DIAGNOSIS — R74.8 ELEVATED LIVER ENZYMES: ICD-10-CM

## 2020-12-10 PROCEDURE — 76705 ECHO EXAM OF ABDOMEN: CPT

## 2021-01-05 DIAGNOSIS — I10 BENIGN ESSENTIAL HYPERTENSION: ICD-10-CM

## 2021-01-05 DIAGNOSIS — E11.21 TYPE 2 DIABETES MELLITUS WITH DIABETIC NEPHROPATHY, WITHOUT LONG-TERM CURRENT USE OF INSULIN (H): Chronic | ICD-10-CM

## 2021-01-06 RX ORDER — QUINAPRIL 40 MG/1
40 TABLET ORAL DAILY
Qty: 90 TABLET | Refills: 0 | OUTPATIENT
Start: 2021-01-06

## 2021-01-08 ENCOUNTER — TRANSFERRED RECORDS (OUTPATIENT)
Dept: HEALTH INFORMATION MANAGEMENT | Facility: CLINIC | Age: 81
End: 2021-01-08

## 2021-01-12 DIAGNOSIS — I10 BENIGN ESSENTIAL HYPERTENSION: ICD-10-CM

## 2021-01-12 DIAGNOSIS — E11.21 TYPE 2 DIABETES MELLITUS WITH DIABETIC NEPHROPATHY, WITHOUT LONG-TERM CURRENT USE OF INSULIN (H): Chronic | ICD-10-CM

## 2021-01-13 RX ORDER — QUINAPRIL 40 MG/1
40 TABLET ORAL DAILY
Qty: 30 TABLET | Refills: 0 | Status: SHIPPED | OUTPATIENT
Start: 2021-01-13 | End: 2021-01-27

## 2021-01-13 NOTE — TELEPHONE ENCOUNTER
Patient's quinapril was increased at last office visit and patient was advised to follow-up in 1 month, no follow-up scheduled.     Medication filled for 1 month and please advise appointment

## 2021-01-13 NOTE — TELEPHONE ENCOUNTER
Pending Prescriptions:                       Disp   Refills    quinapril (ACCUPRIL) 40 MG tablet [Pharmac*90 tab*0        Sig: Take 1 tablet (40 mg) by mouth daily    Routing refill request to provider for review/approval because:  Blood pressures out of range      BP Readings from Last 3 Encounters:   12/04/20 (!) 150/80   09/10/20 126/66   09/02/20 126/74

## 2021-01-27 ENCOUNTER — OFFICE VISIT (OUTPATIENT)
Dept: INTERNAL MEDICINE | Facility: CLINIC | Age: 81
End: 2021-01-27
Payer: COMMERCIAL

## 2021-01-27 VITALS
OXYGEN SATURATION: 96 % | SYSTOLIC BLOOD PRESSURE: 136 MMHG | TEMPERATURE: 98.4 F | WEIGHT: 197.1 LBS | HEIGHT: 66 IN | HEART RATE: 84 BPM | BODY MASS INDEX: 31.68 KG/M2 | DIASTOLIC BLOOD PRESSURE: 78 MMHG | RESPIRATION RATE: 13 BRPM

## 2021-01-27 DIAGNOSIS — I10 BENIGN ESSENTIAL HYPERTENSION: ICD-10-CM

## 2021-01-27 DIAGNOSIS — F51.01 PRIMARY INSOMNIA: Chronic | ICD-10-CM

## 2021-01-27 DIAGNOSIS — R74.8 ELEVATED LIVER ENZYMES: Primary | ICD-10-CM

## 2021-01-27 DIAGNOSIS — I27.20 PULMONARY HYPERTENSION (H): ICD-10-CM

## 2021-01-27 DIAGNOSIS — J84.10 PULMONARY FIBROSIS (H): ICD-10-CM

## 2021-01-27 DIAGNOSIS — N18.31 STAGE 3A CHRONIC KIDNEY DISEASE (H): ICD-10-CM

## 2021-01-27 DIAGNOSIS — E11.21 TYPE 2 DIABETES MELLITUS WITH DIABETIC NEPHROPATHY, WITHOUT LONG-TERM CURRENT USE OF INSULIN (H): ICD-10-CM

## 2021-01-27 DIAGNOSIS — E83.52 SERUM CALCIUM ELEVATED: ICD-10-CM

## 2021-01-27 LAB — PTH-INTACT SERPL-MCNC: 25 PG/ML (ref 18–80)

## 2021-01-27 PROCEDURE — 80076 HEPATIC FUNCTION PANEL: CPT | Performed by: INTERNAL MEDICINE

## 2021-01-27 PROCEDURE — 99214 OFFICE O/P EST MOD 30 MIN: CPT | Performed by: INTERNAL MEDICINE

## 2021-01-27 PROCEDURE — 36415 COLL VENOUS BLD VENIPUNCTURE: CPT | Performed by: INTERNAL MEDICINE

## 2021-01-27 PROCEDURE — 83970 ASSAY OF PARATHORMONE: CPT | Performed by: INTERNAL MEDICINE

## 2021-01-27 PROCEDURE — 82310 ASSAY OF CALCIUM: CPT | Performed by: INTERNAL MEDICINE

## 2021-01-27 RX ORDER — TRAZODONE HYDROCHLORIDE 100 MG/1
100 TABLET ORAL
Qty: 90 TABLET | Refills: 1 | Status: SHIPPED | OUTPATIENT
Start: 2021-01-27 | End: 2021-08-10

## 2021-01-27 RX ORDER — QUINAPRIL 40 MG/1
40 TABLET ORAL DAILY
Qty: 90 TABLET | Refills: 1 | Status: SHIPPED | OUTPATIENT
Start: 2021-01-27 | End: 2021-08-10

## 2021-01-27 RX ORDER — GLIPIZIDE 5 MG/1
5 TABLET, FILM COATED, EXTENDED RELEASE ORAL DAILY
Qty: 90 TABLET | Refills: 1 | Status: SHIPPED | OUTPATIENT
Start: 2021-01-27 | End: 2021-09-03

## 2021-01-27 ASSESSMENT — MIFFLIN-ST. JEOR: SCORE: 1371.82

## 2021-01-27 NOTE — NURSING NOTE
"/78   Pulse 84   Temp 98.4  F (36.9  C) (Oral)   Resp 13   Ht 1.67 m (5' 5.75\")   Wt 89.4 kg (197 lb 1.6 oz)   LMP  (LMP Unknown)   SpO2 96%   BMI 32.06 kg/m    Patient in for follow up on HTN, lipids and DM2.  Anneliese Carter, CMA    "
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
## 2021-01-28 LAB
ALBUMIN SERPL-MCNC: 3.8 G/DL (ref 3.4–5)
ALP SERPL-CCNC: 55 U/L (ref 40–150)
ALT SERPL W P-5'-P-CCNC: 59 U/L (ref 0–50)
AST SERPL W P-5'-P-CCNC: 55 U/L (ref 0–45)
BILIRUB DIRECT SERPL-MCNC: 0.2 MG/DL (ref 0–0.2)
BILIRUB SERPL-MCNC: 0.7 MG/DL (ref 0.2–1.3)
CALCIUM SERPL-MCNC: 10.2 MG/DL (ref 8.5–10.1)
PROT SERPL-MCNC: 7.9 G/DL (ref 6.8–8.8)

## 2021-02-26 ENCOUNTER — OFFICE VISIT (OUTPATIENT)
Dept: INTERNAL MEDICINE | Facility: CLINIC | Age: 81
End: 2021-02-26
Payer: COMMERCIAL

## 2021-02-26 VITALS
SYSTOLIC BLOOD PRESSURE: 128 MMHG | BODY MASS INDEX: 31.37 KG/M2 | HEIGHT: 66 IN | DIASTOLIC BLOOD PRESSURE: 82 MMHG | WEIGHT: 195.21 LBS | TEMPERATURE: 98.1 F | RESPIRATION RATE: 12 BRPM | HEART RATE: 91 BPM | OXYGEN SATURATION: 95 %

## 2021-02-26 DIAGNOSIS — E78.2 MIXED HYPERLIPIDEMIA: ICD-10-CM

## 2021-02-26 DIAGNOSIS — Z12.4 SCREENING FOR MALIGNANT NEOPLASM OF CERVIX: ICD-10-CM

## 2021-02-26 DIAGNOSIS — E11.21 TYPE 2 DIABETES MELLITUS WITH DIABETIC NEPHROPATHY, WITHOUT LONG-TERM CURRENT USE OF INSULIN (H): ICD-10-CM

## 2021-02-26 DIAGNOSIS — I10 BENIGN ESSENTIAL HYPERTENSION: ICD-10-CM

## 2021-02-26 DIAGNOSIS — N18.31 STAGE 3A CHRONIC KIDNEY DISEASE (H): ICD-10-CM

## 2021-02-26 DIAGNOSIS — Z01.818 PREOP GENERAL PHYSICAL EXAM: Primary | ICD-10-CM

## 2021-02-26 DIAGNOSIS — H26.9 CATARACT OF BOTH EYES, UNSPECIFIED CATARACT TYPE: ICD-10-CM

## 2021-02-26 DIAGNOSIS — R74.8 ELEVATED LIVER ENZYMES: ICD-10-CM

## 2021-02-26 LAB
HBA1C MFR BLD: 7.4 % (ref 0–5.6)
HGB BLD-MCNC: 13.7 G/DL (ref 11.7–15.7)

## 2021-02-26 PROCEDURE — 36415 COLL VENOUS BLD VENIPUNCTURE: CPT | Performed by: INTERNAL MEDICINE

## 2021-02-26 PROCEDURE — 99214 OFFICE O/P EST MOD 30 MIN: CPT | Performed by: INTERNAL MEDICINE

## 2021-02-26 PROCEDURE — 83036 HEMOGLOBIN GLYCOSYLATED A1C: CPT | Performed by: INTERNAL MEDICINE

## 2021-02-26 PROCEDURE — 85018 HEMOGLOBIN: CPT | Performed by: INTERNAL MEDICINE

## 2021-02-26 PROCEDURE — 80053 COMPREHEN METABOLIC PANEL: CPT | Performed by: INTERNAL MEDICINE

## 2021-02-26 ASSESSMENT — MIFFLIN-ST. JEOR: SCORE: 1359.28

## 2021-02-26 NOTE — NURSING NOTE
"/82   Pulse 91   Temp 98.1  F (36.7  C) (Oral)   Resp 12   Ht 1.664 m (5' 5.5\")   Wt 88.5 kg (195 lb 3.4 oz)   LMP  (LMP Unknown)   SpO2 95%   BMI 31.99 kg/m    Patient in for Pre-Op.  Anneliese Carter CMA    "
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
## 2021-02-27 LAB
ALBUMIN SERPL-MCNC: 3.8 G/DL (ref 3.4–5)
ALP SERPL-CCNC: 64 U/L (ref 40–150)
ALT SERPL W P-5'-P-CCNC: 77 U/L (ref 0–50)
ANION GAP SERPL CALCULATED.3IONS-SCNC: 7 MMOL/L (ref 3–14)
AST SERPL W P-5'-P-CCNC: 65 U/L (ref 0–45)
BILIRUB SERPL-MCNC: 0.4 MG/DL (ref 0.2–1.3)
BUN SERPL-MCNC: 21 MG/DL (ref 7–30)
CALCIUM SERPL-MCNC: 10.5 MG/DL (ref 8.5–10.1)
CHLORIDE SERPL-SCNC: 104 MMOL/L (ref 94–109)
CO2 SERPL-SCNC: 25 MMOL/L (ref 20–32)
CREAT SERPL-MCNC: 0.94 MG/DL (ref 0.52–1.04)
GFR SERPL CREATININE-BSD FRML MDRD: 56 ML/MIN/{1.73_M2}
GLUCOSE SERPL-MCNC: 201 MG/DL (ref 70–99)
POTASSIUM SERPL-SCNC: 3.6 MMOL/L (ref 3.4–5.3)
PROT SERPL-MCNC: 7.9 G/DL (ref 6.8–8.8)
SODIUM SERPL-SCNC: 136 MMOL/L (ref 133–144)

## 2021-03-01 ENCOUNTER — TELEPHONE (OUTPATIENT)
Dept: INTERNAL MEDICINE | Facility: CLINIC | Age: 81
End: 2021-03-01

## 2021-03-01 NOTE — TELEPHONE ENCOUNTER
Patient called to let Dr Phillips know the dates of her cataract surgeries. The first one is 3/12/21, and the second eye is being done on 4/9/21. She also needs to know what medications she needs to stop prior to the surgeries and directions on when to stop them. Please mail that to her at her home address.

## 2021-03-01 NOTE — PATIENT INSTRUCTIONS
Hold glipizide the  morning of surgery  Hold hydrochlorothiazide on the morning of surgery   ASPIRIN: Discontinue ASA 7  days prior to procedure to reduce bleeding risk.     NSAIDS:   Stop  3 days prior to surgery  HOLD Quinapril  for the 24 hours prior to surgery.  Take Metoprolol as before.

## 2021-03-04 ENCOUNTER — IMMUNIZATION (OUTPATIENT)
Dept: NURSING | Facility: CLINIC | Age: 81
End: 2021-03-04
Payer: COMMERCIAL

## 2021-03-04 PROCEDURE — 91300 PR COVID VAC PFIZER DIL RECON 30 MCG/0.3 ML IM: CPT

## 2021-03-04 PROCEDURE — 0001A PR COVID VAC PFIZER DIL RECON 30 MCG/0.3 ML IM: CPT

## 2021-03-25 ENCOUNTER — IMMUNIZATION (OUTPATIENT)
Dept: NURSING | Facility: CLINIC | Age: 81
End: 2021-03-25
Attending: INTERNAL MEDICINE
Payer: COMMERCIAL

## 2021-03-25 PROCEDURE — 91300 PR COVID VAC PFIZER DIL RECON 30 MCG/0.3 ML IM: CPT

## 2021-03-25 PROCEDURE — 0002A PR COVID VAC PFIZER DIL RECON 30 MCG/0.3 ML IM: CPT

## 2021-03-30 DIAGNOSIS — I10 BENIGN ESSENTIAL HYPERTENSION: ICD-10-CM

## 2021-03-31 RX ORDER — HYDROCHLOROTHIAZIDE 25 MG/1
25 TABLET ORAL DAILY
Qty: 90 TABLET | Refills: 2 | Status: SHIPPED | OUTPATIENT
Start: 2021-03-31 | End: 2021-12-29

## 2021-03-31 NOTE — TELEPHONE ENCOUNTER
Pending Prescriptions:                       Disp   Refills    hydrochlorothiazide (HYDRODIURIL) 25 MG t*90 tab*2            Sig: Take 1 tablet (25 mg) by mouth daily    Prescription approved per [facility] Refill Protocol.

## 2021-04-23 ENCOUNTER — TRANSFERRED RECORDS (OUTPATIENT)
Dept: MULTI SPECIALTY CLINIC | Facility: CLINIC | Age: 81
End: 2021-04-23
Payer: COMMERCIAL

## 2021-04-23 LAB — RETINOPATHY: NORMAL

## 2021-05-14 DIAGNOSIS — E78.5 HYPERLIPIDEMIA LDL GOAL <70: ICD-10-CM

## 2021-05-14 RX ORDER — ROSUVASTATIN CALCIUM 5 MG/1
TABLET, COATED ORAL
Qty: 90 TABLET | Refills: 0 | OUTPATIENT
Start: 2021-05-14

## 2021-06-11 ENCOUNTER — TELEPHONE (OUTPATIENT)
Dept: INTERNAL MEDICINE | Facility: CLINIC | Age: 81
End: 2021-06-11

## 2021-06-11 ENCOUNTER — VIRTUAL VISIT (OUTPATIENT)
Dept: INTERNAL MEDICINE | Facility: CLINIC | Age: 81
End: 2021-06-11
Payer: COMMERCIAL

## 2021-06-11 DIAGNOSIS — N39.0 URINARY TRACT INFECTION WITHOUT HEMATURIA, SITE UNSPECIFIED: ICD-10-CM

## 2021-06-11 DIAGNOSIS — N39.0 URINARY TRACT INFECTION WITHOUT HEMATURIA, SITE UNSPECIFIED: Primary | ICD-10-CM

## 2021-06-11 LAB
ALBUMIN UR-MCNC: ABNORMAL MG/DL
APPEARANCE UR: CLEAR
BACTERIA #/AREA URNS HPF: ABNORMAL /HPF
BILIRUB UR QL STRIP: NEGATIVE
COLOR UR AUTO: YELLOW
GLUCOSE UR STRIP-MCNC: NEGATIVE MG/DL
HGB UR QL STRIP: ABNORMAL
KETONES UR STRIP-MCNC: NEGATIVE MG/DL
LEUKOCYTE ESTERASE UR QL STRIP: ABNORMAL
NITRATE UR QL: NEGATIVE
NON-SQ EPI CELLS #/AREA URNS LPF: ABNORMAL /LPF
PH UR STRIP: 7 PH (ref 5–7)
RBC #/AREA URNS AUTO: ABNORMAL /HPF
SOURCE: ABNORMAL
SP GR UR STRIP: 1.02 (ref 1–1.03)
UROBILINOGEN UR STRIP-ACNC: 1 EU/DL (ref 0.2–1)
WBC #/AREA URNS AUTO: ABNORMAL /HPF

## 2021-06-11 PROCEDURE — 99213 OFFICE O/P EST LOW 20 MIN: CPT | Mod: 95 | Performed by: NURSE PRACTITIONER

## 2021-06-11 PROCEDURE — 81001 URINALYSIS AUTO W/SCOPE: CPT | Performed by: NURSE PRACTITIONER

## 2021-06-11 RX ORDER — SULFAMETHOXAZOLE/TRIMETHOPRIM 800-160 MG
1 TABLET ORAL 2 TIMES DAILY
Qty: 14 TABLET | Refills: 0 | Status: SHIPPED | OUTPATIENT
Start: 2021-06-11 | End: 2021-06-11

## 2021-06-11 NOTE — PATIENT INSTRUCTIONS
Bactrim DS 1 tab twice daily for 7 days     Urine test today - she prefers to go to [place]   Ok to drop off urine

## 2021-06-11 NOTE — NURSING NOTE
"Chief Complaint   Patient presents with     Dysuria     pt c/o history of UTI and has had dysuria and frequency onset x 1 day.     initial LMP  (LMP Unknown)  Estimated body mass index is 31.99 kg/m  as calculated from the following:    Height as of 2/26/21: 1.664 m (5' 5.5\").    Weight as of 2/26/21: 88.5 kg (195 lb 3.4 oz)..  bp completed using cuff size NA (Not Taken)  MAXIMO FAIRBANKS LPN  "

## 2021-06-11 NOTE — PROGRESS NOTES
"Althea is a 81 year old who is being evaluated via a billable video visit.      How would you like to obtain your AVS? Mail a copy  If the video visit is dropped, the invitation should be resent by: Text to cell phone: [phone]  Will anyone else be joining your video visit? Her daughter    Video Start Time: 11:37 AM    Assessment & Plan     Urinary tract infection without hematuria, site unspecified    - *UA reflex to Microscopic and Culture ([place] and [facility] (except [place] and [place]); Future  - sulfamethoxazole-trimethoprim (BACTRIM DS) 800-160 MG tablet; Take 1 tablet by mouth 2 times daily  - Urine Culture Aerobic Bacterial      12 minutes spent on the date of the encounter doing chart review, history and exam, documentation and further activities per the note       BMI:   Estimated body mass index is 31.99 kg/m  as calculated from the following:    Height as of 2/26/21: 1.664 m (5' 5.5\").    Weight as of 2/26/21: 88.5 kg (195 lb 3.4 oz).       There are no Patient Instructions on file for this visit.    No follow-ups on file.    ANIBAL Fitzpatrick CNP  [facility]    Carolann Oh is a 81 year old who presents for the following health issues     HPI   Chief Complaint   Patient presents with     Dysuria     pt c/o history of UTI and has had dysuria and frequency onset x 1 day.   Last positive UTI was 2019  Sensitive to bactrim and she has taken in past without issues     Want to be treated while waiting for culture as her symptoms are bothersome     She prefers to go to [facility] lab to leave a urine   Called to make sure this was possible      Review of Systems   Constitutional, HEENT, cardiovascular, pulmonary, GI, , musculoskeletal, neuro, skin, endocrine and psych systems are negative, except as otherwise noted.      Objective           Vitals:  No vitals were obtained today due to virtual visit.    Physical Exam   GENERAL: alert and no " distress  EYES: Eyes grossly normal to inspection.  No discharge or erythema, or obvious scleral/conjunctival abnormalities.  RESP: No audible wheeze, cough, or visible cyanosis.  No visible retractions or increased work of breathing.    SKIN: Visible skin clear. No significant rash, abnormal pigmentation or lesions.  NEURO: Cranial nerves grossly intact.  Mentation and speech appropriate for age.  PSYCH: Mentation appears normal, affect normal/bright, judgement and insight intact, normal speech and appearance well-groomed.    Urine and culture             Video-Visit Details    Type of service:  Video Visit    Video End Time:11:47 AM    Originating Location (pt. Location): Home    Distant Location (provider location):  [facility]     Platform used for Video Visit: [employer]

## 2021-06-11 NOTE — TELEPHONE ENCOUNTER
Christina, pharmacy intern calling from [facility] regarding Bactrim rx from today..    Bactrim used with Quinapril can cause hyperkalemia.  Do you want to switch to Cephalexin?  MARYANN Ford R.N.      Call back to [facility] [phone]

## 2021-06-17 ENCOUNTER — TELEPHONE (OUTPATIENT)
Dept: INTERNAL MEDICINE | Facility: CLINIC | Age: 81
End: 2021-06-17

## 2021-06-17 NOTE — TELEPHONE ENCOUNTER
The urine did not show evidence of UTI.  Would not treat with antibiotics.  She can try over-the-counter Azo for a few days.

## 2021-06-17 NOTE — TELEPHONE ENCOUNTER
Patient has lab test for UTI. She states she still has symptoms. Wants to take medication. Pharmacy stated Bactrim interferes with her other medication. Please advise. Ok to call and kimberly [phone]

## 2021-07-03 ENCOUNTER — TELEPHONE (OUTPATIENT)
Dept: NURSING | Facility: CLINIC | Age: 81
End: 2021-07-03

## 2021-07-03 NOTE — TELEPHONE ENCOUNTER
Pt discharged from [facility] yesterday for side pain thinking she may have pulled a muscle.  Pt given 10 Oxycodone tablets which last night after taking one caused pt to vomit.  Pt calling to see if she can get a different medication such as Oxycontin.  Pt is scheduled for appt on 7/5/21 with PCP.  Pt will f/u prior if anything further needed.  Hallie Suazo RN, MA  [facility] Nurse Advisor

## 2021-07-05 ENCOUNTER — OFFICE VISIT (OUTPATIENT)
Dept: INTERNAL MEDICINE | Facility: CLINIC | Age: 81
End: 2021-07-05
Payer: COMMERCIAL

## 2021-07-05 VITALS
DIASTOLIC BLOOD PRESSURE: 84 MMHG | RESPIRATION RATE: 16 BRPM | WEIGHT: 187.6 LBS | SYSTOLIC BLOOD PRESSURE: 136 MMHG | HEIGHT: 66 IN | TEMPERATURE: 97.8 F | HEART RATE: 104 BPM | BODY MASS INDEX: 30.15 KG/M2

## 2021-07-05 DIAGNOSIS — K57.32 DIVERTICULITIS OF COLON: ICD-10-CM

## 2021-07-05 DIAGNOSIS — R07.89 CHEST WALL PAIN: Primary | ICD-10-CM

## 2021-07-05 PROCEDURE — 99214 OFFICE O/P EST MOD 30 MIN: CPT | Performed by: FAMILY MEDICINE

## 2021-07-05 RX ORDER — PREDNISONE 20 MG/1
TABLET ORAL
Qty: 12 TABLET | Refills: 0 | Status: SHIPPED | OUTPATIENT
Start: 2021-07-05 | End: 2021-09-17

## 2021-07-05 RX ORDER — TRAMADOL HYDROCHLORIDE 50 MG/1
TABLET ORAL
Qty: 25 TABLET | Refills: 0 | Status: SHIPPED | OUTPATIENT
Start: 2021-07-05 | End: 2021-07-29

## 2021-07-05 ASSESSMENT — MIFFLIN-ST. JEOR: SCORE: 1319.76

## 2021-07-05 NOTE — PROGRESS NOTES
(R07.89) Chest wall pain  (primary encounter diagnosis)  Comment:     Patient has had an extensive work-up without significant chest findings.  These were reviewed.  Her presentation is suggestive of chest wall pain.  Multiple other potential causes were ruled out.  We will try treating with steroid and a different pain medication.  See below.    Plan: predniSONE (DELTASONE) 20 MG tablet, traMADol         (ULTRAM) 50 MG tablet        Observe.  Follow-up if symptoms worsening or not improving.  Follow-up advised in 1 month in any case.        (K57.32) Diverticulitis of colon  Comment:     This was a truly incidental finding on CT.  She is not symptomatic compatible with acute diverticulitis.    Plan:   Advised probably best to continue medication and have follow-up.  Radiologist comment was repeat CT in 3 to 4 months.  I asked her to see her doctor in about a month in any case.        Subjective     HPI       Hospital Follow-up Visit:    Hospital/Nursing Home/IP Rehab Facility: [facility] / [facility]  E R  Date of Admission: 6-29 and 7-2 E R visits  Date of Discharge:   Reason(s) for Admission: pain under right breast      Was your hospitalization related to COVID-19? No   Problems taking medications regularly:  None  Medication changes since discharge: None  Problems adhering to non-medication therapy:  None    Summary of hospitalization:  CareEverywhere information obtained and reviewed  Diagnostic Tests/Treatments reviewed.  Follow up needed:   Other Healthcare Providers Involved in Patient s Care:         Dr Pallager  Update since discharge: stable.       Post Discharge Medication Reconciliation: discharge medications reconciled and changed, per note/orders.  Plan of care communicated with patient and daughter                This patient had 2 ER visits at [facility].    She has pain in the right chest primarily alongside the right breast.  It radiates to some extent toward the spine.  She still  "describes pain as 8-10 level.  It is somewhat positional.  She has not developed a rash which would suggest shingles.  After work-up, she was discharged on oxycodone but it makes her itch so she is not using this.  She took some ibuprofen which is not helpful.    I reviewed her ER records.  She had a negative chest CT scan.  Troponins were negative.  D-dimer normal.    Interestingly, her abdomen was scanned also and indicated findings in LLQ compatible with diverticulitis.  Therefore she was discharged on antibiotics also.    Patient is here with her daughter today with whom she lives.      Review of Systems     No fever.  No cough or S OB.  No nausea or abdominal pain.  No diarrhea or constipation.  No rash.  No swelling.  No focal weakness.        Objective    /84 (BP Location: Right arm, Patient Position: Sitting, Cuff Size: Adult Regular)   Pulse 104   Temp 97.8  F (36.6  C) (Oral)   Resp 16   Ht 1.664 m (5' 5.5\")   Wt 85.1 kg (187 lb 9.6 oz)   LMP  (LMP Unknown)   Breastfeeding No   BMI 30.74 kg/m    Body mass index is 30.74 kg/m .  Physical Exam     Pleasant alert woman, NAD.  HEENT unremarkable.  Neck no thyromegaly  Chest tenderness in the right anterolateral chest to palpation.  Lungs bibasilar rales  Cardiac RSR without murmurs  Abdomen without palpable tenderness.        "

## 2021-07-05 NOTE — NURSING NOTE
"Hospital follow up  /84 (BP Location: Right arm, Patient Position: Sitting, Cuff Size: Adult Regular)   Pulse 104   Temp 97.8  F (36.6  C) (Oral)   Resp 16   Ht 1.664 m (5' 5.5\")   Wt 85.1 kg (187 lb 9.6 oz)   LMP  (LMP Unknown)   Breastfeeding No   BMI 30.74 kg/m      "
Jose Raul Montaño(Attending)

## 2021-07-27 DIAGNOSIS — R07.89 CHEST WALL PAIN: ICD-10-CM

## 2021-07-29 RX ORDER — TRAMADOL HYDROCHLORIDE 50 MG/1
TABLET ORAL
Qty: 25 TABLET | Refills: 0 | Status: SHIPPED | OUTPATIENT
Start: 2021-07-29 | End: 2022-02-02

## 2021-08-06 ENCOUNTER — TELEPHONE (OUTPATIENT)
Dept: INTERNAL MEDICINE | Facility: CLINIC | Age: 81
End: 2021-08-06

## 2021-08-06 ENCOUNTER — OFFICE VISIT (OUTPATIENT)
Dept: INTERNAL MEDICINE | Facility: CLINIC | Age: 81
End: 2021-08-06
Payer: COMMERCIAL

## 2021-08-06 VITALS
RESPIRATION RATE: 12 BRPM | DIASTOLIC BLOOD PRESSURE: 82 MMHG | WEIGHT: 186.9 LBS | OXYGEN SATURATION: 98 % | BODY MASS INDEX: 28.33 KG/M2 | HEIGHT: 68 IN | TEMPERATURE: 98.3 F | HEART RATE: 70 BPM | SYSTOLIC BLOOD PRESSURE: 134 MMHG

## 2021-08-06 DIAGNOSIS — N39.0 URINARY TRACT INFECTION WITHOUT HEMATURIA, SITE UNSPECIFIED: ICD-10-CM

## 2021-08-06 DIAGNOSIS — E11.21 TYPE 2 DIABETES MELLITUS WITH DIABETIC NEPHROPATHY, WITHOUT LONG-TERM CURRENT USE OF INSULIN (H): ICD-10-CM

## 2021-08-06 DIAGNOSIS — I10 BENIGN ESSENTIAL HYPERTENSION: Primary | ICD-10-CM

## 2021-08-06 DIAGNOSIS — K57.32 DIVERTICULITIS OF COLON: ICD-10-CM

## 2021-08-06 DIAGNOSIS — R41.3 MEMORY CHANGES: ICD-10-CM

## 2021-08-06 LAB
ALBUMIN UR-MCNC: NEGATIVE MG/DL
APPEARANCE UR: CLEAR
BACTERIA #/AREA URNS HPF: ABNORMAL /HPF
BILIRUB UR QL STRIP: NEGATIVE
COLOR UR AUTO: YELLOW
GLUCOSE UR STRIP-MCNC: NEGATIVE MG/DL
HGB UR QL STRIP: NEGATIVE
KETONES UR STRIP-MCNC: NEGATIVE MG/DL
LEUKOCYTE ESTERASE UR QL STRIP: ABNORMAL
NITRATE UR QL: NEGATIVE
PH UR STRIP: 7.5 [PH] (ref 5–7)
RBC #/AREA URNS AUTO: ABNORMAL /HPF
SP GR UR STRIP: 1.02 (ref 1–1.03)
SQUAMOUS #/AREA URNS AUTO: ABNORMAL /LPF
UROBILINOGEN UR STRIP-ACNC: 0.2 E.U./DL
WBC #/AREA URNS AUTO: ABNORMAL /HPF

## 2021-08-06 PROCEDURE — 81001 URINALYSIS AUTO W/SCOPE: CPT | Performed by: INTERNAL MEDICINE

## 2021-08-06 PROCEDURE — 80053 COMPREHEN METABOLIC PANEL: CPT | Performed by: INTERNAL MEDICINE

## 2021-08-06 PROCEDURE — 36415 COLL VENOUS BLD VENIPUNCTURE: CPT | Performed by: INTERNAL MEDICINE

## 2021-08-06 PROCEDURE — 87086 URINE CULTURE/COLONY COUNT: CPT | Performed by: INTERNAL MEDICINE

## 2021-08-06 PROCEDURE — 99215 OFFICE O/P EST HI 40 MIN: CPT | Performed by: INTERNAL MEDICINE

## 2021-08-06 RX ORDER — CIPROFLOXACIN 500 MG/1
500 TABLET, FILM COATED ORAL 2 TIMES DAILY
Qty: 10 TABLET | Refills: 0 | Status: SHIPPED | OUTPATIENT
Start: 2021-08-06 | End: 2021-09-17

## 2021-08-06 RX ORDER — FLUCONAZOLE 150 MG/1
150 TABLET ORAL ONCE
Qty: 1 TABLET | Refills: 0 | Status: SHIPPED | OUTPATIENT
Start: 2021-08-06 | End: 2021-08-06

## 2021-08-06 ASSESSMENT — MIFFLIN-ST. JEOR: SCORE: 1352.3

## 2021-08-06 NOTE — TELEPHONE ENCOUNTER
Disability Parking Certification form received. Per Dr. Phillips, most recent pulmonary function test results be faxed from [facility]/Dr. Gould ([phone]) to [phone]. Left message for [facility] to fax results so form can be completed.

## 2021-08-06 NOTE — NURSING NOTE
"/82   Pulse 70   Temp 98.3  F (36.8  C) (Oral)   Resp 12   Ht 1.721 m (5' 7.75\")   Wt 84.8 kg (186 lb 14.4 oz)   LMP  (LMP Unknown)   SpO2 98%   BMI 28.63 kg/m    Patient in for 1 month follow up and dysuria.  Disability Parking Form received. Left message for [facility], Dr. Gould ([phone]) to fax most recent pulmonary function test results to [phone] per Dr. Phillips.  Anneliese Carter [facility]    "
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
## 2021-08-06 NOTE — TELEPHONE ENCOUNTER
Patient is calling to get her urine results from today.  If she needs a antibiotic she will also need a rx for vaginal yeast.

## 2021-08-07 LAB
ALBUMIN SERPL-MCNC: 4 G/DL (ref 3.4–5)
ALP SERPL-CCNC: 63 U/L (ref 40–150)
ALT SERPL W P-5'-P-CCNC: 45 U/L (ref 0–50)
ANION GAP SERPL CALCULATED.3IONS-SCNC: 1 MMOL/L (ref 3–14)
AST SERPL W P-5'-P-CCNC: 37 U/L (ref 0–45)
BACTERIA UR CULT: NORMAL
BILIRUB SERPL-MCNC: 0.6 MG/DL (ref 0.2–1.3)
BUN SERPL-MCNC: 17 MG/DL (ref 7–30)
CALCIUM SERPL-MCNC: 10 MG/DL (ref 8.5–10.1)
CHLORIDE BLD-SCNC: 103 MMOL/L (ref 94–109)
CO2 SERPL-SCNC: 33 MMOL/L (ref 20–32)
CREAT SERPL-MCNC: 1.06 MG/DL (ref 0.52–1.04)
GFR SERPL CREATININE-BSD FRML MDRD: 49 ML/MIN/1.73M2
GLUCOSE BLD-MCNC: 100 MG/DL (ref 70–99)
POTASSIUM BLD-SCNC: 3.8 MMOL/L (ref 3.4–5.3)
PROT SERPL-MCNC: 7.8 G/DL (ref 6.8–8.8)
SODIUM SERPL-SCNC: 137 MMOL/L (ref 133–144)

## 2021-08-09 ENCOUNTER — TELEPHONE (OUTPATIENT)
Dept: INTERNAL MEDICINE | Facility: CLINIC | Age: 81
End: 2021-08-09

## 2021-08-09 DIAGNOSIS — R30.0 DYSURIA: Primary | ICD-10-CM

## 2021-08-09 NOTE — TELEPHONE ENCOUNTER
Please advise patient to complete the course of Cipro, urine culture urogenital odalys .  Advised to increase fluid intake, if patient continues to have symptoms will repeat UA/UC  and also refer to urologist

## 2021-08-09 NOTE — TELEPHONE ENCOUNTER
Patient calling. She is still have symptoms of a UTI after being on medication. Please advise. Ok to call and kimberly [phone]

## 2021-08-09 NOTE — TELEPHONE ENCOUNTER
Patient still has 1 more day of a 5 day course (Cipro 500 mg twice daily)  She also has a  Fluconazole rx but has not taken it.    She continues to feel just as she did on 8/6/21 when last seen with frequent urination, dysuria, urinating in small amounts, very yellow urine.  Denies fever, flank pain.  UC showed less than 10,000 urogenital odalys.  States she has a history of UTIs and has seen urology in the past.  MARYANN Ford R.N.

## 2021-08-10 DIAGNOSIS — F51.01 PRIMARY INSOMNIA: Chronic | ICD-10-CM

## 2021-08-10 NOTE — TELEPHONE ENCOUNTER
Patient advised.  States she will go to the [place] lab to give a UA.  Leave encounter open per MD.  MARYANN Ford R.N.

## 2021-08-10 NOTE — TELEPHONE ENCOUNTER
Patient wants UA/UC ordered.  She continues to have frequent urination, dysuria, urinating in small amounts, very yellow urine.  Denies fever, flank pain.

## 2021-08-10 NOTE — TELEPHONE ENCOUNTER
Patient calls back, she is on her last day of medication and will complete it but she is still having symptoms. Please place order for UA/UC and she will stop by [facility] to give urine sample. Call her to advise when order placed.

## 2021-08-11 ENCOUNTER — LAB (OUTPATIENT)
Dept: LAB | Facility: CLINIC | Age: 81
End: 2021-08-11
Payer: COMMERCIAL

## 2021-08-11 DIAGNOSIS — R30.0 DYSURIA: ICD-10-CM

## 2021-08-11 LAB
ALBUMIN UR-MCNC: NEGATIVE MG/DL
APPEARANCE UR: CLEAR
BACTERIA #/AREA URNS HPF: ABNORMAL /HPF
BILIRUB UR QL STRIP: NEGATIVE
COLOR UR AUTO: YELLOW
GLUCOSE UR STRIP-MCNC: NEGATIVE MG/DL
HGB UR QL STRIP: ABNORMAL
KETONES UR STRIP-MCNC: NEGATIVE MG/DL
LEUKOCYTE ESTERASE UR QL STRIP: NEGATIVE
NITRATE UR QL: NEGATIVE
PH UR STRIP: 7 [PH] (ref 5–7)
RBC #/AREA URNS AUTO: ABNORMAL /HPF
SP GR UR STRIP: 1.02 (ref 1–1.03)
SQUAMOUS #/AREA URNS AUTO: ABNORMAL /LPF
UROBILINOGEN UR STRIP-ACNC: 0.2 E.U./DL
WBC #/AREA URNS AUTO: ABNORMAL /HPF

## 2021-08-11 PROCEDURE — 81001 URINALYSIS AUTO W/SCOPE: CPT

## 2021-08-11 RX ORDER — TRAZODONE HYDROCHLORIDE 100 MG/1
TABLET ORAL
Qty: 90 TABLET | Refills: 0 | OUTPATIENT
Start: 2021-08-11

## 2021-08-18 ENCOUNTER — TELEPHONE (OUTPATIENT)
Dept: INTERNAL MEDICINE | Facility: CLINIC | Age: 81
End: 2021-08-18

## 2021-08-18 DIAGNOSIS — N39.0 RECURRENT UTI: Primary | ICD-10-CM

## 2021-08-18 NOTE — TELEPHONE ENCOUNTER
Patient would like to see urologist for her symptoms. Please place referral and advise patient at [phone] (home)

## 2021-08-20 ENCOUNTER — LAB (OUTPATIENT)
Dept: LAB | Facility: CLINIC | Age: 81
End: 2021-08-20
Payer: COMMERCIAL

## 2021-08-20 DIAGNOSIS — E11.21 TYPE 2 DIABETES MELLITUS WITH DIABETIC NEPHROPATHY, WITHOUT LONG-TERM CURRENT USE OF INSULIN (H): ICD-10-CM

## 2021-08-20 LAB
CHOLEST SERPL-MCNC: 121 MG/DL
CREAT UR-MCNC: 86 MG/DL
FASTING STATUS PATIENT QL REPORTED: NO
HBA1C MFR BLD: 6.5 % (ref 0–5.6)
HDLC SERPL-MCNC: 30 MG/DL
LDLC SERPL CALC-MCNC: 60 MG/DL
MICROALBUMIN UR-MCNC: 9 MG/L
MICROALBUMIN/CREAT UR: 10.47 MG/G CR (ref 0–25)
NONHDLC SERPL-MCNC: 91 MG/DL
TRIGL SERPL-MCNC: 153 MG/DL
TSH SERPL DL<=0.005 MIU/L-ACNC: 0.71 MU/L (ref 0.4–4)

## 2021-08-20 PROCEDURE — 84443 ASSAY THYROID STIM HORMONE: CPT

## 2021-08-20 PROCEDURE — 80061 LIPID PANEL: CPT

## 2021-08-20 PROCEDURE — 82043 UR ALBUMIN QUANTITATIVE: CPT

## 2021-08-20 PROCEDURE — 36415 COLL VENOUS BLD VENIPUNCTURE: CPT

## 2021-08-20 PROCEDURE — 83036 HEMOGLOBIN GLYCOSYLATED A1C: CPT

## 2021-08-25 ENCOUNTER — OFFICE VISIT (OUTPATIENT)
Dept: URGENT CARE | Facility: URGENT CARE | Age: 81
End: 2021-08-25
Payer: COMMERCIAL

## 2021-08-25 VITALS
OXYGEN SATURATION: 95 % | RESPIRATION RATE: 16 BRPM | WEIGHT: 186 LBS | TEMPERATURE: 98.5 F | BODY MASS INDEX: 28.49 KG/M2 | DIASTOLIC BLOOD PRESSURE: 80 MMHG | HEART RATE: 70 BPM | SYSTOLIC BLOOD PRESSURE: 142 MMHG

## 2021-08-25 DIAGNOSIS — B34.9 VIRAL SYNDROME: ICD-10-CM

## 2021-08-25 DIAGNOSIS — Z20.822 SUSPECTED COVID-19 VIRUS INFECTION: Primary | ICD-10-CM

## 2021-08-25 DIAGNOSIS — J01.90 ACUTE SINUSITIS, RECURRENCE NOT SPECIFIED, UNSPECIFIED LOCATION: ICD-10-CM

## 2021-08-25 LAB — SARS-COV-2 RNA RESP QL NAA+PROBE: NEGATIVE

## 2021-08-25 PROCEDURE — U0005 INFEC AGEN DETEC AMPLI PROBE: HCPCS | Performed by: PHYSICIAN ASSISTANT

## 2021-08-25 PROCEDURE — 99214 OFFICE O/P EST MOD 30 MIN: CPT | Performed by: PHYSICIAN ASSISTANT

## 2021-08-25 PROCEDURE — U0003 INFECTIOUS AGENT DETECTION BY NUCLEIC ACID (DNA OR RNA); SEVERE ACUTE RESPIRATORY SYNDROME CORONAVIRUS 2 (SARS-COV-2) (CORONAVIRUS DISEASE [COVID-19]), AMPLIFIED PROBE TECHNIQUE, MAKING USE OF HIGH THROUGHPUT TECHNOLOGIES AS DESCRIBED BY CMS-2020-01-R: HCPCS | Performed by: PHYSICIAN ASSISTANT

## 2021-08-25 RX ORDER — CETIRIZINE HYDROCHLORIDE 10 MG/1
10 TABLET ORAL DAILY
Qty: 30 TABLET | Refills: 0 | Status: SHIPPED | OUTPATIENT
Start: 2021-08-25 | End: 2021-09-24

## 2021-08-25 RX ORDER — FLUTICASONE PROPIONATE 50 MCG
1 SPRAY, SUSPENSION (ML) NASAL DAILY
Qty: 18.2 ML | Refills: 0 | Status: SHIPPED | OUTPATIENT
Start: 2021-08-25 | End: 2022-02-02

## 2021-08-25 NOTE — PATIENT INSTRUCTIONS
Follow up immediately with severe headache, chest pain, or shortness of breath    Rest, isolate for 10 days, hydrate, follow up if worsening or new symptoms  Household members to isolate until test results, if positive isolate for 2 weeks and follow up for testing if symptoms occur      1.  Plenty of fluids, rest, warm compresses on face  2.  Mucinex twice daily for at least 4 days  3.  Lola Pot 1x in the morning 1x at night (SALINE MIST SPRAY IS AN ACCEPTABLE, THOUGH NOT AS EFFECTIVE REPLACEMENT)  4.  Benadryl (diphenhydramine) at bedtime   5.  Either Claritin (Loratadine), Allegra (Fexofenadine), or Zyrtec (Cetirizine) in the day  6.  Flonase (Fluticasone) 2x each nostril twice a day for two weeks, then once each nostril once a day       Please let us know if symptoms persist, or worsen.  Fever and focal pain may be a sign of a bacterial infection which may need antibiotic treatment      Patient Education     Coronavirus Disease 2019 (COVID-19): Caring for Yourself or Others   If you or a household member have symptoms of COVID-19, follow the guidelines below. This will help you manage symptoms and keep the virus from spreading.  If you have symptoms of COVID-19    Stay home and contact your care team. They will tell you what to do.    Don t panic. Keep in mind that other illnesses can cause similar symptoms.    Stay away from work, school, and public places.    Limit physical contact with others in your home. Limit visitors. No kissing.  Clean surfaces you touch with disinfectant.  If you need to cough or sneeze, do it into a tissue. Then throw the tissue into the trash. If you don't have tissues, cough or sneeze into the bend of your elbow.  Don t share food or personal items with people in your household. This includes items like eating and drinking utensils, towels, and bedding.  Wear a cloth face mask around other people. During a public health emergency, medical face masks may be reserved for healthcare  workers. You may need to make a cloth face mask of your own. You can do this using a bandana, T-shirt, or other cloth. [facility] has instructions on how to make a face mask. Wear the mask so that it covers both your nose and mouth.  If you need to go to a hospital or clinic, call ahead to let them know. Expect the care team to wear masks, gowns, gloves, and eye protection. You may need to come to a different entrance or wait in a separate room.  Follow all instructions from your care team.    If you ve been diagnosed with COVID-19    Stay home and away from others, including other people in your home. (This is called self-isolation.) Don t leave home unless you need to get medical care. Don t go to work, school, or public places. Don t use buses, taxis, or other public transportation.    Follow all instructions from your care team.    If you need to go to a hospital or clinic, call ahead to let them know. Expect the care team to wear masks, gowns, gloves, and eye protection. You may need to come to a different entrance or wait in a separate room.    Wear a face mask over your nose and mouth. This is to protect others from your germs. If you can t wear a mask, your caregivers should wear one. You may need to make your own mask using a bandana, T-shirt, or other cloth. See the CDC s instructions on how to make a face mask.    Have no contact with pets and other animals.    Don t share food or personal items with people in your household. This includes items like eating and drinking utensils, towels, and bedding.    If you need to cough or sneeze, do it into a tissue. Then throw the tissue into the trash. If you don't have tissues, cough or sneeze into the bend of your elbow.    Wash your hands often.    Self-care at home   At this time, there is no medicine approved to prevent or treat COVID-19. The FDA has approved an antiviral medicine (called remdesivir) for people being treated in the hospital. This is for people 12  years and older who weigh more than about 88 pounds (40 kgs). In certain cases, it may also be used for people younger than 12 years or who weigh less than about 88 pounds (40 kgs)..  Currently, treatment is mostly aimed at helping your body while it fights the virus.    Getting extra rest.    Drink extra fluids 6 to 8 glasses of liquids each day), unless a doctor has told you not to. Ask your care team which fluids are best for you. Avoid fluids that contain caffeine or alcohol.    Taking over-the-counter (OTC) medicine to reduce pain and fever. Your care team will tell you which medicine to use.  If you ve been in the hospital for COVID-19, follow your care team s instructions. They will tell you when to stop self-isolation. They may also have you change positions to help your breathing (such as lying on your belly).  If a test showed that you have COVID-19, you may be asked to donate plasma after you ve recovered. (This is called COVID-19 convalescent plasma donation.) The plasma may contain antibodies to help fight the virus in others. Experts don't know if the plasma will work well as a treatment. Research continues, and the FDA has approved it for emergency use in certain people with serious or life-threatening COVID-19. For more information, talk to your care team.  Caring for a sick person     Follow all instructions from the care team.    Wash your hands often.    Wear protective clothing as advised.    Make sure the sick person wears a mask. If they can't wear a mask, don t stay in the same room with them. If you must be in the same room, wear a face mask. Make sure the mask covers both the nose and mouth.    Keep track of the sick person s symptoms.    Clean surfaces often with disinfectant. This includes phones, kitchen counters, fridge door handles, bathroom surfaces, and others.    Don t let anyone share household items with the sick person. This includes eating and drinking tools, towels, sheets, or  blankets.    Clean fabrics and laundry well.    Keep other people and pets away from the sick person.    When you can stop self-isolation  When you are sick with COVID-19, you should stay away from other people. This is called self-isolation. The rules for ending self-isolation depend on your health in general.  If you are normally healthy:  You can stop self-isolation when all 3 of these are true:    You ve had no fever--and no medicine that reduces fever--for at least 24 hours. And     Your symptoms are better, such as cough or trouble breathing. And     At least 10 days have passed since your symptoms first started.  Talk with your care team before you leave home. They may tell you it s okay to leave, or they may give you different advice. You do not need to be re-tested.  If you have a weak immune system, or you ve had severe COVID-19:  Follow your care team s instructions. You may be asked to self-isolate for 10 days to 20 days after your symptoms first started. Your care team may want to re-test you for COVID-19.  Note: If you re being treated for cancer, have an immune disorder (such as HIV), or have had a transplant (organ or bone marrow), you may have a weak immune system.  If you've already had COVID-19 once:  If you had the virus over 3 months ago, and you ve been exposed again, treat it like you've never had COVID-19. Stay home, limit your contact with others, call your care team, and watch for symptoms.  If it s been less than 3 months since you had the virus, you re symptom-free, and you ve been exposed again: You don t need to self-isolate or be re-tested. But if you develop new symptoms that can t be linked to another illness, please self-isolate and contact your care team.  When you return to public settings  When you are well enough to go outside your home, follow the CDC s guidance on cloth face masks.    Anyone over age 2 should wear a face mask in public, especially when it's hard to socially  distance. This includes public transit, public protests and marches, and crowded stores, bars, and restaurants.    Face masks are most likely to reduce the spread of COVID-19 when they are widely used by people who are out in the public.  Certain people should not wear a face covering. These include:    Children under 2 years old    Anyone with a health, developmental, or mental health condition that can be made worse by wearing a mask    Anyone who is unconscious or unable to remove the face covering without help. See the CDC's guidance on who should not wear a face mask.  When to call your care team  Call your care team right away if a sick person has any of these:    Trouble breathing    Pain or pressure in chest  If a sick person has any of these, call 911:    Trouble breathing that gets worse    Pain or pressure in chest that gets worse    Blue tint to lips or face    Fast or irregular heartbeat    Confusion or trouble waking    Fainting or loss of consciousness    Coughing up blood  Going home from the hospital   If you have COVID-19 and were recently in the hospital:    Follow the instructions above for self-care and isolation.    Follow the hospital care team s instructions.    Ask questions if anything is unclear to you. Write down answers so you remember them.  Date last modified: 11/23/2020  [employer] last reviewed this educational content on 4/1/2020  This information has been modified by your health care provider with permission from the publisher.    2288-3306 [employer]. [address] [place] [id number]. All rights reserved. This information is not intended as a substitute for professional medical care. Always follow your healthcare professional's instructions.           Patient Education     Viral Syndrome (Adult)  A viral illness may cause a number of symptoms such as fever. Other symptoms depend on the part of the body that the virus affects. If it settles in your nose,  "throat, and lungs, it may cause cough, sore throat, congestion, runny nose, headache, earache and other ear symptoms, or shortness of breath. If it settles in your stomach and intestinal tract, it may cause nausea, vomiting, cramping, and diarrhea. Sometimes it causes generalized symptoms like \"aching all over,\" feeling tired, loss of energy, or loss of appetite.  A viral illness usually lasts anywhere from several days to several weeks, but sometimes it lasts longer. In some cases, a more serious infection can look like a viral syndrome in the first few days of the illness. You may need another exam and additional tests to know the difference. Watch for the warning signs listed below for when to seek medical advice.  Home care  Follow these guidelines for taking care of yourself at home:    If symptoms are severe, rest at home for the first 2 to 3 days.    Stay away from cigarette smoke - both your smoke and the smoke from others.    You may use over-the-counter acetaminophen or ibuprofen for fever, muscle aching, and headache, unless another medicine was prescribed for this. If you have chronic liver or kidney disease or ever had a stomach ulcer or gastrointestinal bleeding, talk with your healthcare provider before using these medicines. No one who is younger than 18 and ill with a fever should take aspirin. It may cause severe disease or death.    Your appetite may be poor, so a light diet is fine. Avoid dehydration by drinking 8 to 12, 8-ounce glasses of fluids each day. This may include water; orange juice; lemonade; apple, grape, and cranberry juice; clear fruit drinks; electrolyte replacement and sports drinks; and decaffeinated teas and coffee. If you have been diagnosed with a kidney disease, ask your healthcare provider how much and what types of fluids you should drink to prevent dehydration. If you have kidney disease, drinking too much fluid can cause it build up in the your body and be dangerous to " your health.    Over-the-counter remedies won't shorten the length of the illness but may be helpful for symptoms such as cough, sore throat, nasal and sinus congestion, or diarrhea. Don't use decongestants if you have high blood pressure.  Follow-up care  Follow up with your healthcare provider if you do not improve over the next week.  Call 911  Call 911 if any of the following occur:    Convulsion    Feeling weak, dizzy, or like you are going to faint    Chest pain, or more than mild shortness of breath  When to seek medical advice  Call your healthcare provider right away if any of these occur:    Cough with lots of colored sputum (mucus) or blood in your sputum    Chest pain, shortness of breath, wheezing, or trouble breathing    Severe headache; face, neck, or ear pain    Severe, constant pain in the lower right side of your belly (abdominal)    Continued vomiting (can t keep liquids down)    Frequent diarrhea (more than 5 times a day); blood (red or black color) or mucus in diarrhea    Feeling weak, dizzy, or like you are going to faint    Extreme thirst    Fever of 100.4 F (38 C) or higher, or as directed by your healthcare provider  [employer] last reviewed this educational content on 4/1/2018 2000-2021 [employer]. All rights reserved. This information is not intended as a substitute for professional medical care. Always follow your healthcare professional's instructions.
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
## 2021-08-31 NOTE — TELEPHONE ENCOUNTER
Spoke with Medical Records Department and requested (again) for them  To fax most recent Pulmonary Function Test results to [phone] ATTN: Alan/Priscilla. Staff (Graciela) stated most recent test was 1/28/2020 and she will fax both results and office visit notes TODAY. Left note on fax machine and for Radha since fax is needed ASAP.

## 2021-09-01 NOTE — TELEPHONE ENCOUNTER
Axed received from [employer]. Disability Form and fax from [facility] placed in Dr. Phillips's In-Basket for completion.

## 2021-09-02 DIAGNOSIS — E11.21 TYPE 2 DIABETES MELLITUS WITH DIABETIC NEPHROPATHY, WITHOUT LONG-TERM CURRENT USE OF INSULIN (H): Primary | ICD-10-CM

## 2021-09-02 NOTE — TELEPHONE ENCOUNTER
Pending Prescriptions:                       Disp   Refills    glipiZIDE (GLUCOTROL XL) 5 MG 24 hr tablet*90 tab*0        Sig: Take 1 tablet by mouth daily  Routing refill request to provider for review/approval because:  Fails protocol    Last ov 8/6/2021

## 2021-09-03 ENCOUNTER — OFFICE VISIT (OUTPATIENT)
Dept: UROLOGY | Facility: CLINIC | Age: 81
End: 2021-09-03
Attending: INTERNAL MEDICINE
Payer: COMMERCIAL

## 2021-09-03 VITALS
SYSTOLIC BLOOD PRESSURE: 124 MMHG | WEIGHT: 191 LBS | BODY MASS INDEX: 30.7 KG/M2 | HEIGHT: 66 IN | OXYGEN SATURATION: 99 % | HEART RATE: 66 BPM | DIASTOLIC BLOOD PRESSURE: 80 MMHG

## 2021-09-03 DIAGNOSIS — R39.15 URINARY URGENCY: Primary | ICD-10-CM

## 2021-09-03 DIAGNOSIS — N39.0 RECURRENT UTI: ICD-10-CM

## 2021-09-03 LAB
ALBUMIN UR-MCNC: NEGATIVE MG/DL
APPEARANCE UR: CLEAR
BILIRUB UR QL STRIP: NEGATIVE
COLOR UR AUTO: YELLOW
GLUCOSE UR STRIP-MCNC: NEGATIVE MG/DL
HGB UR QL STRIP: NEGATIVE
KETONES UR STRIP-MCNC: NEGATIVE MG/DL
LEUKOCYTE ESTERASE UR QL STRIP: ABNORMAL
NITRATE UR QL: NEGATIVE
PH UR STRIP: 7 [PH] (ref 5–7)
RESIDUAL VOLUME (RV) (EXTERNAL): 9
SP GR UR STRIP: 1.02 (ref 1–1.03)
UROBILINOGEN UR STRIP-ACNC: 1 E.U./DL

## 2021-09-03 PROCEDURE — 51798 US URINE CAPACITY MEASURE: CPT | Performed by: PHYSICIAN ASSISTANT

## 2021-09-03 PROCEDURE — 99213 OFFICE O/P EST LOW 20 MIN: CPT | Mod: 25 | Performed by: PHYSICIAN ASSISTANT

## 2021-09-03 PROCEDURE — 81003 URINALYSIS AUTO W/O SCOPE: CPT | Mod: QW | Performed by: PHYSICIAN ASSISTANT

## 2021-09-03 RX ORDER — ESTRADIOL 0.1 MG/G
CREAM VAGINAL
Qty: 42.5 G | Refills: 3 | Status: SHIPPED | OUTPATIENT
Start: 2021-09-03 | End: 2022-11-29

## 2021-09-03 RX ORDER — GLIPIZIDE 5 MG/1
TABLET, FILM COATED, EXTENDED RELEASE ORAL
Qty: 90 TABLET | Refills: 0 | Status: SHIPPED | OUTPATIENT
Start: 2021-09-03 | End: 2021-12-16

## 2021-09-03 ASSESSMENT — PAIN SCALES - GENERAL: PAINLEVEL: NO PAIN (0)

## 2021-09-03 ASSESSMENT — MIFFLIN-ST. JEOR: SCORE: 1343.12

## 2021-09-03 NOTE — PATIENT INSTRUCTIONS
Estrogen cream three times a week near urethra (pea-sized amount): If >$50, let me know and we can get via a compound pharmacy.
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
## 2021-09-03 NOTE — NURSING NOTE
Chief Complaint   Patient presents with     Recurring UTI     PVR is 9 ml today. Patient feels like she has to go all the time.   Shannan Yusuf LPN
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
## 2021-09-17 ENCOUNTER — OFFICE VISIT (OUTPATIENT)
Dept: URGENT CARE | Facility: URGENT CARE | Age: 81
End: 2021-09-17
Payer: COMMERCIAL

## 2021-09-17 VITALS
SYSTOLIC BLOOD PRESSURE: 130 MMHG | TEMPERATURE: 98 F | WEIGHT: 191 LBS | BODY MASS INDEX: 30.83 KG/M2 | OXYGEN SATURATION: 96 % | RESPIRATION RATE: 16 BRPM | DIASTOLIC BLOOD PRESSURE: 72 MMHG | HEART RATE: 64 BPM

## 2021-09-17 DIAGNOSIS — N18.31 STAGE 3A CHRONIC KIDNEY DISEASE (H): ICD-10-CM

## 2021-09-17 DIAGNOSIS — B37.31 CANDIDIASIS OF VAGINA: ICD-10-CM

## 2021-09-17 DIAGNOSIS — R30.0 DYSURIA: Primary | ICD-10-CM

## 2021-09-17 LAB
ALBUMIN UR-MCNC: NEGATIVE MG/DL
APPEARANCE UR: CLEAR
BACTERIA #/AREA URNS HPF: ABNORMAL /HPF
BILIRUB UR QL STRIP: NEGATIVE
COLOR UR AUTO: YELLOW
GLUCOSE UR STRIP-MCNC: NEGATIVE MG/DL
HGB UR QL STRIP: ABNORMAL
KETONES UR STRIP-MCNC: NEGATIVE MG/DL
LEUKOCYTE ESTERASE UR QL STRIP: ABNORMAL
NITRATE UR QL: NEGATIVE
PH UR STRIP: 6 [PH] (ref 5–7)
RBC #/AREA URNS AUTO: ABNORMAL /HPF
SP GR UR STRIP: 1.01 (ref 1–1.03)
SQUAMOUS #/AREA URNS AUTO: ABNORMAL /LPF
UROBILINOGEN UR STRIP-ACNC: 0.2 E.U./DL
WBC #/AREA URNS AUTO: ABNORMAL /HPF

## 2021-09-17 PROCEDURE — 87086 URINE CULTURE/COLONY COUNT: CPT | Performed by: PHYSICIAN ASSISTANT

## 2021-09-17 PROCEDURE — 87088 URINE BACTERIA CULTURE: CPT | Performed by: PHYSICIAN ASSISTANT

## 2021-09-17 PROCEDURE — 87186 SC STD MICRODIL/AGAR DIL: CPT | Performed by: PHYSICIAN ASSISTANT

## 2021-09-17 PROCEDURE — 81001 URINALYSIS AUTO W/SCOPE: CPT | Performed by: PHYSICIAN ASSISTANT

## 2021-09-17 PROCEDURE — 99214 OFFICE O/P EST MOD 30 MIN: CPT | Performed by: PHYSICIAN ASSISTANT

## 2021-09-17 RX ORDER — FLUCONAZOLE 150 MG/1
150 TABLET ORAL ONCE
Qty: 1 TABLET | Refills: 0 | Status: SHIPPED | OUTPATIENT
Start: 2021-09-17 | End: 2021-09-17

## 2021-09-17 RX ORDER — CIPROFLOXACIN 250 MG/1
250 TABLET, FILM COATED ORAL 2 TIMES DAILY
Qty: 14 TABLET | Refills: 0 | Status: SHIPPED | OUTPATIENT
Start: 2021-09-17 | End: 2021-09-24

## 2021-09-17 NOTE — PATIENT INSTRUCTIONS

## 2021-09-17 NOTE — PROGRESS NOTES
Assessment & Plan     Dysuria  Secondary to infection  Urine culture pending  Start cipro  - UA macro with reflex to Microscopic and Culture - Clinc Collect  - Urine Microscopic  - Urine Culture Aerobic Bacterial - lab collect; Future  - ciprofloxacin (CIPRO) 250 MG tablet; Take 1 tablet (250 mg) by mouth 2 times daily for 7 days    Stage 3a chronic kidney disease  Fluids, rest  Monitor renal functions    Candidiasis of vagina  If needed after taking antibiotics  - fluconazole (DIFLUCAN) 150 MG tablet; Take 1 tablet (150 mg) by mouth once for 1 dose    Review of external notes as documented elsewhere in note      No follow-ups on file.    Surya Huizar PA-C  [facility] B[facility] Oh is a 81 year old who presents for the following health issues  accompanied by her daughter:    HPI     Dysuria  Urgency and frequency of urination    Review of Systems   Constitutional, HEENT, cardiovascular, pulmonary, gi and gu systems are negative, except as otherwise noted.      Objective    /72   Pulse 64   Temp 98  F (36.7  C) (Oral)   Resp 16   Wt 86.6 kg (191 lb)   LMP  (LMP Unknown)   SpO2 96%   BMI 30.83 kg/m    Body mass index is 30.83 kg/m .  Physical Exam   GENERAL: healthy, alert and no distress  ABDOMEN: soft, nontender, no hepatosplenomegaly, no masses and bowel sounds normal  MS: no gross musculoskeletal defects noted, no edema      Results for orders placed or performed in visit on 09/17/21   UA macro with reflex to Microscopic and Culture - Clinc Collect     Status: Abnormal    Specimen: Urine, Midstream   Result Value Ref Range    Color Urine Yellow Colorless, Straw, Light Yellow, Yellow    Appearance Urine Clear Clear    Glucose Urine Negative Negative mg/dL    Bilirubin Urine Negative Negative    Ketones Urine Negative Negative mg/dL    Specific Gravity Urine 1.010 1.003 - 1.035    Blood Urine Small (A) Negative    pH Urine 6.0 5.0 - 7.0    Protein Albumin Urine  Negative Negative mg/dL    Urobilinogen Urine 0.2 0.2, 1.0 E.U./dL    Nitrite Urine Negative Negative    Leukocyte Esterase Urine Trace (A) Negative   Urine Microscopic     Status: Abnormal   Result Value Ref Range    Bacteria Urine Few (A) None Seen /HPF    RBC Urine 0-2 0-2 /HPF /HPF    WBC Urine 0-5 0-5 /HPF /HPF    Squamous Epithelials Urine Few (A) None Seen /LPF    Narrative    Urine Culture not indicated

## 2021-09-19 LAB
BACTERIA UR CULT: ABNORMAL
BACTERIA UR CULT: ABNORMAL

## 2021-09-20 ENCOUNTER — NURSE TRIAGE (OUTPATIENT)
Dept: NURSING | Facility: CLINIC | Age: 81
End: 2021-09-20

## 2021-09-20 NOTE — TELEPHONE ENCOUNTER
Daughter Ernestina is calling and states that she was into urgent care on Friday September 17 and was started on antibiotics Ciprofloxacin  250 mg.  Patient diagnosed with painful urination and is urinating frequently.  Today is not getting better.  Ernestina is requesting PCP Du Phillips phone mom Dimple to discuss medication.  Please phone Dimple.      COVID 19 Nurse Triage Plan/Patient Instructions    Please be aware that novel coronavirus (COVID-19) may be circulating in the community. If you develop symptoms such as fever, cough, or SOB or if you have concerns about the presence of another infection including coronavirus (COVID-19), please contact your health care provider or visit [email or web address].     Disposition/Instructions    Home care recommended. Follow home care protocol based instructions.    Thank you for taking steps to prevent the spread of this virus.  o Limit your contact with others.  o Wear a simple mask to cover your cough.  o Wash your hands well and often.    Resources    [facility]: About COVID-19: [email or web address]    CDC: What to Do If You're Sick: www.cdc.gov/coronavirus/2019-ncov/about/steps-when-sick.html    CDC: Ending Home Isolation: www.cdc.gov/coronavirus/2019-ncov/hcp/disposition-in-home-patients.html     CDC: Caring for Someone: www.cdc.gov/coronavirus/2019-ncov/if-you-are-sick/care-for-someone.html     [facility]: Interim Guidance for Hospital Discharge to Home: www.health.[facility].mn.us/diseases/coronavirus/hcp/hospdischarge.pdf    [facility] clinical trials (COVID-19 research studies): [email or web address]/n-clinical-trials     Below are the COVID-19 hotlines at the [facility] ([facility]). Interpreters are available.   o For health questions: Call 877-451-0079 or [phone] (7 a.m. to 7 p.m.)  o For questions about schools and childcare: Call 507-919-6867 or [phone] (7 a.m. to 7 p.m.)

## 2021-09-28 ENCOUNTER — TELEPHONE (OUTPATIENT)
Dept: INTERNAL MEDICINE | Facility: CLINIC | Age: 81
End: 2021-09-28

## 2021-09-28 DIAGNOSIS — R94.39 ABNORMAL STRESS TEST: Primary | ICD-10-CM

## 2021-09-28 NOTE — TELEPHONE ENCOUNTER
nitroGLYcerin (NITROSTAT) 0.4 MG sublingual       Last Written Prescription Date:  10/23/20  Last Fill Quantity: 25,   # refills: 1  Last Office Visit: 08/06/21  Future Office visit:       Routing refill request to provider for review/approval because:  Not active on current med list

## 2021-09-29 ENCOUNTER — HOSPITAL ENCOUNTER (OUTPATIENT)
Dept: CT IMAGING | Facility: CLINIC | Age: 81
Discharge: HOME | End: 2021-09-29
Attending: INTERNAL MEDICINE | Admitting: INTERNAL MEDICINE
Payer: COMMERCIAL

## 2021-09-29 DIAGNOSIS — K57.32 DIVERTICULITIS OF COLON: ICD-10-CM

## 2021-09-29 PROCEDURE — 74176 CT ABD & PELVIS W/O CONTRAST: CPT

## 2021-09-29 RX ORDER — NITROGLYCERIN 0.4 MG/1
TABLET SUBLINGUAL
Qty: 30 TABLET | Refills: 0 | Status: SHIPPED | OUTPATIENT
Start: 2021-09-29 | End: 2023-03-22

## 2021-09-29 NOTE — TELEPHONE ENCOUNTER
Routing refill request to provider for review/approval because:  Drug not active on patient's medication list    Writer did not find any nitroglycerin in medication history.      Called patient and left detailed message regarding request for this medication.

## 2021-10-04 ENCOUNTER — TELEPHONE (OUTPATIENT)
Dept: NEUROLOGY | Facility: CLINIC | Age: 81
End: 2021-10-04

## 2021-10-04 NOTE — PATIENT INSTRUCTIONS
AFTER VISIT SUMMARY (AVS):    At today's visit we thoroughly discussed various diagnostic possibilities for your symptoms, necessary evaluation, and the plan, which includes:  Orders Placed This Encounter   Procedures     MR Brain w/o Contrast     Vitamin B1 whole blood     Vitamin B12     Methylmalonic Acid     No new medications.    Stay physically and mentally active with particular emphasis on daily mentally stimulating activities of your choice (such as crosswords, puzzles, sudoku, etc.), stretching exercises, walking, and healthy eating.     Additional recommendations after the work-up.    Next follow-up appointment is in the next 6 months or earlier if needed.    Please do not hesitate to call me with any questions or concerns.    Thanks.
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
## 2021-10-05 ENCOUNTER — OFFICE VISIT (OUTPATIENT)
Dept: NEUROLOGY | Facility: CLINIC | Age: 81
End: 2021-10-05
Attending: INTERNAL MEDICINE
Payer: COMMERCIAL

## 2021-10-05 VITALS
WEIGHT: 185.6 LBS | SYSTOLIC BLOOD PRESSURE: 116 MMHG | BODY MASS INDEX: 29.83 KG/M2 | HEIGHT: 66 IN | OXYGEN SATURATION: 96 % | DIASTOLIC BLOOD PRESSURE: 75 MMHG | HEART RATE: 62 BPM

## 2021-10-05 DIAGNOSIS — R41.3 MEMORY CHANGES: Primary | ICD-10-CM

## 2021-10-05 PROCEDURE — G0463 HOSPITAL OUTPT CLINIC VISIT: HCPCS

## 2021-10-05 PROCEDURE — 99205 OFFICE O/P NEW HI 60 MIN: CPT | Performed by: PSYCHIATRY & NEUROLOGY

## 2021-10-05 ASSESSMENT — MONTREAL COGNITIVE ASSESSMENT (MOCA)
8. SERIAL SUBTRACTION OF 7S: 3
7. [VIGILENCE] TAP WHEN HEARING DESIGNATED LETTER: 1
WHAT IS THE TOTAL SCORE (OUT OF 30): 26
12. MEMORY INDEX SCORE: 4
4. NAME EACH OF THE THREE ANIMALS SHOWN: 3
6. READ LIST OF DIGITS [FORWARD/BACKWARD]: 1
11. FOR EACH PAIR OF WORDS, WHAT CATEGORY DO THEY BELONG TO (OUT OF 2): 2
10. [FLUENCY] NAME WORDS STARTING WITH DESIGNATED LETTER: 0
VISUOSPATIAL/EXECUTIVE SUBSCORE: 5
13. ORIENTATION SUBSCORE: 5
WHAT LEVEL OF EDUCATION WAS ATTAINED: 1
9. REPEAT EACH SENTENCE: 1

## 2021-10-05 ASSESSMENT — MIFFLIN-ST. JEOR: SCORE: 1318.63
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
## 2021-10-11 ENCOUNTER — OFFICE VISIT (OUTPATIENT)
Dept: URGENT CARE | Facility: URGENT CARE | Age: 81
End: 2021-10-11
Payer: COMMERCIAL

## 2021-10-11 VITALS
WEIGHT: 183.4 LBS | SYSTOLIC BLOOD PRESSURE: 178 MMHG | BODY MASS INDEX: 29.6 KG/M2 | TEMPERATURE: 97.6 F | HEART RATE: 54 BPM | OXYGEN SATURATION: 97 % | DIASTOLIC BLOOD PRESSURE: 71 MMHG

## 2021-10-11 DIAGNOSIS — E11.21 TYPE 2 DIABETES MELLITUS WITH DIABETIC NEPHROPATHY, WITHOUT LONG-TERM CURRENT USE OF INSULIN (H): ICD-10-CM

## 2021-10-11 DIAGNOSIS — R30.0 DYSURIA: Primary | ICD-10-CM

## 2021-10-11 DIAGNOSIS — N18.31 STAGE 3A CHRONIC KIDNEY DISEASE (H): ICD-10-CM

## 2021-10-11 LAB
ALBUMIN UR-MCNC: NEGATIVE MG/DL
APPEARANCE UR: CLEAR
BACTERIA #/AREA URNS HPF: ABNORMAL /HPF
BILIRUB UR QL STRIP: NEGATIVE
COLOR UR AUTO: YELLOW
GLUCOSE UR STRIP-MCNC: NEGATIVE MG/DL
HGB UR QL STRIP: ABNORMAL
KETONES UR STRIP-MCNC: NEGATIVE MG/DL
LEUKOCYTE ESTERASE UR QL STRIP: ABNORMAL
NITRATE UR QL: NEGATIVE
PH UR STRIP: 6.5 [PH] (ref 5–7)
RBC #/AREA URNS AUTO: ABNORMAL /HPF
SP GR UR STRIP: 1.01 (ref 1–1.03)
UROBILINOGEN UR STRIP-ACNC: 0.2 E.U./DL
WBC #/AREA URNS AUTO: ABNORMAL /HPF

## 2021-10-11 PROCEDURE — 99214 OFFICE O/P EST MOD 30 MIN: CPT | Performed by: PHYSICIAN ASSISTANT

## 2021-10-11 PROCEDURE — 81001 URINALYSIS AUTO W/SCOPE: CPT | Performed by: PHYSICIAN ASSISTANT

## 2021-10-11 PROCEDURE — 87086 URINE CULTURE/COLONY COUNT: CPT | Performed by: PHYSICIAN ASSISTANT

## 2021-10-11 RX ORDER — CIPROFLOXACIN 250 MG/1
250 TABLET, FILM COATED ORAL 2 TIMES DAILY
Qty: 28 TABLET | Refills: 0 | Status: SHIPPED | OUTPATIENT
Start: 2021-10-11 | End: 2021-10-25

## 2021-10-11 NOTE — PROGRESS NOTES
Assessment & Plan     Dysuria  UA positive  Urine culture pending  Advised to have follow up with PCP in 2 weeks for recheck  - UA macro with reflex to Microscopic and Culture - Clinc Collect  - Urine Microscopic  - Urine Culture Aerobic Bacterial - lab collect; Future  - ciprofloxacin (CIPRO) 250 MG tablet; Take 1 tablet (250 mg) by mouth 2 times daily for 14 days  - Urine Culture Aerobic Bacterial - lab collect    Type 2 diabetes mellitus with diabetic nephropathy, without long-term current use of insulin (H)  Monitor blood sugars while on medication    Stage 3a chronic kidney disease (H)  cipro lower dose given due to renal function  - ciprofloxacin (CIPRO) 250 MG tablet; Take 1 tablet (250 mg) by mouth 2 times daily for 14 days    Review of external notes as documented elsewhere in note      Follow up w/ PCP in 2 wks for recheck    Surya Huizar PA-C  [facility] BENJA Oh is a 81 year old who presents for the following health issues  accompanied by her daughter:    HPI     Dysuria  Urinary frequency  Hx of UTI    Review of Systems   Constitutional, HEENT, cardiovascular, pulmonary, gi and gu systems are negative, except as otherwise noted.      Objective    BP (!) 178/71 (BP Location: Right arm, Patient Position: Sitting, Cuff Size: Adult Regular)   Pulse 54   Temp 97.6  F (36.4  C) (Oral)   Wt 83.2 kg (183 lb 6.4 oz)   LMP  (LMP Unknown)   SpO2 97%   BMI 29.60 kg/m    Body mass index is 29.6 kg/m .  Physical Exam   GENERAL: healthy, alert and no distress  ABDOMEN: soft, nontender, no hepatosplenomegaly, no masses and bowel sounds normal  MS: no gross musculoskeletal defects noted, no edema  SKIN: no suspicious lesions or rashes  PSYCH: mentation appears normal, affect normal/bright      Results for orders placed or performed in visit on 10/11/21   UA macro with reflex to Microscopic and Culture - Clinc Collect     Status: Abnormal    Specimen: Urine, Midstream    Result Value Ref Range    Color Urine Yellow Colorless, Straw, Light Yellow, Yellow    Appearance Urine Clear Clear    Glucose Urine Negative Negative mg/dL    Bilirubin Urine Negative Negative    Ketones Urine Negative Negative mg/dL    Specific Gravity Urine 1.015 1.003 - 1.035    Blood Urine Small (A) Negative    pH Urine 6.5 5.0 - 7.0    Protein Albumin Urine Negative Negative mg/dL    Urobilinogen Urine 0.2 0.2, 1.0 E.U./dL    Nitrite Urine Negative Negative    Leukocyte Esterase Urine Trace (A) Negative   Urine Microscopic     Status: Abnormal   Result Value Ref Range    Bacteria Urine Few (A) None Seen /HPF    RBC Urine 2-5 (A) 0-2 /HPF /HPF    WBC Urine 5-10 (A) 0-5 /HPF /HPF    Narrative    Urine Culture not indicated

## 2021-10-12 LAB — BACTERIA UR CULT: NORMAL

## 2021-10-14 ENCOUNTER — LAB (OUTPATIENT)
Dept: LAB | Facility: CLINIC | Age: 81
End: 2021-10-14
Payer: COMMERCIAL

## 2021-10-14 ENCOUNTER — HOSPITAL ENCOUNTER (OUTPATIENT)
Dept: MAMMOGRAPHY | Facility: CLINIC | Age: 81
Discharge: HOME | End: 2021-10-14
Attending: INTERNAL MEDICINE | Admitting: INTERNAL MEDICINE
Payer: COMMERCIAL

## 2021-10-14 DIAGNOSIS — Z12.31 SCREENING MAMMOGRAM FOR HIGH-RISK PATIENT: ICD-10-CM

## 2021-10-14 DIAGNOSIS — R41.3 MEMORY CHANGES: ICD-10-CM

## 2021-10-14 LAB — VIT B12 SERPL-MCNC: 663 PG/ML (ref 193–986)

## 2021-10-14 PROCEDURE — 99000 SPECIMEN HANDLING OFFICE-LAB: CPT

## 2021-10-14 PROCEDURE — 36415 COLL VENOUS BLD VENIPUNCTURE: CPT

## 2021-10-14 PROCEDURE — 83921 ORGANIC ACID SINGLE QUANT: CPT

## 2021-10-14 PROCEDURE — 82607 VITAMIN B-12: CPT

## 2021-10-14 PROCEDURE — 84425 ASSAY OF VITAMIN B-1: CPT | Mod: 90

## 2021-10-14 PROCEDURE — 77063 BREAST TOMOSYNTHESIS BI: CPT

## 2021-10-16 ENCOUNTER — HOSPITAL ENCOUNTER (OUTPATIENT)
Dept: MRI IMAGING | Facility: CLINIC | Age: 81
Discharge: HOME | End: 2021-10-16
Attending: PSYCHIATRY & NEUROLOGY | Admitting: PSYCHIATRY & NEUROLOGY
Payer: COMMERCIAL

## 2021-10-16 DIAGNOSIS — R41.3 MEMORY CHANGES: ICD-10-CM

## 2021-10-16 PROCEDURE — 70551 MRI BRAIN STEM W/O DYE: CPT

## 2021-10-17 LAB — VIT B1 PYROPHOSHATE BLD-SCNC: 102 NMOL/L

## 2021-10-18 ENCOUNTER — TELEPHONE (OUTPATIENT)
Dept: NEUROLOGY | Facility: CLINIC | Age: 81
End: 2021-10-18

## 2021-10-18 NOTE — TELEPHONE ENCOUNTER
----- Message from Chu Lara MD sent at 10/18/2021  7:58 AM CDT -----  Please advise the patient that her brain MRI does not have any concerning findings.Thanks,Chu Lara MD.

## 2021-10-19 ENCOUNTER — TELEPHONE (OUTPATIENT)
Dept: NEUROLOGY | Facility: CLINIC | Age: 81
End: 2021-10-19

## 2021-10-19 LAB — METHYLMALONATE SERPL-SCNC: 0.23 UMOL/L (ref 0–0.4)

## 2021-10-19 NOTE — TELEPHONE ENCOUNTER
----- Message from Chu Lara MD sent at 10/19/2021  8:15 AM CDT -----  Please advise the patient that her labs are normal.Thanks,Chu Lara MD.

## 2021-10-25 DIAGNOSIS — M54.50 BILATERAL LOW BACK PAIN WITHOUT SCIATICA, UNSPECIFIED CHRONICITY: ICD-10-CM

## 2021-10-25 DIAGNOSIS — E11.21 TYPE 2 DIABETES MELLITUS WITH DIABETIC NEPHROPATHY (H): Chronic | ICD-10-CM

## 2021-10-25 DIAGNOSIS — J30.2 SEASONAL ALLERGIC RHINITIS, UNSPECIFIED TRIGGER: Primary | ICD-10-CM

## 2021-10-25 DIAGNOSIS — K21.9 GASTROESOPHAGEAL REFLUX DISEASE, UNSPECIFIED WHETHER ESOPHAGITIS PRESENT: ICD-10-CM

## 2021-10-25 DIAGNOSIS — E11.21 TYPE 2 DIABETES MELLITUS WITH DIABETIC NEPHROPATHY, WITHOUT LONG-TERM CURRENT USE OF INSULIN (H): Chronic | ICD-10-CM

## 2021-10-25 RX ORDER — LANCING DEVICE/LANCETS
KIT MISCELLANEOUS
Qty: 1 EACH | Refills: 0 | Status: SHIPPED | OUTPATIENT
Start: 2021-10-25

## 2021-10-25 RX ORDER — LANCETS
EACH MISCELLANEOUS
Qty: 100 EACH | Refills: 1 | Status: SHIPPED | OUTPATIENT
Start: 2021-10-25

## 2021-10-25 RX ORDER — FAMOTIDINE 20 MG/1
20 TABLET, FILM COATED ORAL 2 TIMES DAILY PRN
Qty: 180 TABLET | Refills: 0 | Status: SHIPPED | OUTPATIENT
Start: 2021-10-25

## 2021-10-25 RX ORDER — CETIRIZINE HYDROCHLORIDE 10 MG/1
10 TABLET ORAL DAILY
Qty: 90 TABLET | Refills: 1 | Status: SHIPPED | OUTPATIENT
Start: 2021-10-25

## 2021-10-25 NOTE — TELEPHONE ENCOUNTER
Received fax from pharmacy asking for new prescriptions for below medications.  Spoke with patient and verified which medications she needs.    1.  ASA 81 mg daily  2.  Cholecalciferol 1000u daily  3.  Glucometer--her old one does not work anymore  4.  Test strips--has not been checking her blood sugar but is supposed to check blood sugar daily.  Will start.  5.  Famotidine 20mg daily as needed  6.  Lancets  7.  Selden device       Last office visit 8-6-21    This RN e-scribed diabetic supplies approved per [facility] Refill Protocol.    Routing other medications to provider for review/approval because:  Patient was taking medications OTC.    Please advise, thanks.

## 2021-12-14 DIAGNOSIS — E11.21 TYPE 2 DIABETES MELLITUS WITH DIABETIC NEPHROPATHY, WITHOUT LONG-TERM CURRENT USE OF INSULIN (H): ICD-10-CM

## 2021-12-16 RX ORDER — GLIPIZIDE 5 MG/1
TABLET, FILM COATED, EXTENDED RELEASE ORAL
Qty: 90 TABLET | Refills: 0 | Status: SHIPPED | OUTPATIENT
Start: 2021-12-16 | End: 2022-03-22

## 2021-12-16 NOTE — TELEPHONE ENCOUNTER
Pending Prescriptions:                       Disp   Refills    glipiZIDE (GLUCOTROL XL) 5 MG 24 hr tablet*90 tab*0        Sig: TAKE 1 TABLET BY MOUTH ONE TIME DAILY    Routing refill request to provider for review/approval because:  Needs provider review

## 2021-12-27 DIAGNOSIS — I10 BENIGN ESSENTIAL HYPERTENSION: ICD-10-CM

## 2021-12-28 ENCOUNTER — OFFICE VISIT (OUTPATIENT)
Dept: URGENT CARE | Facility: URGENT CARE | Age: 81
End: 2021-12-28
Payer: COMMERCIAL

## 2021-12-28 VITALS
DIASTOLIC BLOOD PRESSURE: 76 MMHG | SYSTOLIC BLOOD PRESSURE: 122 MMHG | BODY MASS INDEX: 29.86 KG/M2 | WEIGHT: 185 LBS | HEART RATE: 94 BPM | TEMPERATURE: 98.8 F | OXYGEN SATURATION: 95 % | RESPIRATION RATE: 20 BRPM

## 2021-12-28 DIAGNOSIS — E11.21 TYPE 2 DIABETES MELLITUS WITH DIABETIC NEPHROPATHY, WITHOUT LONG-TERM CURRENT USE OF INSULIN (H): ICD-10-CM

## 2021-12-28 DIAGNOSIS — N18.31 STAGE 3A CHRONIC KIDNEY DISEASE (H): ICD-10-CM

## 2021-12-28 DIAGNOSIS — R30.0 DYSURIA: Primary | ICD-10-CM

## 2021-12-28 DIAGNOSIS — R35.0 URINARY FREQUENCY: ICD-10-CM

## 2021-12-28 LAB
ALBUMIN UR-MCNC: NEGATIVE MG/DL
APPEARANCE UR: CLEAR
BACTERIA #/AREA URNS HPF: ABNORMAL /HPF
BILIRUB UR QL STRIP: NEGATIVE
COLOR UR AUTO: YELLOW
GLUCOSE UR STRIP-MCNC: NEGATIVE MG/DL
HGB UR QL STRIP: ABNORMAL
KETONES UR STRIP-MCNC: NEGATIVE MG/DL
LEUKOCYTE ESTERASE UR QL STRIP: NEGATIVE
NITRATE UR QL: NEGATIVE
PH UR STRIP: 6 [PH] (ref 5–7)
RBC #/AREA URNS AUTO: ABNORMAL /HPF
SP GR UR STRIP: 1.02 (ref 1–1.03)
SQUAMOUS #/AREA URNS AUTO: ABNORMAL /LPF
UROBILINOGEN UR STRIP-ACNC: 0.2 E.U./DL
WBC #/AREA URNS AUTO: ABNORMAL /HPF

## 2021-12-28 PROCEDURE — 99214 OFFICE O/P EST MOD 30 MIN: CPT | Performed by: PHYSICIAN ASSISTANT

## 2021-12-28 PROCEDURE — 87086 URINE CULTURE/COLONY COUNT: CPT | Performed by: PHYSICIAN ASSISTANT

## 2021-12-28 PROCEDURE — 81001 URINALYSIS AUTO W/SCOPE: CPT | Performed by: PHYSICIAN ASSISTANT

## 2021-12-28 RX ORDER — CIPROFLOXACIN 250 MG/1
250 TABLET, FILM COATED ORAL 2 TIMES DAILY
Qty: 20 TABLET | Refills: 0 | Status: SHIPPED | OUTPATIENT
Start: 2021-12-28 | End: 2022-01-07

## 2021-12-28 NOTE — PROGRESS NOTES
Assessment & Plan     Dysuria  UA positive  Urine culture pending  Start on cipro for infection  - UA Macro with Reflex to Micro and Culture - lab collect; Future  - UA Macro with Reflex to Micro and Culture - lab collect  - Urine Microscopic  - Urine Culture  - ciprofloxacin (CIPRO) 250 MG tablet; Take 1 tablet (250 mg) by mouth 2 times daily for 10 days    Urinary frequency  cipro for infection  - Urine Culture  - ciprofloxacin (CIPRO) 250 MG tablet; Take 1 tablet (250 mg) by mouth 2 times daily for 10 days    Type 2 diabetes mellitus with diabetic nephropathy, without long-term current use of insulin (H)  Monitor blood sugars as they fluctuate with infection    Stage 3a chronic kidney disease (H)  CrCl cannot be calculated (Patient's most recent lab result is older than the maximum 30 days allowed.).        Review of external notes as documented elsewhere in note        No follow-ups on file.    Surya Huizar PA-C  [facility] BENJA Oh is a 81 year old who presents for the following health issues     HPI     Dysuria  Frequency of urination  Hx of infection    Review of Systems   Constitutional, HEENT, cardiovascular, pulmonary, gi and gu systems are negative, except as otherwise noted.      Objective    /76   Pulse 94   Temp 98.8  F (37.1  C)   Resp 20   Wt 83.9 kg (185 lb)   LMP  (LMP Unknown)   SpO2 95%   BMI 29.86 kg/m    Body mass index is 29.86 kg/m .  Physical Exam   GENERAL: healthy, alert and no distress  ABDOMEN: soft, nontender, no hepatosplenomegaly, no masses and bowel sounds normal  MS: no gross musculoskeletal defects noted, no edema  SKIN: no suspicious lesions or rashes  NEURO: Normal strength and tone, mentation intact and speech normal    Results for orders placed or performed in visit on 12/28/21   UA Macro with Reflex to Micro and Culture - lab collect     Status: Abnormal    Specimen: Urine, Clean Catch   Result Value Ref Range    Color  Urine Yellow Colorless, Straw, Light Yellow, Yellow    Appearance Urine Clear Clear    Glucose Urine Negative Negative mg/dL    Bilirubin Urine Negative Negative    Ketones Urine Negative Negative mg/dL    Specific Gravity Urine 1.020 1.003 - 1.035    Blood Urine Trace (A) Negative    pH Urine 6.0 5.0 - 7.0    Protein Albumin Urine Negative Negative mg/dL    Urobilinogen Urine 0.2 0.2, 1.0 E.U./dL    Nitrite Urine Negative Negative    Leukocyte Esterase Urine Negative Negative   Urine Microscopic     Status: Abnormal   Result Value Ref Range    Bacteria Urine Few (A) None Seen /HPF    RBC Urine 0-2 0-2 /HPF /HPF    WBC Urine 0-5 0-5 /HPF /HPF    Squamous Epithelials Urine Few (A) None Seen /LPF    Narrative    Urine Culture not indicated

## 2021-12-28 NOTE — PATIENT INSTRUCTIONS

## 2021-12-29 RX ORDER — HYDROCHLOROTHIAZIDE 25 MG/1
25 TABLET ORAL DAILY
Qty: 45 TABLET | Refills: 0 | Status: SHIPPED | OUTPATIENT
Start: 2021-12-29 | End: 2022-04-06

## 2021-12-29 NOTE — TELEPHONE ENCOUNTER
Routing refill request to provider for review/approval because:  Labs out of range:    Blood pressure out of protocol range    Pending Prescriptions:                       Disp   Refills    hydrochlorothiazide (HYDRODIURIL) 25 MG ta*90 tab*0        Sig: TAKE ONE TABLET BY MOUTH ONE TIME DAILY

## 2021-12-30 LAB — BACTERIA UR CULT: NO GROWTH

## 2022-01-10 ENCOUNTER — VIRTUAL VISIT (OUTPATIENT)
Dept: UROLOGY | Facility: CLINIC | Age: 82
End: 2022-01-10
Payer: COMMERCIAL

## 2022-01-10 ENCOUNTER — TELEPHONE (OUTPATIENT)
Dept: UROLOGY | Facility: CLINIC | Age: 82
End: 2022-01-10

## 2022-01-10 VITALS — WEIGHT: 190 LBS | HEIGHT: 66 IN | BODY MASS INDEX: 30.53 KG/M2

## 2022-01-10 DIAGNOSIS — R39.15 URINARY URGENCY: Primary | ICD-10-CM

## 2022-01-10 DIAGNOSIS — B37.31 CANDIDIASIS OF VAGINA: ICD-10-CM

## 2022-01-10 PROCEDURE — 99213 OFFICE O/P EST LOW 20 MIN: CPT | Mod: TEL | Performed by: PHYSICIAN ASSISTANT

## 2022-01-10 RX ORDER — MIRABEGRON 25 MG/1
25 TABLET, FILM COATED, EXTENDED RELEASE ORAL DAILY
Qty: 90 TABLET | Refills: 4 | Status: SHIPPED | OUTPATIENT
Start: 2022-01-10 | End: 2022-01-11

## 2022-01-10 RX ORDER — FLUCONAZOLE 150 MG/1
150 TABLET ORAL ONCE
Qty: 1 TABLET | Refills: 0 | Status: SHIPPED | OUTPATIENT
Start: 2022-01-10 | End: 2022-01-10

## 2022-01-10 ASSESSMENT — MIFFLIN-ST. JEOR: SCORE: 1338.58

## 2022-01-10 ASSESSMENT — PAIN SCALES - GENERAL: PAINLEVEL: NO PAIN (0)
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
## 2022-01-10 NOTE — TELEPHONE ENCOUNTER
Urology pt of Mirna Adorno PA-C seen today for urgency and vaginal veronica. Althea calls reporting that the Mirabegron Rx is too expensive and asks about an alternative med. This request forwarded to provider via In Basket message. Will await response. Pt expressed understanding.

## 2022-01-11 ENCOUNTER — TELEPHONE (OUTPATIENT)
Dept: UROLOGY | Facility: CLINIC | Age: 82
End: 2022-01-11
Payer: COMMERCIAL

## 2022-01-11 DIAGNOSIS — R39.15 URINARY URGENCY: Primary | ICD-10-CM

## 2022-01-11 RX ORDER — SOLIFENACIN SUCCINATE 10 MG/1
10 TABLET, FILM COATED ORAL DAILY
Qty: 90 TABLET | Refills: 3 | Status: SHIPPED | OUTPATIENT
Start: 2022-01-11 | End: 2022-07-01

## 2022-01-11 NOTE — TELEPHONE ENCOUNTER
Rx for Vesicare sent to pt's pharmacy as below. Pt updated.  MARYANN Nunn RN      ----- Message from Mirna Adorno PA-C sent at 1/10/2022  4:08 PM CST -----  Regarding: RE: alternative to Mirabegron  Vesicare 10 mg daily      ----- Message -----  From: Mirta Nunn RN  Sent: 1/10/2022   3:48 PM CST  To: Mirna Adorno PA-C  Subject: alternative to Mirabegron                        Dimple Merchant calls this afternoon reporting that the Mirabegron is too expensive and asks about an alternative.  Thank you,  Mirta

## 2022-01-27 ENCOUNTER — OFFICE VISIT (OUTPATIENT)
Dept: URGENT CARE | Facility: URGENT CARE | Age: 82
End: 2022-01-27
Payer: COMMERCIAL

## 2022-01-27 ENCOUNTER — NURSE TRIAGE (OUTPATIENT)
Dept: NURSING | Facility: CLINIC | Age: 82
End: 2022-01-27

## 2022-01-27 ENCOUNTER — TELEPHONE (OUTPATIENT)
Dept: UROLOGY | Facility: CLINIC | Age: 82
End: 2022-01-27
Payer: COMMERCIAL

## 2022-01-27 VITALS
TEMPERATURE: 97.6 F | OXYGEN SATURATION: 96 % | SYSTOLIC BLOOD PRESSURE: 157 MMHG | DIASTOLIC BLOOD PRESSURE: 80 MMHG | HEART RATE: 84 BPM

## 2022-01-27 DIAGNOSIS — N30.01 ACUTE CYSTITIS WITH HEMATURIA: Primary | ICD-10-CM

## 2022-01-27 DIAGNOSIS — R30.0 DYSURIA: ICD-10-CM

## 2022-01-27 LAB
ALBUMIN UR-MCNC: NEGATIVE MG/DL
APPEARANCE UR: CLEAR
BACTERIA #/AREA URNS HPF: ABNORMAL /HPF
BILIRUB UR QL STRIP: NEGATIVE
COLOR UR AUTO: YELLOW
GLUCOSE UR STRIP-MCNC: NEGATIVE MG/DL
HGB UR QL STRIP: ABNORMAL
KETONES UR STRIP-MCNC: NEGATIVE MG/DL
LEUKOCYTE ESTERASE UR QL STRIP: ABNORMAL
NITRATE UR QL: NEGATIVE
PH UR STRIP: 6.5 [PH] (ref 5–7)
RBC #/AREA URNS AUTO: ABNORMAL /HPF
SP GR UR STRIP: 1.02 (ref 1–1.03)
SQUAMOUS #/AREA URNS AUTO: ABNORMAL /LPF
UROBILINOGEN UR STRIP-ACNC: 1 E.U./DL
WBC #/AREA URNS AUTO: ABNORMAL /HPF

## 2022-01-27 PROCEDURE — 99213 OFFICE O/P EST LOW 20 MIN: CPT | Performed by: PHYSICIAN ASSISTANT

## 2022-01-27 PROCEDURE — 87086 URINE CULTURE/COLONY COUNT: CPT | Performed by: PHYSICIAN ASSISTANT

## 2022-01-27 PROCEDURE — 81001 URINALYSIS AUTO W/SCOPE: CPT | Performed by: PHYSICIAN ASSISTANT

## 2022-01-27 RX ORDER — FLUCONAZOLE 150 MG/1
150 TABLET ORAL DAILY
Qty: 3 TABLET | Refills: 0 | Status: SHIPPED | OUTPATIENT
Start: 2022-01-27 | End: 2022-01-30

## 2022-01-27 RX ORDER — SULFAMETHOXAZOLE/TRIMETHOPRIM 800-160 MG
1 TABLET ORAL 2 TIMES DAILY
Qty: 14 TABLET | Refills: 0 | Status: SHIPPED | OUTPATIENT
Start: 2022-01-27 | End: 2022-02-02

## 2022-01-27 ASSESSMENT — ENCOUNTER SYMPTOMS
FEVER: 0
FREQUENCY: 1

## 2022-01-27 NOTE — PATIENT INSTRUCTIONS

## 2022-01-27 NOTE — TELEPHONE ENCOUNTER
----- Message from Kenna Lyn sent at 1/12/2022  9:03 AM CST -----  1 mo, phone visit, med     MARCUS
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
## 2022-01-28 ENCOUNTER — TELEPHONE (OUTPATIENT)
Dept: UROLOGY | Facility: CLINIC | Age: 82
End: 2022-01-28
Payer: COMMERCIAL

## 2022-01-28 DIAGNOSIS — N39.0 URINARY TRACT INFECTION: Primary | ICD-10-CM

## 2022-01-28 RX ORDER — CIPROFLOXACIN 500 MG/1
500 TABLET, FILM COATED ORAL 2 TIMES DAILY
Qty: 14 TABLET | Refills: 0 | Status: SHIPPED | OUTPATIENT
Start: 2022-01-28 | End: 2022-04-06

## 2022-01-28 NOTE — TELEPHONE ENCOUNTER
Health Call Center    Phone Message    May a detailed message be left on voicemail: yes     Reason for Call: Medication Question or concern regarding medication   Prescription Clarification  Name of Medication: ulfamethoxazole-trimethoprim (BACTRIM DS) 800-160 MG tablet [50040] (Order [id number])  Prescribing Provider: Viviana Valverde   What on the order needs clarification? Patient went to Urgent care yesterday and was prescribed the above medication.  Per patient, that medication interferes with her other meds.  She would like to be prescribed ciprofloxacin, has Mirna has prescribed that before to her.  Please send to Pharmacy: [id number]    Please reach out to patient in regards to UTI.        Action Taken: Message routed to:  [facility] ([facility] Urology    Travel Screening: Not Applicable

## 2022-01-28 NOTE — TELEPHONE ENCOUNTER
Pt called in states she got medication from  today.  The Pt states the medication she got is flagged by her insurance because of her kidney disease and the pharmacist told her to contact the prescribed Dr for medication change.  The medication she got from the  is sulfamethoxazole-trimethoprim (BACTRIM DS) 800-160 MG tablet,  The Pt got  ciprofloxacin for UTI in the past.  The Pt request the provider to change the medication.    Please advise.      Abram Jean-B[facility]iste [place] Nurse Advisor 1/27/2022 7:46 PM      Reason for Disposition    [1] Caller has URGENT medication question about med that PCP or specialist prescribed AND [2] triager unable to answer question    Additional Information    Negative: Drug overdose and triager unable to answer question    Negative: Caller requesting information unrelated to medicine    Negative: Caller requesting a prescription for Strep throat and has a positive culture result    Negative: Rash while taking a medication or within 3 days of stopping it    Negative: Immunization reaction suspected    Negative: [1] Asthma and [2] having symptoms of asthma (cough, wheezing, etc.)    Negative: [1] Influenza symptoms AND [2] anti-viral med prescription request, such as Tamiflu    Negative: [1] Symptom of illness (e.g., headache, abdominal pain, earache, vomiting) AND [2] more than mild    Negative: MORE THAN A DOUBLE DOSE of a prescription or over-the-counter (OTC) drug    Negative: [1] DOUBLE DOSE (an extra dose or lesser amount) of over-the-counter (OTC) drug AND [2] any symptoms (e.g., dizziness, nausea, pain, sleepiness)    Negative: [1] DOUBLE DOSE (an extra dose or lesser amount) of prescription drug AND [2] any symptoms (e.g., dizziness, nausea, pain, sleepiness)    Negative: Took another person's prescription drug    Negative: [1] Pharmacy calling with prescription questions AND [2] triager unable to answer question    Negative: [1] Prescription not at pharmacy AND [2] was  "prescribed by PCP recently    Negative: [1] Request for URGENT new prescription or refill of \"essential\" medication (i.e., likelihood of harm to patient if not taken) AND [2] triager unable to fill per unit policy    Negative: Diabetes drug error or overdose (e.g., took wrong type of insulin or took extra dose)    Negative: [1] DOUBLE DOSE (an extra dose or lesser amount) of prescription drug AND [2] NO symptoms (Exception: a double dose of antibiotics)    Protocols used: MEDICATION QUESTION CALL-A-AH      "

## 2022-01-29 LAB — BACTERIA UR CULT: NORMAL

## 2022-01-31 ENCOUNTER — LAB (OUTPATIENT)
Dept: LAB | Facility: CLINIC | Age: 82
End: 2022-01-31
Payer: COMMERCIAL

## 2022-01-31 DIAGNOSIS — E83.52 SERUM CALCIUM ELEVATED: ICD-10-CM

## 2022-01-31 DIAGNOSIS — R74.8 ELEVATED LIVER ENZYMES: ICD-10-CM

## 2022-01-31 PROCEDURE — 36415 COLL VENOUS BLD VENIPUNCTURE: CPT

## 2022-01-31 PROCEDURE — 80076 HEPATIC FUNCTION PANEL: CPT

## 2022-01-31 PROCEDURE — 82310 ASSAY OF CALCIUM: CPT

## 2022-02-01 LAB
ALBUMIN SERPL-MCNC: 3.5 G/DL (ref 3.4–5)
ALP SERPL-CCNC: 75 U/L (ref 40–150)
ALT SERPL W P-5'-P-CCNC: 31 U/L (ref 0–50)
AST SERPL W P-5'-P-CCNC: 29 U/L (ref 0–45)
BILIRUB DIRECT SERPL-MCNC: 0.1 MG/DL (ref 0–0.2)
BILIRUB SERPL-MCNC: 0.6 MG/DL (ref 0.2–1.3)
CALCIUM SERPL-MCNC: 9.4 MG/DL (ref 8.5–10.1)
PROT SERPL-MCNC: 7.7 G/DL (ref 6.8–8.8)

## 2022-02-02 ENCOUNTER — VIRTUAL VISIT (OUTPATIENT)
Dept: INTERNAL MEDICINE | Facility: CLINIC | Age: 82
End: 2022-02-02
Payer: COMMERCIAL

## 2022-02-02 DIAGNOSIS — J84.10 PULMONARY FIBROSIS (H): ICD-10-CM

## 2022-02-02 DIAGNOSIS — E11.21 TYPE 2 DIABETES MELLITUS WITH DIABETIC NEPHROPATHY, WITHOUT LONG-TERM CURRENT USE OF INSULIN (H): Primary | ICD-10-CM

## 2022-02-02 DIAGNOSIS — E87.1 LOW SODIUM LEVELS: ICD-10-CM

## 2022-02-02 DIAGNOSIS — I44.7 LBBB (LEFT BUNDLE BRANCH BLOCK): ICD-10-CM

## 2022-02-02 DIAGNOSIS — I27.20 PULMONARY HYPERTENSION (H): ICD-10-CM

## 2022-02-02 DIAGNOSIS — N18.31 STAGE 3A CHRONIC KIDNEY DISEASE (H): ICD-10-CM

## 2022-02-02 DIAGNOSIS — I10 BENIGN ESSENTIAL HYPERTENSION: ICD-10-CM

## 2022-02-02 DIAGNOSIS — F51.01 PRIMARY INSOMNIA: Chronic | ICD-10-CM

## 2022-02-02 PROCEDURE — 99214 OFFICE O/P EST MOD 30 MIN: CPT | Mod: 95 | Performed by: INTERNAL MEDICINE

## 2022-02-02 RX ORDER — QUINAPRIL 40 MG/1
40 TABLET ORAL DAILY
Qty: 90 TABLET | Refills: 0 | Status: CANCELLED | OUTPATIENT
Start: 2022-02-02

## 2022-02-02 RX ORDER — METOPROLOL SUCCINATE 100 MG/1
TABLET, EXTENDED RELEASE ORAL
Qty: 90 TABLET | Refills: 0 | Status: SHIPPED | OUTPATIENT
Start: 2022-02-02 | End: 2022-05-26

## 2022-02-02 RX ORDER — TRAZODONE HYDROCHLORIDE 100 MG/1
TABLET ORAL
Qty: 90 TABLET | Refills: 0 | Status: SHIPPED | OUTPATIENT
Start: 2022-02-02 | End: 2022-05-03

## 2022-02-02 RX ORDER — QUINAPRIL 40 MG/1
40 TABLET ORAL DAILY
Qty: 90 TABLET | Refills: 0 | Status: SHIPPED | OUTPATIENT
Start: 2022-02-02 | End: 2022-05-03

## 2022-02-02 NOTE — Clinical Note
Please abstract the following data from this visit with this patient into the appropriate field in Epic:    Eye exam with ophthalmology on this date:  last eye exam [place] [facility]- 04/23/21
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
## 2022-02-05 DIAGNOSIS — F51.01 PRIMARY INSOMNIA: Chronic | ICD-10-CM

## 2022-02-05 DIAGNOSIS — I10 BENIGN ESSENTIAL HYPERTENSION: ICD-10-CM

## 2022-02-05 DIAGNOSIS — I44.7 LBBB (LEFT BUNDLE BRANCH BLOCK): ICD-10-CM

## 2022-02-08 ENCOUNTER — ALLIED HEALTH/NURSE VISIT (OUTPATIENT)
Dept: NURSING | Facility: CLINIC | Age: 82
End: 2022-02-08
Payer: COMMERCIAL

## 2022-02-08 ENCOUNTER — LAB (OUTPATIENT)
Dept: LAB | Facility: CLINIC | Age: 82
End: 2022-02-08
Payer: COMMERCIAL

## 2022-02-08 VITALS — SYSTOLIC BLOOD PRESSURE: 128 MMHG | DIASTOLIC BLOOD PRESSURE: 62 MMHG

## 2022-02-08 DIAGNOSIS — E11.21 TYPE 2 DIABETES MELLITUS WITH DIABETIC NEPHROPATHY, WITHOUT LONG-TERM CURRENT USE OF INSULIN (H): ICD-10-CM

## 2022-02-08 DIAGNOSIS — I10 BENIGN ESSENTIAL HYPERTENSION: Primary | ICD-10-CM

## 2022-02-08 LAB
ANION GAP SERPL CALCULATED.3IONS-SCNC: 1 MMOL/L (ref 3–14)
BUN SERPL-MCNC: 18 MG/DL (ref 7–30)
CALCIUM SERPL-MCNC: 9.3 MG/DL (ref 8.5–10.1)
CHLORIDE BLD-SCNC: 96 MMOL/L (ref 94–109)
CO2 SERPL-SCNC: 33 MMOL/L (ref 20–32)
CREAT SERPL-MCNC: 1.09 MG/DL (ref 0.52–1.04)
CREAT UR-MCNC: 112 MG/DL
GFR SERPL CREATININE-BSD FRML MDRD: 50 ML/MIN/1.73M2
GLUCOSE BLD-MCNC: 117 MG/DL (ref 70–99)
HBA1C MFR BLD: 6.3 % (ref 0–5.6)
MICROALBUMIN UR-MCNC: 10 MG/L
MICROALBUMIN/CREAT UR: 8.93 MG/G CR (ref 0–25)
POTASSIUM BLD-SCNC: 4.2 MMOL/L (ref 3.4–5.3)
SODIUM SERPL-SCNC: 130 MMOL/L (ref 133–144)

## 2022-02-08 PROCEDURE — 80048 BASIC METABOLIC PNL TOTAL CA: CPT

## 2022-02-08 PROCEDURE — 82043 UR ALBUMIN QUANTITATIVE: CPT

## 2022-02-08 PROCEDURE — 83036 HEMOGLOBIN GLYCOSYLATED A1C: CPT

## 2022-02-08 PROCEDURE — 36415 COLL VENOUS BLD VENIPUNCTURE: CPT

## 2022-02-08 PROCEDURE — 99207 PR NO CHARGE NURSE ONLY: CPT

## 2022-02-08 RX ORDER — METOPROLOL SUCCINATE 100 MG/1
TABLET, EXTENDED RELEASE ORAL
Qty: 90 TABLET | Refills: 0 | OUTPATIENT
Start: 2022-02-08

## 2022-02-08 RX ORDER — TRAZODONE HYDROCHLORIDE 100 MG/1
TABLET ORAL
Qty: 90 TABLET | Refills: 0 | OUTPATIENT
Start: 2022-02-08

## 2022-02-08 NOTE — PROGRESS NOTES
Dimple Baxter is a 81 year old year old patient who comes in today for a Blood Pressure check because of new medication.On Quinapril, Metoprolol, Hydrochlorothiazide   Vital Signs as repeated by /62   Patient is taking medication as prescribed  Patient is tolerating medications well.  Patient is not monitoring Blood Pressure at home.    Current complaints: none  Disposition:  patient to continue with the same medication. Routing to provider as GUILLERMINA NOVAK RN  [facility]

## 2022-02-08 NOTE — TELEPHONE ENCOUNTER
Medication refused, filled 2/2/22 for 90 day supply    Pending Prescriptions:                       Disp   Refills    metoprolol succinate ER (TOPROL-XL) 100 M*90 tab*0            Sig: TAKE ONE TABLET BY MOUTH ONE TIME DAILY    traZODone (DESYREL) 100 MG tablet [Pharma*90 tab*0            Sig: TAKE ONE TABLET AT BEDTIME AS NEEDED FOR SLEEP

## 2022-03-08 ENCOUNTER — LAB (OUTPATIENT)
Dept: LAB | Facility: CLINIC | Age: 82
End: 2022-03-08
Payer: COMMERCIAL

## 2022-03-08 DIAGNOSIS — E87.1 LOW SODIUM LEVELS: ICD-10-CM

## 2022-03-08 LAB — SODIUM SERPL-SCNC: 140 MMOL/L (ref 133–144)

## 2022-03-08 PROCEDURE — 36415 COLL VENOUS BLD VENIPUNCTURE: CPT

## 2022-03-08 PROCEDURE — 84295 ASSAY OF SERUM SODIUM: CPT

## 2022-04-05 ENCOUNTER — OFFICE VISIT (OUTPATIENT)
Dept: NEUROLOGY | Facility: CLINIC | Age: 82
End: 2022-04-05
Payer: COMMERCIAL

## 2022-04-05 VITALS — OXYGEN SATURATION: 94 % | HEART RATE: 85 BPM | DIASTOLIC BLOOD PRESSURE: 89 MMHG | SYSTOLIC BLOOD PRESSURE: 174 MMHG

## 2022-04-05 DIAGNOSIS — R41.3 MEMORY CHANGES: Primary | ICD-10-CM

## 2022-04-05 PROCEDURE — 99213 OFFICE O/P EST LOW 20 MIN: CPT | Performed by: PSYCHIATRY & NEUROLOGY

## 2022-04-05 ASSESSMENT — MONTREAL COGNITIVE ASSESSMENT (MOCA)
WHAT IS THE TOTAL SCORE (OUT OF 30): 28
9. REPEAT EACH SENTENCE: 2
6. READ LIST OF DIGITS [FORWARD/BACKWARD]: 2
13. ORIENTATION SUBSCORE: 6
VISUOSPATIAL/EXECUTIVE SUBSCORE: 4
11. FOR EACH PAIR OF WORDS, WHAT CATEGORY DO THEY BELONG TO (OUT OF 2): 2
8. SERIAL SUBTRACTION OF 7S: 2
WHAT LEVEL OF EDUCATION WAS ATTAINED: 1
10. [FLUENCY] NAME WORDS STARTING WITH DESIGNATED LETTER: 0
12. MEMORY INDEX SCORE: 5
4. NAME EACH OF THE THREE ANIMALS SHOWN: 3
7. [VIGILENCE] TAP WHEN HEARING DESIGNATED LETTER: 1

## 2022-04-05 NOTE — PATIENT INSTRUCTIONS
AFTER VISIT SUMMARY (AVS):    At today's visit we thoroughly discussed current symptoms, cognitive test results (stable), and the plan.    We decided to repeat cognitive test again at the next visit.    Next follow-up appointment is in the next 1 year or earlier if needed.    Please do not hesitate to call me with any questions or concerns.    Thanks.

## 2022-04-05 NOTE — LETTER
4/5/2022         RE: Dimple Baxter  [address] [address]        Dear Colleague,    Thank you for referring your patient, Dimple Baxter, to the [facility] NEUROLOGY [facility]. Please see a copy of my visit note below.    ESTABLISHED PATIENT NEUROLOGY NOTE    DATE OF VISIT: 4/5/2022  CLINIC LOCATION: [facility]  MRN: [id number]  PATIENT NAME: Dimple Baxter  YOB: 1940    REASON FOR VISIT:   Chief Complaint   Patient presents with     RECHECK     Memory 6 month follow up     SUBJECTIVE:                                                      HISTORY OF PRESENT ILLNESS: Patient is here to follow up regarding memory loss. Please refer to my initial note from 10/5/2021 for further information.  The patient is accompanied by her daughter, who participates in interview today.    Since the last visit, the patient reports that memory issues are stable.  Her daughter agrees and has no additional concerns.  The patient denies interval development of new focal neurological symptoms.    Brain MRI from 10/16/2021 demonstrated mild diffuse cerebral volume loss and mild white matter changes consistent with small vessel ischemic disease.  Images were personally reviewed and independently interpreted.    Laboratory evaluation, including methylmalonic acid, B1, and B12 was unrevealing.  EXAM:                                                    Physical Exam:   Vitals: BP (!) 177/82   Pulse 85   LMP  (LMP Unknown)   SpO2 94%   Gonzalo Cognitive Assessment:    [place] Cognitive Assessment (MOCA)  Visuospatial/Executive : 4  Naming: 3  Attention - Digits: 2  Attention - Letters: 1  Attention - Subtraction: 2  Language - Repeat: 2  Language - Fluency : 0  Abstraction: 2  Delayed Recall: 5  Orientation: 6  Education: 1  MOCA Score: 28  Administered by: : Ortega CARY     [place] Cognitive Assessment Score:  MOCA Score: 28/30.     General: pt is in NAD,  cooperative.  Skin: normal turgor, moist mucous membranes, no lesions/rashes noticed.  HEENT: ATNC, white sclera, normal conjunctiva.  Respiratory: Symmetric lung excursion, no accessory respiratory muscle use.  Abdomen: Non distended.  Neurological: awake, cooperative, follows commands, MoCA as per above, no other exam changes compared to the initial visit.  ASSESSMENT AND PLAN:                                                    Assessment: 81 year old female patient presents for follow-up of memory loss.  She reports that her memory changes are stable.  Her MoCA today is 28/30, compared to 26/30 on initial visit in October 2021.  At that time we discussed option of CPT and neuropsychology evaluation if we notice score reduction.  However, we did not see it today.  I advised the patient to continue monitoring her cognition with regular cognitive evaluation which should be done annually.    Althea to follow up with Primary Care provider regarding elevated blood pressure.     Diagnoses:    ICD-10-CM    1. Memory changes  R41.3      Plan: At today's visit we thoroughly discussed current symptoms, cognitive test results (stable), and the plan.    We decided to repeat cognitive test again at the next visit.    Next follow-up appointment is in the next 1 year or earlier if needed.    Total Time: 21 minutes spent on the date of the encounter doing chart review, history and exam, documentation and further activities per the note.    Chu Lara MD  [facility]  (Chart documentation was completed in part with Dragon voice-recognition software. Even though reviewed, some grammatical, spelling, and word errors may remain.)        Again, thank you for allowing me to participate in the care of your patient.        Sincerely,        Chu Lara MD

## 2022-04-05 NOTE — NURSING NOTE
"Dimple Baxter is a 81 year old female who presents for:  Chief Complaint   Patient presents with     RECHECK     Memory 6 month follow up        Initial Vitals:  BP (!) 177/82   Pulse 85   LMP  (LMP Unknown)   SpO2 94%  Estimated body mass index is 30.67 kg/m  as calculated from the following:    Height as of 1/10/22: 5' 6\" (1.676 m).    Weight as of 1/10/22: 190 lb (86.2 kg).. There is no height or weight on file to calculate BSA. BP completed using cuff size: large  Data Unavailable    Ortega Berg MA'    "

## 2022-04-05 NOTE — PROGRESS NOTES
ESTABLISHED PATIENT NEUROLOGY NOTE    DATE OF VISIT: 4/5/2022  CLINIC LOCATION: [facility]  MRN: [id number]  PATIENT NAME: Dimple Baxter  YOB: 1940    REASON FOR VISIT:   Chief Complaint   Patient presents with     RECHECK     Memory 6 month follow up     SUBJECTIVE:                                                      HISTORY OF PRESENT ILLNESS: Patient is here to follow up regarding memory loss. Please refer to my initial note from 10/5/2021 for further information.  The patient is accompanied by her daughter, who participates in interview today.    Since the last visit, the patient reports that memory issues are stable.  Her daughter agrees and has no additional concerns.  The patient denies interval development of new focal neurological symptoms.    Brain MRI from 10/16/2021 demonstrated mild diffuse cerebral volume loss and mild white matter changes consistent with small vessel ischemic disease.  Images were personally reviewed and independently interpreted.    Laboratory evaluation, including methylmalonic acid, B1, and B12 was unrevealing.  EXAM:                                                    Physical Exam:   Vitals: BP (!) 177/82   Pulse 85   LMP  (LMP Unknown)   SpO2 94%   G[place] Cognitive Assessment:    [place] Cognitive Assessment (MOCA)  Visuospatial/Executive : 4  Naming: 3  Attention - Digits: 2  Attention - Letters: 1  Attention - Subtraction: 2  Language - Repeat: 2  Language - Fluency : 0  Abstraction: 2  Delayed Recall: 5  Orientation: 6  Education: 1  MOCA Score: 28  Administered by: : Ortega CARY     [place] Cognitive Assessment Score:  MOCA Score: 28/30.     General: pt is in NAD, cooperative.  Skin: normal turgor, moist mucous membranes, no lesions/rashes noticed.  HEENT: ATNC, white sclera, normal conjunctiva.  Respiratory: Symmetric lung excursion, no accessory respiratory muscle use.  Abdomen: Non distended.  Neurological: awake, cooperative,  follows commands, MoCA as per above, no other exam changes compared to the initial visit.  ASSESSMENT AND PLAN:                                                    Assessment: 81 year old female patient presents for follow-up of memory loss.  She reports that her memory changes are stable.  Her MoCA today is 28/30, compared to 26/30 on initial visit in October 2021.  At that time we discussed option of CPT and neuropsychology evaluation if we notice score reduction.  However, we did not see it today.  I advised the patient to continue monitoring her cognition with regular cognitive evaluation which should be done annually.    Althea to follow up with Primary Care provider regarding elevated blood pressure.     Diagnoses:    ICD-10-CM    1. Memory changes  R41.3      Plan: At today's visit we thoroughly discussed current symptoms, cognitive test results (stable), and the plan.    We decided to repeat cognitive test again at the next visit.    Next follow-up appointment is in the next 1 year or earlier if needed.    Total Time: 21 minutes spent on the date of the encounter doing chart review, history and exam, documentation and further activities per the note.    Chu Lara MD  [facility]  (Chart documentation was completed in part with Dragon voice-recognition software. Even though reviewed, some grammatical, spelling, and word errors may remain.)

## 2022-04-06 ENCOUNTER — OFFICE VISIT (OUTPATIENT)
Dept: URGENT CARE | Facility: URGENT CARE | Age: 82
End: 2022-04-06
Payer: COMMERCIAL

## 2022-04-06 VITALS
SYSTOLIC BLOOD PRESSURE: 162 MMHG | WEIGHT: 190 LBS | OXYGEN SATURATION: 95 % | BODY MASS INDEX: 30.67 KG/M2 | HEART RATE: 78 BPM | RESPIRATION RATE: 20 BRPM | TEMPERATURE: 97.7 F | DIASTOLIC BLOOD PRESSURE: 90 MMHG

## 2022-04-06 DIAGNOSIS — R05.9 COUGH: ICD-10-CM

## 2022-04-06 DIAGNOSIS — J84.10 PULMONARY FIBROSIS (H): ICD-10-CM

## 2022-04-06 DIAGNOSIS — R30.0 DYSURIA: ICD-10-CM

## 2022-04-06 DIAGNOSIS — J20.9 ACUTE BRONCHITIS WITH COEXISTING CONDITION REQUIRING PROPHYLACTIC TREATMENT: Primary | ICD-10-CM

## 2022-04-06 DIAGNOSIS — I10 BENIGN ESSENTIAL HYPERTENSION: ICD-10-CM

## 2022-04-06 LAB
ALBUMIN UR-MCNC: NEGATIVE MG/DL
APPEARANCE UR: CLEAR
BACTERIA #/AREA URNS HPF: ABNORMAL /HPF
BILIRUB UR QL STRIP: NEGATIVE
COLOR UR AUTO: YELLOW
GLUCOSE UR STRIP-MCNC: 250 MG/DL
HGB UR QL STRIP: ABNORMAL
KETONES UR STRIP-MCNC: NEGATIVE MG/DL
LEUKOCYTE ESTERASE UR QL STRIP: NEGATIVE
NITRATE UR QL: NEGATIVE
PH UR STRIP: 5.5 [PH] (ref 5–7)
RBC #/AREA URNS AUTO: ABNORMAL /HPF
SARS-COV-2 RNA RESP QL NAA+PROBE: NEGATIVE
SP GR UR STRIP: 1.02 (ref 1–1.03)
SQUAMOUS #/AREA URNS AUTO: ABNORMAL /LPF
UROBILINOGEN UR STRIP-ACNC: 0.2 E.U./DL
WBC #/AREA URNS AUTO: ABNORMAL /HPF

## 2022-04-06 PROCEDURE — U0005 INFEC AGEN DETEC AMPLI PROBE: HCPCS | Performed by: NURSE PRACTITIONER

## 2022-04-06 PROCEDURE — 87086 URINE CULTURE/COLONY COUNT: CPT | Performed by: NURSE PRACTITIONER

## 2022-04-06 PROCEDURE — U0003 INFECTIOUS AGENT DETECTION BY NUCLEIC ACID (DNA OR RNA); SEVERE ACUTE RESPIRATORY SYNDROME CORONAVIRUS 2 (SARS-COV-2) (CORONAVIRUS DISEASE [COVID-19]), AMPLIFIED PROBE TECHNIQUE, MAKING USE OF HIGH THROUGHPUT TECHNOLOGIES AS DESCRIBED BY CMS-2020-01-R: HCPCS | Performed by: NURSE PRACTITIONER

## 2022-04-06 PROCEDURE — 81001 URINALYSIS AUTO W/SCOPE: CPT | Performed by: NURSE PRACTITIONER

## 2022-04-06 PROCEDURE — 99214 OFFICE O/P EST MOD 30 MIN: CPT | Mod: CS | Performed by: NURSE PRACTITIONER

## 2022-04-06 RX ORDER — PREDNISONE 20 MG/1
40 TABLET ORAL DAILY
Qty: 10 TABLET | Refills: 0 | Status: SHIPPED | OUTPATIENT
Start: 2022-04-06 | End: 2022-04-11

## 2022-04-06 RX ORDER — BENZONATATE 100 MG/1
100 CAPSULE ORAL 3 TIMES DAILY PRN
Qty: 30 CAPSULE | Refills: 0 | Status: SHIPPED | OUTPATIENT
Start: 2022-04-06 | End: 2022-04-16

## 2022-04-06 RX ORDER — DOXYCYCLINE 100 MG/1
100 TABLET ORAL 2 TIMES DAILY
Qty: 20 TABLET | Refills: 0 | Status: SHIPPED | OUTPATIENT
Start: 2022-04-06 | End: 2022-04-16

## 2022-04-06 RX ORDER — ALBUTEROL SULFATE 90 UG/1
2 AEROSOL, METERED RESPIRATORY (INHALATION) EVERY 6 HOURS
Qty: 18 G | Refills: 0 | Status: SHIPPED | OUTPATIENT
Start: 2022-04-06

## 2022-04-06 NOTE — PATIENT INSTRUCTIONS

## 2022-04-06 NOTE — PROGRESS NOTES
Chief Complaint   Patient presents with     Cough     1-2 weeks     UTI     Urgency and polyuria since early this morning         ICD-10-CM    1. Acute bronchitis with coexisting condition requiring prophylactic treatment  J20.9 albuterol (PROAIR HFA/PROVENTIL HFA/VENTOLIN HFA) 108 (90 Base) MCG/ACT inhaler     predniSONE (DELTASONE) 20 MG tablet     doxycycline monohydrate (ADOXA) 100 MG tablet     benzonatate (TESSALON) 100 MG capsule   2. Dysuria  R30.0 UA Macro with Reflex to Micro and Culture - lab collect     Urine Microscopic     Urine Culture Aerobic Bacterial - lab collect   3. Cough  R05.9 Symptomatic; Unknown COVID-19 Virus (Coronavirus) by PCR Nose     albuterol (PROAIR HFA/PROVENTIL HFA/VENTOLIN HFA) 108 (90 Base) MCG/ACT inhaler     doxycycline monohydrate (ADOXA) 100 MG tablet     benzonatate (TESSALON) 100 MG capsule   4. Pulmonary fibrosis (H)  J84.10 albuterol (PROAIR HFA/PROVENTIL HFA/VENTOLIN HFA) 108 (90 Base) MCG/ACT inhaler     predniSONE (DELTASONE) 20 MG tablet     doxycycline monohydrate (ADOXA) 100 MG tablet   5. Benign essential hypertension  I10    Patient will take medications as prescribed.  She will go to the emergency room if symptoms worsen or she develops acute shortness of breath.  She will follow up with primary care provider regarding her hypertension.  Reviewed all medications and she has been taking these appropriately.  She will recheck her blood pressure when she returns home.    No clear evidence of urinary tract infection on urinalysis.  Will add culture to be sure there is no infection.      Results for orders placed or performed in visit on 04/06/22 (from the past 24 hour(s))   UA Macro with Reflex to Micro and Culture - lab collect    Specimen: Urine, Midstream   Result Value Ref Range    Color Urine Yellow Colorless, Straw, Light Yellow, Yellow    Appearance Urine Clear Clear    Glucose Urine 250  (A) Negative mg/dL    Bilirubin Urine Negative Negative    Ketones Urine  Negative Negative mg/dL    Specific Gravity Urine 1.025 1.003 - 1.035    Blood Urine Small (A) Negative    pH Urine 5.5 5.0 - 7.0    Protein Albumin Urine Negative Negative mg/dL    Urobilinogen Urine 0.2 0.2, 1.0 E.U./dL    Nitrite Urine Negative Negative    Leukocyte Esterase Urine Negative Negative   Urine Microscopic   Result Value Ref Range    Bacteria Urine Few (A) None Seen /HPF    RBC Urine 5-10 (A) 0-2 /HPF /HPF    WBC Urine 0-5 0-5 /HPF /HPF    Squamous Epithelials Urine Few (A) None Seen /LPF    Narrative    Urine Culture not indicated     *Note: Due to a large number of results and/or encounters for the requested time period, some results have not been displayed. A complete set of results can be found in Results Review.       Subjective     Dimple Baxter is an 81 year old female who presents to clinic today for cough that has been present for 10 days. Dry cough.       ROS: 10 point ROS neg other than the symptoms noted above in the HPI.       Objective    BP (!) 162/90   Pulse 78   Temp 97.7  F (36.5  C)   Resp 20   Wt 86.2 kg (190 lb)   LMP  (LMP Unknown)   SpO2 95%   BMI 30.67 kg/m    Nurses notes and VS have been reviewed.    Physical Exam       GENERAL APPEARANCE: healthy appearing, alert     HENT: oral exam benign, mucus membranes intact, without ulcers or lesions     NECK: no adenopathy or asymmetry, thyroid normal to palpation     RESP: Bilateral lower lobe crackles, otherwise clear     CV: regular rates and rhythm, no murmurs, rubs, or gallop     ABDOMEN:  soft, nontender, no HSM or masses and bowel sounds normal, no CVA tenderness     MS: extremities normal- no gross deformities noted; normal muscle tone.     SKIN: no suspicious lesions or rashes     NEURO: Normal strength and tone, mentation intact and speech normal     PSYCH: normal thought process; no significant mood disturbance    Patient Instructions     Patient Education     Bronchitis, Antibiotic Treatment (Adult)    Bronchitis  is an infection of the air passages (bronchial tubes) in your lungs. It often occurs when you have a cold. This illness is contagious during the first few days and is spread through the air by coughing and sneezing, or by direct contact (touching the sick person and then touching your own eyes, nose, or mouth).  Symptoms of bronchitis include cough with mucus (phlegm) and low-grade fever. Bronchitis usually lasts 7 to 14 days. Mild cases can be treated with simple home remedies. More severe infection is treated with an antibiotic.  Home care  Follow these guidelines when caring for yourself at home:    If your symptoms are severe, rest at home for the first 2 to 3 days. When you go back to your usual activities, don't let yourself get too tired.    Don't smoke. Also stay away from secondhand smoke.    You may use over-the-counter medicines to control fever or pain, unless another medicine was prescribed. If you have chronic liver or kidney disease or have ever had a stomach ulcer or gastrointestinal bleeding, talk with your healthcare provider before using these medicines. Also talk to your provider if you are taking medicine to prevent blood clots. Aspirin should never be given to anyone younger than 18 who is ill with a viral infection or fever. It may cause severe liver or brain damage.    Your appetite may be low, so a light diet is fine. Stay well hydrated by drinking 6 to 8 glasses of fluids per day. This includes water, soft drinks, sports drinks, juices, tea, or soup. Extra fluids will help loosen mucus in your nose and lungs.    Over-the-counter cough, cold, and sore-throat medicines will not shorten the length of the illness, but they may be helpful to reduce your symptoms. Don't use decongestants if you have high blood pressure.    Finish all antibiotic medicine. Do this even if you are feeling better after only a few days.  Follow-up care  Follow up with your healthcare provider, or as advised. If you  had an X-ray or ECG (electrocardiogram), a specialist will review it. You will be told of any new test results that may affect your care.  If you are age 65 or older, if you smoke, or if you have a chronic lung disease or condition that affects your immune system, ask your healthcare provider about getting a pneumococcal vaccine and a yearly flu shot (influenza vaccine).  When to seek medical advice  Call your healthcare provider right away if any of these occur:    Fever of 100.4 F (38 C) or higher, or as directed by your healthcare provider    Coughing up more sputum    Weakness, drowsiness, headache, facial pain, ear pain, or a stiff neck  Call 911  Call 911 if any of these occur.    Coughing up blood    Weakness, drowsiness, headache, or stiff neck that get worse    Trouble breathing, wheezing, or pain with breathing  [employer] last reviewed this educational content on 6/1/2018 2000-2021 [employer]. All rights reserved. This information is not intended as a substitute for professional medical care. Always follow your healthcare professional's instructions.               ANIBAL Floyd, [facility]gent [facility] Provider    The use of Dragon/[employer] dictation services may have been used to construct the content in this note; any grammatical or spelling errors are non-intentional. Please contact the author of this note directly if you are in need of any clarification.

## 2022-04-08 LAB — BACTERIA UR CULT: NORMAL

## 2022-04-22 ENCOUNTER — VIRTUAL VISIT (OUTPATIENT)
Dept: INTERNAL MEDICINE | Facility: CLINIC | Age: 82
End: 2022-04-22
Payer: COMMERCIAL

## 2022-04-22 DIAGNOSIS — J01.01 ACUTE RECURRENT MAXILLARY SINUSITIS: Primary | ICD-10-CM

## 2022-04-22 DIAGNOSIS — J84.10 PULMONARY FIBROSIS (H): ICD-10-CM

## 2022-04-22 PROCEDURE — 99213 OFFICE O/P EST LOW 20 MIN: CPT | Mod: 95 | Performed by: INTERNAL MEDICINE

## 2022-04-22 RX ORDER — PREDNISONE 20 MG/1
TABLET ORAL
Qty: 25 TABLET | Refills: 0 | Status: SHIPPED | OUTPATIENT
Start: 2022-04-22 | End: 2022-05-13

## 2022-04-22 RX ORDER — BENZONATATE 200 MG/1
200 CAPSULE ORAL 3 TIMES DAILY PRN
Qty: 30 CAPSULE | Refills: 0 | Status: SHIPPED | OUTPATIENT
Start: 2022-04-22 | End: 2024-02-13

## 2022-04-22 NOTE — PROGRESS NOTES
Althea is a 81 year old who is being evaluated via a billable video visit.      How would you like to obtain your AVS? Mail a copy  If the video visit is dropped, the invitation should be resent by: Text to cell phone: [phone]  Will anyone else be joining your video visit? No      Video Start Time: 12:34    Assessment & Plan     Acute recurrent maxillary sinusitis  She is having more sinus symptoms now, its not clear that she had a bacterial infection previously but with this change she may have 1.  It is also possible that she had an infection that the doxycycline did not help.  We will go ahead and start her on Augmentin, recommend she use Mucinex DM regularly  - amoxicillin-clavulanate (AUGMENTIN) 875-125 MG tablet; Take 1 tablet by mouth 2 times daily for 10 days    Pulmonary fibrosis (H)  She seems to be having some exacerbation with wheezing and dyspnea.  She did improve a little bit on the prednisone but the symptoms recurred after stopping it so will reorder prednisone with a longer taper.  Advised how to use Tessalon correctly, will reorder this to see if that helps with some of the spasmodic cough, continue Mucinex DM in addition.  Advised to be seen again if significant worsening in dyspnea, significant fever.  - amoxicillin-clavulanate (AUGMENTIN) 875-125 MG tablet; Take 1 tablet by mouth 2 times daily for 10 days  - predniSONE (DELTASONE) 20 MG tablet; Take 2 tablets (40 mg) by mouth daily for 7 days, THEN 1 tablet (20 mg) daily for 7 days, THEN 0.5 tablets (10 mg) daily for 7 days.  - benzonatate (TESSALON) 200 MG capsule; Take 1 capsule (200 mg) by mouth 3 times daily as needed for cough        No follow-ups on file.    Jacqui Townsend MD  [facility]    Carolann Oh is a 81 year old who presents for the following health issues:    HPI     Cough: She was seen in urgent care just over 2 weeks ago for about 10 days of a dry cough.  She had not had any acute fever,  chills.  She was put on 5 days of prednisone, doxycycline for possible bronchitis.  She reports that the cough has been overall stable and very bothersome, frequently spasmodic, occasionally brings up some phlegm.  This is interfering with sleep.  She reports that in the last for 5 days she is having increasing postnasal drainage and some sinus pressure.  She has been having wheezes.  She feels some shortness of breath and chest tightness.  This had improved a little bit by the end of the prednisone but symptoms increased again within a few days of completing it.    She has significant pulmonary fibrosis and reports that frequently she will need longer courses of steroids to get through it.  She has had some sinus infections in the past, has underlying allergic rhinitis and feels like this may be new sinus infection.      Patient Active Problem List   Diagnosis     Allergic rhinitis     Esophageal reflux     Benign essential hypertension     Fatty liver     Colon polyp     Advanced directives, counseling/discussion     Mixed hyperlipidemia     Pulmonary hypertension (H)     Left bundle branch block     DDD (degenerative disc disease), lumbar     Lumbar spondylosis     Insomnia     Recurrent left knee instability     Chronic midline low back pain without sciatica     Left ventricular diastolic dysfunction     Tricuspid regurgitation     Episodic tension-type headache, not intractable     CKD (chronic kidney disease) stage 3, GFR 30-59 ml/min (H)     Secondary pulmonary arterial hypertension (H)     Pulmonary fibrosis (H)     Type 2 diabetes mellitus with diabetic nephropathy, without long-term current use of insulin (H)     Current Outpatient Medications   Medication Sig Dispense Refill     albuterol (PROAIR HFA/PROVENTIL HFA/VENTOLIN HFA) 108 (90 Base) MCG/ACT inhaler Inhale 2 puffs into the lungs every 6 hours 18 g 0     ASPIRIN 81 MG OR TABS take 1 x daily with food 0 0     blood glucose (ACCU-CHEK SOFTCLIX)  lancing device Lancing device to be used with lancets. 1 each 0     blood glucose monitoring (NO BRAND SPECIFIED) meter device kit Use to test blood sugar once daily  as directed. 1 kit 0     blood glucose monitoring (SOFTCLIX) lancets Use to test blood sugar One times daily. 100 each 1     cetirizine (ZYRTEC) 10 MG tablet Take 1 tablet (10 mg) by mouth daily 90 tablet 1     cholecalciferol (VITAMIN D) 1000 UNIT tablet Take 1 tablet (1,000 Units) by mouth daily 100 tablet 3     estradiol (ESTRACE VAGINAL) 0.1 MG/GM vaginal cream Apply small amount to the vaginal opening and urethra M, W, F @ h.s. (Patient taking differently: Through urologist    Apply small amount to the vaginal opening and urethra M, W, F @ h.s.) 42.5 g 3     famotidine (PEPCID) 20 MG tablet Take 1 tablet (20 mg) by mouth 2 times daily as needed (heartburn) 180 tablet 0     fluticasone (FLONASE) 50 MCG/ACT nasal spray Spray 2 sprays into both nostrils daily 16 g 5     glipiZIDE (GLUCOTROL XL) 5 MG 24 hr tablet TAKE 1 TABLET BY MOUTH ONE TIME DAILY 90 tablet 0     melatonin 5 MG tablet Take 5 mg by mouth nightly as needed for sleep       metoprolol succinate ER (TOPROL-XL) 100 MG 24 hr tablet TAKE 1 TABLET BY MOUTH ONE TIME DAILY 90 tablet 0     nitroGLYcerin (NITROSTAT) 0.4 MG sublingual tablet For chest pain place 1 tablet under the tongue every 5 minutes for 3 doses. If symptoms persist 5 minutes after 1st dose call 911. 30 tablet 0     OVER-THE-COUNTER For Lactose intolerance       quinapril (ACCUPRIL) 40 MG tablet Take 1 tablet (40 mg) by mouth daily 90 tablet 0     rosuvastatin (CRESTOR) 5 MG tablet TAKE ONE TABLET BY MOUTH AT BEDTIME 90 tablet 1     traZODone (DESYREL) 100 MG tablet TAKE ONE TABLET BY MOUTH AT BEDTIME AS NEEDED FOR SLEEP 90 tablet 0     vitamin D3 (CHOLECALCIFEROL) 250 mcg (09826 units) capsule Take 1 capsule (250 mcg) by mouth daily 90 capsule 1     solifenacin (VESICARE) 10 MG tablet Take 1 tablet (10 mg) by mouth daily  (Patient not taking: Reported on 2022) 90 tablet 3      Social History     Tobacco Use     Smoking status: Former Smoker     Packs/day: 1.00     Years: 2.00     Pack years: 2.00     Types: Cigarettes     Start date:      Quit date: 1963     Years since quittin.3     Smokeless tobacco: Never Used   Substance Use Topics     Alcohol use: Not Currently     Alcohol/week: 0.0 standard drinks     Comment: occ     Drug use: No      Review of Systems   No fever, chills, positive sinus pain, some purulent postnasal drainage and rhinorrhea.  No sore throat.  She has mild chest tightness, dyspnea, wheezing      Objective           Vitals:  No vitals were obtained today due to virtual visit.    Physical Exam   GENERAL: Healthy, alert and no distress  EYES: Eyes grossly normal to inspection.  No discharge or erythema, or obvious scleral/conjunctival abnormalities.  RESP: No audible wheeze, cough, or visible cyanosis.  No visible retractions or increased work of breathing.    SKIN: Visible skin clear. No significant rash, abnormal pigmentation or lesions.  NEURO: Cranial nerves grossly intact.  Mentation and speech appropriate for age.  PSYCH: Mentation appears normal, affect normal/bright, judgement and insight intact, normal speech and appearance well-groomed.                Video-Visit Details    Type of service:  Video Visit    Video End Time:12:48    Originating Location (pt. Location): Home    Distant Location (provider location):  [facility]     Platform used for Video Visit: [employer]

## 2022-05-02 DIAGNOSIS — I10 BENIGN ESSENTIAL HYPERTENSION: ICD-10-CM

## 2022-05-02 DIAGNOSIS — F51.01 PRIMARY INSOMNIA: Chronic | ICD-10-CM

## 2022-05-03 RX ORDER — QUINAPRIL 40 MG/1
TABLET ORAL
Qty: 30 TABLET | Refills: 0 | Status: SHIPPED | OUTPATIENT
Start: 2022-05-03 | End: 2022-06-07

## 2022-05-03 RX ORDER — TRAZODONE HYDROCHLORIDE 100 MG/1
TABLET ORAL
Qty: 90 TABLET | Refills: 0 | Status: SHIPPED | OUTPATIENT
Start: 2022-05-03 | End: 2022-08-11

## 2022-05-03 NOTE — TELEPHONE ENCOUNTER
Routing refill request to provider for review/approval because:  Labs out of range:    Blood pressure out of protocol range

## 2022-05-04 NOTE — TELEPHONE ENCOUNTER
Pt is due for appt, last BP at neuro office 177/82.  I have refilled for 1 month, pl advise appt in clinic

## 2022-05-18 ENCOUNTER — OFFICE VISIT (OUTPATIENT)
Dept: URGENT CARE | Facility: URGENT CARE | Age: 82
End: 2022-05-18
Payer: COMMERCIAL

## 2022-05-18 ENCOUNTER — TRANSFERRED RECORDS (OUTPATIENT)
Dept: HEALTH INFORMATION MANAGEMENT | Facility: CLINIC | Age: 82
End: 2022-05-18

## 2022-05-18 VITALS
DIASTOLIC BLOOD PRESSURE: 98 MMHG | SYSTOLIC BLOOD PRESSURE: 140 MMHG | WEIGHT: 184.6 LBS | BODY MASS INDEX: 29.8 KG/M2 | OXYGEN SATURATION: 96 % | HEART RATE: 55 BPM | TEMPERATURE: 96.6 F

## 2022-05-18 DIAGNOSIS — N30.01 ACUTE CYSTITIS WITH HEMATURIA: Primary | ICD-10-CM

## 2022-05-18 LAB
ALBUMIN UR-MCNC: NEGATIVE MG/DL
APPEARANCE UR: CLEAR
BACTERIA #/AREA URNS HPF: ABNORMAL /HPF
BILIRUB UR QL STRIP: NEGATIVE
COLOR UR AUTO: YELLOW
GLUCOSE UR STRIP-MCNC: NEGATIVE MG/DL
HGB UR QL STRIP: ABNORMAL
KETONES UR STRIP-MCNC: NEGATIVE MG/DL
LEUKOCYTE ESTERASE UR QL STRIP: ABNORMAL
NITRATE UR QL: NEGATIVE
PH UR STRIP: 6 [PH] (ref 5–7)
RBC #/AREA URNS AUTO: ABNORMAL /HPF
SP GR UR STRIP: 1.01 (ref 1–1.03)
SQUAMOUS #/AREA URNS AUTO: ABNORMAL /LPF
UROBILINOGEN UR STRIP-ACNC: 0.2 E.U./DL
WBC #/AREA URNS AUTO: ABNORMAL /HPF
WBC CLUMPS #/AREA URNS HPF: PRESENT /HPF

## 2022-05-18 PROCEDURE — 81001 URINALYSIS AUTO W/SCOPE: CPT | Performed by: PHYSICIAN ASSISTANT

## 2022-05-18 PROCEDURE — 99213 OFFICE O/P EST LOW 20 MIN: CPT | Performed by: PHYSICIAN ASSISTANT

## 2022-05-18 PROCEDURE — 87086 URINE CULTURE/COLONY COUNT: CPT | Performed by: PHYSICIAN ASSISTANT

## 2022-05-18 RX ORDER — FLUCONAZOLE 150 MG/1
150 TABLET ORAL ONCE
Qty: 1 TABLET | Refills: 0 | Status: SHIPPED | OUTPATIENT
Start: 2022-05-18 | End: 2022-05-18

## 2022-05-18 RX ORDER — CEPHALEXIN 500 MG/1
500 CAPSULE ORAL 4 TIMES DAILY
Qty: 20 CAPSULE | Refills: 0 | Status: SHIPPED | OUTPATIENT
Start: 2022-05-18 | End: 2022-05-23

## 2022-05-18 RX ORDER — NITROFURANTOIN 25; 75 MG/1; MG/1
100 CAPSULE ORAL 2 TIMES DAILY
Qty: 10 CAPSULE | Refills: 0 | Status: SHIPPED | OUTPATIENT
Start: 2022-05-18 | End: 2022-05-23
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
## 2022-05-19 LAB — BACTERIA UR CULT: NORMAL

## 2022-06-01 ENCOUNTER — TELEPHONE (OUTPATIENT)
Dept: INTERNAL MEDICINE | Facility: CLINIC | Age: 82
End: 2022-06-01
Payer: COMMERCIAL

## 2022-06-01 DIAGNOSIS — E11.21 TYPE 2 DIABETES MELLITUS WITH DIABETIC NEPHROPATHY, WITHOUT LONG-TERM CURRENT USE OF INSULIN (H): Primary | ICD-10-CM

## 2022-06-01 NOTE — TELEPHONE ENCOUNTER
Called and left detailed message regarding referral and left phone number for her to call and get scheduled.

## 2022-06-13 ENCOUNTER — TELEPHONE (OUTPATIENT)
Dept: INTERNAL MEDICINE | Facility: CLINIC | Age: 82
End: 2022-06-13
Payer: COMMERCIAL

## 2022-06-14 NOTE — TELEPHONE ENCOUNTER
Yes patient drives as she marked on the form. Her daughter mostly drives her around though. Copied to chart.  Form at  for

## 2022-06-18 DIAGNOSIS — E11.21 TYPE 2 DIABETES MELLITUS WITH DIABETIC NEPHROPATHY, WITHOUT LONG-TERM CURRENT USE OF INSULIN (H): ICD-10-CM

## 2022-06-21 RX ORDER — GLIPIZIDE 5 MG/1
TABLET, FILM COATED, EXTENDED RELEASE ORAL
Qty: 90 TABLET | Refills: 0 | Status: SHIPPED | OUTPATIENT
Start: 2022-06-21 | End: 2022-06-29

## 2022-06-21 NOTE — TELEPHONE ENCOUNTER
Pending Prescriptions:                       Disp   Refills    glipiZIDE (GLUCOTROL XL) 5 MG 24 hr tablet*90 tab*0        Sig: TAKE ONE TABLET BY MOUTH ONE TIME DAILY    Routing refill request to provider for review/approval because:  Lab Results   Component Value Date    A1C 6.3 02/08/2022    A1C 6.5 08/20/2021    A1C 7.4 02/26/2021    A1C 7.8 11/12/2020    A1C 9.0 09/10/2020    A1C 8.3 03/13/2020    A1C 6.4 09/17/2019

## 2022-06-22 ENCOUNTER — ALLIED HEALTH/NURSE VISIT (OUTPATIENT)
Dept: EDUCATION SERVICES | Facility: CLINIC | Age: 82
End: 2022-06-22
Attending: INTERNAL MEDICINE
Payer: COMMERCIAL

## 2022-06-22 DIAGNOSIS — E11.21 TYPE 2 DIABETES MELLITUS WITH DIABETIC NEPHROPATHY, WITHOUT LONG-TERM CURRENT USE OF INSULIN (H): ICD-10-CM

## 2022-06-22 PROCEDURE — G0108 DIAB MANAGE TRN  PER INDIV: HCPCS | Mod: AE | Performed by: DIETITIAN, REGISTERED

## 2022-06-22 RX ORDER — LANCETS
EACH MISCELLANEOUS
Qty: 102 EACH | Refills: 1 | Status: SHIPPED | OUTPATIENT
Start: 2022-06-22 | End: 2024-01-24

## 2022-06-22 NOTE — Clinical Note
This patient has a visit with you next week. She would likely benefit from changing Glipizide to Farxiga (if covered by insurance) to help slow the progression of her CKD and also aid in BG improvement (since her GFR is > 45).  I did not have time to discuss this with her today as we were trouble shooting her meter and focusing on diet today but thought you could discuss it at your next visit if you are in agreement.  Laina Kramer, RD LD [facility]

## 2022-06-22 NOTE — PROGRESS NOTES
Diabetes Self-Management Education & Support    Presents for: Individual review    Type of Visit: In Person    ASSESSMENT:    Althea has been struggling getting her 2 meters to work. They keep giving her error codes. Tried both today in our visit and both gave error codes. I gave her a new meter with new sample test strips and tried this with her and it worked just fine.  Then, tried her accu chek guide me meter with the new test strips and that worked as well.  Suspect it is maybe her test strips (though they were not ). Discarded the Guide meter since that just gave error codes.    She has never had any diabetes education per report but has had diabetes a long time. Focused on what diabetes is today and diet with diabetes.  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  Would benefit from having sweets less often.  Explained that we ideally want balance at her meals so she is not having too much protein either given her CKD stage 3. Encouraged more plants like fruits and vegetables.     She would likely benefit from changing Glipizide to Farxiga to help slow the progression of her CKD and also aid in BG improvement (since her GFR is > 45).    Patient's most recent   Lab Results   Component Value Date    A1C 6.3 2022    A1C 7.4 2021    is meeting goal of <8.0    Diabetes knowledge and skills assessment:   Patient is knowledgeable in diabetes management concepts related to: Being Active  Continue education with the following diabetes management concepts: Healthy Eating, Monitoring, Taking Medication, Problem Solving, Reducing Risks and Healthy Coping  Based on learning assessment above, most appropriate setting for further diabetes education would be: Group class or Individual setting.    INTERVENTIONS:    Education provided today on:  AADE Self-Care Behaviors:  Diabetes Pathophysiology  Healthy Eating: carbohydrate counting, consistency in amount, composition, and timing of food  intake, weight reduction, portion control, plate planning method and label reading  Being Active: relationship to blood glucose  Monitoring: purpose, proper technique, log and interpret results, individual blood glucose targets, frequency of monitoring and proper sharps disposal  Taking Medication: action of prescribed medication and side effects of prescribed medications  Problem Solving: low blood glucose - causes, signs/symptoms, treatment and prevention and carrying a carbohydrate source at all times  Reducing Risks: major complications of diabetes  Patient was instructed on Accu-Chek Guide Me meter and was able to provide an accurate return demonstration. Patient's blood glucose reading today was 110 mg/dL.    Opportunities for ongoing education and support in diabetes-self management were discussed. Pt verbalized understanding of concepts discussed and recommendations provided today.       Education Materials Provided:  [employer] Understanding Diabetes Booklet and Accu-Chek Guide Me meter kit          PLAN  Start checking BG once daily (can alternate times to help learn how different choices affect her readings).    Aim for 30-45 grams of carb per meal and 15-30 gm per snack.  If this is too challenging, then recommend to try to just eat less sweets/juice and try to increase vegetable intake.   Order placed for fastclix lancets (she has the device already but only has softclix lancets).  Consider changing from Glipizide to Farxiga if covered by insurance. Will route to her PCP since she has a visit with them next week.     Topics to cover at upcoming visits: Problem Solving, Reducing Risks and Healthy Coping  Follow-up: she will call to schedule    See Goals Section for co-developed, patient-stated behavior change goals.  AVS provided to patient today.          SUBJECTIVE / OBJECTIVE:  Presents for: Individual review  Accompanied by: Self, Daughter  Diabetes education in the past 24mo: No  Focus of  "Visit: Healthy Eating, Monitoring  Diabetes type: Type 2  Date of diagnosis: a long time  Disease course: Stable  Diabetes management related comments/concerns: Some dos and dont's.  Difficulty affording diabetes medication?: No  Other concerns:: None  Cultural Influences/Ethnic Background:  Choose not to answer    Diabetes Symptoms & Complications:  Weight trend: Stable  Complications assessed today?: Yes  Nephropathy: Yes    Patient Problem List and Family Medical History reviewed for relevant medical history, current medical status, and diabetes risk factors.    Vitals:  LMP  (LMP Unknown)   Estimated body mass index is 29.8 kg/m  as calculated from the following:    Height as of 1/10/22: 1.676 m (5' 6\").    Weight as of 5/18/22: 83.7 kg (184 lb 9.6 oz).   Last 3 BP:   BP Readings from Last 3 Encounters:   05/18/22 (!) 140/98   04/06/22 (!) 162/90   04/05/22 (!) 174/89       History   Smoking Status     Former Smoker     Packs/day: 1.00     Years: 2.00     Types: Cigarettes     Start date: 1961     Quit date: 1/1/1963   Smokeless Tobacco     Never Used       Labs:  Lab Results   Component Value Date    A1C 6.3 02/08/2022    A1C 7.4 02/26/2021     Lab Results   Component Value Date     02/08/2022     02/26/2021     Lab Results   Component Value Date    LDL 60 08/20/2021    LDL 89 11/12/2020     HDL Cholesterol   Date Value Ref Range Status   11/12/2020 32 (L) >49 mg/dL Final     Direct Measure HDL   Date Value Ref Range Status   08/20/2021 30 (L) >=50 mg/dL Final     Comment:     0-19 years:       Greater than or equal to 45 mg/dL   Low: Less than 40 mg/dL   Borderline low: 40-44 mg/dL     20 years and older:   Female: Greater than or equal to 50 mg/dL   Male:   Greater than or equal to 40 mg/dL        ]  GFR Estimate   Date Value Ref Range Status   02/08/2022 50 (L) >60 mL/min/1.73m2 Final     Comment:     Effective December 21, 2021 eGFRcr in adults is calculated using the 2021 CKD-EPI creatinine " equation which includes age and gender ([place] alba al., [place], DOI: 10.1056/PXQPtt7111505)   02/26/2021 56 (L) >60 mL/min/[1.73_m2] Final     Comment:     Non  GFR Calc  Starting 12/18/2018, serum creatinine based estimated GFR (eGFR) will be   calculated using the Chronic Kidney Disease Epidemiology Collaboration   (CKD-EPI) equation.       GFR Estimate If Black   Date Value Ref Range Status   02/26/2021 65 >60 mL/min/[1.73_m2] Final     Comment:      GFR Calc  Starting 12/18/2018, serum creatinine based estimated GFR (eGFR) will be   calculated using the Chronic Kidney Disease Epidemiology Collaboration   (CKD-EPI) equation.       Lab Results   Component Value Date    CR 1.09 02/08/2022    CR 0.94 02/26/2021     No results found for: MICROALBUMIN    Healthy Eating:  Healthy Eating Assessed Today: Yes  Cultural/Congregation diet restrictions?: No  Meal planning/habits: None  Has patient met with a dietitian in the past?: No    Breakfast: glass of OJ and cereal  With 1/2 banana OR 2 eggs with 1 piece whole grain toast and cup of coffee OR raspberry filled florentin  Lunch: southwest salad OR chicken leg/breast (fried) and potatoes OR hotdish and slice of bread  Snack: glucerna OR caramel corn OR vanilla ice cream and caramel syrup  Dinner: chicken nuggets and french fries (1/2 of a small) OR pizza  Snack: 1/2 banana    Being Active:  Being Active Assessed Today: No    Monitoring:  Monitoring Assessed Today: Yes  Did patient bring glucose meter to appointment? : Yes  Blood Glucose Meter: Accu-chek Guide  And Guide Me    She has 2 meters but has been struggling getting them to work so brought in both today. To avoid confusion with lancing devices, just showed her the fast clix and then ordered more lancets for this one (as opposed to the softclix since she brought in both).       Taking Medications:  Diabetes Medication(s)     Sulfonylureas       glipiZIDE (GLUCOTROL XL) 5 MG 24 hr tablet     TAKE ONE TABLET BY MOUTH ONE TIME DAILY          Taking Medication Assessed Today: Yes  Current Treatments: Oral Medication (taken by mouth)  Problems taking diabetes medications regularly?: No  Diabetes medication side effects?: No    Problem Solving:  Problem Solving Assessed Today: Yes  Is the patient at risk for hypoglycemia?: Yes  Hypoglycemia Frequency: Never      Reducing Risks:  Reducing Risks Assessed Today: No    Healthy Coping:  Healthy Coping Assessed Today: Yes  Emotional response to diabetes: Ready to learn  Informal Support system:: Family  Stage of change: PREPARATION (Decided to change - considering how)  Patient Activation Measure Survey Score:  GILMA Score (Last Two) 11/6/2012   GILMA Raw Score 39   Activation Score 56.4   GILMA Level 3             NILAM Mon CDCES    Time Spent: 65 minutes  Encounter Type: Individual        Any diabetes medication dose changes were made via the Certified Diabetes Care & Education Protocol in collaboration with the patient's referring provider. A copy of this encounter was shared with the provider.

## 2022-06-22 NOTE — PATIENT INSTRUCTIONS
Diabetes Support Resources:  Websites: American Diabetes Association: www.diabetes.org    Check blood sugar once daily. Goal is  mg/dL before meals and < 180 mg/dL 2 hours after meals.     Signs/symptoms of low blood sugar include (but are not limited to): sweating, shaking, feeling dizzy or weak, extreme hunger, headache, feeling crabby or confused, numbness or tingling of mouth/lips.  If you have these symptoms check your blood sugar if you can and then consume carbohydrate (sugar)  This is a helpful reminder on how to treat a blood sugar if you are low:  If your blood sugar is < 70 mg/dL, consume 15 grams of fast-acting carbohydrate (4 glucose tabs OR 4 oz juice or non-diet pop OR 2 Tbsp dried fruit OR 5-7 lifesavers OR 1 Tbsp sugar) and wait 15 minutes. Check blood sugar again and if not rising, repeat.  If your blood sugar is < 50 mg/dL, consume 30 grams of fast-acting carbohydrate (8 glucose tabs OR 8 oz juice or non-diet pop OR 4 Tbsp dried fruit OR 10-14 lifesavers OR 2 Tbsp sugar) and wait 15 minutes. Check blood sugar again and if not rising, repeat.       Bring blood glucose meter and logbook with you to all doctor and follow-up appointments.    Diabetes Education Telephone Visit Follow-up:    We realize your time is valuable and your health is important! We offer a convenient Telephone Visit follow up! It s a quick way to check in for a medication dose adjustment without having to come back to clinic as soon.    Telephone Visits are often covered by insurance. Please check with your insurance plan to see if this type of visit is covered. If not, the cost is less expensive than an office visit:    Up to 10 minutes (Code 11729): $30  11-20 minutes (Code 61205): $59  More than 20 minutes (Code 04805): $85    Talk with your Diabetes Educator if you want to learn more.      [facility]:  For Your Diabetes Education and Nutrition Appointments Call:  [phone]    For Diabetes Education or Nutrition Related Questions:   Phone: [phone]  Send [employer] Message   If you need a medication refill please contact your pharmacy. Please allow 3 business days for your refills to be completed.

## 2022-06-29 ENCOUNTER — TELEPHONE (OUTPATIENT)
Dept: UROLOGY | Facility: CLINIC | Age: 82
End: 2022-06-29

## 2022-06-29 ENCOUNTER — OFFICE VISIT (OUTPATIENT)
Dept: INTERNAL MEDICINE | Facility: CLINIC | Age: 82
End: 2022-06-29
Payer: COMMERCIAL

## 2022-06-29 VITALS
WEIGHT: 182.9 LBS | DIASTOLIC BLOOD PRESSURE: 84 MMHG | HEIGHT: 66 IN | BODY MASS INDEX: 29.39 KG/M2 | OXYGEN SATURATION: 94 % | HEART RATE: 75 BPM | TEMPERATURE: 97.1 F | RESPIRATION RATE: 12 BRPM | SYSTOLIC BLOOD PRESSURE: 158 MMHG

## 2022-06-29 DIAGNOSIS — I10 PRIMARY HYPERTENSION: ICD-10-CM

## 2022-06-29 DIAGNOSIS — E78.5 HYPERLIPIDEMIA LDL GOAL <70: ICD-10-CM

## 2022-06-29 DIAGNOSIS — R35.0 URINARY FREQUENCY: Primary | ICD-10-CM

## 2022-06-29 DIAGNOSIS — E11.21 TYPE 2 DIABETES MELLITUS WITH DIABETIC NEPHROPATHY, WITHOUT LONG-TERM CURRENT USE OF INSULIN (H): ICD-10-CM

## 2022-06-29 LAB
ALBUMIN UR-MCNC: 30 MG/DL
APPEARANCE UR: CLEAR
BACTERIA #/AREA URNS HPF: ABNORMAL /HPF
BILIRUB UR QL STRIP: NEGATIVE
COLOR UR AUTO: YELLOW
GLUCOSE UR STRIP-MCNC: NEGATIVE MG/DL
HBA1C MFR BLD: 6.2 % (ref 0–5.6)
HGB UR QL STRIP: ABNORMAL
KETONES UR STRIP-MCNC: NEGATIVE MG/DL
LEUKOCYTE ESTERASE UR QL STRIP: NEGATIVE
NITRATE UR QL: NEGATIVE
PH UR STRIP: 6 [PH] (ref 5–7)
RBC #/AREA URNS AUTO: ABNORMAL /HPF
SP GR UR STRIP: 1.02 (ref 1–1.03)
UROBILINOGEN UR STRIP-ACNC: 0.2 E.U./DL
WBC #/AREA URNS AUTO: ABNORMAL /HPF

## 2022-06-29 PROCEDURE — 80053 COMPREHEN METABOLIC PANEL: CPT | Performed by: INTERNAL MEDICINE

## 2022-06-29 PROCEDURE — 83036 HEMOGLOBIN GLYCOSYLATED A1C: CPT | Performed by: INTERNAL MEDICINE

## 2022-06-29 PROCEDURE — 80061 LIPID PANEL: CPT | Performed by: INTERNAL MEDICINE

## 2022-06-29 PROCEDURE — 81001 URINALYSIS AUTO W/SCOPE: CPT | Performed by: INTERNAL MEDICINE

## 2022-06-29 PROCEDURE — 36415 COLL VENOUS BLD VENIPUNCTURE: CPT | Performed by: INTERNAL MEDICINE

## 2022-06-29 PROCEDURE — 99215 OFFICE O/P EST HI 40 MIN: CPT | Performed by: INTERNAL MEDICINE

## 2022-06-29 RX ORDER — QUINAPRIL 40 MG/1
40 TABLET ORAL DAILY
Qty: 90 TABLET | Refills: 1 | Status: SHIPPED | OUTPATIENT
Start: 2022-06-29 | End: 2022-11-29

## 2022-06-29 RX ORDER — GLIPIZIDE 5 MG/1
5 TABLET, FILM COATED, EXTENDED RELEASE ORAL DAILY
Qty: 90 TABLET | Refills: 1 | Status: SHIPPED | OUTPATIENT
Start: 2022-06-29 | End: 2022-11-29

## 2022-06-29 RX ORDER — AMLODIPINE BESYLATE 2.5 MG/1
2.5 TABLET ORAL DAILY
Qty: 90 TABLET | Refills: 0 | Status: SHIPPED | OUTPATIENT
Start: 2022-06-29 | End: 2022-11-15

## 2022-06-29 RX ORDER — METOPROLOL SUCCINATE 100 MG/1
TABLET, EXTENDED RELEASE ORAL
Qty: 90 TABLET | Refills: 1 | Status: SHIPPED | OUTPATIENT
Start: 2022-06-29 | End: 2022-11-29

## 2022-06-29 RX ORDER — ROSUVASTATIN CALCIUM 5 MG/1
5 TABLET, COATED ORAL DAILY
Qty: 90 TABLET | Refills: 1 | Status: SHIPPED | OUTPATIENT
Start: 2022-06-29 | End: 2023-02-13
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
## 2022-06-29 NOTE — LETTER
[place] [address], [address]  [place] [address]  [phone]        July 11, 2022    Dimple Baxter  [address] [facility] [address]            Dear MsDomingo Baxter:      The results of your recent labs showed no urinary tract infection.  It did show a small blood in urine, same as before.  I'd advise you to follow up with your urologist.  Diabetes under good control. Lipids stable  If you have any further questions or problems, please contact our office.    Sincerely,        Alcides Phillips M.D.    Results for orders placed or performed in visit on 06/29/22   UA with Microscopic reflex to Culture - Clinic Collect     Status: Abnormal    Specimen: Urine, Midstream   Result Value Ref Range    Color Urine Yellow Colorless, Straw, Light Yellow, Yellow    Appearance Urine Clear Clear    Glucose Urine Negative Negative mg/dL    Bilirubin Urine Negative Negative    Ketones Urine Negative Negative mg/dL    Specific Gravity Urine 1.025 1.003 - 1.035    Blood Urine Small (A) Negative    pH Urine 6.0 5.0 - 7.0    Protein Albumin Urine 30  (A) Negative mg/dL    Urobilinogen Urine 0.2 0.2, 1.0 E.U./dL    Nitrite Urine Negative Negative    Leukocyte Esterase Urine Negative Negative   Urine Microscopic     Status: Abnormal   Result Value Ref Range    Bacteria Urine Few (A) None Seen /HPF    RBC Urine 0-2 0-2 /HPF /HPF    WBC Urine 0-5 0-5 /HPF /HPF    Narrative    Urine Culture not indicated   Comprehensive metabolic panel     Status: Abnormal   Result Value Ref Range    Sodium 143 133 - 144 mmol/L    Potassium 4.3 3.4 - 5.3 mmol/L    Chloride 109 94 - 109 mmol/L    Carbon Dioxide (CO2) 26 20 - 32 mmol/L    Anion Gap 8 3 - 14 mmol/L    Urea Nitrogen 16 7 - 30 mg/dL    Creatinine 1.00 0.52 - 1.04 mg/dL    Calcium 9.0 8.5 - 10.1 mg/dL    Glucose 121 (H) 70 - 99 mg/dL    Alkaline Phosphatase 75 40 - 150 U/L    AST 33 0 - 45 U/L    ALT 24 0 - 50 U/L    Protein Total 7.8 6.8 -  8.8 g/dL    Albumin 3.9 3.4 - 5.0 g/dL    Bilirubin Total 0.3 0.2 - 1.3 mg/dL    GFR Estimate 56 (L) >60 mL/min/1.73m2   Lipid panel reflex to direct LDL Fasting     Status: Abnormal   Result Value Ref Range    Cholesterol 135 <200 mg/dL    Triglycerides 178 (H) <150 mg/dL    Direct Measure HDL 30 (L) >=50 mg/dL    LDL Cholesterol Calculated 69 <=100 mg/dL    Non HDL Cholesterol 105 <130 mg/dL    Patient Fasting > 8hrs? No     Narrative    Cholesterol  Desirable:  <200 mg/dL    Triglycerides  Normal:  Less than 150 mg/dL  Borderline High:  150-199 mg/dL  High:  200-499 mg/dL  Very High:  Greater than or equal to 500 mg/dL    Direct Measure HDL  Female:  Greater than or equal to 50 mg/dL   Male:  Greater than or equal to 40 mg/dL    LDL Cholesterol  Desirable:  <100mg/dL  Above Desirable:  100-129 mg/dL   Borderline High:  130-159 mg/dL   High:  160-189 mg/dL   Very High:  >= 190 mg/dL    Non HDL Cholesterol  Desirable:  130 mg/dL  Above Desirable:  130-159 mg/dL  Borderline High:  160-189 mg/dL  High:  190-219 mg/dL  Very High:  Greater than or equal to 220 mg/dL   Hemoglobin A1c     Status: Abnormal   Result Value Ref Range    Hemoglobin A1C 6.2 (H) 0.0 - 5.6 %

## 2022-06-29 NOTE — NURSING NOTE
"BP (!) 186/86   Pulse 75   Temp 97.1  F (36.2  C) (Tympanic)   Resp 12   Ht 1.676 m (5' 6\")   Wt 83 kg (182 lb 14.4 oz)   LMP  (LMP Unknown)   SpO2 94%   BMI 29.52 kg/m    Patient in for medication follow up and urinary frequency x's 3 days.  "

## 2022-06-29 NOTE — TELEPHONE ENCOUNTER
[facility] Call Center    Phone Message    May a detailed message be left on voicemail: yes     Reason for Call: Medication Question or concern regarding medication   Prescription Clarification  Name of Medication: solifenacin (VESICARE) 10 MG tablet   Prescribing Provider: Mirna Adorno   Pharmacy: [facility][id number] [address]   What on the order needs clarification? Pt states she went to her primary doctor and she stated she should be taking the above medication, pt states the provider has cancelled the medication, please call pt to further discuss, thanks!    Action Taken: Message routed to:  Other: URo    Travel Screening: Not Applicable

## 2022-07-01 DIAGNOSIS — R39.15 URINARY URGENCY: ICD-10-CM

## 2022-07-01 LAB
ALBUMIN SERPL-MCNC: 3.9 G/DL (ref 3.4–5)
ALP SERPL-CCNC: 75 U/L (ref 40–150)
ALT SERPL W P-5'-P-CCNC: 24 U/L (ref 0–50)
ANION GAP SERPL CALCULATED.3IONS-SCNC: 8 MMOL/L (ref 3–14)
AST SERPL W P-5'-P-CCNC: 33 U/L (ref 0–45)
BILIRUB SERPL-MCNC: 0.3 MG/DL (ref 0.2–1.3)
BUN SERPL-MCNC: 16 MG/DL (ref 7–30)
CALCIUM SERPL-MCNC: 9 MG/DL (ref 8.5–10.1)
CHLORIDE BLD-SCNC: 109 MMOL/L (ref 94–109)
CHOLEST SERPL-MCNC: 135 MG/DL
CO2 SERPL-SCNC: 26 MMOL/L (ref 20–32)
CREAT SERPL-MCNC: 1 MG/DL (ref 0.52–1.04)
FASTING STATUS PATIENT QL REPORTED: NO
GFR SERPL CREATININE-BSD FRML MDRD: 56 ML/MIN/1.73M2
GLUCOSE BLD-MCNC: 121 MG/DL (ref 70–99)
HDLC SERPL-MCNC: 30 MG/DL
LDLC SERPL CALC-MCNC: 69 MG/DL
NONHDLC SERPL-MCNC: 105 MG/DL
POTASSIUM BLD-SCNC: 4.3 MMOL/L (ref 3.4–5.3)
PROT SERPL-MCNC: 7.8 G/DL (ref 6.8–8.8)
SODIUM SERPL-SCNC: 143 MMOL/L (ref 133–144)
TRIGL SERPL-MCNC: 178 MG/DL

## 2022-07-01 RX ORDER — SOLIFENACIN SUCCINATE 10 MG/1
10 TABLET, FILM COATED ORAL DAILY
Qty: 90 TABLET | Refills: 1 | Status: SHIPPED | OUTPATIENT
Start: 2022-07-01 | End: 2023-02-13

## 2022-08-08 DIAGNOSIS — F51.01 PRIMARY INSOMNIA: Chronic | ICD-10-CM

## 2022-08-11 RX ORDER — TRAZODONE HYDROCHLORIDE 100 MG/1
TABLET ORAL
Qty: 90 TABLET | Refills: 2 | Status: SHIPPED | OUTPATIENT
Start: 2022-08-11 | End: 2023-03-14

## 2022-08-11 NOTE — TELEPHONE ENCOUNTER
Trazodone 100 mg tab  Prescription approved per [facility] Refill Protocol.  LOV 06/2022

## 2022-08-15 ENCOUNTER — OFFICE VISIT (OUTPATIENT)
Dept: FAMILY MEDICINE | Facility: CLINIC | Age: 82
End: 2022-08-15
Payer: COMMERCIAL

## 2022-08-15 DIAGNOSIS — L82.0 SEBORRHEIC KERATOSES, INFLAMED: ICD-10-CM

## 2022-08-15 DIAGNOSIS — L82.1 SEBORRHEIC KERATOSES: ICD-10-CM

## 2022-08-15 DIAGNOSIS — Z80.8 FAMILY HISTORY OF MELANOMA: Primary | ICD-10-CM

## 2022-08-15 DIAGNOSIS — L81.4 LENTIGINES: ICD-10-CM

## 2022-08-15 PROCEDURE — 99203 OFFICE O/P NEW LOW 30 MIN: CPT | Mod: 25 | Performed by: PHYSICIAN ASSISTANT

## 2022-08-15 PROCEDURE — 17110 DESTRUCTION B9 LES UP TO 14: CPT | Performed by: PHYSICIAN ASSISTANT

## 2022-08-15 ASSESSMENT — PAIN SCALES - GENERAL: PAINLEVEL: NO PAIN (0)
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
## 2022-08-25 ENCOUNTER — TELEPHONE (OUTPATIENT)
Dept: INTERNAL MEDICINE | Facility: CLINIC | Age: 82
End: 2022-08-25

## 2022-08-25 ENCOUNTER — TELEPHONE (OUTPATIENT)
Dept: UROLOGY | Facility: CLINIC | Age: 82
End: 2022-08-25

## 2022-08-25 DIAGNOSIS — N39.0 URINARY TRACT INFECTION: Primary | ICD-10-CM

## 2022-08-25 DIAGNOSIS — E11.21 TYPE 2 DIABETES MELLITUS WITH DIABETIC NEPHROPATHY, WITHOUT LONG-TERM CURRENT USE OF INSULIN (H): Primary | ICD-10-CM

## 2022-08-26 ENCOUNTER — LAB (OUTPATIENT)
Dept: LAB | Facility: CLINIC | Age: 82
End: 2022-08-26
Payer: COMMERCIAL

## 2022-08-26 ENCOUNTER — TELEPHONE (OUTPATIENT)
Dept: UROLOGY | Facility: CLINIC | Age: 82
End: 2022-08-26

## 2022-08-26 DIAGNOSIS — N39.0 URINARY TRACT INFECTION: Primary | ICD-10-CM

## 2022-08-26 DIAGNOSIS — N39.0 URINARY TRACT INFECTION: ICD-10-CM

## 2022-08-26 LAB
ALBUMIN UR-MCNC: ABNORMAL MG/DL
APPEARANCE UR: CLEAR
BILIRUB UR QL STRIP: NEGATIVE
COLOR UR AUTO: YELLOW
GLUCOSE UR STRIP-MCNC: NEGATIVE MG/DL
HGB UR QL STRIP: ABNORMAL
KETONES UR STRIP-MCNC: NEGATIVE MG/DL
LEUKOCYTE ESTERASE UR QL STRIP: ABNORMAL
NITRATE UR QL: NEGATIVE
PH UR STRIP: 5.5 [PH] (ref 5–7)
SP GR UR STRIP: 1.02 (ref 1–1.03)
UROBILINOGEN UR STRIP-ACNC: 0.2 E.U./DL

## 2022-08-26 PROCEDURE — 87086 URINE CULTURE/COLONY COUNT: CPT

## 2022-08-26 PROCEDURE — 81003 URINALYSIS AUTO W/O SCOPE: CPT | Mod: QW

## 2022-08-26 RX ORDER — NITROFURANTOIN 25; 75 MG/1; MG/1
100 CAPSULE ORAL 2 TIMES DAILY
Qty: 14 CAPSULE | Refills: 0 | Status: SHIPPED | OUTPATIENT
Start: 2022-08-26 | End: 2022-09-02

## 2022-08-26 NOTE — TELEPHONE ENCOUNTER
Called patient and informed that she can start Macrobid BID x 7 days and we can change if necessary    Chayo Castano LPN

## 2022-08-26 NOTE — TELEPHONE ENCOUNTER
[facility] Call Center    Phone Message    May a detailed message be left on voicemail: yes     Reason for Call: Pt called and stated she is a pt of [facility] and has recently had labs and wondering if results have come back in. Pt states she feels its urgent to know the results as she may have an infection and need antibiotics and does not want to wait over the weekend for results. Please review and follow-up with pt at [phone]    Action Taken: Message routed to:  Other: [facility]     Travel Screening: Not Applicable

## 2022-08-28 LAB — BACTERIA UR CULT: NORMAL

## 2022-08-29 NOTE — TELEPHONE ENCOUNTER
Called patient and LM. Per Mirna she can discontinue antibiotics, as there was no infection. Patient should schedule a virtual visit, if still having symptoms.     Chayo Castano LPN

## 2022-09-12 ENCOUNTER — TELEPHONE (OUTPATIENT)
Dept: UROLOGY | Facility: CLINIC | Age: 82
End: 2022-09-12

## 2022-09-12 ENCOUNTER — LAB (OUTPATIENT)
Dept: LAB | Facility: CLINIC | Age: 82
End: 2022-09-12
Payer: COMMERCIAL

## 2022-09-12 DIAGNOSIS — R39.15 URINARY URGENCY: Primary | ICD-10-CM

## 2022-09-12 DIAGNOSIS — R39.15 URINARY URGENCY: ICD-10-CM

## 2022-09-12 LAB
ALBUMIN UR-MCNC: 30 MG/DL
APPEARANCE UR: ABNORMAL
BILIRUB UR QL STRIP: NEGATIVE
COLOR UR AUTO: YELLOW
GLUCOSE UR STRIP-MCNC: NEGATIVE MG/DL
HGB UR QL STRIP: ABNORMAL
KETONES UR STRIP-MCNC: NEGATIVE MG/DL
LEUKOCYTE ESTERASE UR QL STRIP: ABNORMAL
NITRATE UR QL: NEGATIVE
PH UR STRIP: 6.5 [PH] (ref 5–7)
SP GR UR STRIP: 1.01 (ref 1–1.03)
UROBILINOGEN UR STRIP-ACNC: 0.2 E.U./DL

## 2022-09-12 PROCEDURE — 87086 URINE CULTURE/COLONY COUNT: CPT

## 2022-09-12 PROCEDURE — 81003 URINALYSIS AUTO W/O SCOPE: CPT | Mod: QW

## 2022-09-12 NOTE — TELEPHONE ENCOUNTER
[facility] Call Center    Phone Message    May a detailed message be left on voicemail: yes     Reason for Call: Symptoms or Concerns     If patient has red-flag symptoms, warm transfer to triage line    Current symptom or concern: Sensation going to the bathroom then getting up and feels like she has to go again. Stated she needs some type of medication    Symptoms have been present for:  A few day(s)    Has patient previously been seen for this? Yes    By: Mirna Adorno    Date: N/A    Are there any new or worsening symptoms? Yes: Frequent    Please contact patient she is very anxious, states she needs a call back soon doesn't know if she should go to urgent care. Please contact patient in regards to this. Thank you    Action Taken: Other: Urology    Travel Screening: Not Applicable

## 2022-09-12 NOTE — TELEPHONE ENCOUNTER
Returned phone call to patient and informed her she could leave a urine sample at any [facility] lab. She plans to leave one at a lab in [place]. Gave her [facility] central scheduling line to set-up an appointment. Explained that orders have been placed in the computer. Results take 2-3 days, if negative we usually don't call. If positive informed that we would call to start her on antibiotic. Patient is aware she can try AZO over the counter in the meantime for symptoms.     Chayo Castano LPN

## 2022-09-14 LAB — BACTERIA UR CULT: NORMAL

## 2022-09-16 ENCOUNTER — TELEPHONE (OUTPATIENT)
Dept: UROLOGY | Facility: CLINIC | Age: 82
End: 2022-09-16

## 2022-09-16 DIAGNOSIS — R39.15 URINARY URGENCY: Primary | ICD-10-CM

## 2022-09-16 RX ORDER — TOLTERODINE 2 MG/1
2 CAPSULE, EXTENDED RELEASE ORAL DAILY
Qty: 90 CAPSULE | Refills: 3 | Status: SHIPPED | OUTPATIENT
Start: 2022-09-16 | End: 2023-01-18

## 2022-09-16 NOTE — TELEPHONE ENCOUNTER
Called patient and informed her of PA's recommendations. Patient would like the medication she recommended called into her pharmacy.     Chayo Castano LPN

## 2022-09-16 NOTE — TELEPHONE ENCOUNTER
----- Message from Tisha Sun PA-C sent at 9/15/2022 12:32 PM CDT -----  You could try to a OAB mediation like detrol 2mg, but it might not work that fast.  The other option is to have the lab speciate and see if there really is a predominate culture or get a cath [facility] and send antibiotics.    Tisha Sun PA-C   ----- Message -----  From: Chayo Castano LPN  Sent: 9/15/2022  12:16 PM CDT  To: Tisha Sun PA-C    Hi,   This patient of Mirna's is having some urgency. Her urine culture is negative.   She is not wanting to wait over the weekend. She would like some medication for this. She is already taking AZO. Please Advise.   Thanks!       ~Chayo

## 2022-09-26 ENCOUNTER — TELEPHONE (OUTPATIENT)
Dept: UROLOGY | Facility: CLINIC | Age: 82
End: 2022-09-26

## 2022-09-26 DIAGNOSIS — R39.15 URINARY URGENCY: Primary | ICD-10-CM

## 2022-09-26 NOTE — TELEPHONE ENCOUNTER
[facility] Call Center    Phone Message    May a detailed message be left on voicemail: yes     Reason for Call: Other: Althea is calling stating that tolterodine ER (DETROL LA) 2 MG 24 hr capsule is not working and is wondering what else she can do. Please call her back, thanks!     Action Taken: Message routed to:  Other: [facility] uro    Travel Screening: Not Applicable

## 2022-09-27 RX ORDER — TOLTERODINE 4 MG/1
4 CAPSULE, EXTENDED RELEASE ORAL DAILY
Qty: 90 CAPSULE | Refills: 3 | Status: SHIPPED | OUTPATIENT
Start: 2022-09-27 | End: 2023-01-18

## 2022-09-27 NOTE — TELEPHONE ENCOUNTER
Called patient and informed her that a new script has been called into her pharmacy per Mirna for Detrol LA 4mg. She plans to double up daily her medication that she has on hand due to cost. Patient was also informed that per PA she should have a phone visit/virtual visit in one month as medication takes some time. Patient plans to call back to schedule something.     Chayo Castano LPN

## 2022-09-29 NOTE — TELEPHONE ENCOUNTER
Pt called to get scheduled for her visit w/ Tila. She is scheduled to meet w/ Tila virtually on 12/2 but was advised to meet with her within the next month. Please reach out to pt to further discuss sooner availability. VM okayed. Thank you

## 2022-10-10 ENCOUNTER — TELEPHONE (OUTPATIENT)
Dept: UROLOGY | Facility: CLINIC | Age: 82
End: 2022-10-10

## 2022-10-10 DIAGNOSIS — R39.15 URINARY URGENCY: Primary | ICD-10-CM

## 2022-10-10 NOTE — TELEPHONE ENCOUNTER
[facility] Call Center    Phone Message    May a detailed message be left on voicemail: yes     Reason for Call: Medication Question or concern regarding medication   Prescription Clarification  Name of Medication: tolterodine ER (DETROL LA) 2 MG 24 hr capsule [25883] (Order [id number])    Prescribing Provider: Mirna Adorno PA-C   What on the order needs clarification? Patient states she has doubled up on her med per her last conversation with clinic and it is still not helping. She is still having same symptoms. Please reach out to patient.          Action Taken: Message routed to:  [facility] Urology    Travel Screening: Not Applicable

## 2022-10-11 RX ORDER — TROSPIUM CHLORIDE ER 60 MG/1
60 CAPSULE ORAL EVERY MORNING
Qty: 90 CAPSULE | Refills: 3 | Status: SHIPPED | OUTPATIENT
Start: 2022-10-11 | End: 2023-01-09

## 2022-10-11 NOTE — TELEPHONE ENCOUNTER
Central Prior Authorization Team   Phone: [phone]      PA Initiation    Medication: trospium (SANCTURA XR) 60 MG CP24 24 hr capsule  Insurance Company: [employer] Part D - Phone [phone] Fax [phone]  Pharmacy Filling the Rx: [facility] #[id number] [address]  Filling Pharmacy Phone: [phone]  Filling Pharmacy Fax:    Start Date: 10/11/2022

## 2022-10-11 NOTE — TELEPHONE ENCOUNTER
Patient called back again today. Spoke with patient yesterday and informed her that a message would be sent to Mirna, that she is taking the medication two pills daily now and is still not helping with her symptoms. She has upcoming appointment with Mirna. Sent message to Mirna yesterday. Waiting for a response. Will send another response to Mirna.     Chayo Castano LPN

## 2022-10-11 NOTE — TELEPHONE ENCOUNTER
"Per Mirna- \"I am sorry, I thought I responded yesterday.     Is she using the topical estrogen cream?     Has tried several meds (detrol, vesicare, mirabegron, cost issues).  Can stop detrol and try sanctura 60mg daily. I would follow-up with Dr. Velazco as she will need to discuss other options. \"        "

## 2022-10-12 NOTE — TELEPHONE ENCOUNTER
Prior Authorization Approval    Authorization Effective Date: 10/11/2022  Authorization Expiration Date: 12/31/2023  Medication: trospium (SANCTURA XR) 60 MG CP24 24 hr capsule-APPROVED  Approved Dose/Quantity:   Reference #:     Insurance Company: [employer] Part D - Phone [phone] Fax [phone]  Expected CoPay:       CoPay Card Available:      Foundation Assistance Needed:    Which Pharmacy is filling the prescription (Not needed for infusion/clinic administered): [id number] - [facility] [address]  Pharmacy Notified: Yes-Pharmacy will notify patient when ready.  Patient Notified: No

## 2022-10-18 ENCOUNTER — OFFICE VISIT (OUTPATIENT)
Dept: PODIATRY | Facility: CLINIC | Age: 82
End: 2022-10-18
Payer: COMMERCIAL

## 2022-10-18 VITALS
SYSTOLIC BLOOD PRESSURE: 144 MMHG | WEIGHT: 180 LBS | BODY MASS INDEX: 29.05 KG/M2 | RESPIRATION RATE: 16 BRPM | DIASTOLIC BLOOD PRESSURE: 80 MMHG | HEART RATE: 80 BPM

## 2022-10-18 DIAGNOSIS — B35.1 ONYCHOMYCOSIS: Primary | ICD-10-CM

## 2022-10-18 DIAGNOSIS — E11.21 TYPE 2 DIABETES MELLITUS WITH DIABETIC NEPHROPATHY, WITHOUT LONG-TERM CURRENT USE OF INSULIN (H): ICD-10-CM

## 2022-10-18 PROCEDURE — 11750 EXCISION NAIL&NAIL MATRIX: CPT | Mod: 51 | Performed by: PODIATRIST

## 2022-10-18 PROCEDURE — 11750 EXCISION NAIL&NAIL MATRIX: CPT | Mod: T5 | Performed by: PODIATRIST

## 2022-10-18 PROCEDURE — 99203 OFFICE O/P NEW LOW 30 MIN: CPT | Mod: 25 | Performed by: PODIATRIST

## 2022-10-18 NOTE — NURSING NOTE
Chief Complaint   Patient presents with     Right Great Toe - Ingrown Toenail     Left Great Toe - Ingrown Toenail    Rhea Pearson MA,CMA,10:16 AM

## 2022-10-18 NOTE — PATIENT INSTRUCTIONS
"PATIENT INSTRUCTIONS - Podiatry / Foot & Ankle Surgery        Aftercare instructions  -Remove the bandage tomorrow to begin soaking  -You may soak 2x daily in warm Epsom salts or warm soapy water for 5-10 minutes.  Alternatively, you may remove the band-aid and let clean shower water run over the toe.  -Cover w/ an antibiotic ointment and a band-aid  -If the skin retains too much moisture, stop the antibiotic ointment and use Alcohol or Betadine (Iodine) to clean the toe daily.  -All supplies are available over the counter, you do not need a prescription  -Redness and drainage (straw colored or bloody) are normal reactions to the chemical    -Continue soaking until the drainage stops  -Call the clinic if you experience:   -Redness extending beyond the 1st joint (\"knuckle\") of the toe   -Severe blistering   -Purulent (pus) drainage      "
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
## 2022-10-22 ENCOUNTER — HOSPITAL ENCOUNTER (EMERGENCY)
Facility: CLINIC | Age: 82
Discharge: HOME | End: 2022-10-22
Attending: EMERGENCY MEDICINE | Admitting: EMERGENCY MEDICINE
Payer: COMMERCIAL

## 2022-10-22 VITALS
TEMPERATURE: 97.8 F | BODY MASS INDEX: 29.57 KG/M2 | SYSTOLIC BLOOD PRESSURE: 201 MMHG | WEIGHT: 184 LBS | HEART RATE: 78 BPM | HEIGHT: 66 IN | DIASTOLIC BLOOD PRESSURE: 87 MMHG | RESPIRATION RATE: 18 BRPM | OXYGEN SATURATION: 97 %

## 2022-10-22 DIAGNOSIS — R35.0 URINARY FREQUENCY: ICD-10-CM

## 2022-10-22 LAB
ALBUMIN UR-MCNC: NEGATIVE MG/DL
APPEARANCE UR: CLEAR
BACTERIA #/AREA URNS HPF: ABNORMAL /HPF
BILIRUB UR QL STRIP: NEGATIVE
COLOR UR AUTO: ABNORMAL
GLUCOSE BLDC GLUCOMTR-MCNC: 98 MG/DL (ref 70–99)
GLUCOSE UR STRIP-MCNC: NEGATIVE MG/DL
HBA1C MFR BLD: 5.6 % (ref 0–5.6)
HGB UR QL STRIP: NEGATIVE
KETONES UR STRIP-MCNC: NEGATIVE MG/DL
LEUKOCYTE ESTERASE UR QL STRIP: ABNORMAL
NITRATE UR QL: NEGATIVE
PH UR STRIP: 7 [PH] (ref 5–7)
RBC URINE: 0 /HPF
SP GR UR STRIP: 1 (ref 1–1.03)
UROBILINOGEN UR STRIP-MCNC: NORMAL MG/DL
WBC URINE: 6 /HPF

## 2022-10-22 PROCEDURE — 81003 URINALYSIS AUTO W/O SCOPE: CPT | Performed by: EMERGENCY MEDICINE

## 2022-10-22 PROCEDURE — 36415 COLL VENOUS BLD VENIPUNCTURE: CPT | Performed by: EMERGENCY MEDICINE

## 2022-10-22 PROCEDURE — 83036 HEMOGLOBIN GLYCOSYLATED A1C: CPT | Performed by: EMERGENCY MEDICINE

## 2022-10-22 PROCEDURE — 99284 EMERGENCY DEPT VISIT MOD MDM: CPT

## 2022-10-22 PROCEDURE — 87086 URINE CULTURE/COLONY COUNT: CPT | Performed by: EMERGENCY MEDICINE

## 2022-10-22 PROCEDURE — 81001 URINALYSIS AUTO W/SCOPE: CPT | Performed by: EMERGENCY MEDICINE

## 2022-10-22 RX ORDER — CEPHALEXIN 500 MG/1
500 CAPSULE ORAL 4 TIMES DAILY
Qty: 20 CAPSULE | Refills: 0 | Status: SHIPPED | OUTPATIENT
Start: 2022-10-22 | End: 2022-10-27

## 2022-10-22 ASSESSMENT — ENCOUNTER SYMPTOMS
FLANK PAIN: 0
ABDOMINAL PAIN: 0
HEADACHES: 0
NAUSEA: 0
SHORTNESS OF BREATH: 0
CHILLS: 0
FREQUENCY: 1
FEVER: 0
NUMBNESS: 0
DIFFICULTY URINATING: 1
WEAKNESS: 0
DIAPHORESIS: 0
DYSURIA: 0

## 2022-10-22 ASSESSMENT — ACTIVITIES OF DAILY LIVING (ADL): ADLS_ACUITY_SCORE: 33

## 2022-10-22 NOTE — ED TRIAGE NOTES
Patient with constant feeling up bladder fullness, feels like unable to empty bladder. On meds from urology clinic. Hx of UTIs.     Triage Assessment     Row Name 10/22/22 1642       Respiratory WDL    Respiratory WDL WDL       Cardiac WDL    Cardiac WDL X  HTN       Cognitive/Neuro/Behavioral WDL    Cognitive/Neuro/Behavioral WDL WDL

## 2022-10-23 NOTE — ED PROVIDER NOTES
History   Chief Complaint:  Dysuria       HPI   Dimple Baxter is a 82 year old female with history of hypertension, diabetes, and CKD who presents with dysuria. The patient reports that she has been following with urology for months for increased urinary frequency sensation without increased urinary output. She states that the past couple days she has been getting up during the night 5-6 times but she doesn't have much urine output. The patient has tried many different medications with her urologist. The patient denies any dysuria, fever, chills, diaphoresis, flank pain, and abdominal pain. The patient denies any nausea, chest pain, shortness of breath, visual disturbance, hearing loss, headache, numbness, and weakness as well. She currently is taking Sanctura XR for symptoms without relief.     Review of Systems   Constitutional: Negative for chills, diaphoresis and fever.   HENT: Negative for hearing loss.    Eyes: Negative for visual disturbance.   Respiratory: Negative for shortness of breath.    Cardiovascular: Negative for chest pain.   Gastrointestinal: Negative for abdominal pain and nausea.   Genitourinary: Positive for difficulty urinating and frequency. Negative for dysuria and flank pain.   Neurological: Negative for weakness, numbness and headaches.   All other systems reviewed and are negative.      Allergies:  Cymbalta  Lipitor  Neurontin   Nortriptyline  Omnicef   Paroxetine  Rosuvastatin  Seroquel   Topamax   Wellbutrin  Zoloft    Medications:  Albuterol   Norvasc   Aspirin   Glucotrol   Toprol   Nitrostat   Accupril   Crestor   Vesicare   Detrol   Desyrel   Sanctura     Past Medical History:     GERD   Hypertension   Hyperlipidemia   Left bundle branch block   DDD   Lumbar spondylosis   Insomnia   CKD   Pulmonary fibrosis   Type 2 diabetes     Past Surgical History:    Hysterectomy   Right carpal tunnel surgery   Cholecystectomy   Coronary angiogram   Lasik eye surgery     Family History:   "  Mother- CVD   Father- alcoholism     Social History:  The patient presents to the ED with daughter Nikkie.   PCP: Du Phillips     Physical Exam     Patient Vitals for the past 24 hrs:   BP Temp Temp src Pulse Resp SpO2 Height Weight   10/22/22 1645 (!) 201/87 97.8  F (36.6  C) Temporal 78 18 97 % 1.676 m (5' 6\") 83.5 kg (184 lb)       Physical Exam    General: Laying on the ED bed, no distress  HEENT: Normocephalic, atraumatic  Cardiac: Warm and well perfused, regular rate and rhythm  Pulm: Breathing comfortably, no accessory muscle usage, no conversational dyspnea, and lungs clear bilaterally  GI: Abdomen soft, nontender, no rigidity or guarding  MSK: No deformities, no CVAT  Skin: Warm and dry  Neuro: Moves all extremities x4, no focal deficits  Psych: Normal mood and affect    Emergency Department Course     Laboratory:  Labs Ordered and Resulted from Time of ED Arrival to Time of ED Departure   ROUTINE UA WITH MICROSCOPIC REFLEX TO CULTURE - Abnormal       Result Value    Color Urine Straw      Appearance Urine Clear      Glucose Urine Negative      Bilirubin Urine Negative      Ketones Urine Negative      Specific Gravity Urine 1.005      Blood Urine Negative      pH Urine 7.0      Protein Albumin Urine Negative      Urobilinogen Urine Normal      Nitrite Urine Negative      Leukocyte Esterase Urine Trace (*)     Bacteria Urine Few (*)     RBC Urine 0      WBC Urine 6 (*)    HEMOGLOBIN A1C - Normal    Hemoglobin A1C 5.6     GLUCOSE BY METER - Normal    GLUCOSE BY METER POCT 98     GLUCOSE MONITOR NURSING POCT   URINE CULTURE        Procedures    Emergency Department Course:       Reviewed:  I reviewed nursing notes, vitals and past medical history    Assessments:  2145 I obtained history and examined the patient as noted above.     2249 I rechecked the patient and explained findings.     Disposition:  The patient was discharged to home.     Impression & Plan     CMS Diagnoses: None     Medical " Decision Makin-year-old female presents with increased urinary frequency in the context of multiple prior encounters for this problem dating back several months.  The patient's symptoms tonight seem to be part of her usual constellation, only worsened over the last couple of days.  UA here does have some bacteria and trace leuk esterase, 6 WBCs, and in the context of worsening symptoms plan is to treat for UTI while a culture is sent.  Also sending an A1c to assess whether the polyuria could be a symptom of her diabetes.  Fingerstick glucose here tonight is 98.  Plan at this time is for discharge home with antibiotics for UTI and urology follow-up.    Critical Care Time: was 0 minutes for this patient excluding procedures    Diagnosis:    ICD-10-CM    1. Urinary frequency  R35.0 Adult Urology  Referral          Discharge Medications:  New Prescriptions    CEPHALEXIN (KEFLEX) 500 MG CAPSULE    Take 1 capsule (500 mg) by mouth 4 times daily for 5 days       Scribe Disclosure:  I, Ting Ramos, am serving as a scribe at 9:38 PM on 10/22/2022 to document services personally performed by Arthur Damon MD based on my observations and the provider's statements to me.     MD Nelson SANDOVAL Colin, MD  10/22/22 3796

## 2022-10-24 ENCOUNTER — TELEPHONE (OUTPATIENT)
Dept: PODIATRY | Facility: CLINIC | Age: 82
End: 2022-10-24

## 2022-10-24 LAB
BACTERIA UR CULT: ABNORMAL
BACTERIA UR CULT: ABNORMAL

## 2022-10-24 NOTE — RESULT ENCOUNTER NOTE
[facility] Emergency Dept discharge antibiotic (if prescribed): Cephalexin (Keflex) 500 mg capsule, 1 capsule (500 mg) by mouth 4 times daily for 5 days.   Date of Rx (if applicable):  10/22/22  No changes in treatment per [facility] ED Lab Result Urine culture protocol.

## 2022-10-24 NOTE — TELEPHONE ENCOUNTER
"Patient had procedure for right and left great toe ingrown toenail on 10/18/22. Called patient- she requested I speak with her daughter. Daughter said patient has been doing soaks, antibiotic ointment and bandaid. The last 2 nights they have also used rubbing alcohol on the area. They report right great toe has increased redness that has spread to the first \"knuckle\". She has continued drainage, mainly described as bloody, but the areas of the toe look like there may be pus. They state right great toe looks different and more red than the left great toe.     To note: patient was placed on cephalexin on 10/22/22 at ED for separate concern.     Please advise on recommendations. If a separate medication is needed patient prefers [facility] in [facility].     Jennifer Negron MSA, ATC  Certified Athletic Trainer  "

## 2022-10-24 NOTE — TELEPHONE ENCOUNTER
[facility] Call Center    Phone Message    May a detailed message be left on voicemail: yes     Reason for Call: Other: pt needs a RN to call back asap      Action Taken: Message routed to:  [facility] ortho    Travel Screening: Not Applicable     Patient had a toenail removed on her R big toe and the redness is now moving up to her knuckle and they are concerned with how it looks     Please call back as soon as possible

## 2022-10-25 NOTE — RESULT ENCOUNTER NOTE
Final Urine Culture Report on 10.24.22  Emergency Dep/Urgent Care discharge antibiotic prescribed: Cephalexin (Keflex) 500 mg capsule, 1 capsule (500 mg) by mouth 4 times daily for 5 days.  #1. Bacteria, 50,000 - 100,000 CFU/ML Klebsiella pneumoniae , is SUSCEPTIBLE to Antibiotic.    #2. Bacteria, 10,000 - 50,000 CFU/mL Klebsiella pneumoniae , is SUSCEPTIBLE to Antibiotic.    No change in treatment per [facility] ED lab result Urine Culture protocol.

## 2022-10-26 ENCOUNTER — OFFICE VISIT (OUTPATIENT)
Dept: UROLOGY | Facility: CLINIC | Age: 82
End: 2022-10-26
Payer: COMMERCIAL

## 2022-10-26 VITALS
HEART RATE: 76 BPM | HEIGHT: 66 IN | BODY MASS INDEX: 28.93 KG/M2 | WEIGHT: 180 LBS | DIASTOLIC BLOOD PRESSURE: 93 MMHG | OXYGEN SATURATION: 93 % | SYSTOLIC BLOOD PRESSURE: 179 MMHG

## 2022-10-26 DIAGNOSIS — N39.0 RECURRENT UTI: ICD-10-CM

## 2022-10-26 DIAGNOSIS — R39.15 URINARY URGENCY: ICD-10-CM

## 2022-10-26 DIAGNOSIS — R35.0 URINARY FREQUENCY: Primary | ICD-10-CM

## 2022-10-26 DIAGNOSIS — N30.00 ACUTE CYSTITIS WITHOUT HEMATURIA: ICD-10-CM

## 2022-10-26 LAB — RESIDUAL VOLUME (RV) (EXTERNAL): 14

## 2022-10-26 PROCEDURE — 51798 US URINE CAPACITY MEASURE: CPT | Performed by: UROLOGY

## 2022-10-26 PROCEDURE — 99214 OFFICE O/P EST MOD 30 MIN: CPT | Mod: 25 | Performed by: UROLOGY

## 2022-10-26 RX ORDER — SULFAMETHOXAZOLE/TRIMETHOPRIM 800-160 MG
1 TABLET ORAL 2 TIMES DAILY
Qty: 10 TABLET | Refills: 0 | Status: SHIPPED | OUTPATIENT
Start: 2022-10-26 | End: 2022-10-31

## 2022-10-26 RX ORDER — ESTRADIOL 0.1 MG/G
1 CREAM VAGINAL
Qty: 42.5 G | Refills: 11 | Status: SHIPPED | OUTPATIENT
Start: 2022-10-26 | End: 2024-01-12

## 2022-10-26 RX ORDER — PHENAZOPYRIDINE HYDROCHLORIDE 200 MG/1
200 TABLET, FILM COATED ORAL 3 TIMES DAILY PRN
Qty: 9 TABLET | Refills: 0 | Status: SHIPPED | OUTPATIENT
Start: 2022-10-26 | End: 2023-05-03

## 2022-10-26 ASSESSMENT — PAIN SCALES - GENERAL: PAINLEVEL: NO PAIN (0)

## 2022-10-26 NOTE — LETTER
10/26/2022       RE: Dimple Baxter  [address] [address]     Dear Colleague,    Thank you for referring your patient, Dimple Baxter, to the [facility] [place] [facility]. Please see a copy of my visit note below.    October 26, 2022    Dimple was seen today for urinary frequency.    Diagnoses and all orders for this visit:    Urinary frequency  -     MEASURE POST-VOID RESIDUAL URINE/BLADDER CAPACITY, US NON-IMAGING (94620)    Acute cystitis without hematuria  -     phenazopyridine (PYRIDIUM) 200 MG tablet; Take 1 tablet (200 mg) by mouth 3 times daily as needed for irritation  -     sulfamethoxazole-trimethoprim (BACTRIM DS) 800-160 MG tablet; Take 1 tablet by mouth 2 times daily for 5 days    Urinary urgency    Recurrent UTI  -     estradiol (ESTRACE) 0.1 MG/GM vaginal cream; Place 1 g vaginally three times a week       She has continued to have UTIs and dysuria during this time and so recommend that we further evaluate with an office cystoscopy in the future    Will have her increase the vaginal estrogen cream dose as atrophy can contribute to both the dysuria and the UTIs    She is given some prn pyridium to try for the dysuria and will change her course of antibiotics.  IF the symptoms not better will need further urine testing.      10 minutes were spent today on the day of the encounter in reviewing the EMR including Mirna Adorno's notes, ED notes and urinalysis and urine culture, direct patient care including prescription medications, coordination of care and documentation    Mirta Velazco MD MPH  (she/her/hers)   of Urology  [facility]      Subjective    She is here today for UTI    Was in the ED over the weekend (note from 10/22/22 reviewed in Epic) for nocturia and urinary urgency frequency for small volume voids, diagnosed with a UTI (Urinalysis with 6 WBC and Urine culture with  "Klebsiella-results 10/22/22 in Epic reviewed).  She is currently taking cephalexin and does not feel that it is helping as she is still having significant dysuria    Last saw Mirna Adorno 1/10/2022 (note reviewed).  Was prescribed estrogen cream and mirabegron.  Currently on the trospium    She denies any changes in health since last visit    BP (!) 179/93   Pulse 76   Ht 1.676 m (5' 6\")   Wt 81.6 kg (180 lb)   LMP  (LMP Unknown)   SpO2 93%   BMI 29.05 kg/m    GENERAL: healthy, alert and no distress  EYES: Eyes grossly normal to inspection, conjunctivae and sclerae normal  HENT: normal cephalic/atraumatic.  External ears, nose and mouth without ulcers or lesions.  RESP: no audible wheeze, cough, or visible cyanosis.  No visible retractions or increased work of breathing.  Able to speak fully in complete sentences.  NEURO: Cranial nerves grossly intact, mentation intact and speech normal  PSYCH: mentation appears normal, affect normal/bright, judgement and insight intact, normal speech and appearance well-groomed    CC  Patient Care Team:  Du Phillips MD as PCP - General (Internal Medicine)  Du Phillips MD as Assigned PCP  Mirna Adorno PA-C as Physician Assistant (Urology)  Chu Lara MD as Assigned Neuroscience Provider  Mirna Adorno PA-C as Assigned OBGYN Provider  Laina Kramer RD as Diabetes Educator (Dietitian, Registered)  Una Kelly DPM as Assigned Surgical Provider      "

## 2022-10-26 NOTE — PATIENT INSTRUCTIONS
Websites with free information:    American [employer]ro[employer] Society patient website: www.voicesforpfd.org    Total Control Program: www.totalcontrolprogram.com    Supplements to prevent UTI to consider  -Probiotics  -Cranberry (for these products let them know a doctor is recommending them)   Ellura: www.myellura.[employer]Castic   Theracran HP by [employer] [facility] [address]  -d-mannose 2gm daily  -Vitamin C 500-1000mg twice a day    Increase the estrogen use to 1gm 3x a week    Please change the antibiotics and take the azo    Please return for a cystoscopy (procedure to look in the bladder) and pelvic exam    It was a pleasure meeting with you today.  Thank you for allowing me and my team the privilege of caring for you today.  YOU are the reason we are here, and I truly hope we provided you with the excellent service you deserve.  Please let us know if there is anything else we can do for you so that we can be sure you are leaving completely satisfied with your care experience.    Cystoscopy    Cystoscopy is a procedure that lets your doctor look directly inside your urethra and bladder. It can be used to:  Help diagnose a problem with your urethra, bladder, or kidneys.  Take a sample (biopsy) of bladder or urethral tissue.  Treat certain problems (such as removing kidney stones).  Place a stent to bypass an obstruction.  Take special X-rays of the kidneys.  Based on the findings, your doctor may recommend other tests or treatments.  What is a cystoscope?  A cystoscope is a telescope-like instrument that contains lenses and fiberoptics (small glass wires that make bright light). The cystoscope may be straight and rigid, or flexible to bend around curves in the urethra. The doctor may look directly into the cystoscope, or project the image onto a monitor.  Getting ready  Ask your doctor if you should stop taking any medicines before the procedure.  Follow any other instructions your doctor gives you.  Tell your doctor before the exam  if you:  Take any medicines, such as aspirin or blood thinners  Have allergies to any medicines  Are pregnant   The procedure  Cystoscopy is done in the doctor s office, surgery center, or hospital. The doctor and a nurse are present during the procedure. It takes only a few minutes, longer if a biopsy, X-ray, or treatment needs to be done.  During the procedure:  You lie on an exam table on your back, knees bent and legs apart. You are covered with a drape.  Your urethra and the area around it are washed. Anesthetic jelly may be applied to numb the urethra.  The cystoscope is inserted. A sterile fluid is put into the bladder to expand it. You may feel pressure from this fluid.  When the procedure is done, the cystoscope is removed.  After the procedure   Once you re home:  Drink plenty of fluids.  You may have burning or light bleeding when you urinate--this is normal.  Medicines may be prescribed to ease any discomfort or prevent infection. Take these as directed.  Call your doctor if you have heavy bleeding or blood clots, burning that lasts more than a day, a fever over 100 F  (38  C), or trouble urinating.  Date Last Reviewed: 1/1/2017 2000-2017 [employer]. [address] [place] [address], [place] [address]. All rights reserved. This information is not intended as a substitute for professional medical care. Always follow your healthcare professional's instructions.

## 2022-10-26 NOTE — PROGRESS NOTES
October 26, 2022    Dimple was seen today for urinary frequency.    Diagnoses and all orders for this visit:    Urinary frequency  -     MEASURE POST-VOID RESIDUAL URINE/BLADDER CAPACITY, US NON-IMAGING (35308)    Acute cystitis without hematuria  -     phenazopyridine (PYRIDIUM) 200 MG tablet; Take 1 tablet (200 mg) by mouth 3 times daily as needed for irritation  -     sulfamethoxazole-trimethoprim (BACTRIM DS) 800-160 MG tablet; Take 1 tablet by mouth 2 times daily for 5 days    Urinary urgency    Recurrent UTI  -     estradiol (ESTRACE) 0.1 MG/GM vaginal cream; Place 1 g vaginally three times a week       She has continued to have UTIs and dysuria during this time and so recommend that we further evaluate with an office cystoscopy in the future    Will have her increase the vaginal estrogen cream dose as atrophy can contribute to both the dysuria and the UTIs    She is given some prn pyridium to try for the dysuria and will change her course of antibiotics.  IF the symptoms not better will need further urine testing.      10 minutes were spent today on the day of the encounter in reviewing the EMR including Mirna Adorno's notes, ED notes and urinalysis and urine culture, direct patient care including prescription medications, coordination of care and documentation    Mirta Velazco MD MPH  (she/her/hers)   of Urology  [facility]      Subjective    She is here today for UTI    Was in the ED over the weekend (note from 10/22/22 reviewed in Epic) for nocturia and urinary urgency frequency for small volume voids, diagnosed with a UTI (Urinalysis with 6 WBC and Urine culture with Klebsiella-results 10/22/22 in Epic reviewed).  She is currently taking cephalexin and does not feel that it is helping as she is still having significant dysuria    Last saw Mirna Adorno 1/10/2022 (note reviewed).  Was prescribed estrogen cream and mirabegron.  Currently on the trospium    She denies any  "changes in health since last visit    BP (!) 179/93   Pulse 76   Ht 1.676 m (5' 6\")   Wt 81.6 kg (180 lb)   LMP  (LMP Unknown)   SpO2 93%   BMI 29.05 kg/m    GENERAL: healthy, alert and no distress  EYES: Eyes grossly normal to inspection, conjunctivae and sclerae normal  HENT: normal cephalic/atraumatic.  External ears, nose and mouth without ulcers or lesions.  RESP: no audible wheeze, cough, or visible cyanosis.  No visible retractions or increased work of breathing.  Able to speak fully in complete sentences.  NEURO: Cranial nerves grossly intact, mentation intact and speech normal  PSYCH: mentation appears normal, affect normal/bright, judgement and insight intact, normal speech and appearance well-groomed    CC  Patient Care Team:  Du Phillips MD as PCP - General (Internal Medicine)  Du Phillips MD as Assigned PCP  Mirna Adorno PA-C as Physician Assistant (Urology)  Chu Lara MD as Assigned Neuroscience Provider  Mirna Adorno PA-C as Assigned OBGYN Provider  Laina Kramer RD as Diabetes Educator (Dietitian, Registered)  Una Kelly DPM as Assigned Surgical Provider          "

## 2022-10-26 NOTE — NURSING NOTE
Chief Complaint   Patient presents with     Urinary Frequency     Pt here today for urinary frequency   pvr 14ml  Sejal Velarde CMA

## 2022-10-31 ENCOUNTER — TELEPHONE (OUTPATIENT)
Dept: UROLOGY | Facility: CLINIC | Age: 82
End: 2022-10-31

## 2022-10-31 DIAGNOSIS — N39.0 RECURRENT UTI: Primary | ICD-10-CM

## 2022-11-02 NOTE — TELEPHONE ENCOUNTER
Phone call to daughter, Ernestina Kennedy. She was very frustrated and disappointed that they had not received a call back. Apologized for the delay in getting back with them. She was informed this would be relayed to a supervisor.     Patient was seen in the ED on 10/22/22 for another health issue and was put on an antibiotic.   Per chart review patient was on Keflex 500mg and then started on Bactrim DS on 10/26/22.   Patient's toes are doing better. Drainage stopped and she has been using alcohol twice daily. There are scabs on each toenail and the redness has improved. She is no longer using the Neosporin and is not keeping them covered so they can dry out. Patient was raking leaves yesterday and had a little drainage, so she covered her toenail with a band aid.   Recommended she keep the toe nail areas covered with a band aid to protect them from bacteria and to change daily.   Discussed that it can take up to 4-6 weeks for the toe nail areas to heal due to the chemical used.   She had no further questions and verbalized understanding.   Provided [facility]  number for any questions in the future.     SHERRY Truong RN

## 2022-11-02 NOTE — TELEPHONE ENCOUNTER
Finish current cephalexin  No additional Abx needed  If not improved after this let us know    Una Kelly, JOAOM

## 2022-11-02 NOTE — TELEPHONE ENCOUNTER
Called patient and informed that since she is still having symptoms and has finished ABT, recommended patient leave a UAUC at a [facility] lab today and gave her central scheduling number to schedule her lab in [place].     Chayo Castano LPN

## 2022-11-03 ENCOUNTER — LAB (OUTPATIENT)
Dept: LAB | Facility: CLINIC | Age: 82
End: 2022-11-03
Payer: COMMERCIAL

## 2022-11-03 DIAGNOSIS — N39.0 RECURRENT UTI: ICD-10-CM

## 2022-11-03 LAB
ALBUMIN UR-MCNC: NEGATIVE MG/DL
APPEARANCE UR: CLEAR
BILIRUB UR QL STRIP: NEGATIVE
COLOR UR AUTO: YELLOW
GLUCOSE UR STRIP-MCNC: NEGATIVE MG/DL
HGB UR QL STRIP: ABNORMAL
KETONES UR STRIP-MCNC: NEGATIVE MG/DL
LEUKOCYTE ESTERASE UR QL STRIP: NEGATIVE
NITRATE UR QL: NEGATIVE
PH UR STRIP: 5.5 [PH] (ref 5–7)
SP GR UR STRIP: 1.01 (ref 1–1.03)
UROBILINOGEN UR STRIP-ACNC: 0.2 E.U./DL

## 2022-11-03 PROCEDURE — 87086 URINE CULTURE/COLONY COUNT: CPT

## 2022-11-03 PROCEDURE — 81003 URINALYSIS AUTO W/O SCOPE: CPT | Mod: QW

## 2022-11-05 LAB — BACTERIA UR CULT: NO GROWTH

## 2022-11-07 ENCOUNTER — HOSPITAL ENCOUNTER (OUTPATIENT)
Dept: MAMMOGRAPHY | Facility: CLINIC | Age: 82
Discharge: HOME | End: 2022-11-07
Admitting: INTERNAL MEDICINE
Payer: COMMERCIAL

## 2022-11-07 DIAGNOSIS — Z12.31 ENCOUNTER FOR SCREENING MAMMOGRAM FOR MALIGNANT NEOPLASM OF BREAST: ICD-10-CM

## 2022-11-07 PROCEDURE — 77067 SCR MAMMO BI INCL CAD: CPT

## 2022-11-11 DIAGNOSIS — I10 PRIMARY HYPERTENSION: ICD-10-CM

## 2022-11-14 NOTE — TELEPHONE ENCOUNTER
Amlodipine 2.5 mg  Routing refill request to provider for review/approval because:  Blood Pressure out of range for [facility] refill protocol.    BP Readings from Last 3 Encounters:   10/26/22 (!) 179/93   10/22/22 (!) 201/87   10/18/22 (!) 144/80       Appointments in Next Year      Nov 29, 2022 12:00 PM  (Arrive by 11:40 AM)  Provider Visit with Du Phillips MD  [facility] ([facility] ) [phone]

## 2022-11-15 RX ORDER — AMLODIPINE BESYLATE 2.5 MG/1
2.5 TABLET ORAL DAILY
Qty: 90 TABLET | Refills: 0 | Status: SHIPPED | OUTPATIENT
Start: 2022-11-15 | End: 2023-02-02

## 2022-11-29 ENCOUNTER — VIRTUAL VISIT (OUTPATIENT)
Dept: INTERNAL MEDICINE | Facility: CLINIC | Age: 82
End: 2022-11-29
Payer: COMMERCIAL

## 2022-11-29 DIAGNOSIS — E78.2 MIXED HYPERLIPIDEMIA: ICD-10-CM

## 2022-11-29 DIAGNOSIS — G47.00 INSOMNIA, UNSPECIFIED TYPE: Chronic | ICD-10-CM

## 2022-11-29 DIAGNOSIS — E11.21 TYPE 2 DIABETES MELLITUS WITH DIABETIC NEPHROPATHY, WITHOUT LONG-TERM CURRENT USE OF INSULIN (H): Primary | ICD-10-CM

## 2022-11-29 DIAGNOSIS — E78.5 HYPERLIPIDEMIA LDL GOAL <70: ICD-10-CM

## 2022-11-29 DIAGNOSIS — I10 PRIMARY HYPERTENSION: ICD-10-CM

## 2022-11-29 DIAGNOSIS — I10 BENIGN ESSENTIAL HYPERTENSION: ICD-10-CM

## 2022-11-29 PROCEDURE — 99214 OFFICE O/P EST MOD 30 MIN: CPT | Mod: 95 | Performed by: INTERNAL MEDICINE

## 2022-11-29 RX ORDER — METOPROLOL SUCCINATE 100 MG/1
TABLET, EXTENDED RELEASE ORAL
Qty: 90 TABLET | Refills: 1 | Status: SHIPPED | OUTPATIENT
Start: 2022-11-29 | End: 2023-08-02

## 2022-11-29 RX ORDER — GLIPIZIDE 5 MG/1
5 TABLET, FILM COATED, EXTENDED RELEASE ORAL DAILY
Qty: 90 TABLET | Refills: 1 | Status: SHIPPED | OUTPATIENT
Start: 2022-11-29 | End: 2023-06-10

## 2022-11-29 RX ORDER — QUINAPRIL 40 MG/1
40 TABLET ORAL DAILY
Qty: 90 TABLET | Refills: 1 | Status: SHIPPED | OUTPATIENT
Start: 2022-11-29 | End: 2023-02-13 | Stop reason: ALTCHOICE

## 2022-11-29 RX ORDER — NITROGLYCERIN 0.4 MG/1
TABLET SUBLINGUAL
Qty: 30 TABLET | Refills: 0 | Status: CANCELLED | OUTPATIENT
Start: 2022-11-29
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
## 2022-12-03 ENCOUNTER — HEALTH MAINTENANCE LETTER (OUTPATIENT)
Age: 82
End: 2022-12-03

## 2022-12-05 ENCOUNTER — ALLIED HEALTH/NURSE VISIT (OUTPATIENT)
Dept: UROLOGY | Facility: CLINIC | Age: 82
End: 2022-12-05
Payer: COMMERCIAL

## 2022-12-05 DIAGNOSIS — N39.0 RECURRENT UTI: Primary | ICD-10-CM

## 2022-12-05 LAB
ALBUMIN UR-MCNC: ABNORMAL MG/DL
APPEARANCE UR: CLEAR
BILIRUB UR QL STRIP: NEGATIVE
COLOR UR AUTO: YELLOW
GLUCOSE UR STRIP-MCNC: NEGATIVE MG/DL
HGB UR QL STRIP: ABNORMAL
KETONES UR STRIP-MCNC: NEGATIVE MG/DL
LEUKOCYTE ESTERASE UR QL STRIP: NEGATIVE
NITRATE UR QL: NEGATIVE
PH UR STRIP: 6.5 [PH] (ref 5–7)
SP GR UR STRIP: 1.01 (ref 1–1.03)
UROBILINOGEN UR STRIP-ACNC: 0.2 E.U./DL

## 2022-12-05 PROCEDURE — 87086 URINE CULTURE/COLONY COUNT: CPT

## 2022-12-05 PROCEDURE — 81003 URINALYSIS AUTO W/O SCOPE: CPT | Mod: QW

## 2022-12-05 PROCEDURE — 99211 OFF/OP EST MAY X REQ PHY/QHP: CPT

## 2022-12-05 NOTE — PROGRESS NOTES
Dimple Baxter comes into clinic today at the request of Dr. Velazco Ordering Provider for Cathed UAUC.    Patient is here for cathed urine analysis and urine culture. Patient's urethra was cleansed with iodine and a 14 Divehi catheter was inserted into urethra. Cathed sample was sent to lab for analysis. Also, patient was informed that if results are positive we will call her to treat in 2-3 days.     This service provided today was under the supervising provider of the day Dr. Villalpando, who was available if needed.    Chayo Castano LPN

## 2022-12-07 LAB — BACTERIA UR CULT: NORMAL

## 2022-12-13 ENCOUNTER — OFFICE VISIT (OUTPATIENT)
Dept: UROLOGY | Facility: CLINIC | Age: 82
End: 2022-12-13
Payer: COMMERCIAL

## 2022-12-13 VITALS — HEART RATE: 87 BPM | BODY MASS INDEX: 27.97 KG/M2 | HEIGHT: 66 IN | OXYGEN SATURATION: 98 % | WEIGHT: 174 LBS

## 2022-12-13 DIAGNOSIS — N39.0 RECURRENT UTI: Primary | ICD-10-CM

## 2022-12-13 DIAGNOSIS — N36.9 URETHRAL DISORDER: ICD-10-CM

## 2022-12-13 DIAGNOSIS — R31.0 GROSS HEMATURIA: ICD-10-CM

## 2022-12-13 LAB
ALBUMIN UR-MCNC: NEGATIVE MG/DL
APPEARANCE UR: CLEAR
BILIRUB UR QL STRIP: NEGATIVE
COLOR UR AUTO: YELLOW
GLUCOSE UR STRIP-MCNC: NEGATIVE MG/DL
HGB UR QL STRIP: NEGATIVE
KETONES UR STRIP-MCNC: NEGATIVE MG/DL
LEUKOCYTE ESTERASE UR QL STRIP: NEGATIVE
NITRATE UR QL: NEGATIVE
PH UR STRIP: 7 [PH] (ref 5–7)
SP GR UR STRIP: 1.01 (ref 1–1.03)
UROBILINOGEN UR STRIP-ACNC: 0.2 E.U./DL

## 2022-12-13 PROCEDURE — 99212 OFFICE O/P EST SF 10 MIN: CPT | Mod: 25 | Performed by: UROLOGY

## 2022-12-13 PROCEDURE — 81003 URINALYSIS AUTO W/O SCOPE: CPT | Mod: QW | Performed by: UROLOGY

## 2022-12-13 PROCEDURE — 52000 CYSTOURETHROSCOPY: CPT | Performed by: UROLOGY

## 2022-12-13 ASSESSMENT — PAIN SCALES - GENERAL: PAINLEVEL: NO PAIN (0)

## 2022-12-13 NOTE — LETTER
"12/13/2022       RE: Dimple Baxter  [address] [address]     Dear Colleague,    Thank you for referring your patient, Dimple Baxter, to the [facility]OLOGY [facility]IC [place] at [facility]. Please see a copy of my visit note below.    December 13, 2022    Return visit    Patient returns today for follow up She denies any changes in her health since last visit.    Pulse 87   Ht 1.676 m (5' 6\")   Wt 78.9 kg (174 lb)   LMP  (LMP Unknown)   SpO2 98%   BMI 28.08 kg/m    She is comfortable, in no distress, non-labored breathing.  Abdomen is soft, non-tender, non-distended.  Normal external female genitalia.  Negative CST.  Pelvic exam is remarkable for fullness aolng urethra.    Cystoscopy Note: After informed consent was obtained patient was prepped and draped in the standard fashion.  The flexible cystoscope was inserted into a normal appearing urethral meatus.  The urothelium was carefully examined and there were no tumors, masses, stones, foreign bodies, or other urothelial abnormalities noted.  Bilateral ureteral orifices were noted in the normal orthotopic position and both effluxed clear urine.  The cystoscope was retroflexed and the bladder neck was unremarkable.  The urethra was carefully examined upon removing the cystoscope and was unremarkable.  Patient tolerated the procedure without complications noted.      A/P: 82 year old F with gross hematuria and recurrent UTI with negative evaluation thus far, exam with question of urethral diverticula    MRI pelvis    RTC after MRI    10 minutes were spent today on the date of the encounter in reviewing the EMR, direct patient care, coordination of care and documentation in addition to the cystoscopy procedure    Mirta Velazco MD MPH  (she/her/hers)   of Urology  [facility]      CC  Patient Care Team:  Du Phillips MD as PCP - General (Internal " Medicine)  Du Phillips MD as Assigned PCP  Mirna Adorno PA-C as Physician Assistant (Urology)  Chu Lara MD as Assigned Neuroscience Provider  Mirna Adorno PA-C as Assigned OBGYN Provider  Laina Kramer RD as Diabetes Educator (Dietitian, Registered)  Mirta Velazco MD as Assigned Surgical Provider

## 2022-12-13 NOTE — PROGRESS NOTES
"December 13, 2022    Return visit    Patient returns today for follow up She denies any changes in her health since last visit.    Pulse 87   Ht 1.676 m (5' 6\")   Wt 78.9 kg (174 lb)   LMP  (LMP Unknown)   SpO2 98%   BMI 28.08 kg/m    She is comfortable, in no distress, non-labored breathing.  Abdomen is soft, non-tender, non-distended.  Normal external female genitalia.  Negative CST.  Pelvic exam is remarkable for fullness aolng urethra.    Cystoscopy Note: After informed consent was obtained patient was prepped and draped in the standard fashion.  The flexible cystoscope was inserted into a normal appearing urethral meatus.  The urothelium was carefully examined and there were no tumors, masses, stones, foreign bodies, or other urothelial abnormalities noted.  Bilateral ureteral orifices were noted in the normal orthotopic position and both effluxed clear urine.  The cystoscope was retroflexed and the bladder neck was unremarkable.  The urethra was carefully examined upon removing the cystoscope and was unremarkable.  Patient tolerated the procedure without complications noted.      A/P: 82 year old F with gross hematuria and recurrent UTI with negative evaluation thus far, exam with question of urethral diverticula    MRI pelvis    RTC after MRI    10 minutes were spent today on the date of the encounter in reviewing the EMR, direct patient care, coordination of care and documentation in addition to the cystoscopy procedure    Mirta Velazco MD MPH  (she/her/hers)   of Urology  [facility]  Patient Care Team:  Du Phillips MD as PCP - General (Internal Medicine)  Du Phillips MD as Assigned PCP  Mirna Adorno PA-C as Physician Assistant (Urology)  Chu Lara MD as Assigned Neuroscience Provider  Mirna Adorno PA-C as Assigned OBGYN Provider  Laina Kramer RD as Diabetes Educator (Dietitian, Registered)  Juan A, " Mirta Garcia MD as Assigned Surgical Provider

## 2022-12-13 NOTE — PATIENT INSTRUCTIONS
"Websites with free information:    [employer] patient website: www.voicesforpfd.org    Total Control Program: www.totalcontrolprogram.com    Supplements to prevent UTI to consider  -Probiotics  -Cranberry (for these products let them know a doctor is recommending them)   Ellura: www.myellura.CalmSea   Theracran HP by Theralogli[employer] [facility] [address]  -d-mannose 2gm daily  -Vitamin C 500-1000mg twice a day    Please do the MRI and then return afterwards    It was a pleasure meeting with you today.  Thank you for allowing me and my team the privilege of caring for you today.  YOU are the reason we are here, and I truly hope we provided you with the excellent service you deserve.  Please let us know if there is anything else we can do for you so that we can be sure you are leaving completely satisfied with your care experience.      AFTER YOUR CYSTOSCOPY  ?  ?  You have just completed a cystoscopy, or \"cysto\", which allowed your physician to learn more about your bladder (or to remove a stent placed after surgery). We suggest that you continue to avoid caffeine, fruit juice, and alcohol for the next 24 hours, however, you are encouraged to return to your normal activities.  ?  ?  A few things that are considered normal after your cystoscopy:  ?  * small amount of bleeding (or spotting) that clears within the next 24 hours  ?  * slight burning sensation with urination  ?  * sensation of needing to void (urinate) more frequently  ?  * the feeling of \"air\" in your urine  ?  * mild discomfort that is relieved with Tylenol    * bladder spasms  ?  ?  ?  Please contact our office promptly if you:  ?  * develop a fever above 101 degrees  ?  * are unable to urinate  ?  * develop bright red blood that does not stop  ?  * experience severe pain or swelling  ?  ?  ?  And of course, please contact our office with any concerns or questions [phone].  ?  "

## 2022-12-28 ENCOUNTER — ANCILLARY PROCEDURE (OUTPATIENT)
Dept: MRI IMAGING | Facility: CLINIC | Age: 82
End: 2022-12-28
Attending: UROLOGY
Payer: COMMERCIAL

## 2022-12-28 DIAGNOSIS — R31.0 GROSS HEMATURIA: ICD-10-CM

## 2022-12-28 DIAGNOSIS — N39.0 RECURRENT UTI: ICD-10-CM

## 2022-12-28 DIAGNOSIS — N36.9 URETHRAL DISORDER: ICD-10-CM

## 2022-12-28 PROCEDURE — 72197 MRI PELVIS W/O & W/DYE: CPT

## 2022-12-28 PROCEDURE — A9585 GADOBUTROL INJECTION: HCPCS | Performed by: UROLOGY

## 2022-12-28 PROCEDURE — 255N000002 HC RX 255 OP 636: Performed by: UROLOGY

## 2022-12-28 RX ORDER — GADOBUTROL 604.72 MG/ML
8 INJECTION INTRAVENOUS ONCE
Status: COMPLETED | OUTPATIENT
Start: 2022-12-28 | End: 2022-12-28

## 2022-12-28 RX ADMIN — GADOBUTROL 8 ML: 604.72 INJECTION INTRAVENOUS at 09:49

## 2023-01-01 ENCOUNTER — TRANSFERRED RECORDS (OUTPATIENT)
Dept: MULTI SPECIALTY CLINIC | Facility: CLINIC | Age: 83
End: 2023-01-01

## 2023-01-01 LAB — RETINOPATHY: NORMAL

## 2023-01-18 ENCOUNTER — VIRTUAL VISIT (OUTPATIENT)
Dept: UROLOGY | Facility: CLINIC | Age: 83
End: 2023-01-18
Payer: COMMERCIAL

## 2023-01-18 VITALS — WEIGHT: 182 LBS | HEIGHT: 66 IN | BODY MASS INDEX: 29.25 KG/M2

## 2023-01-18 DIAGNOSIS — N39.0 RECURRENT UTI: Primary | ICD-10-CM

## 2023-01-18 DIAGNOSIS — R31.0 GROSS HEMATURIA: ICD-10-CM

## 2023-01-18 DIAGNOSIS — N39.41 URGENCY INCONTINENCE: ICD-10-CM

## 2023-01-18 PROCEDURE — 99214 OFFICE O/P EST MOD 30 MIN: CPT | Mod: 95 | Performed by: UROLOGY

## 2023-01-18 RX ORDER — TROSPIUM CHLORIDE ER 60 MG/1
60 CAPSULE ORAL EVERY MORNING
Qty: 30 CAPSULE | Refills: 11 | Status: SHIPPED | OUTPATIENT
Start: 2023-01-18 | End: 2024-02-06

## 2023-01-18 RX ORDER — SENNOSIDES A AND B 8.6 MG/1
1 TABLET, FILM COATED ORAL DAILY PRN
COMMUNITY
End: 2024-02-13

## 2023-01-18 RX ORDER — TROSPIUM CHLORIDE ER 60 MG/1
60 CAPSULE ORAL EVERY MORNING
COMMUNITY
End: 2023-01-18

## 2023-01-18 ASSESSMENT — PAIN SCALES - GENERAL: PAINLEVEL: NO PAIN (0)

## 2023-01-18 NOTE — LETTER
1/18/2023       RE: Dimple Baxter  [address] [address]     Dear Colleague,    Thank you for referring your patient, Dimple Baxter, to the [facility]OLOGY CLINIC [place] at [facility]. Please see a copy of my visit note below.    Pt does not want to mychart for her visit    Send link to cell phone    Althea is a 82 year old who is being evaluated via a billable video visit.      How would you like to obtain your AVS? MyChart  If the video visit is dropped, the invitation should be resent by: Text to cell phone: [phone]  Will anyone else be joining your video visit? No        Video-Visit Details    Type of service:  Video Visit   Video Start Time: 2:18 PM  Video End Time:2:26 PM    Originating Location (pt. Location): Home    Distant Location (provider location):  On-site  Platform used for Video Visit: [facility]    January 18, 2023    Dimple was seen today for results.    Diagnoses and all orders for this visit:    Recurrent UTI  -     Adult Nephrology  Referral; Future    Urgency incontinence  -     trospium (SANCTURA XR) 60 MG CP24 24 hr capsule; Take 1 capsule (60 mg) by mouth every morning    Gross hematuria  -     Adult Nephrology  Referral; Future       Given no obvious etiology for the gross hematuria will place nephrology referral    Tropsium is working well.  Have refilled the medication but did advise her that she needs to come in for a PVR to make sure she empties appropriately on the medication    RTC to see Qiana in 6 months, sooner if needed    8 minutes were spent today on the day of the encounter in reviewing the EMR including interpretation of the MRI, direct patient care including prescription medications, coordination of care and documentation    Mirta Velazco MD MPH  (she/her/hers)   of Urology  [facility]      Subjective    Here to follow up on the MRI.  States that the  "trospium is helping her urinary symptoms.  She denies any changes in health since last visit    Ht 1.676 m (5' 6\")   Wt 82.6 kg (182 lb)   LMP  (LMP Unknown)   BMI 29.38 kg/m    GENERAL: healthy, alert and no distress  EYES: Eyes grossly normal to inspection, conjunctivae and sclerae normal  HENT: normal cephalic/atraumatic.  External ears, nose and mouth without ulcers or lesions.  RESP: no audible wheeze, cough, or visible cyanosis.  No visible retractions or increased work of breathing.  Able to speak fully in complete sentences.  NEURO: Cranial nerves grossly intact, mentation intact and speech normal  PSYCH: mentation appears normal, affect normal/bright, judgement and insight intact, normal speech and appearance well-groomed    Labs/imaging/pathology  MRI from 12/8/2022 was reviewed.  On my interpretation there is no obvious  abnormalities, no urethral diverticula as questioned      CC  Patient Care Team:  Du Phillips MD as PCP - General (Internal Medicine)  Du Phillips MD as Assigned PCP  Mirna Adorno PA-C as Physician Assistant (Urology)  Chu Lara MD as Assigned Neuroscience Provider  Mirna Adorno PA-C as Assigned OBGYN Provider  Laina Kramer RD as Diabetes Educator (Dietitian, Registered)  Mirta Velazco MD as Assigned Surgical Provider    "

## 2023-01-18 NOTE — PROGRESS NOTES
"Pt does not want to mychart for her visit    Send link to cell phone    Althea is a 82 year old who is being evaluated via a billable video visit.      How would you like to obtain your AVS? MyChart  If the video visit is dropped, the invitation should be resent by: Text to cell phone: [phone]  Will anyone else be joining your video visit? No        Video-Visit Details    Type of service:  Video Visit   Video Start Time: 2:18 PM  Video End Time:2:26 PM    Originating Location (pt. Location): Home    Distant Location (provider location):  On-site  Platform used for Video Visit: [employer]    January 18, 2023    Dimple was seen today for results.    Diagnoses and all orders for this visit:    Recurrent UTI  -     Adult Nephrology  Referral; Future    Urgency incontinence  -     trospium (SANCTURA XR) 60 MG CP24 24 hr capsule; Take 1 capsule (60 mg) by mouth every morning    Gross hematuria  -     Adult Nephrology  Referral; Future       Given no obvious etiology for the gross hematuria will place nephrology referral    Tropsium is working well.  Have refilled the medication but did advise her that she needs to come in for a PVR to make sure she empties appropriately on the medication    RTC to see Qiana in 6 months, sooner if needed    8 minutes were spent today on the day of the encounter in reviewing the EMR including interpretation of the MRI, direct patient care including prescription medications, coordination of care and documentation    Mirta Velazco MD MPH  (she/her/hers)   of Urology  [facility]      Subjective    Here to follow up on the MRI.  States that the trospium is helping her urinary symptoms.  She denies any changes in health since last visit    Ht 1.676 m (5' 6\")   Wt 82.6 kg (182 lb)   LMP  (LMP Unknown)   BMI 29.38 kg/m    GENERAL: healthy, alert and no distress  EYES: Eyes grossly normal to inspection, conjunctivae and sclerae normal  HENT: " normal cephalic/atraumatic.  External ears, nose and mouth without ulcers or lesions.  RESP: no audible wheeze, cough, or visible cyanosis.  No visible retractions or increased work of breathing.  Able to speak fully in complete sentences.  NEURO: Cranial nerves grossly intact, mentation intact and speech normal  PSYCH: mentation appears normal, affect normal/bright, judgement and insight intact, normal speech and appearance well-groomed    Labs/imaging/pathology  MRI from 12/8/2022 was reviewed.  On my interpretation there is no obvious  abnormalities, no urethral diverticula as questioned      CC  Patient Care Team:  Du Phillips MD as PCP - General (Internal Medicine)  Du Phillips MD as Assigned PCP  Mirna Adorno PA-C as Physician Assistant (Urology)  Chu Lara MD as Assigned Neuroscience Provider  Mirna Adorno PA-C as Assigned OBGYN Provider  Laina Kramer RD as Diabetes Educator (Dietitian, Registered)  Mirta Velazco MD as Assigned Surgical Provider

## 2023-01-18 NOTE — PATIENT INSTRUCTIONS
Nurse visit to empty your bladder    Continue the medications (estrogen cream and tropsium)    Websites with free information:    [employer] patient website: www.voicesforpfd.org    Total Control Program: www.totalcontrolprogram.com    Nephrology referral    Please return to see Qiana in 6 months, sooner if needed    It was a pleasure meeting with you today.  Thank you for allowing me and my team the privilege of caring for you today.  YOU are the reason we are here, and I truly hope we provided you with the excellent service you deserve.  Please let us know if there is anything else we can do for you so that we can be sure you are leaving completely satisfied with your care experience.

## 2023-01-20 ENCOUNTER — ALLIED HEALTH/NURSE VISIT (OUTPATIENT)
Dept: UROLOGY | Facility: CLINIC | Age: 83
End: 2023-01-20
Payer: COMMERCIAL

## 2023-01-20 DIAGNOSIS — R35.0 URINARY FREQUENCY: Primary | ICD-10-CM

## 2023-01-20 LAB — RESIDUAL VOLUME (RV) (EXTERNAL): 18

## 2023-01-20 PROCEDURE — 51798 US URINE CAPACITY MEASURE: CPT

## 2023-01-20 NOTE — PROGRESS NOTES
Dimple Baxter comes into clinic today at the request of Dr Velazco Ordering Provider for PVR.  This service provided today was under the supervising provider of the day Dr Mishra, who was available if needed.          She is here for a PVR per Dr Velazco. She is on Trospium and reports it is helping her with her symptoms  . PVr by bladder scan was 18 ml . She will continue on medication   Shannan Barkley LPN

## 2023-01-23 ENCOUNTER — TRANSFERRED RECORDS (OUTPATIENT)
Dept: HEALTH INFORMATION MANAGEMENT | Facility: CLINIC | Age: 83
End: 2023-01-23

## 2023-01-23 LAB — RETINOPATHY: NEGATIVE

## 2023-01-27 ENCOUNTER — TELEPHONE (OUTPATIENT)
Dept: NEPHROLOGY | Facility: CLINIC | Age: 83
End: 2023-01-27
Payer: COMMERCIAL

## 2023-01-27 NOTE — TELEPHONE ENCOUNTER
[facility] Call Center    Phone Message    May a detailed message be left on voicemail: no     Reason for Call: Pt called back because she received a VM saying that there was an earlier appt to see Dr Sexton. Writer unable to find appt earlier than scheduled. Please reach out to pt if necessary. Thank you.    Action Taken: Other: URO    Travel Screening: Not Applicable

## 2023-01-31 NOTE — TELEPHONE ENCOUNTER
Spoke to patient, informed her that she was likely called for a last minute appt as this nurse is unable to find a hold spot. Pt verbalized understanding.

## 2023-02-01 ENCOUNTER — TELEPHONE (OUTPATIENT)
Dept: UROLOGY | Facility: CLINIC | Age: 83
End: 2023-02-01
Payer: COMMERCIAL

## 2023-02-01 NOTE — TELEPHONE ENCOUNTER
Dimple  called to report the Tropisum is no longer working for her . Having frequency  Like before . She  Did have a PVR   On 1/20/23 that was 18 ml. She does not feel like it is a UTI . Would like a different  Medication ?

## 2023-02-03 NOTE — TELEPHONE ENCOUNTER
Lets have her see Qiana to discuss options  Per Dr Velazco .    Please call and let her know Dr Velazco wants her to see Qiana  To discuss other options  And arrange . Thanks

## 2023-02-09 ENCOUNTER — VIRTUAL VISIT (OUTPATIENT)
Dept: UROLOGY | Facility: CLINIC | Age: 83
End: 2023-02-09
Payer: COMMERCIAL

## 2023-02-09 VITALS — BODY MASS INDEX: 29.57 KG/M2 | WEIGHT: 184 LBS | HEIGHT: 66 IN

## 2023-02-09 DIAGNOSIS — N39.0 RECURRENT UTI: ICD-10-CM

## 2023-02-09 DIAGNOSIS — R39.15 URINARY URGENCY: ICD-10-CM

## 2023-02-09 DIAGNOSIS — R31.0 GROSS HEMATURIA: ICD-10-CM

## 2023-02-09 DIAGNOSIS — N39.46 MIXED STRESS AND URGE URINARY INCONTINENCE: Primary | ICD-10-CM

## 2023-02-09 PROCEDURE — 99214 OFFICE O/P EST MOD 30 MIN: CPT | Mod: VID | Performed by: PHYSICIAN ASSISTANT

## 2023-02-09 ASSESSMENT — PAIN SCALES - GENERAL: PAINLEVEL: NO PAIN (0)
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
## 2023-02-09 NOTE — PATIENT INSTRUCTIONS
- Keep your appointment with nephrology (kidney doctor) as planned.   - Will refer you to pelvic floor physical therapy; they will call you to schedule.  - Try to void every 2-3 hours during the day.  - Continue Trospium for now.   - Follow-up in about 4 months.  - Contact clinic if develop symptoms of UTI in the future.   - Keep bowels regular.  - Below is a list of things that can irritate the bladder and should be eliminated:  Caffeinated soft drinks.  Coffee.  Tea.  Chocolate.  Tomato-based foods.  Acidic juices and fruits. (includes cranberry juice)  Alcohol.  Nicotine  Carbonated drinks.  Aspartame/Nutrasweet.  ____________________________  Locations for Pelvic Floor Physical Therapy:    [facility])   [facility] - [facility] - [facility]   [facility] - [facility] - [facility]    _________________________________________________

## 2023-02-13 ENCOUNTER — OFFICE VISIT (OUTPATIENT)
Dept: INTERNAL MEDICINE | Facility: CLINIC | Age: 83
End: 2023-02-13
Payer: COMMERCIAL

## 2023-02-13 VITALS
DIASTOLIC BLOOD PRESSURE: 90 MMHG | OXYGEN SATURATION: 98 % | TEMPERATURE: 96.3 F | WEIGHT: 177.2 LBS | BODY MASS INDEX: 28.48 KG/M2 | RESPIRATION RATE: 12 BRPM | SYSTOLIC BLOOD PRESSURE: 154 MMHG | HEART RATE: 64 BPM | HEIGHT: 66 IN

## 2023-02-13 DIAGNOSIS — I10 PRIMARY HYPERTENSION: ICD-10-CM

## 2023-02-13 DIAGNOSIS — J84.10 PULMONARY FIBROSIS (H): ICD-10-CM

## 2023-02-13 DIAGNOSIS — E11.21 TYPE 2 DIABETES MELLITUS WITH DIABETIC NEPHROPATHY, WITHOUT LONG-TERM CURRENT USE OF INSULIN (H): Primary | ICD-10-CM

## 2023-02-13 DIAGNOSIS — E78.2 MIXED HYPERLIPIDEMIA: ICD-10-CM

## 2023-02-13 DIAGNOSIS — N18.31 STAGE 3A CHRONIC KIDNEY DISEASE (H): ICD-10-CM

## 2023-02-13 LAB
ALBUMIN SERPL BCG-MCNC: 4.3 G/DL (ref 3.5–5.2)
ALP SERPL-CCNC: 84 U/L (ref 35–104)
ALT SERPL W P-5'-P-CCNC: 23 U/L (ref 10–35)
ANION GAP SERPL CALCULATED.3IONS-SCNC: 13 MMOL/L (ref 7–15)
AST SERPL W P-5'-P-CCNC: 36 U/L (ref 10–35)
BILIRUB SERPL-MCNC: 0.4 MG/DL
BUN SERPL-MCNC: 13.3 MG/DL (ref 8–23)
CALCIUM SERPL-MCNC: 9.6 MG/DL (ref 8.8–10.2)
CHLORIDE SERPL-SCNC: 104 MMOL/L (ref 98–107)
CHOLEST SERPL-MCNC: 155 MG/DL
CREAT SERPL-MCNC: 0.99 MG/DL (ref 0.51–0.95)
CREAT UR-MCNC: 70.5 MG/DL
DEPRECATED HCO3 PLAS-SCNC: 26 MMOL/L (ref 22–29)
GFR SERPL CREATININE-BSD FRML MDRD: 56 ML/MIN/1.73M2
GLUCOSE SERPL-MCNC: 105 MG/DL (ref 70–99)
HBA1C MFR BLD: 6 % (ref 0–5.6)
HDLC SERPL-MCNC: 40 MG/DL
LDLC SERPL CALC-MCNC: 88 MG/DL
MICROALBUMIN UR-MCNC: 104 MG/L
MICROALBUMIN/CREAT UR: 147.52 MG/G CR (ref 0–25)
NONHDLC SERPL-MCNC: 115 MG/DL
POTASSIUM SERPL-SCNC: 4 MMOL/L (ref 3.4–5.3)
PROT SERPL-MCNC: 7.6 G/DL (ref 6.4–8.3)
SODIUM SERPL-SCNC: 143 MMOL/L (ref 136–145)
TRIGL SERPL-MCNC: 137 MG/DL

## 2023-02-13 PROCEDURE — 99214 OFFICE O/P EST MOD 30 MIN: CPT | Performed by: INTERNAL MEDICINE

## 2023-02-13 PROCEDURE — 80061 LIPID PANEL: CPT | Performed by: INTERNAL MEDICINE

## 2023-02-13 PROCEDURE — 80053 COMPREHEN METABOLIC PANEL: CPT | Performed by: INTERNAL MEDICINE

## 2023-02-13 PROCEDURE — 83036 HEMOGLOBIN GLYCOSYLATED A1C: CPT | Performed by: INTERNAL MEDICINE

## 2023-02-13 PROCEDURE — 82043 UR ALBUMIN QUANTITATIVE: CPT | Performed by: INTERNAL MEDICINE

## 2023-02-13 PROCEDURE — 36415 COLL VENOUS BLD VENIPUNCTURE: CPT | Performed by: INTERNAL MEDICINE

## 2023-02-13 PROCEDURE — 82570 ASSAY OF URINE CREATININE: CPT | Performed by: INTERNAL MEDICINE

## 2023-02-13 RX ORDER — ROSUVASTATIN CALCIUM 5 MG/1
5 TABLET, COATED ORAL DAILY
Qty: 90 TABLET | Refills: 1 | Status: SHIPPED | OUTPATIENT
Start: 2023-02-13 | End: 2023-08-14

## 2023-02-13 RX ORDER — LISINOPRIL 40 MG/1
40 TABLET ORAL DAILY
Qty: 90 TABLET | Refills: 0 | Status: SHIPPED | OUTPATIENT
Start: 2023-02-13 | End: 2023-03-14

## 2023-02-13 RX ORDER — AMLODIPINE BESYLATE 5 MG/1
5 TABLET ORAL DAILY
Qty: 30 TABLET | Refills: 0 | Status: SHIPPED | OUTPATIENT
Start: 2023-02-13 | End: 2023-03-14
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
## 2023-02-13 NOTE — NURSING NOTE
"BP (!) 164/72   Pulse 64   Temp (!) 96.3  F (35.7  C) (Tympanic)   Resp 12   Ht 1.676 m (5' 6\")   Wt 80.4 kg (177 lb 3.2 oz)   LMP  (LMP Unknown)   SpO2 98%   BMI 28.60 kg/m      "

## 2023-02-24 DIAGNOSIS — E11.21 TYPE 2 DIABETES MELLITUS WITH DIABETIC NEPHROPATHY, WITHOUT LONG-TERM CURRENT USE OF INSULIN (H): Chronic | ICD-10-CM

## 2023-02-24 NOTE — TELEPHONE ENCOUNTER
Prescription approved per [facility] Refill Protocol.  Oren WEINBERG RN, BSN

## 2023-03-13 NOTE — TELEPHONE ENCOUNTER
Left message no infection   
[facility] Call Center    Phone Message    May a detailed message be left on voicemail: yes     Reason for Call: Other: . pt is reporting UTI symptoms, and is wondering if an antibiotic could be sent to [id number] [address], please call Althea, thank you     Action Taken: Message routed to:  Other: uro    Travel Screening: Not Applicable                                                                      
Orders placed.she will come to [place] tomorrow.  Margoth Ivan LPN  
Pt is calling stating she did a UA/UC today, please call her when results are in, thank you   
Home

## 2023-03-14 ENCOUNTER — OFFICE VISIT (OUTPATIENT)
Dept: INTERNAL MEDICINE | Facility: CLINIC | Age: 83
End: 2023-03-14
Payer: COMMERCIAL

## 2023-03-14 VITALS
WEIGHT: 178 LBS | HEART RATE: 75 BPM | TEMPERATURE: 97.5 F | BODY MASS INDEX: 28.61 KG/M2 | OXYGEN SATURATION: 100 % | HEIGHT: 66 IN | SYSTOLIC BLOOD PRESSURE: 122 MMHG | RESPIRATION RATE: 16 BRPM | DIASTOLIC BLOOD PRESSURE: 60 MMHG

## 2023-03-14 DIAGNOSIS — I10 PRIMARY HYPERTENSION: ICD-10-CM

## 2023-03-14 DIAGNOSIS — F51.01 PRIMARY INSOMNIA: Chronic | ICD-10-CM

## 2023-03-14 PROCEDURE — 99213 OFFICE O/P EST LOW 20 MIN: CPT | Performed by: INTERNAL MEDICINE

## 2023-03-14 RX ORDER — AMLODIPINE BESYLATE 5 MG/1
5 TABLET ORAL DAILY
Qty: 90 TABLET | Refills: 1 | Status: SHIPPED | OUTPATIENT
Start: 2023-03-14 | End: 2023-08-02

## 2023-03-14 RX ORDER — LISINOPRIL 40 MG/1
40 TABLET ORAL DAILY
Qty: 90 TABLET | Refills: 1 | Status: SHIPPED | OUTPATIENT
Start: 2023-03-14 | End: 2023-08-02

## 2023-03-14 RX ORDER — TRAZODONE HYDROCHLORIDE 100 MG/1
TABLET ORAL
Qty: 90 TABLET | Refills: 1 | Status: SHIPPED | OUTPATIENT
Start: 2023-03-14 | End: 2023-10-25
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
## 2023-03-31 ENCOUNTER — THERAPY VISIT (OUTPATIENT)
Dept: PHYSICAL THERAPY | Facility: CLINIC | Age: 83
End: 2023-03-31
Attending: PHYSICIAN ASSISTANT
Payer: COMMERCIAL

## 2023-03-31 DIAGNOSIS — N39.46 MIXED STRESS AND URGE URINARY INCONTINENCE: ICD-10-CM

## 2023-03-31 DIAGNOSIS — M99.05 SOMATIC DYSFUNCTION OF PELVIC REGION: ICD-10-CM

## 2023-03-31 PROCEDURE — 97530 THERAPEUTIC ACTIVITIES: CPT | Mod: GP

## 2023-03-31 PROCEDURE — 97110 THERAPEUTIC EXERCISES: CPT | Mod: GP

## 2023-03-31 PROCEDURE — 97535 SELF CARE MNGMENT TRAINING: CPT | Mod: GP

## 2023-03-31 PROCEDURE — 97161 PT EVAL LOW COMPLEX 20 MIN: CPT | Mod: GP

## 2023-03-31 NOTE — PROGRESS NOTES
[facility]    OUTPATIENT Physical Therapy ORTHOPEDIC EVALUATION  PLAN OF TREATMENT FOR OUTPATIENT REHABILITATION  (COMPLETE FOR INITIAL CLAIMS ONLY)  Patient's Last Name, First Name, M.I.  YOB: 1940  Dimple Baxter    Provider s Name:  [facility]   Medical Record No.  [id number]   Start of Care Date:  03/31/23   Onset Date:   02/09/23   Treatment Diagnosis:  Pelvic Floor Dysfunction Medical Diagnosis:  Mixed stress and urge urinary incontinence       Goals:     03/31/23 0700   Urinary Leakage   Previous Functional Level No problems   Current Functional Level Number of urinary leakage episodes in a day   Performance Level 2   STG Target Performance Decrease urinary leakage episodes in a day to   Performance Level 1   Rationale continence throughout the day   Due Date 04/28/23   LTG Target Performance Decrease urinary leakage episodes in a week to   Performance Level <4   Rationale continence throughout the day   Due Date 06/23/23   Pad/Pantiliner usage   Previous Functional Level None   Current Functional Level Number of pads used in a day/night   Performance Level 2   STG Target Performance Number of pads used in a day/night   Performance Level 1   Rationale for healthy hygiene and to prevent skin breakdown   Due Date 04/28/23         Therapy Frequency:  2x/month  Predicted Duration of Therapy Intervention:  3 months    ZACHARY CABA, PT                 I CERTIFY THE NEED FOR THESE SERVICES FURNISHED UNDER        THIS PLAN OF TREATMENT AND WHILE UNDER MY CARE     (Physician co-signature of this document indicates review and certification of the therapy plan).                     Certification Date From:  03/31/23   Certification Date To:  06/23/23    Referring Provider:  Una Giordano    Initial Assessment        See Epic Evaluation SOC Date: 03/31/23

## 2023-03-31 NOTE — PROGRESS NOTES
Physical Therapy Initial Evaluation  Subjective:  The history is provided by the patient and a relative (Daughter present for subjective ). History limited by: Poor ability to describe/recall symptoms.   Therapist Generated HPI Evaluation  Problem details: SUBJECTIVE:    MD order: Mixed urinary incontinence  Chief Complaint: UTIs    Pt is an 82 year old female who presents to physical therapy with complaints of mixed urinary incontinence and UTI symptoms. Pt reports that her symptoms have been going on for one year. Pt reports that she has a history of UTIs and pt is experiencing urinary urgency and frequency even when UTI screens are negative. Pt reports that she has discomfort around the urethra before urination. Pt is unsure what may be triggering these symptoms. Pt reports that she has had 4 UTIs in the past year. Pt reports that she leaks urine when she laughs or when she waits too long to urinate. Pt leaks 2x/day. Pt wears 2 pantyliners in 24 hours. Pt notes that it is not always wet. Pt has a hx of constipation, but is taking nighat daily. Pt's goals are to decrease UTI symptoms.       Urination:  Do you leak on the way to the bathroom or with a strong urge to void? Yes   Do you leak with cough,sneeze, jumping, running?Yes   Any other activities that cause leaking? No   Do you have triggers that make you feel you can't wait to go to the bathroom? No.  Type of pad and number used per day? 2 thin pads per day   When you leak what is the amount? Few drops   How long can you delay the need to urinate? 11 - 30 minutes.   How many times do you get up to urinate at night? 1-2    Can you stop the flow of urine when on the toilet? Yes  Is the volume of urine passed usually: medium. (8sec rule= 250ml with average bladder storing 400-600ml)  Do you feel empty when you are done? Yes   Do you strain to pass urine? No  Do you have a slow or hesitant urinary stream? Yes- when she has a UTI   Do you have difficulty initiating  "the urine stream? No  Is urination painful? Yes- when pt has UTI symptoms.   How many bladder infections have you had in last 12 months? 3-4   Fluid intake(one glass is 8oz or one cup) 64 oz/day, 1 caffinated glasses/day (mini coke)  0-1 alcohol glasses/month.    Bowel habits:  Frequency of bowel movements? 7 times a week  Consistancy of stool? soft formed  Do you ignore the urge to defecate? No  Do you strain to pass stool? Yes- sometimes     Aggrevating factors:  Is loss of stool associated with an activity (lifting, coughing, running) or a food)  No    Do you have abdominal pain?  No    Pelvic Pain:  Do you have any pelvic pain with intercourse, exams, use of tampons? Yes- sitting and carrying things   Is initial penetration during intercourse painful? Not active   Is deeper penetration painful? Not active   Do you use lubricant? Not asked     Given birth? Yes Any complications? No  # of vaginal delieveries? 4   # of C-sections? 0   # of episiotomies? 1  Are you sexually active?No  Have you ever been worried for your physical safety? Not currently   Do you have any depression, anxiety, panic attacks, excessive stress?  No  Any abdominal or pelvic surgeries? Hysterectomy   Are you having any regular exercise? Yes- walking   Have you practiced the PF(kegel) exercises for 4 or more weeks? No   .         Type of problem:  Incontinence.    This is a chronic condition.      Site of Pain: Pt is unable to determine where her pain is   Pain is described as sharp (Pt has difficulty decribing her symptoms but reports that they \"catch her off guard\")   Pain timing: Worse with UTI.  Since onset symptoms are unchanged.  Symptoms are exacerbated by coughing, laughing and sneezing (UTIs)        Work activity restrictions: Retired.  Barriers include:  Transportation.                        Objective:  System         Lumbar/SI Evaluation  ROM:  AROM Lumbar: normal                                                Pelvic Dysfunction " Evaluation:        Flexibility:    Tightness present at:Hamstrings and Piriformis    Abdominal Wall:  Abdominal wall pelvic: Good mobility over incision. No TTP         Pelvic Clock Exam:    Ischiocavernosis pain:  -  Bulbocavernosis pain:  -  Transverse Perineal:  -  Levator ANI:  -  Perineal Body:  -  SI Provocation:  Si provocation pelvic: FADIR- negative.    Negative for:  Fabres        External Assessment:  External assessment pelvic: Irritation around urethral meatus. Voluntary contraction- positive, voluntary relaxation- positive, involuntary contraction- absent   Skin Condition:  Atrophic    Bearing Down/Coughing:  Normal    Introitus:  Normal  Muscle Contraction/Perineal Mobility:  Slight lift, no urogential triangle descent and substitution  Internal Assessment:  Internal assessment pelvic: 4 second endurance. 5 quick flicks. Good coordination. No TTP- unable to recreate symptoms     Contraction/Grade:  Fair squeeze, definite lift (3)          SEMG Biofeedback:  Semg biofeedback pelvic: Deferred due to time constraints.                                         General     ROS    Assessment/Plan:    Patient is a 82 year old female with pelvic complaints.    Patient has the following significant findings with corresponding treatment plan.                Diagnosis 1:  Pelvic Floor Dysfunction  Pain -  manual therapy, self management and education  Decreased ROM/flexibility - manual therapy and therapeutic exercise  Decreased joint mobility - manual therapy and therapeutic exercise  Decreased proprioception - neuro re-education and therapeutic activities  Impaired muscle performance - biofeedback and neuro re-education  Decreased function - therapeutic activities    Therapy Evaluation Codes:   1) History comprised of:   Personal factors that impact the plan of care:      Age, Past/current experiences and Time since onset of symptoms.    Comorbidity factors that impact the plan of care are:      Bowel/bladder  changes, Diabetes, Depression, Heart problems, High blood pressure and Migraines/headaches.     Medications impacting care: Cardiac, High blood pressure, Pain, Steroids and Sleep.  2) Examination of Body Systems comprised of:   Body structures and functions that impact the plan of care:      Lumbar spine and Pelvis.   Activity limitations that impact the plan of care are:      Stress incontinence, Urgency, Urge incontinence and Urinary incontinence.  3) Clinical presentation characteristics are:   Stable/Uncomplicated.  4) Decision-Making    Low complexity using standardized patient assessment instrument and/or measureable assessment of functional outcome.  Cumulative Therapy Evaluation is: Low complexity.    Previous and current functional limitations:  (See Goal Flow Sheet for this information)    Short term and Long term goals: (See Goal Flow Sheet for this information)     Communication ability:  Patient appears to be able to clearly communicate and understand verbal and written communication and follow directions correctly.  Treatment Explanation - The following has been discussed with the patient:   RX ordered/plan of care  Anticipated outcomes  Possible risks and side effects  This patient would benefit from PT intervention to resume normal activities.   Rehab potential is good.    Frequency:  2 X a month, once daily  Duration:  for 3 months  Discharge Plan:  Achieve all LTG.  Independent in home treatment program.  Reach maximal therapeutic benefit.    Please refer to the daily flowsheet for treatment today, total treatment time and time spent performing 1:1 timed codes.

## 2023-03-31 NOTE — PROGRESS NOTES
Physical Therapy Initial Evaluation  Subjective:    Patient Health History             Pertinent medical history includes: diabetes, heart problems, hepatitis, high blood pressure, incontinence, kidney disease and migraines/headaches (Some sort of arthritis that hasnt been diagnosed).   Red flags:  Changes in bowel and bladder habits.  Medical allergies: other (cefdinir, bactrim).   Surgeries include:  Heart surgery and other (Angiogram to unblock an artery, gall bladder, carpal tunnel).    Current medications:  Cardiac medication, high blood pressure medication, pain medication, sleep medication and steroids.    Current occupation is Retired.   Primary job tasks include:  Prolonged sitting.                                    Objective:  System    Physical Exam    General     ROS    Assessment/Plan:

## 2023-04-04 ENCOUNTER — OFFICE VISIT (OUTPATIENT)
Dept: NEUROLOGY | Facility: CLINIC | Age: 83
End: 2023-04-04
Payer: COMMERCIAL

## 2023-04-04 VITALS — OXYGEN SATURATION: 97 % | SYSTOLIC BLOOD PRESSURE: 136 MMHG | DIASTOLIC BLOOD PRESSURE: 68 MMHG | HEART RATE: 67 BPM

## 2023-04-04 DIAGNOSIS — R41.3 MEMORY CHANGES: Primary | ICD-10-CM

## 2023-04-04 PROCEDURE — 99213 OFFICE O/P EST LOW 20 MIN: CPT | Performed by: PSYCHIATRY & NEUROLOGY

## 2023-04-04 ASSESSMENT — MONTREAL COGNITIVE ASSESSMENT (MOCA)
4. NAME EACH OF THE THREE ANIMALS SHOWN: 3
8. SERIAL SUBTRACTION OF 7S: 3
VISUOSPATIAL/EXECUTIVE SUBSCORE: 5
WHAT IS THE TOTAL SCORE (OUT OF 30): 26
7. [VIGILENCE] TAP WHEN HEARING DESIGNATED LETTER: 1
10. [FLUENCY] NAME WORDS STARTING WITH DESIGNATED LETTER: 1
12. MEMORY INDEX SCORE: 3
9. REPEAT EACH SENTENCE: 0
13. ORIENTATION SUBSCORE: 6
WHAT LEVEL OF EDUCATION WAS ATTAINED: 1
6. READ LIST OF DIGITS [FORWARD/BACKWARD]: 1
11. FOR EACH PAIR OF WORDS, WHAT CATEGORY DO THEY BELONG TO (OUT OF 2): 2

## 2023-04-04 NOTE — PROGRESS NOTES
ESTABLISHED PATIENT NEUROLOGY NOTE    DATE OF VISIT: 4/4/2023  CLINIC LOCATION: [facility]  MRN: [id number]  PATIENT NAME: Dimple Baxter  YOB: 1940    REASON FOR VISIT:   Chief Complaint   Patient presents with     Follow Up     Memory- patient reports memory is stable      SUBJECTIVE:                                                      HISTORY OF PRESENT ILLNESS: Patient is here to follow up regarding memory changes.  The last visit was on 4/5/2022.  Please refer to my initial/other prior notes for further information.  Accompanied by her daughter today.    Since the last visit, the patient reports that her memory is stable. Daughter agrees.  She denies interval development of new focal neurological symptoms.  EXAM:                                                    Physical Exam:   Vitals: /68 (BP Location: Right arm, Patient Position: Sitting, Cuff Size: Adult Regular)   Pulse 67   LMP  (LMP Unknown)   SpO2 97%   Gonzalo Cognitive Assessment:    Flint Cognitive Assessment (MOCA)  Visuospatial/Executive : 5  Naming: 3  Attention - Digits: 1  Attention - Letters: 1  Attention - Subtraction: 3  Language - Repeat: 0  Language - Fluency : 1  Abstraction: 2  Delayed Recall: 3  Orientation: 6  Education: 1  MOCA Score: 26  Administered by: : Gavi TAN     Flint Cognitive Assessment Score:  MOCA Score: 26/30.     General: pt is in NAD, cooperative.  Skin: normal turgor, moist mucous membranes, no lesions/rashes noticed.  HEENT: ATNC, white sclera, normal conjunctiva.  Respiratory: Symmetric lung excursion, no accessory respiratory muscle use.  Abdomen: Non distended.  Neurological: awake, cooperative, follows commands, no aphasia or dysarthria noted, cranial nerves II-XII: no ptosis, face is symmetric, equally moves all extremities, no dysmetria bilaterally, casual gait is normal.  ASSESSMENT AND PLAN:                                                    Assessment: 82  year old female patient presents for follow-up of cognitive complaints.  She reports that her memory is stable.  Her daughter agrees.  Her MoCA today is 26/30, compared to a range of 26-28/30.  She had 26/30 score in October 2021.  I discussed with the patient and her daughter that her cognitive function is in normal range since that time without evidence of cognitive decline.  For that reason, she does not need to be followed regularly with cognitive testing, but should come back if she or her family notices cognitive decline in the future.    Diagnoses:    ICD-10-CM    1. Memory changes  R41.3         Plan: At today's visit we thoroughly discussed current symptoms, cognitive test results (stable), and the plan.    We decided to monitor her memory for interval worsening.  I advised the patient to come back in such case for additional testing/evaluation.    Next follow-up appointment is on as needed basis.    Total Time: 21 minutes spent on the date of the encounter doing chart review, history and exam, documentation and further activities per the note.    Chu Lara MD  [facility]  (Chart documentation was completed in part with Dragon voice-recognition software. Even though reviewed, some grammatical, spelling, and word errors may remain.)

## 2023-04-04 NOTE — PROGRESS NOTES
"Dimple Baxter is a 82 year old female who presents for:  Chief Complaint   Patient presents with     Follow Up     Memory- patient reports memory is stable         Initial Vitals:  /68 (BP Location: Right arm, Patient Position: Sitting, Cuff Size: Adult Regular)   Pulse 67   LMP  (LMP Unknown)   SpO2 97%  Estimated body mass index is 28.73 kg/m  as calculated from the following:    Height as of 3/14/23: 1.676 m (5' 6\").    Weight as of 3/14/23: 80.7 kg (178 lb).. There is no height or weight on file to calculate BSA. BP completed using cuff size: regular      Visit Facilitator Comments: MoCA 7.3=  26/30    Gavi Montana  "

## 2023-04-04 NOTE — LETTER
4/4/2023         RE: Dimple Baxter  [address] [facility] [address]        Dear Colleague,    Thank you for referring your patient, Dimple Baxter, to the [facility] NEUROLOGY [facility]. Please see a copy of my visit note below.    ESTABLISHED PATIENT NEUROLOGY NOTE    DATE OF VISIT: 4/4/2023  CLINIC LOCATION: [facility]  MRN: [id number]  PATIENT NAME: Dimple Baxter  YOB: 1940    REASON FOR VISIT:   Chief Complaint   Patient presents with     Follow Up     Memory- patient reports memory is stable      SUBJECTIVE:                                                      HISTORY OF PRESENT ILLNESS: Patient is here to follow up regarding memory changes.  The last visit was on 4/5/2022.  Please refer to my initial/other prior notes for further information.  Accompanied by her daughter today.    Since the last visit, the patient reports that her memory is stable. Daughter agrees.  She denies interval development of new focal neurological symptoms.  EXAM:                                                    Physical Exam:   Vitals: /68 (BP Location: Right arm, Patient Position: Sitting, Cuff Size: Adult Regular)   Pulse 67   LMP  (LMP Unknown)   SpO2 97%   Lovington Cognitive Assessment:    Lovington Cognitive Assessment (MOCA)  Visuospatial/Executive : 5  Naming: 3  Attention - Digits: 1  Attention - Letters: 1  Attention - Subtraction: 3  Language - Repeat: 0  Language - Fluency : 1  Abstraction: 2  Delayed Recall: 3  Orientation: 6  Education: 1  MOCA Score: 26  Administered by: : Gavi TAN     Gonzalo Cognitive Assessment Score:  MOCA Score: 26/30.     General: pt is in NAD, cooperative.  Skin: normal turgor, moist mucous membranes, no lesions/rashes noticed.  HEENT: ATNC, white sclera, normal conjunctiva.  Respiratory: Symmetric lung excursion, no accessory respiratory muscle use.  Abdomen: Non distended.  Neurological: awake, cooperative, follows commands,  no aphasia or dysarthria noted, cranial nerves II-XII: no ptosis, face is symmetric, equally moves all extremities, no dysmetria bilaterally, casual gait is normal.  ASSESSMENT AND PLAN:                                                    Assessment: 82 year old female patient presents for follow-up of cognitive complaints.  She reports that her memory is stable.  Her daughter agrees.  Her MoCA today is 26/30, compared to a range of 26-28/30.  She had 26/30 score in October 2021.  I discussed with the patient and her daughter that her cognitive function is in normal range since that time without evidence of cognitive decline.  For that reason, she does not need to be followed regularly with cognitive testing, but should come back if she or her family notices cognitive decline in the future.    Diagnoses:    ICD-10-CM    1. Memory changes  R41.3         Plan: At today's visit we thoroughly discussed current symptoms, cognitive test results (stable), and the plan.    We decided to monitor her memory for interval worsening.  I advised the patient to come back in such case for additional testing/evaluation.    Next follow-up appointment is on as needed basis.    Total Time: 21 minutes spent on the date of the encounter doing chart review, history and exam, documentation and further activities per the note.    Chu Lara MD  [facility]rology  (Chart documentation was completed in part with Dragon voice-recognition software. Even though reviewed, some grammatical, spelling, and word errors may remain.)      Dimple Baxter is a 82 year old female who presents for:  Chief Complaint   Patient presents with     Follow Up     Memory- patient reports memory is stable         Initial Vitals:  /68 (BP Location: Right arm, Patient Position: Sitting, Cuff Size: Adult Regular)   Pulse 67   LMP  (LMP Unknown)   SpO2 97%  Estimated body mass index is 28.73 kg/m  as calculated from the following:     "Height as of 3/14/23: 1.676 m (5' 6\").    Weight as of 3/14/23: 80.7 kg (178 lb).. There is no height or weight on file to calculate BSA. BP completed using cuff size: regular      Visit Facilitator Comments: MoCA 7.3=  26/30    Gavi Montana      Again, thank you for allowing me to participate in the care of your patient.        Sincerely,        Chu Lara MD    "

## 2023-04-04 NOTE — PATIENT INSTRUCTIONS
AFTER VISIT SUMMARY (AVS):    At today's visit we thoroughly discussed current symptoms, cognitive test results (stable), and the plan.    We decided to monitor your memory for interval worsening.  Please come back in such case for additional testing/evaluation.    Next follow-up appointment is on as needed basis.    Please do not hesitate to call me with any questions or concerns.    Thanks.

## 2023-04-07 ENCOUNTER — DOCUMENTATION ONLY (OUTPATIENT)
Dept: LAB | Facility: CLINIC | Age: 83
End: 2023-04-07
Payer: COMMERCIAL

## 2023-04-07 NOTE — PROGRESS NOTES
"Mayur Baxter has an upcoming lab appointment with us.    Future Appointments   Date Time Provider Department Center   4/14/2023 10:20 AM [facility]    4/25/2023 10:30 AM [facility] LAB [facility]   5/3/2023  8:00 AM [facility]   7/11/2023 10:30 AM [facility]   8/2/2023 11:00 AM [facility]     The Appointment Note reads as: \"[facility] labs\"    There is no order available.     Please review and place either future orders as appropriate.    Health Maintenance Due   Topic     ANNUAL REVIEW OF  ORDERS      HEMOGLOBIN      Thank You,  Zehra Rowland MLT (ASCP)      "

## 2023-04-14 ENCOUNTER — THERAPY VISIT (OUTPATIENT)
Dept: PHYSICAL THERAPY | Facility: CLINIC | Age: 83
End: 2023-04-14
Payer: COMMERCIAL

## 2023-04-14 DIAGNOSIS — M99.05 SOMATIC DYSFUNCTION OF PELVIC REGION: ICD-10-CM

## 2023-04-14 DIAGNOSIS — N39.46 MIXED STRESS AND URGE URINARY INCONTINENCE: Primary | ICD-10-CM

## 2023-04-14 PROCEDURE — 97140 MANUAL THERAPY 1/> REGIONS: CPT | Mod: GP | Performed by: PHYSICAL THERAPIST

## 2023-04-14 PROCEDURE — 97110 THERAPEUTIC EXERCISES: CPT | Mod: GP | Performed by: PHYSICAL THERAPIST

## 2023-04-14 PROCEDURE — 97530 THERAPEUTIC ACTIVITIES: CPT | Mod: GP | Performed by: PHYSICAL THERAPIST

## 2023-04-19 DIAGNOSIS — N18.30 STAGE 3 CHRONIC KIDNEY DISEASE, UNSPECIFIED WHETHER STAGE 3A OR 3B CKD (H): Primary | ICD-10-CM

## 2023-04-25 ENCOUNTER — LAB (OUTPATIENT)
Dept: LAB | Facility: CLINIC | Age: 83
End: 2023-04-25
Payer: COMMERCIAL

## 2023-04-25 DIAGNOSIS — N18.30 STAGE 3 CHRONIC KIDNEY DISEASE, UNSPECIFIED WHETHER STAGE 3A OR 3B CKD (H): ICD-10-CM

## 2023-04-25 LAB
ALBUMIN MFR UR ELPH: 23.8 MG/DL (ref 1–14)
ALBUMIN SERPL BCG-MCNC: 4.1 G/DL (ref 3.5–5.2)
ALBUMIN UR-MCNC: NEGATIVE MG/DL
ALP SERPL-CCNC: 77 U/L (ref 35–104)
ALT SERPL W P-5'-P-CCNC: 19 U/L (ref 10–35)
ANION GAP SERPL CALCULATED.3IONS-SCNC: 12 MMOL/L (ref 7–15)
APPEARANCE UR: CLEAR
AST SERPL W P-5'-P-CCNC: 33 U/L (ref 10–35)
BILIRUB SERPL-MCNC: 0.4 MG/DL
BILIRUB UR QL STRIP: NEGATIVE
BUN SERPL-MCNC: 13.8 MG/DL (ref 8–23)
CALCIUM SERPL-MCNC: 9.3 MG/DL (ref 8.8–10.2)
CHLORIDE SERPL-SCNC: 104 MMOL/L (ref 98–107)
COLOR UR AUTO: YELLOW
CREAT SERPL-MCNC: 1.1 MG/DL (ref 0.51–0.95)
CREAT UR-MCNC: 129 MG/DL
CREAT UR-MCNC: 129 MG/DL
DEPRECATED HCO3 PLAS-SCNC: 25 MMOL/L (ref 22–29)
ERYTHROCYTE [DISTWIDTH] IN BLOOD BY AUTOMATED COUNT: 12.6 % (ref 10–15)
GFR SERPL CREATININE-BSD FRML MDRD: 50 ML/MIN/1.73M2
GLUCOSE SERPL-MCNC: 153 MG/DL (ref 70–99)
GLUCOSE UR STRIP-MCNC: NEGATIVE MG/DL
HCT VFR BLD AUTO: 38.6 % (ref 35–47)
HGB BLD-MCNC: 13.2 G/DL (ref 11.7–15.7)
HGB UR QL STRIP: NEGATIVE
KETONES UR STRIP-MCNC: NEGATIVE MG/DL
LEUKOCYTE ESTERASE UR QL STRIP: NEGATIVE
MCH RBC QN AUTO: 32 PG (ref 26.5–33)
MCHC RBC AUTO-ENTMCNC: 34.2 G/DL (ref 31.5–36.5)
MCV RBC AUTO: 94 FL (ref 78–100)
MICROALBUMIN UR-MCNC: 69.5 MG/L
MICROALBUMIN/CREAT UR: 53.88 MG/G CR (ref 0–25)
NITRATE UR QL: NEGATIVE
PH UR STRIP: 5.5 [PH] (ref 5–7)
PLATELET # BLD AUTO: 186 10E3/UL (ref 150–450)
POTASSIUM SERPL-SCNC: 4.1 MMOL/L (ref 3.4–5.3)
PROT SERPL-MCNC: 7.1 G/DL (ref 6.4–8.3)
PROT/CREAT 24H UR: 0.18 MG/MG CR (ref 0–0.2)
RBC # BLD AUTO: 4.13 10E6/UL (ref 3.8–5.2)
SODIUM SERPL-SCNC: 141 MMOL/L (ref 136–145)
SP GR UR STRIP: 1.01 (ref 1–1.03)
UROBILINOGEN UR STRIP-ACNC: 0.2 E.U./DL
WBC # BLD AUTO: 9.2 10E3/UL (ref 4–11)

## 2023-04-25 PROCEDURE — 81003 URINALYSIS AUTO W/O SCOPE: CPT

## 2023-04-25 PROCEDURE — 36415 COLL VENOUS BLD VENIPUNCTURE: CPT

## 2023-04-25 PROCEDURE — 80053 COMPREHEN METABOLIC PANEL: CPT

## 2023-04-25 PROCEDURE — 82043 UR ALBUMIN QUANTITATIVE: CPT

## 2023-04-25 PROCEDURE — 84156 ASSAY OF PROTEIN URINE: CPT

## 2023-04-25 PROCEDURE — 85027 COMPLETE CBC AUTOMATED: CPT

## 2023-04-25 PROCEDURE — 82570 ASSAY OF URINE CREATININE: CPT

## 2023-05-02 ENCOUNTER — THERAPY VISIT (OUTPATIENT)
Dept: PHYSICAL THERAPY | Facility: CLINIC | Age: 83
End: 2023-05-02
Payer: COMMERCIAL

## 2023-05-02 DIAGNOSIS — M99.05 SOMATIC DYSFUNCTION OF PELVIC REGION: ICD-10-CM

## 2023-05-02 DIAGNOSIS — N39.46 MIXED STRESS AND URGE URINARY INCONTINENCE: Primary | ICD-10-CM

## 2023-05-02 PROCEDURE — 97110 THERAPEUTIC EXERCISES: CPT | Mod: GP | Performed by: PHYSICAL THERAPIST

## 2023-05-03 ENCOUNTER — OFFICE VISIT (OUTPATIENT)
Dept: NEPHROLOGY | Facility: CLINIC | Age: 83
End: 2023-05-03
Attending: UROLOGY
Payer: COMMERCIAL

## 2023-05-03 VITALS
HEART RATE: 65 BPM | BODY MASS INDEX: 28.87 KG/M2 | OXYGEN SATURATION: 98 % | HEIGHT: 66 IN | WEIGHT: 179.6 LBS | SYSTOLIC BLOOD PRESSURE: 161 MMHG | DIASTOLIC BLOOD PRESSURE: 77 MMHG

## 2023-05-03 DIAGNOSIS — R31.0 GROSS HEMATURIA: ICD-10-CM

## 2023-05-03 DIAGNOSIS — E11.22 CKD STAGE 3 DUE TO TYPE 2 DIABETES MELLITUS (H): Primary | ICD-10-CM

## 2023-05-03 DIAGNOSIS — N39.0 RECURRENT UTI: ICD-10-CM

## 2023-05-03 DIAGNOSIS — N18.30 CKD STAGE 3 DUE TO TYPE 2 DIABETES MELLITUS (H): Primary | ICD-10-CM

## 2023-05-03 PROCEDURE — 99205 OFFICE O/P NEW HI 60 MIN: CPT | Performed by: INTERNAL MEDICINE

## 2023-05-03 RX ORDER — MULTIPLE VITAMINS W/ MINERALS TAB 9MG-400MCG
1 TAB ORAL DAILY
COMMUNITY

## 2023-05-03 ASSESSMENT — PAIN SCALES - GENERAL: PAINLEVEL: NO PAIN (0)

## 2023-05-03 NOTE — PATIENT INSTRUCTIONS
It was a pleasure taking care of you today.  I've included a brief summary of our discussion and care plan from today's visit below.  Please review this information with your primary care provider.    My recommendations are summarized as follows:  -Keep the amount of sodium in your diet at 2.4 g/day (also see instructions attached in that regard)  -Keep a Blood Pressure diary by taking your blood pressure twice a day as instructed (also see instructions attached in that regard).Please make sure that you are using a validated blood pressure device by checking that it is the case at: https://www.validatebp.org/  -Avoid all NSAID's. Examples include Ibuprofen (Advil, Motrin), naprosyn (Aleve), celebrex among others. Acetaminophen (Tylenol) is ok with maximum dose in 24 hours of 3200 mg.  -Do not exceed one alcoholic drink per day.  -If for any reason, you are at risk of volume depletion/dehydration (high grade fevers, severe vomiting or diarrhea) stop lisinopril till you get better  Check out this website: https://www.heart.org/en/news/2020/05/22/efj-hu-ssvomcvvdt-measure-blood-pressure-at-home    - Return to Nephrology Clinic in 6 months to review your progress.     To schedule imaging please call [phone]. To schedule your lab appointment at [address] lab call [phone]    Who do I call with any questions after my visit?  Please be in touch if there are any further questions that arise following today's visit.  There are multiple ways to contact your nephrology care team.      During business hours, you may reach your Nephrology RN Care Coordinator, Esthela Vasquez at . To schedule or reschedule an appointment, please call [phone].    You can always send a secure message through [employer].  [employer] messages are answered by your nurse or doctor typically within 24 hours.  Please allow extra time on weekends and holidays.      For urgent/emergent questions after business  hours, you may reach the on-call Nephrology Fellow by contacting the [facility]  at [phone].     How will I get the results of any tests ordered?    You will receive all of your results.  If you have signed up for [employer], any tests ordered at your visit will be available to you after your physician reviews them.  Typically this takes 1-2 weeks.  If there are urgent results that require a change in your care plan, your physician or nurse will call you to discuss the next steps.      What is [employer]?  [employer] is a secure way for you to access all of your healthcare records from the [facility].  It is a web based computer program, so you can sign on to it from any location.  It also allows you to send secure messages to your care team.  I recommend signing up for [employer] access if you have not already done so and are comfortable with using a computer.      How do I schedule a follow-up visit?  If you did not schedule a follow-up visit today, please call [phone] to schedule a follow-up office visit.      Sincerely,    Virginia Sexton MD  [facility]  Division of Nephrology and Hypertension
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
## 2023-05-17 ENCOUNTER — TRANSFERRED RECORDS (OUTPATIENT)
Dept: HEALTH INFORMATION MANAGEMENT | Facility: CLINIC | Age: 83
End: 2023-05-17
Payer: COMMERCIAL

## 2023-05-30 NOTE — TELEPHONE ENCOUNTER
Tramadol      Last Written Prescription Date:  07/05/21  Last Fill Quantity: 25,   # refills: 0  Last Office Visit: 07/05/21  Future Office visit:    Next 5 appointments (look out 90 days)    Aug 06, 2021 10:40 AM  SHORT with Du Phillips MD  [facility] [place] [facility] [address] [facility] [address]  [phone]           Routing refill request to provider for review/approval because:  Drug not on the [facility], [facility] or [facility] refill protocol or controlled substance]   Partial Purse String (Intermediate) Text: Given the location of the defect and the characteristics of the surrounding skin an intermediate purse string closure was deemed most appropriate.  Undermining was performed circumfirentially around the surgical defect.  A purse string suture was then placed and tightened. Wound tension only allowed a partial closure of the circular defect.

## 2023-05-31 ENCOUNTER — TELEPHONE (OUTPATIENT)
Dept: NEPHROLOGY | Facility: CLINIC | Age: 83
End: 2023-05-31
Payer: COMMERCIAL

## 2023-05-31 NOTE — TELEPHONE ENCOUNTER
Writer received mailed document with updated BP readings. Scanned into patient's chart and gave physical copy to MD to review.

## 2023-07-11 ENCOUNTER — OFFICE VISIT (OUTPATIENT)
Dept: UROLOGY | Facility: CLINIC | Age: 83
End: 2023-07-11
Payer: COMMERCIAL

## 2023-07-11 VITALS
WEIGHT: 179 LBS | HEIGHT: 66 IN | BODY MASS INDEX: 28.77 KG/M2 | DIASTOLIC BLOOD PRESSURE: 64 MMHG | SYSTOLIC BLOOD PRESSURE: 120 MMHG

## 2023-07-11 DIAGNOSIS — Z87.898 HISTORY OF GROSS HEMATURIA: ICD-10-CM

## 2023-07-11 DIAGNOSIS — Z87.440 PERSONAL HISTORY OF URINARY TRACT INFECTION: ICD-10-CM

## 2023-07-11 DIAGNOSIS — N39.46 MIXED STRESS AND URGE URINARY INCONTINENCE: Primary | ICD-10-CM

## 2023-07-11 DIAGNOSIS — N39.0 RECURRENT UTI: ICD-10-CM

## 2023-07-11 LAB — RESIDUAL VOLUME (RV) (EXTERNAL): 11

## 2023-07-11 PROCEDURE — 51798 US URINE CAPACITY MEASURE: CPT | Performed by: PHYSICIAN ASSISTANT

## 2023-07-11 PROCEDURE — 99214 OFFICE O/P EST MOD 30 MIN: CPT | Mod: 25 | Performed by: PHYSICIAN ASSISTANT

## 2023-07-11 ASSESSMENT — PAIN SCALES - GENERAL: PAINLEVEL: NO PAIN (0)

## 2023-07-11 NOTE — PROGRESS NOTES
Urology Clinic      Name: Dimple Baxter    MRN: [id number]   YOB: 1940  Accompanied at today's visit by:self               Chief Complaint:   Belinda          History of Present Illness:   July 11, 2023    HISTORY:   We have been following 83 year old Dimple Baxter for Belinda, UUI, gross hematuria. Negative hematuria work-up in the past. Last seen by Dr. Velazco on 1/18/23 via virtual visit and was referred to nephrology for gross hematuria along with switching from tolterodine to trospium 60mg once daily for UUI. Saw Nephrology on 5/3/23 and planning to follow-up in 6 months. Trospium continues to work well for her. No UTIs since last encounter. Denies gross hematuria. Denies any s/s of UTI today. Patient voices no other concerns at this time.            Allergies:     Allergies   Allergen Reactions     Bactrim [Sulfamethoxazole-Trimethoprim]      Duloxetine Hcl      Twitches, nausea     Lipitor [Atorvastatin Calcium]      myalgias     Neurontin [Gabapentin]      ankle swelling     Nortriptyline      ha, edema     Omnicef [Cefdinir]      Vomiting and diarrhea      Paroxetine      gi; wt gain     Rosuvastatin      myalgias     Seroquel [Quetiapine Fumarate]      insomnia/drowsiness     Topamax [Topiramate]      constipation     Wellbutrin [Bupropion Hcl]      shakiness, heartburn     Zoloft      fatigue            Medications:     Current Outpatient Medications   Medication Sig     albuterol (PROAIR HFA/PROVENTIL HFA/VENTOLIN HFA) 108 (90 Base) MCG/ACT inhaler Inhale 2 puffs into the lungs every 6 hours (Patient taking differently: Inhale 2 puffs into the lungs every 6 hours Through pulmonologist  Every 6 hours as needed)     amLODIPine (NORVASC) 5 MG tablet Take 1 tablet (5 mg) by mouth daily     Ascorbic Acid (VITAMIN C PO)      ASPIRIN 81 MG OR TABS take 1 x daily with food     benzonatate (TESSALON) 200 MG capsule Take 1 capsule (200 mg) by mouth 3 times daily as needed for cough     blood glucose  (ACCU-CHEK SOFTCLIX) lancing device Lancing device to be used with lancets.     blood glucose (NO BRAND SPECIFIED) test strip Use to test blood sugar 1 times daily.  Dispense Accu-Chek or per insurance [id number]     blood glucose monitoring (ACCU-CHEK FASTCLIX) lancets Test 1 times a day     blood glucose monitoring (NO BRAND SPECIFIED) meter device kit Use to test blood sugar once daily  as directed.     blood glucose monitoring (SOFTCLIX) lancets Use to test blood sugar One times daily.     cetirizine (ZYRTEC) 10 MG tablet Take 1 tablet (10 mg) by mouth daily (Patient taking differently: Take 10 mg by mouth as needed Take 10 mg by mouth as needed for allergies)     cholecalciferol (VITAMIN D) 1000 UNIT tablet Take 1 tablet (1,000 Units) by mouth daily     COMPOUNDED NON-CONTROLLED SUBSTANCE (CMPD RX) - PHARMACY TO MIX COMPOUNDED MEDICATION Apply 1-2 grams of estrogen cream vaginally 3x/week at night.     estradiol (ESTRACE) 0.1 MG/GM vaginal cream Place 1 g vaginally three times a week (Patient taking differently: Place 1 g vaginally three times a week Through urologist)     famotidine (PEPCID) 20 MG tablet Take 1 tablet (20 mg) by mouth 2 times daily as needed (heartburn)     fluticasone (FLONASE) 50 MCG/ACT nasal spray Spray 2 sprays into both nostrils daily (Patient taking differently: Spray 2 sprays into both nostrils as needed As need for allergies)     glipiZIDE (GLUCOTROL XL) 5 MG 24 hr tablet Take 1 tablet (5 mg) by mouth daily     lisinopril (ZESTRIL) 40 MG tablet Take 1 tablet (40 mg) by mouth daily     Melatonin 10 MG TABS tablet Take 10 mg by mouth At Bedtime Take 10 mg by mouth at bedtime     metoprolol succinate ER (TOPROL XL) 100 MG 24 hr tablet TAKE ONE TABLET BY MOUTH ONE TIME DAILY     multivitamin w/minerals (MULTI-VITAMIN) tablet Take 1 tablet by mouth daily     nitroGLYcerin (NITROSTAT) 0.4 MG sublingual tablet For chest pain place 1 tablet under the tongue every 5 minutes for 3 doses. If  "symptoms persist 5 minutes after 1st dose call 911.     OVER-THE-COUNTER For Lactose intolerance     Probiotic Product (PROBIOTIC DAILY PO)      rosuvastatin (CRESTOR) 5 MG tablet Take 1 tablet (5 mg) by mouth daily     senna (SENOKOT) 8.6 MG tablet Take 1 tablet by mouth daily as needed Take daily as needed     traZODone (DESYREL) 100 MG tablet TAKE ONE TABLET AT BEDTIME AS NEEDED FOR SLEEP     trospium (SANCTURA XR) 60 MG CP24 24 hr capsule Take 1 capsule (60 mg) by mouth every morning (Patient taking differently: Take 60 mg by mouth every morning Through urologist)     No current facility-administered medications for this visit.               Past  Surgical History:     Past Surgical History:   Procedure Laterality Date     COLONOSCOPY  8/12/2013    Procedure: COMBINED COLONOSCOPY, SINGLE BIOPSY/POLYPECTOMY BY BIOPSY;;  Surgeon: Marshall Logan MD;  Location:  GI     COLONOSCOPY Left 4/10/2019    Procedure: COLONOSCOPY;  Surgeon: Chico Tran MD;  Location:  GI     HYSTERECTOMY, PAP NO LONGER INDICATED       Z NONSPECIFIC PROCEDURE      Hysterectomy/ Right Oophorectomy     ZZC NONSPECIFIC PROCEDURE      Right Carpal Tunnel Surgery     ZZC NONSPECIFIC PROCEDURE      Cholecystectomy     ZZC NONSPECIFIC PROCEDURE  8/03    cor angio; nl     ZZ NONSPECIFIC PROCEDURE  2006    lasik             Physical Exam:     Vitals:    07/11/23 1034   BP: 120/64   Weight: 81.2 kg (179 lb)   Height: 1.676 m (5' 5.98\")     PSYCH: NAD  EYES: EOMI  NEURO: AAO x3    LABS:   PVR 11mL  Creatinine   Date Value Ref Range Status   04/25/2023 1.10 (H) 0.51 - 0.95 mg/dL Final   02/26/2021 0.94 0.52 - 1.04 mg/dL Final            Assessment and Plan:   83 year old is a pleasant female who has hx of UTI, gross hematuria, UUI    Plan:  - PVR WNL  - Continue Trospium 60mg daily.   - Contact clinic if develops s/s of UTI in the future.  - Using estrogen but expensive. Switched to compounding pharmacy.   - Plan to follow-up in 6 " months.   - After discussing the assessment and plan with patient, patient verbalizes understanding and agrees to the above plan. All questions answered.     Other orders as below:  Orders Placed This Encounter   Procedures     MEASURE POST-VOID RESIDUAL URINE/BLADDER CAPACITY, US NON-IMAGING (52233)     35 minutes spent on the date of the encounter doing chart review, review of outside records, review of test results, interpretation of tests, patient visit and documentation.      Una Giordano PA-C  Urology  July 11, 2023      Patient Care Team:  Du Phillips MD as PCP - General (Internal Medicine)  Du Phillips MD as Assigned PCP  Mirna Adorno PA-C as Physician Assistant (Urology)  Chu Lara MD as Assigned Neuroscience Provider  Mirna Adorno PA-C as Assigned OBGYN Provider  Laina Kramer RD as Diabetes Educator (Dietitian, Registered)  Mirta Velazco MD as Assigned Surgical Provider  Virginia Sexton MD as Assigned Nephrology Provider

## 2023-07-11 NOTE — NURSING NOTE
Chief Complaint   Patient presents with     Incontinence     Here for 6 month follow up,doing better.   post void residual by bladder scan 11 ml  Margoth Ivan LPN

## 2023-07-11 NOTE — LETTER
7/11/2023       RE: Dimple Baxter  [address] [address]     Dear Colleague,    Thank you for referring your patient, Dimple Baxter, to the [facility] at [facility]. Please see a copy of my visit note below.    Urology Clinic      Name: Dimple Baxter    MRN: [id number]   YOB: 1940  Accompanied at today's visit by:self               Chief Complaint:   Belinda          History of Present Illness:   July 11, 2023    HISTORY:   We have been following 83 year old Dimple Baxter for Belinda, UUI, gross hematuria. Negative hematuria work-up in the past. Last seen by Dr. Velazco on 1/18/23 via virtual visit and was referred to nephrology for gross hematuria along with switching from tolterodine to trospium 60mg once daily for UUI. Saw Nephrology on 5/3/23 and planning to follow-up in 6 months. Trospium continues to work well for her. No UTIs since last encounter. Denies gross hematuria. Denies any s/s of UTI today. Patient voices no other concerns at this time.            Allergies:     Allergies   Allergen Reactions    Bactrim [Sulfamethoxazole-Trimethoprim]     Duloxetine Hcl      Twitches, nausea    Lipitor [Atorvastatin Calcium]      myalgias    Neurontin [Gabapentin]      ankle swelling    Nortriptyline      ha, edema    Omnicef [Cefdinir]      Vomiting and diarrhea     Paroxetine      gi; wt gain    Rosuvastatin      myalgias    Seroquel [Quetiapine Fumarate]      insomnia/drowsiness    Topamax [Topiramate]      constipation    Wellbutrin [Bupropion Hcl]      shakiness, heartburn    Zoloft      fatigue            Medications:     Current Outpatient Medications   Medication Sig    albuterol (PROAIR HFA/PROVENTIL HFA/VENTOLIN HFA) 108 (90 Base) MCG/ACT inhaler Inhale 2 puffs into the lungs every 6 hours (Patient taking differently: Inhale 2 puffs into the lungs every 6 hours Through pulmonologist  Every 6 hours as needed)     amLODIPine (NORVASC) 5 MG tablet Take 1 tablet (5 mg) by mouth daily    Ascorbic Acid (VITAMIN C PO)     ASPIRIN 81 MG OR TABS take 1 x daily with food    benzonatate (TESSALON) 200 MG capsule Take 1 capsule (200 mg) by mouth 3 times daily as needed for cough    blood glucose (ACCU-CHEK SOFTCLIX) lancing device Lancing device to be used with lancets.    blood glucose (NO BRAND SPECIFIED) test strip Use to test blood sugar 1 times daily.  Dispense Accu-Chek or per insurance [id number]    blood glucose monitoring (ACCU-CHEK FASTCLIX) lancets Test 1 times a day    blood glucose monitoring (NO BRAND SPECIFIED) meter device kit Use to test blood sugar once daily  as directed.    blood glucose monitoring (SOFTCLIX) lancets Use to test blood sugar One times daily.    cetirizine (ZYRTEC) 10 MG tablet Take 1 tablet (10 mg) by mouth daily (Patient taking differently: Take 10 mg by mouth as needed Take 10 mg by mouth as needed for allergies)    cholecalciferol (VITAMIN D) 1000 UNIT tablet Take 1 tablet (1,000 Units) by mouth daily    COMPOUNDED NON-CONTROLLED SUBSTANCE (CMPD RX) - PHARMACY TO MIX COMPOUNDED MEDICATION Apply 1-2 grams of estrogen cream vaginally 3x/week at night.    estradiol (ESTRACE) 0.1 MG/GM vaginal cream Place 1 g vaginally three times a week (Patient taking differently: Place 1 g vaginally three times a week Through urologist)    famotidine (PEPCID) 20 MG tablet Take 1 tablet (20 mg) by mouth 2 times daily as needed (heartburn)    fluticasone (FLONASE) 50 MCG/ACT nasal spray Spray 2 sprays into both nostrils daily (Patient taking differently: Spray 2 sprays into both nostrils as needed As need for allergies)    glipiZIDE (GLUCOTROL XL) 5 MG 24 hr tablet Take 1 tablet (5 mg) by mouth daily    lisinopril (ZESTRIL) 40 MG tablet Take 1 tablet (40 mg) by mouth daily    Melatonin 10 MG TABS tablet Take 10 mg by mouth At Bedtime Take 10 mg by mouth at bedtime    metoprolol succinate ER (TOPROL XL) 100 MG 24  "hr tablet TAKE ONE TABLET BY MOUTH ONE TIME DAILY    multivitamin w/minerals (MULTI-VITAMIN) tablet Take 1 tablet by mouth daily    nitroGLYcerin (NITROSTAT) 0.4 MG sublingual tablet For chest pain place 1 tablet under the tongue every 5 minutes for 3 doses. If symptoms persist 5 minutes after 1st dose call 911.    OVER-THE-COUNTER For Lactose intolerance    Probiotic Product (PROBIOTIC DAILY PO)     rosuvastatin (CRESTOR) 5 MG tablet Take 1 tablet (5 mg) by mouth daily    senna (SENOKOT) 8.6 MG tablet Take 1 tablet by mouth daily as needed Take daily as needed    traZODone (DESYREL) 100 MG tablet TAKE ONE TABLET AT BEDTIME AS NEEDED FOR SLEEP    trospium (SANCTURA XR) 60 MG CP24 24 hr capsule Take 1 capsule (60 mg) by mouth every morning (Patient taking differently: Take 60 mg by mouth every morning Through urologist)     No current facility-administered medications for this visit.               Past  Surgical History:     Past Surgical History:   Procedure Laterality Date    COLONOSCOPY  8/12/2013    Procedure: COMBINED COLONOSCOPY, SINGLE BIOPSY/POLYPECTOMY BY BIOPSY;;  Surgeon: Marshall Logan MD;  Location:  GI    COLONOSCOPY Left 4/10/2019    Procedure: COLONOSCOPY;  Surgeon: Chico Tran MD;  Location:  GI    HYSTERECTOMY, PAP NO LONGER INDICATED      ZC NONSPECIFIC PROCEDURE      Hysterectomy/ Right Oophorectomy    ZZC NONSPECIFIC PROCEDURE      Right Carpal Tunnel Surgery    ZZC NONSPECIFIC PROCEDURE      Cholecystectomy    ZZC NONSPECIFIC PROCEDURE  8/03    cor angio; nl    ZZC NONSPECIFIC PROCEDURE  2006    lasik             Physical Exam:     Vitals:    07/11/23 1034   BP: 120/64   Weight: 81.2 kg (179 lb)   Height: 1.676 m (5' 5.98\")     PSYCH: NAD  EYES: EOMI  NEURO: AAO x3    LABS:   PVR 11mL  Creatinine   Date Value Ref Range Status   04/25/2023 1.10 (H) 0.51 - 0.95 mg/dL Final   02/26/2021 0.94 0.52 - 1.04 mg/dL Final            Assessment and Plan:   83 year old is a pleasant female " who has hx of UTI, gross hematuria, UUI    Plan:  - PVR WNL  - Continue Trospium 60mg daily.   - Contact clinic if develops s/s of UTI in the future.  - Using estrogen but expensive. Switched to compounding pharmacy.   - Plan to follow-up in 6 months.   - After discussing the assessment and plan with patient, patient verbalizes understanding and agrees to the above plan. All questions answered.     Other orders as below:  Orders Placed This Encounter   Procedures    MEASURE POST-VOID RESIDUAL URINE/BLADDER CAPACITY, US NON-IMAGING (19348)     35 minutes spent on the date of the encounter doing chart review, review of outside records, review of test results, interpretation of tests, patient visit and documentation.      Una Giordano PA-C  Urology  July 11, 2023      Patient Care Team:  Du Phillips MD as PCP - General (Internal Medicine)  Du Phillips MD as Assigned PCP  Mirna Adorno PA-C as Physician Assistant (Urology)  Chu Lara MD as Assigned Neuroscience Provider  Mirna Adorno PA-C as Assigned OBGYN Provider  Laina Kramer RD as Diabetes Educator (Dietitian, Registered)  Mirta Velazco MD as Assigned Surgical Provider  Virginia Sexton MD as Assigned Nephrology Provider

## 2023-07-11 NOTE — NURSING NOTE
Chief Complaint   Patient presents with     Incontinence     Here for 6 month follow up,doing better.   Margoth Ivan, ABELN

## 2023-08-02 ENCOUNTER — OFFICE VISIT (OUTPATIENT)
Dept: INTERNAL MEDICINE | Facility: CLINIC | Age: 83
End: 2023-08-02
Payer: COMMERCIAL

## 2023-08-02 VITALS
HEART RATE: 67 BPM | OXYGEN SATURATION: 97 % | WEIGHT: 181.8 LBS | BODY MASS INDEX: 29.22 KG/M2 | HEIGHT: 66 IN | DIASTOLIC BLOOD PRESSURE: 64 MMHG | RESPIRATION RATE: 13 BRPM | SYSTOLIC BLOOD PRESSURE: 130 MMHG | TEMPERATURE: 98 F

## 2023-08-02 DIAGNOSIS — I27.20 PULMONARY HYPERTENSION (H): ICD-10-CM

## 2023-08-02 DIAGNOSIS — I10 PRIMARY HYPERTENSION: ICD-10-CM

## 2023-08-02 DIAGNOSIS — E78.2 MIXED HYPERLIPIDEMIA: ICD-10-CM

## 2023-08-02 DIAGNOSIS — N18.31 STAGE 3A CHRONIC KIDNEY DISEASE (H): ICD-10-CM

## 2023-08-02 DIAGNOSIS — E11.21 TYPE 2 DIABETES MELLITUS WITH DIABETIC NEPHROPATHY, WITHOUT LONG-TERM CURRENT USE OF INSULIN (H): Primary | ICD-10-CM

## 2023-08-02 LAB
ALBUMIN SERPL BCG-MCNC: 4.1 G/DL (ref 3.5–5.2)
ALP SERPL-CCNC: 63 U/L (ref 35–104)
ALT SERPL W P-5'-P-CCNC: 29 U/L (ref 0–50)
ANION GAP SERPL CALCULATED.3IONS-SCNC: 11 MMOL/L (ref 7–15)
AST SERPL W P-5'-P-CCNC: 31 U/L (ref 0–45)
BILIRUB SERPL-MCNC: 0.5 MG/DL
BUN SERPL-MCNC: 19.7 MG/DL (ref 8–23)
CALCIUM SERPL-MCNC: 9.3 MG/DL (ref 8.8–10.2)
CHLORIDE SERPL-SCNC: 107 MMOL/L (ref 98–107)
CREAT SERPL-MCNC: 1.11 MG/DL (ref 0.51–0.95)
DEPRECATED HCO3 PLAS-SCNC: 23 MMOL/L (ref 22–29)
GFR SERPL CREATININE-BSD FRML MDRD: 49 ML/MIN/1.73M2
GLUCOSE SERPL-MCNC: 127 MG/DL (ref 70–99)
HBA1C MFR BLD: 6.1 % (ref 0–5.6)
POTASSIUM SERPL-SCNC: 4.4 MMOL/L (ref 3.4–5.3)
PROT SERPL-MCNC: 7.1 G/DL (ref 6.4–8.3)
SODIUM SERPL-SCNC: 141 MMOL/L (ref 136–145)

## 2023-08-02 PROCEDURE — 80053 COMPREHEN METABOLIC PANEL: CPT | Performed by: INTERNAL MEDICINE

## 2023-08-02 PROCEDURE — 99214 OFFICE O/P EST MOD 30 MIN: CPT | Performed by: INTERNAL MEDICINE

## 2023-08-02 PROCEDURE — 36415 COLL VENOUS BLD VENIPUNCTURE: CPT | Performed by: INTERNAL MEDICINE

## 2023-08-02 PROCEDURE — 83036 HEMOGLOBIN GLYCOSYLATED A1C: CPT | Performed by: INTERNAL MEDICINE

## 2023-08-02 RX ORDER — METOPROLOL SUCCINATE 100 MG/1
TABLET, EXTENDED RELEASE ORAL
Qty: 90 TABLET | Refills: 1 | Status: SHIPPED | OUTPATIENT
Start: 2023-08-02 | End: 2024-01-16

## 2023-08-02 RX ORDER — LISINOPRIL 40 MG/1
40 TABLET ORAL DAILY
Qty: 90 TABLET | Refills: 1 | Status: SHIPPED | OUTPATIENT
Start: 2023-08-02 | End: 2024-01-16

## 2023-08-02 RX ORDER — GLIPIZIDE 5 MG/1
5 TABLET, FILM COATED, EXTENDED RELEASE ORAL DAILY
Qty: 90 TABLET | Refills: 1 | Status: SHIPPED | OUTPATIENT
Start: 2023-08-02 | End: 2024-01-16

## 2023-08-02 RX ORDER — AMLODIPINE BESYLATE 5 MG/1
5 TABLET ORAL DAILY
Qty: 90 TABLET | Refills: 1 | Status: SHIPPED | OUTPATIENT
Start: 2023-08-02 | End: 2023-11-02

## 2023-08-02 RX ORDER — ACETAMINOPHEN 325 MG/1
325-650 TABLET ORAL EVERY 6 HOURS PRN
COMMUNITY
End: 2024-02-13

## 2023-08-02 NOTE — NURSING NOTE
"/64   Pulse 67   Temp 98  F (36.7  C) (Tympanic)   Resp 13   Ht 1.676 m (5' 6\")   Wt 82.5 kg (181 lb 12.8 oz)   LMP  (LMP Unknown)   SpO2 97%   BMI 29.34 kg/m      "
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
## 2023-09-11 ENCOUNTER — TELEPHONE (OUTPATIENT)
Dept: CARDIOLOGY | Facility: CLINIC | Age: 83
End: 2023-09-11
Payer: COMMERCIAL

## 2023-09-11 ENCOUNTER — PRE VISIT (OUTPATIENT)
Dept: CARDIOLOGY | Facility: CLINIC | Age: 83
End: 2023-09-11
Payer: COMMERCIAL

## 2023-09-11 DIAGNOSIS — R06.09 DOE (DYSPNEA ON EXERTION): ICD-10-CM

## 2023-09-11 DIAGNOSIS — I27.20 PULMONARY HTN (H): Primary | ICD-10-CM

## 2023-09-11 NOTE — TELEPHONE ENCOUNTER
Patient Name: Dimple Baxter Age: 83 year old, female, 1940 MRN: [id number]   Referring Provider:  Dr. Phillips Appt:         PH Medications:        Patient History: Pt has a history of pulmonary fibrosis, DM2, ischemic heart disease, aortic valve insufficiency, DM2    Referred by PCP for abnormal Echo    PCP: Alcides Phillips  Follows with lung and sleep clinic: [phone] Dr. Gould    Orders entered and sent to [facility] scheduling for VQ scan with CXR prior, 6MWT, updated Echocardiogram, PH labs      Recent Testing:       Date Test Epic Media/Scan Care Everywhere    7/30/23 Echo               Left ventricular size, global systolic function, and wall motion are normal,  estimated LVEF 60-65%.  Right ventricular global function is normal.  Mild (1+) aortic regurgitation.  Right ventricular systolic pressure is normal. The right ventricular systolic  pressure is approximated at 30mmHg plus the right atrial pressure.  The inferior vena cava was normal in size with preserved respiratory  variability.                       [x]  []   []     5/17/23 PFT              HIM scan from [place] [facility] [x]   []   []     5/20/19 Chest CT      IMPRESSION: Subpleural reticular opacities with lower lobe  predominance, minor traction bronchiectasis, and a few associated  groundglass opacities are not significantly changed compared to prior.   []  []   []         []   []   []

## 2023-09-11 NOTE — TELEPHONE ENCOUNTER
[facility] Call Center    Phone Message    May a detailed message be left on voicemail: yes     Reason for Call: Other: PT wants to schedule a new pulmonology appt for Pulm hypertension.  Please call  pt to schedule for an appt.     Action Taken: Message routed to:  [facility] ([facility] cardio    Travel Screening: Not Applicable    Thank you!  [facility]

## 2023-09-12 NOTE — TELEPHONE ENCOUNTER
Called and YANIRAM/jd to schedule labs, 6 min walk , Echocardiogram and hopefully a V/Q lung scan(chest x ray goes PRIOR) prior to her appointment with Dr. Bruno on 10/5. All tests are basic testing that we look at for diagnosis. Can be at any [place] location.    6 min walk test (6MWT)- is an assessment to measure the distance a person is capable of walking on a flat, hard surface in 6 minutes. It is a useful test to measure the response of all the bodily systems that a person uses while exercising. These include the respiratory and cardiovascular systems, blood circulation, and muscle movement.     V/Q scan ( Lung scan to see if there's any blood clots or blockages in the airflow in the lungs) Please call conference call imaging to schedule.      Echo-- ultrasound on the heart     Christine Rojas  Clinic    Pulmonary Hypertension   [facility]  (p) [phone]

## 2023-09-15 ENCOUNTER — TELEPHONE (OUTPATIENT)
Dept: INTERNAL MEDICINE | Facility: CLINIC | Age: 83
End: 2023-09-15
Payer: COMMERCIAL

## 2023-09-15 NOTE — TELEPHONE ENCOUNTER
Reason for Call:  Appointment Request    Patient requesting this type of appt:  office visit    Requested provider: Du Phillips    Reason patient unable to be scheduled: Not within requested timeframe    When does patient want to be seen/preferred time: Same day    Comments: Patient looking to be seen ASAP, noticed a cyst on the back of her leg/knee about 2 weeks ago. It still has not gone away, not painful to the touch, however would still like it checked with Dr Phillips, specifically.    Could we send this information to you in [facility] or would you prefer to receive a phone call?:   Patient would prefer a phone call   Okay to leave a detailed message?: Yes at Cell number on file:    Telephone Information:   Mobile [phone]       Call taken on 9/15/2023 at 1:43 PM by Hetal Medrano

## 2023-09-20 ENCOUNTER — OFFICE VISIT (OUTPATIENT)
Dept: INTERNAL MEDICINE | Facility: CLINIC | Age: 83
End: 2023-09-20
Payer: COMMERCIAL

## 2023-09-20 VITALS
SYSTOLIC BLOOD PRESSURE: 134 MMHG | HEIGHT: 66 IN | HEART RATE: 71 BPM | DIASTOLIC BLOOD PRESSURE: 70 MMHG | BODY MASS INDEX: 29.57 KG/M2 | RESPIRATION RATE: 14 BRPM | WEIGHT: 184 LBS | OXYGEN SATURATION: 95 % | TEMPERATURE: 97.7 F

## 2023-09-20 DIAGNOSIS — D17.20 LIPOMA OF EXTREMITY: Primary | ICD-10-CM

## 2023-09-20 PROCEDURE — 99213 OFFICE O/P EST LOW 20 MIN: CPT

## 2023-09-20 NOTE — PROGRESS NOTES
Assessment & Plan     (D17.20) Lipoma of extremity  (primary encounter diagnosis)  Comment: Patient presents to the clinic with chief complaint of a small mass behind her right knee.  Clinical picture suggests a lipoma or a cyst.  At this time it is not causing any pain therefore we will have a watchful wait approach.  Plan: Continue to monitor.  If it grows in size, becomes painful, will get ultrasound of the area.      15 minutes spent by me on the date of the encounter doing chart review, patient visit, documentation, and discussion with family            [facility] is a 83 year old, presenting for the following health issues: Patient presents to the clinic accompanied by daughter.  Chief complaint today is a small lump behind her right knee.  It has been present there for 3 to 4 weeks.  It is nontender.  Her leg is not red swollen or warm to the touch.  It is not growing in size and seems to be stable.  Patient just wants to make sure it is nothing to be worried about.  Mass      9/20/2023     9:47 AM   Additional Questions   Roomed by Brunilda CHRISTENSEN CMA   Accompanied by Nikkiesubhash       Sae    History of Present Illness       Reason for visit:  Lump in right leg  Symptom onset:  3-4 weeks ago  Symptom intensity:  Moderate  Symptom progression:  Staying the same  Had these symptoms before:  No  What makes it worse:  No  What makes it better:  No    She eats 2-3 servings of fruits and vegetables daily.She consumes 0 sweetened beverage(s) daily.She exercises with enough effort to increase her heart rate 10 to 19 minutes per day.  She exercises with enough effort to increase her heart rate 3 or less days per week.   She is taking medications regularly.                     Review of Systems   Constitutional, HEENT, cardiovascular, pulmonary, gi and gu systems are negative, except as otherwise noted.      Objective    /70 (BP Location:  "Right arm, Patient Position: Sitting, Cuff Size: Adult Large)   Pulse 71   Temp 97.7  F (36.5  C) (Tympanic)   Resp 14   Ht 1.676 m (5' 6\")   Wt 83.5 kg (184 lb)   LMP  (LMP Unknown)   SpO2 95%   Breastfeeding No   BMI 29.70 kg/m    Body mass index is 29.7 kg/m .  Physical Exam   GENERAL: healthy, alert and no distress  MS: no gross musculoskeletal defects noted, no edema  SKIN: small pea size moveable, non tender mass behind R knee.                       "

## 2023-10-04 ENCOUNTER — LAB (OUTPATIENT)
Dept: LAB | Facility: CLINIC | Age: 83
End: 2023-10-04
Attending: INTERNAL MEDICINE
Payer: COMMERCIAL

## 2023-10-04 ENCOUNTER — HOSPITAL ENCOUNTER (OUTPATIENT)
Dept: CARDIOLOGY | Facility: CLINIC | Age: 83
Discharge: HOME | End: 2023-10-04
Attending: INTERNAL MEDICINE
Payer: COMMERCIAL

## 2023-10-04 ENCOUNTER — HOSPITAL ENCOUNTER (OUTPATIENT)
Dept: GENERAL RADIOLOGY | Facility: CLINIC | Age: 83
Discharge: HOME | End: 2023-10-04
Attending: INTERNAL MEDICINE
Payer: COMMERCIAL

## 2023-10-04 ENCOUNTER — HOSPITAL ENCOUNTER (OUTPATIENT)
Dept: NUCLEAR MEDICINE | Facility: CLINIC | Age: 83
Setting detail: NUCLEAR MEDICINE
Discharge: HOME | End: 2023-10-04
Attending: INTERNAL MEDICINE
Payer: COMMERCIAL

## 2023-10-04 DIAGNOSIS — R06.09 DOE (DYSPNEA ON EXERTION): ICD-10-CM

## 2023-10-04 DIAGNOSIS — I27.20 PULMONARY HTN (H): ICD-10-CM

## 2023-10-04 LAB
ALBUMIN SERPL BCG-MCNC: 4.2 G/DL (ref 3.5–5.2)
ALP SERPL-CCNC: 69 U/L (ref 35–104)
ALT SERPL W P-5'-P-CCNC: 30 U/L (ref 0–50)
ANION GAP SERPL CALCULATED.3IONS-SCNC: 15 MMOL/L (ref 7–15)
AST SERPL W P-5'-P-CCNC: 35 U/L (ref 0–45)
BILIRUB DIRECT SERPL-MCNC: <0.2 MG/DL (ref 0–0.3)
BILIRUB SERPL-MCNC: 0.6 MG/DL
BUN SERPL-MCNC: 13.3 MG/DL (ref 8–23)
CALCIUM SERPL-MCNC: 9.3 MG/DL (ref 8.8–10.2)
CHLORIDE SERPL-SCNC: 103 MMOL/L (ref 98–107)
CHOLEST SERPL-MCNC: 126 MG/DL
CREAT SERPL-MCNC: 0.96 MG/DL (ref 0.51–0.95)
CRP SERPL-MCNC: 3.15 MG/L
DEPRECATED HCO3 PLAS-SCNC: 23 MMOL/L (ref 22–29)
EGFRCR SERPLBLD CKD-EPI 2021: 58 ML/MIN/1.73M2
ERYTHROCYTE [DISTWIDTH] IN BLOOD BY AUTOMATED COUNT: 12.9 % (ref 10–15)
GLUCOSE SERPL-MCNC: 136 MG/DL (ref 70–99)
HCT VFR BLD AUTO: 38.7 % (ref 35–47)
HDLC SERPL-MCNC: 27 MG/DL
HGB BLD-MCNC: 13.1 G/DL (ref 11.7–15.7)
INR PPP: 1.07 (ref 0.85–1.15)
IRON BINDING CAPACITY (ROCHE): 245 UG/DL (ref 240–430)
IRON SATN MFR SERPL: 37 % (ref 15–46)
IRON SERPL-MCNC: 90 UG/DL (ref 37–145)
LDLC SERPL CALC-MCNC: 57 MG/DL
LVEF ECHO: NORMAL
MCH RBC QN AUTO: 31.3 PG (ref 26.5–33)
MCHC RBC AUTO-ENTMCNC: 33.9 G/DL (ref 31.5–36.5)
MCV RBC AUTO: 92 FL (ref 78–100)
NONHDLC SERPL-MCNC: 99 MG/DL
NT-PROBNP SERPL-MCNC: 134 PG/ML (ref 0–1800)
PLATELET # BLD AUTO: 159 10E3/UL (ref 150–450)
POTASSIUM SERPL-SCNC: 4 MMOL/L (ref 3.4–5.3)
PROT SERPL-MCNC: 7.4 G/DL (ref 6.4–8.3)
RBC # BLD AUTO: 4.19 10E6/UL (ref 3.8–5.2)
SODIUM SERPL-SCNC: 141 MMOL/L (ref 135–145)
TRIGL SERPL-MCNC: 210 MG/DL
TSH SERPL DL<=0.005 MIU/L-ACNC: 1.24 UIU/ML (ref 0.3–4.2)
WBC # BLD AUTO: 7.9 10E3/UL (ref 4–11)

## 2023-10-04 PROCEDURE — 85597 PHOSPHOLIPID PLTLT NEUTRALIZ: CPT

## 2023-10-04 PROCEDURE — 36415 COLL VENOUS BLD VENIPUNCTURE: CPT

## 2023-10-04 PROCEDURE — 93306 TTE W/DOPPLER COMPLETE: CPT | Mod: 26 | Performed by: INTERNAL MEDICINE

## 2023-10-04 PROCEDURE — 83550 IRON BINDING TEST: CPT

## 2023-10-04 PROCEDURE — 86803 HEPATITIS C AB TEST: CPT

## 2023-10-04 PROCEDURE — 82248 BILIRUBIN DIRECT: CPT

## 2023-10-04 PROCEDURE — 87340 HEPATITIS B SURFACE AG IA: CPT

## 2023-10-04 PROCEDURE — 71046 X-RAY EXAM CHEST 2 VIEWS: CPT

## 2023-10-04 PROCEDURE — 85027 COMPLETE CBC AUTOMATED: CPT

## 2023-10-04 PROCEDURE — 86706 HEP B SURFACE ANTIBODY: CPT

## 2023-10-04 PROCEDURE — A9567 TECHNETIUM TC-99M AEROSOL: HCPCS | Performed by: INTERNAL MEDICINE

## 2023-10-04 PROCEDURE — 80061 LIPID PANEL: CPT

## 2023-10-04 PROCEDURE — A9540 TC99M MAA: HCPCS | Performed by: INTERNAL MEDICINE

## 2023-10-04 PROCEDURE — 83529 ASAY OF INTERLEUKIN-6 (IL-6): CPT

## 2023-10-04 PROCEDURE — 83880 ASSAY OF NATRIURETIC PEPTIDE: CPT

## 2023-10-04 PROCEDURE — 87389 HIV-1 AG W/HIV-1&-2 AB AG IA: CPT

## 2023-10-04 PROCEDURE — 85730 THROMBOPLASTIN TIME PARTIAL: CPT

## 2023-10-04 PROCEDURE — 84238 ASSAY NONENDOCRINE RECEPTOR: CPT

## 2023-10-04 PROCEDURE — 82310 ASSAY OF CALCIUM: CPT

## 2023-10-04 PROCEDURE — 86431 RHEUMATOID FACTOR QUANT: CPT

## 2023-10-04 PROCEDURE — 343N000001 HC RX 343: Performed by: INTERNAL MEDICINE

## 2023-10-04 PROCEDURE — 86140 C-REACTIVE PROTEIN: CPT

## 2023-10-04 PROCEDURE — 85390 FIBRINOLYSINS SCREEN I&R: CPT | Mod: 26 | Performed by: PATHOLOGY

## 2023-10-04 PROCEDURE — 84443 ASSAY THYROID STIM HORMONE: CPT

## 2023-10-04 PROCEDURE — 86038 ANTINUCLEAR ANTIBODIES: CPT

## 2023-10-04 PROCEDURE — 85610 PROTHROMBIN TIME: CPT

## 2023-10-04 PROCEDURE — 86704 HEP B CORE ANTIBODY TOTAL: CPT

## 2023-10-04 PROCEDURE — 272N000035 NM LUNG SCAN VENTILATION AND PERFUSION

## 2023-10-04 PROCEDURE — 93306 TTE W/DOPPLER COMPLETE: CPT

## 2023-10-04 RX ADMIN — KIT FOR THE PREPARATION OF TECHNETIUM TC 99M ALBUMIN AGGREGATED 6.4 MILLICURIE: 2.5 INJECTION, POWDER, FOR SOLUTION INTRAVENOUS at 11:07

## 2023-10-04 RX ADMIN — KIT FOR THE PREPARATION OF TECHNETIUM TC 99M PENTETATE 51.3 MILLICURIE: 20 INJECTION, POWDER, LYOPHILIZED, FOR SOLUTION INTRAVENOUS; RESPIRATORY (INHALATION) at 11:04

## 2023-10-05 ENCOUNTER — OFFICE VISIT (OUTPATIENT)
Dept: CARDIOLOGY | Facility: CLINIC | Age: 83
End: 2023-10-05
Payer: COMMERCIAL

## 2023-10-05 VITALS
WEIGHT: 189.2 LBS | HEIGHT: 66 IN | BODY MASS INDEX: 30.41 KG/M2 | HEART RATE: 70 BPM | SYSTOLIC BLOOD PRESSURE: 157 MMHG | DIASTOLIC BLOOD PRESSURE: 76 MMHG

## 2023-10-05 DIAGNOSIS — R06.02 SOB (SHORTNESS OF BREATH): Primary | ICD-10-CM

## 2023-10-05 DIAGNOSIS — I27.20 PULMONARY HYPERTENSION (H): ICD-10-CM

## 2023-10-05 LAB
ANA SER QL IF: NEGATIVE
DRVVT SCREEN RATIO: 0.8
HBV CORE AB SERPL QL IA: NONREACTIVE
HBV SURFACE AB SERPL IA-ACNC: 0.57 M[IU]/ML
HBV SURFACE AB SERPL IA-ACNC: NONREACTIVE M[IU]/ML
HBV SURFACE AG SERPL QL IA: NONREACTIVE
HCV AB SERPL QL IA: NONREACTIVE
HIV 1+2 AB+HIV1 P24 AG SERPL QL IA: NONREACTIVE
IL6 SERPL-MCNC: 9.99 PG/ML
INR PPP: 1.08 (ref 0.85–1.15)
LA PPP-IMP: POSITIVE
LUPUS INTERPRETATION: ABNORMAL
PLATELET NEUTRALIZATION: 2 SECONDS
PTT RATIO: 1.28
RHEUMATOID FACT SER NEPH-ACNC: <6 IU/ML
STFR SERPL-MCNC: 2.9 MG/L
THROMBIN TIME: 18.2 SECONDS (ref 13–19)

## 2023-10-05 PROCEDURE — 99204 OFFICE O/P NEW MOD 45 MIN: CPT | Performed by: INTERNAL MEDICINE

## 2023-10-05 NOTE — NURSING NOTE
Procedures and/or Testing: Patient given instructions regarding  6 minute walk test,  NM Lexiscan Stress Test,. Discussed purpose, preparation, procedure and when to expect results reported back to the patient. Patient demonstrated understanding of this information and agreed to call with further questions or concerns.  Diet: Patient instructed regarding a heart healthy diet, including discussion of reduced fat and sodium intake. Patient demonstrated understanding of this information and agreed to call with further questions or concerns.  Return Appointment: Patient given instructions regarding scheduling next clinic visit. Patient demonstrated understanding of this information and agreed to call with further questions or concerns.    Patient escorted to schedulers for follow up testing. Bhavya Browne RN on 10/5/2023 at 3:19 PM

## 2023-10-05 NOTE — LETTER
10/5/2023    Du Phillips MD  [address] [facility] [address]    RE: Dimple Baxter       Dear Colleague,     I had the pleasure of seeing Dimple Baxter in the [facility].    Faculty Attestation  Marshall Bruno M.D.          Impression  1. Pulmonary fibrosis  2  Exertional shortness of breath and chest discomfort  3. LBBB  4. DM2  5. + AMBROSE    Plan: : Screen labs, VQ scan, nuclear non-exercise stress test, 6 minute walk to assess saturation with exertion    83 year old female seen for possible PAH complicating pulmonary fibrosis,atypical chest discomfort with exertion and abnormal LV wall motion most likely to know LBBB.        Constitutional: alert, oriented, normal gait and station, normal mentation.  Oral: moist mucous membrans  Lymph: without pathologic adenopathy  Chest: clear to ausculation and percussion  Cor: No evidence of left or right ventricular activity.  Rhythm is regular.  S1 normal, S2 split paradoxical. Murmurs are not present  Abdomen: without tenderness, rebound, guarding, masses, ascites  Extremities: Edema not present  Neuro: no focal defects, normal mentation  Skin: without open lesions  Psych: oriented, verbal, mental status in tact     Component Ref Range & Units  9:47 AM     6 min walk (FT) ft 860    6 Min Walk (M) m 262                         Pt has a history of pulmonary fibrosis, DM2, ischemic heart disease, aortic valve insufficiency, DM2            Wt Readings from Last 24 Encounters:   10/05/23 85.8 kg (189 lb 3.2 oz)   09/20/23 83.5 kg (184 lb)   08/02/23 82.5 kg (181 lb 12.8 oz)   07/11/23 81.2 kg (179 lb)   05/03/23 81.5 kg (179 lb 9.6 oz)   03/14/23 80.7 kg (178 lb)   02/13/23 80.4 kg (177 lb 3.2 oz)   02/09/23 83.5 kg (184 lb)   01/18/23 82.6 kg (182 lb)   12/13/22 78.9 kg (174 lb)   10/26/22 81.6 kg (180 lb)   10/22/22 83.5 kg (184 lb)   10/18/22 81.6 kg (180 lb)   06/29/22 83 kg (182 lb 14.4 oz)   05/18/22 83.7 kg (184 lb 9.6 oz)    22 86.2 kg (190 lb)   01/10/22 86.2 kg (190 lb)   21 83.9 kg (185 lb)   10/11/21 83.2 kg (183 lb 6.4 oz)   10/05/21 84.2 kg (185 lb 9.6 oz)   21 86.6 kg (191 lb)   21 86.6 kg (191 lb)   21 84.4 kg (186 lb)   21 84.8 kg (186 lb 14.4 oz)        Current Outpatient Medications   Medication    acetaminophen (TYLENOL) 325 MG tablet    albuterol (PROAIR HFA/PROVENTIL HFA/VENTOLIN HFA) 108 (90 Base) MCG/ACT inhaler    amLODIPine (NORVASC) 5 MG tablet    Ascorbic Acid (VITAMIN C PO)    ASPIRIN 81 MG OR TABS    benzonatate (TESSALON) 200 MG capsule    blood glucose (ACCU-CHEK SOFTCLIX) lancing device    blood glucose (NO BRAND SPECIFIED) test strip    blood glucose monitoring (ACCU-CHEK FASTCLIX) lancets    blood glucose monitoring (NO BRAND SPECIFIED) meter device kit    blood glucose monitoring (SOFTCLIX) lancets    cetirizine (ZYRTEC) 10 MG tablet    cholecalciferol (VITAMIN D) 1000 UNIT tablet    COMPOUNDED NON-CONTROLLED SUBSTANCE (CMPD RX) - PHARMACY TO MIX COMPOUNDED MEDICATION    estradiol (ESTRACE) 0.1 MG/GM vaginal cream    famotidine (PEPCID) 20 MG tablet    fluticasone (FLONASE) 50 MCG/ACT nasal spray    glipiZIDE (GLUCOTROL XL) 5 MG 24 hr tablet    lisinopril (ZESTRIL) 40 MG tablet    Melatonin 10 MG TABS tablet    metoprolol succinate ER (TOPROL XL) 100 MG 24 hr tablet    multivitamin w/minerals (MULTI-VITAMIN) tablet    nitroGLYcerin (NITROSTAT) 0.4 MG sublingual tablet    OVER-THE-COUNTER    Probiotic Product (PROBIOTIC DAILY PO)    rosuvastatin (CRESTOR) 5 MG tablet    senna (SENOKOT) 8.6 MG tablet    traZODone (DESYREL) 100 MG tablet    trospium (SANCTURA XR) 60 MG CP24 24 hr capsule     No current facility-administered medications for this visit.      francis: ALEJANDRO GAMEZ  MRN: [id number]  : 1940  Study Date: 10/04/2023 09:00 AM  Age: 83 yrs  Gender: Female  Patient Location: [facility]  Reason For Study: Pulmonary HTN (H), YATES (dyspnea on exertion)  Ordering  Physician: REBEKAH FINNEY  Referring Physician: REBEKAH FINNEY  Performed By: Mandy Vargas  HR: 64     ______________________________________________________________________________  Procedure  Complete Echo Adult.     ______________________________________________________________________________  Interpretation Summary     1. The left ventricle is normal in structure, function and size. The visual  ejection fraction is estimated at 60%.  2. The right ventricle is normal in structure, function and size.  3. No valve disease.     No changes from echo 7/2020.     ______________________________________________________________________________  Left Ventricle  The left ventricle is normal in structure, function and size. There is normal  left ventricular wall thickness. The visual ejection fraction is estimated at  60%. Left ventricular diastolic function is indeterminate. Normal left  ventricular wall motion. Septal motion is consistent with conduction  abnormality.     Right Ventricle  The right ventricle is normal in structure, function and size.     Atria  The left atrium is severely dilated. The right atrium is mildly dilated. There  is no atrial shunt seen.     Mitral Valve  There is no mitral regurgitation noted.     Tricuspid Valve  There is trace tricuspid regurgitation.     Aortic Valve  There is trace aortic regurgitation.     Pulmonic Valve  The pulmonic valve is normal in structure and function.     Vessels  Normal ascending, transverse (arch), and descending aorta. The inferior vena  cava was normal in size with preserved respiratory variability.     Pericardium  There is no pericardial effusion.     Rhythm  The rhythm was sinus with wide QRS. Possible 1st degree AV block.     ______________________________________________________________________________  MMode/2D Measurements & Calculations  IVSd: 1.1 cm  LVIDd: 4.2 cm  LVIDs: 3.1 cm  LVPWd: 1.1 cm  FS: 27.3 %  LV mass(C)d: 157.4  "grams  Ao root diam: 3.2 cm  LA dimension: 4.6 cm  asc Aorta Diam: 3.2 cm  LA/Ao: 1.4  LA Volume (BP): 61.4 ml     RWT: 0.54  TAPSE: 1.9 cm     Doppler Measurements & Calculations  MV E max pérez: 74.4 cm/sec  MV A max pérez: 121.1 cm/sec  MV E/A: 0.61  MV dec slope: 330.2 cm/sec2  MV dec time: 0.23 sec  PA acc time: 0.10 sec  E/E' av.2  Lateral E/e': 14.9  Medial E/e': 17.5  RV S Pérez: 11.9 cm/sec      23 09:50 10/05/23   /64 134/70 157/76 !   Temp 98  F (36.7  C) 97.7  F (36.5  C)    Pulse 67 71 70   Resp 13 14    SpO2 97 % 95 %    Height 1.676 m (5' 6\") 1.676 m (5' 6\") 1.676 m (5' 6\")   BMI (Calculated) 29.34 29.7 30.54   Weight (lbs) 181 lb 12.8 oz 184 lb 189 lb 3.2 oz     EXAM: NM LUNG SCAN VENTILATION AND PERFUSION  LOCATION: [facility]  DATE: 10/4/2023     INDICATION: r o chronic PE; Pulmonary embolism; Female sex; Not pregnant; No imaging to r o PE in the last 24 hours; Pulmonary Embolism Rule Out Criteria (PERC) score > 0; Revised [place] Score (RGS) not >= 11; No D dimer result available; D dimer not   ordered. Pulmonary hypertension.  COMPARISON: Chest x-ray 10/04/2023.  TECHNIQUE: 51.3 mCi Tc-99m DTPA inhaled. 6.4 mCi Tc-99m MAA, IV. Standard planar imaging during perfusion and ventilation portions of exam.     FINDINGS: Normal pulmonary perfusion. Areas of heterogeneous ventilation with clumping of DTPA particles which can be seen with turbulent airflow secondary to bronchiectasis or emphysema. No mismatched segmental perfusion defect.                                                                      IMPRESSION:     No evidence of chronic pulmonary thromboembolic disease.     Latest Reference Range & Units Most Recent   Sodium 135 - 145 mmol/L 141  10/4/23 08:18   Potassium 3.4 - 5.3 mmol/L 4.0  10/4/23 08:18   Chloride 98 - 107 mmol/L 103  10/4/23 08:18   Carbon Dioxide (CO2) 22 - 29 mmol/L 23  10/4/23 08:18   Urea Nitrogen 8.0 - 23.0 mg/dL 13.3  10/4/23 " 08:18   Creatinine 0.51 - 0.95 mg/dL 0.96 (H)  10/4/23 08:18   GFR Estimate >60 mL/min/1.73m2 58 (L)  10/4/23 08:18   Calcium 8.8 - 10.2 mg/dL 9.3  10/4/23 08:18   Anion Gap 7 - 15 mmol/L 15  10/4/23 08:18   Albumin 3.5 - 5.2 g/dL 4.2  10/4/23 08:18   Protein Total 6.4 - 8.3 g/dL 7.4  10/4/23 08:18   Alkaline Phosphatase 35 - 104 U/L 69  10/4/23 08:18   ALT 0 - 50 U/L 30  10/4/23 08:18   AST 0 - 45 U/L 35  10/4/23 08:18   Albumin Urine mg/g Cr 0.00 - 25.00 mg/g Cr 53.88 (H)  4/25/23 10:56   Albumin Urine mg/L mg/L 69.5  4/25/23 10:56   Bilirubin Direct 0.00 - 0.30 mg/dL <0.20  10/4/23 08:18   Bilirubin Total <=1.2 mg/dL 0.6  10/4/23 08:18   Cholesterol <200 mg/dL 126  10/4/23 08:18   CRP Inflammation <5.00 mg/L 3.15  10/4/23 08:18   Creatinine Urine mg/dL  mg/dL 129.0  4/25/23 10:56  129.0  4/25/23 10:56   Glucose 70 - 99 mg/dL 136 (H)  10/4/23 08:18   HDL Cholesterol >=50 mg/dL 27 (L)  10/4/23 08:18   Hemoglobin A1C 0.0 - 5.6 % 6.1 (H)  8/2/23 11:30   Interleukin 6 Blood <3.01 pg/mL 9.99 (H)  10/4/23 08:18   Iron 37 - 145 ug/dL 90  10/4/23 08:18   Iron Binding Capacity 240 - 430 ug/dL 245  10/4/23 08:18   Iron Sat Index 15 - 46 % 37  10/4/23 08:18   LDL Cholesterol Calculated <=100 mg/dL 57  10/4/23 08:18   Non HDL Cholesterol <130 mg/dL 99  10/4/23 08:18   N-Terminal Pro Bnp 0 - 1,800 pg/mL 134  10/4/23 08:18   Rheumatoid Factor <12 IU/mL <6  10/4/23 08:18   Soluble Transferrin Receptor 1.9 - 4.4 mg/L 2.9  10/4/23 08:18   Triglycerides <150 mg/dL 210 (H)  10/4/23 08:18   TSH 0.30 - 4.20 uIU/mL 1.24  10/4/23 08:18   GLUCOSE BY METER POCT 70 - 99 mg/dL 98  10/22/22 22:31   WBC 4.0 - 11.0 10e3/uL 7.9  10/4/23 08:18   Hemoglobin 11.7 - 15.7 g/dL 13.1  10/4/23 08:18   Hematocrit 35.0 - 47.0 % 38.7  10/4/23 08:18   Platelet Count 150 - 450 10e3/uL 159  10/4/23 08:18   RBC Count 3.80 - 5.20 10e6/uL 4.19  10/4/23 08:18   MCV 78 - 100 fL 92  10/4/23 08:18   MCH 26.5 - 33.0 pg 31.3  10/4/23 08:18   MCHC 31.5 - 36.5 g/dL  33.9  10/4/23 08:18   RDW 10.0 - 15.0 % 12.9  10/4/23 08:18   INR 0.85 - 1.15   0.85 - 1.15  1.08  10/4/23 08:18  1.07  10/4/23 08:18   Thrombin Time 13.0 - 19.0 Seconds 18.2  10/4/23 08:18   LUPUS ANTICOAGULANT PANEL  Rpt !  10/4/23 08:18         Thank you for allowing me to participate in the care of your patient.      Sincerely,     Marshall Bruno MD     [facility]  cc:   Du Phillips MD  [facility] [place] [address]

## 2023-10-05 NOTE — PROGRESS NOTES
Faculty Attestation  Marshall Bruno M.D.          Impression  1. Pulmonary fibrosis  2  Exertional shortness of breath and chest discomfort  3. LBBB  4. DM2  5. + AMBROSE    Plan: : Screen labs, VQ scan, nuclear non-exercise stress test, 6 minute walk to assess saturation with exertion    83 year old female seen for possible PAH complicating pulmonary fibrosis,atypical chest discomfort with exertion and abnormal LV wall motion most likely to know LBBB.        Constitutional: alert, oriented, normal gait and station, normal mentation.  Oral: moist mucous membrans  Lymph: without pathologic adenopathy  Chest: clear to ausculation and percussion  Cor: No evidence of left or right ventricular activity.  Rhythm is regular.  S1 normal, S2 split paradoxical. Murmurs are not present  Abdomen: without tenderness, rebound, guarding, masses, ascites  Extremities: Edema not present  Neuro: no focal defects, normal mentation  Skin: without open lesions  Psych: oriented, verbal, mental status in tact     Component Ref Range & Units  9:47 AM     6 min walk (FT) ft 860    6 Min Walk (M) m 262                         Pt has a history of pulmonary fibrosis, DM2, ischemic heart disease, aortic valve insufficiency, DM2            Wt Readings from Last 24 Encounters:   10/05/23 85.8 kg (189 lb 3.2 oz)   09/20/23 83.5 kg (184 lb)   08/02/23 82.5 kg (181 lb 12.8 oz)   07/11/23 81.2 kg (179 lb)   05/03/23 81.5 kg (179 lb 9.6 oz)   03/14/23 80.7 kg (178 lb)   02/13/23 80.4 kg (177 lb 3.2 oz)   02/09/23 83.5 kg (184 lb)   01/18/23 82.6 kg (182 lb)   12/13/22 78.9 kg (174 lb)   10/26/22 81.6 kg (180 lb)   10/22/22 83.5 kg (184 lb)   10/18/22 81.6 kg (180 lb)   06/29/22 83 kg (182 lb 14.4 oz)   05/18/22 83.7 kg (184 lb 9.6 oz)   04/06/22 86.2 kg (190 lb)   01/10/22 86.2 kg (190 lb)   12/28/21 83.9 kg (185 lb)   10/11/21 83.2 kg (183 lb 6.4 oz)   10/05/21 84.2 kg (185 lb 9.6 oz)   09/17/21 86.6 kg (191 lb)   09/03/21 86.6 kg (191 lb)    21 84.4 kg (186 lb)   21 84.8 kg (186 lb 14.4 oz)        Current Outpatient Medications   Medication    acetaminophen (TYLENOL) 325 MG tablet    albuterol (PROAIR HFA/PROVENTIL HFA/VENTOLIN HFA) 108 (90 Base) MCG/ACT inhaler    amLODIPine (NORVASC) 5 MG tablet    Ascorbic Acid (VITAMIN C PO)    ASPIRIN 81 MG OR TABS    benzonatate (TESSALON) 200 MG capsule    blood glucose (ACCU-CHEK SOFTCLIX) lancing device    blood glucose (NO BRAND SPECIFIED) test strip    blood glucose monitoring (ACCU-CHEK FASTCLIX) lancets    blood glucose monitoring (NO BRAND SPECIFIED) meter device kit    blood glucose monitoring (SOFTCLIX) lancets    cetirizine (ZYRTEC) 10 MG tablet    cholecalciferol (VITAMIN D) 1000 UNIT tablet    COMPOUNDED NON-CONTROLLED SUBSTANCE (CMPD RX) - PHARMACY TO MIX COMPOUNDED MEDICATION    estradiol (ESTRACE) 0.1 MG/GM vaginal cream    famotidine (PEPCID) 20 MG tablet    fluticasone (FLONASE) 50 MCG/ACT nasal spray    glipiZIDE (GLUCOTROL XL) 5 MG 24 hr tablet    lisinopril (ZESTRIL) 40 MG tablet    Melatonin 10 MG TABS tablet    metoprolol succinate ER (TOPROL XL) 100 MG 24 hr tablet    multivitamin w/minerals (MULTI-VITAMIN) tablet    nitroGLYcerin (NITROSTAT) 0.4 MG sublingual tablet    OVER-THE-COUNTER    Probiotic Product (PROBIOTIC DAILY PO)    rosuvastatin (CRESTOR) 5 MG tablet    senna (SENOKOT) 8.6 MG tablet    traZODone (DESYREL) 100 MG tablet    trospium (SANCTURA XR) 60 MG CP24 24 hr capsule     No current facility-administered medications for this visit.      francis: VERA ALEJANDRO BALDO  MRN: [id number]  : 1940  Study Date: 10/04/2023 09:00 AM  Age: 83 yrs  Gender: Female  [facility]  Reason For Study: Pulmonary HTN (H), YATES (dyspnea on exertion)  Ordering Physician: REBEKAH FINNEY  Referring Physician: REBEKAH FINNEY  Performed By: Mandy Vargas  HR: 64      ______________________________________________________________________________  Procedure  Complete Echo Adult.     ______________________________________________________________________________  Interpretation Summary     1. The left ventricle is normal in structure, function and size. The visual  ejection fraction is estimated at 60%.  2. The right ventricle is normal in structure, function and size.  3. No valve disease.     No changes from echo 7/2020.     ______________________________________________________________________________  Left Ventricle  The left ventricle is normal in structure, function and size. There is normal  left ventricular wall thickness. The visual ejection fraction is estimated at  60%. Left ventricular diastolic function is indeterminate. Normal left  ventricular wall motion. Septal motion is consistent with conduction  abnormality.     Right Ventricle  The right ventricle is normal in structure, function and size.     Atria  The left atrium is severely dilated. The right atrium is mildly dilated. There  is no atrial shunt seen.     Mitral Valve  There is no mitral regurgitation noted.     Tricuspid Valve  There is trace tricuspid regurgitation.     Aortic Valve  There is trace aortic regurgitation.     Pulmonic Valve  The pulmonic valve is normal in structure and function.     Vessels  Normal ascending, transverse (arch), and descending aorta. The inferior vena  cava was normal in size with preserved respiratory variability.     Pericardium  There is no pericardial effusion.     Rhythm  The rhythm was sinus with wide QRS. Possible 1st degree AV block.     ______________________________________________________________________________  MMode/2D Measurements & Calculations  IVSd: 1.1 cm  LVIDd: 4.2 cm  LVIDs: 3.1 cm  LVPWd: 1.1 cm  FS: 27.3 %  LV mass(C)d: 157.4 grams  Ao root diam: 3.2 cm  LA dimension: 4.6 cm  asc Aorta Diam: 3.2 cm  LA/Ao: 1.4  LA Volume (BP): 61.4 ml     RWT:  "0.54  TAPSE: 1.9 cm     Doppler Measurements & Calculations  MV E max pérez: 74.4 cm/sec  MV A max pérez: 121.1 cm/sec  MV E/A: 0.61  MV dec slope: 330.2 cm/sec2  MV dec time: 0.23 sec  PA acc time: 0.10 sec  E/E' av.2  Lateral E/e': 14.9  Medial E/e': 17.5  RV S Pérez: 11.9 cm/sec      23 09:50 10/05/23   /64 134/70 157/76 !   Temp 98  F (36.7  C) 97.7  F (36.5  C)    Pulse 67 71 70   Resp 13 14    SpO2 97 % 95 %    Height 1.676 m (5' 6\") 1.676 m (5' 6\") 1.676 m (5' 6\")   BMI (Calculated) 29.34 29.7 30.54   Weight (lbs) 181 lb 12.8 oz 184 lb 189 lb 3.2 oz     EXAM: NM LUNG SCAN VENTILATION AND PERFUSION  LOCATION: [facility]  DATE: 10/4/2023     INDICATION: r o chronic PE; Pulmonary embolism; Female sex; Not pregnant; No imaging to r o PE in the last 24 hours; Pulmonary Embolism Rule Out Criteria (PERC) score > 0; Revised [place] Score (RGS) not >= 11; No D dimer result available; D dimer not   ordered. Pulmonary hypertension.  COMPARISON: Chest x-ray 10/04/2023.  TECHNIQUE: 51.3 mCi Tc-99m DTPA inhaled. 6.4 mCi Tc-99m MAA, IV. Standard planar imaging during perfusion and ventilation portions of exam.     FINDINGS: Normal pulmonary perfusion. Areas of heterogeneous ventilation with clumping of DTPA particles which can be seen with turbulent airflow secondary to bronchiectasis or emphysema. No mismatched segmental perfusion defect.                                                                      IMPRESSION:     No evidence of chronic pulmonary thromboembolic disease.     Latest Reference Range & Units Most Recent   Sodium 135 - 145 mmol/L 141  10/4/23 08:18   Potassium 3.4 - 5.3 mmol/L 4.0  10/4/23 08:18   Chloride 98 - 107 mmol/L 103  10/4/23 08:18   Carbon Dioxide (CO2) 22 - 29 mmol/L 23  10/4/23 08:18   Urea Nitrogen 8.0 - 23.0 mg/dL 13.3  10/4/23 08:18   Creatinine 0.51 - 0.95 mg/dL 0.96 (H)  10/4/23 08:18   GFR Estimate >60 mL/min/1.73m2 58 (L)  10/4/23 08:18 "   Calcium 8.8 - 10.2 mg/dL 9.3  10/4/23 08:18   Anion Gap 7 - 15 mmol/L 15  10/4/23 08:18   Albumin 3.5 - 5.2 g/dL 4.2  10/4/23 08:18   Protein Total 6.4 - 8.3 g/dL 7.4  10/4/23 08:18   Alkaline Phosphatase 35 - 104 U/L 69  10/4/23 08:18   ALT 0 - 50 U/L 30  10/4/23 08:18   AST 0 - 45 U/L 35  10/4/23 08:18   Albumin Urine mg/g Cr 0.00 - 25.00 mg/g Cr 53.88 (H)  4/25/23 10:56   Albumin Urine mg/L mg/L 69.5  4/25/23 10:56   Bilirubin Direct 0.00 - 0.30 mg/dL <0.20  10/4/23 08:18   Bilirubin Total <=1.2 mg/dL 0.6  10/4/23 08:18   Cholesterol <200 mg/dL 126  10/4/23 08:18   CRP Inflammation <5.00 mg/L 3.15  10/4/23 08:18   Creatinine Urine mg/dL  mg/dL 129.0  4/25/23 10:56  129.0  4/25/23 10:56   Glucose 70 - 99 mg/dL 136 (H)  10/4/23 08:18   HDL Cholesterol >=50 mg/dL 27 (L)  10/4/23 08:18   Hemoglobin A1C 0.0 - 5.6 % 6.1 (H)  8/2/23 11:30   Interleukin 6 Blood <3.01 pg/mL 9.99 (H)  10/4/23 08:18   Iron 37 - 145 ug/dL 90  10/4/23 08:18   Iron Binding Capacity 240 - 430 ug/dL 245  10/4/23 08:18   Iron Sat Index 15 - 46 % 37  10/4/23 08:18   LDL Cholesterol Calculated <=100 mg/dL 57  10/4/23 08:18   Non HDL Cholesterol <130 mg/dL 99  10/4/23 08:18   N-Terminal Pro Bnp 0 - 1,800 pg/mL 134  10/4/23 08:18   Rheumatoid Factor <12 IU/mL <6  10/4/23 08:18   Soluble Transferrin Receptor 1.9 - 4.4 mg/L 2.9  10/4/23 08:18   Triglycerides <150 mg/dL 210 (H)  10/4/23 08:18   TSH 0.30 - 4.20 uIU/mL 1.24  10/4/23 08:18   GLUCOSE BY METER POCT 70 - 99 mg/dL 98  10/22/22 22:31   WBC 4.0 - 11.0 10e3/uL 7.9  10/4/23 08:18   Hemoglobin 11.7 - 15.7 g/dL 13.1  10/4/23 08:18   Hematocrit 35.0 - 47.0 % 38.7  10/4/23 08:18   Platelet Count 150 - 450 10e3/uL 159  10/4/23 08:18   RBC Count 3.80 - 5.20 10e6/uL 4.19  10/4/23 08:18   MCV 78 - 100 fL 92  10/4/23 08:18   MCH 26.5 - 33.0 pg 31.3  10/4/23 08:18   MCHC 31.5 - 36.5 g/dL 33.9  10/4/23 08:18   RDW 10.0 - 15.0 % 12.9  10/4/23 08:18   INR 0.85 - 1.15   0.85 - 1.15  1.08  10/4/23  08:18  1.07  10/4/23 08:18   Thrombin Time 13.0 - 19.0 Seconds 18.2  10/4/23 08:18   LUPUS ANTICOAGULANT PANEL  Rpt !  10/4/23 08:18

## 2023-10-05 NOTE — PATIENT INSTRUCTIONS
Medication Changes:  - None    Patient Instructions:  1. Continue staying active and eat a heart healthy diet.    2. Please keep current list of medications with you at all times.    3. Remember to weigh yourself daily after voiding and before you consume any food or beverages and log the numbers.  If you have gained 2 pounds overnight or 5 pounds in a week contact us immediately for medication adjustments or further instructions.    4. **Please call us immediately if you have any syncope (fainting or passing out), chest pain, edema (swelling or weight gain), or decline in your functional status (general decline in how you are feeling).    5. Patients on Remodulin (treprostinil) or Veletri (epoprostenol): Please make sure that you have your backup pump and supplies with you at all times, your mixing instructions, and contact information for your specialty pharmacy.    Follow up Appointment Information:  - 6 minute walk test and lexiscan  - Virtual visit follow up after testing      For scheduling at [facility] please call [phone]  For scheduling at the [facility] please call [phone]  We are located on the second floor [address] at the [facility].  Our address is     [address] [place] [address] [facility]   [address]  [place]  [address]    Thank you for allowing us to be a part of your care here at the [facility]    If you have questions or concerns please contact us at:    Nikki Mirza RN, BSN     Nurse Coordinator       Pulmonary Hypertension     [facility]   (Phone)[phone]  (Fax)[phone]    ** Please note that you will NOT receive a reminder call regarding your scheduled testing, reminder calls are for provider appointments only.  If you are scheduled for testing within the [facility] system you may receive a call regarding pre-registration for billing purposes only.**     Remember to weigh yourself daily after voiding and before you consume any  food or beverages and log the numbers.  If you have gained/lost 2 pounds overnight or 5 pounds in a week contact us immediately for medication adjustments or further instructions.    Support Group:  Pulmonary Hypertension Association  Https://www.phassociation.org/  **Look at the Events Tab** They even have Support Groups that you can call into    [facility] PH Support Group  Second Saturday of the Month from 1-3 PM   Location: [address] [place] [id number]  Leader: Fiorella Crawford  Phone: [phone]  Email: [email or web address]     Great Videos about Pulmonary Hypertension!!  Scan ME!    Website: [email or web address]

## 2023-10-07 DIAGNOSIS — E11.21 TYPE 2 DIABETES MELLITUS WITH DIABETIC NEPHROPATHY, WITHOUT LONG-TERM CURRENT USE OF INSULIN (H): ICD-10-CM

## 2023-10-09 ENCOUNTER — OFFICE VISIT (OUTPATIENT)
Dept: PULMONOLOGY | Facility: CLINIC | Age: 83
End: 2023-10-09
Attending: INTERNAL MEDICINE
Payer: COMMERCIAL

## 2023-10-09 DIAGNOSIS — I27.20 PULMONARY HYPERTENSION (H): ICD-10-CM

## 2023-10-09 DIAGNOSIS — R06.09 DOE (DYSPNEA ON EXERTION): ICD-10-CM

## 2023-10-09 DIAGNOSIS — I27.20 PULMONARY HTN (H): ICD-10-CM

## 2023-10-09 DIAGNOSIS — R06.02 SOB (SHORTNESS OF BREATH): ICD-10-CM

## 2023-10-09 LAB
6 MIN WALK (FT): 860 FT
6 MIN WALK (M): 262 M

## 2023-10-09 PROCEDURE — 94618 PULMONARY STRESS TESTING: CPT | Performed by: INTERNAL MEDICINE

## 2023-10-09 RX ORDER — BLOOD SUGAR DIAGNOSTIC
STRIP MISCELLANEOUS
Qty: 100 STRIP | Refills: 1 | Status: SHIPPED | OUTPATIENT
Start: 2023-10-09

## 2023-10-09 NOTE — PROGRESS NOTES
Dimple BALDO Baxter comes into clinic today at the request of Dr. Kentrell Bruno, Ordering Provider for 6 minute walk    This service provided today was under the supervising provider of the day Dr. Andino, who was available if needed.    Donald Patel, RT

## 2023-10-09 NOTE — LETTER
10/9/2023         RE: Dimple Baxter  [address] [facility] [address]        Dear Colleague,    Thank you for referring your patient, Dimple Baxter, to the [facility] [place]. Please see a copy of my visit note below.    Dimple Baxter comes into clinic today at the request of Dr. Kentrell Bruno, Ordering Provider for 6 minute walk    This service provided today was under the supervising provider of the day Dr. Andino, who was available if needed.    Donald Patel, RT       Again, thank you for allowing me to participate in the care of your patient.        Sincerely,        [facility]

## 2023-10-11 ENCOUNTER — HOSPITAL ENCOUNTER (OUTPATIENT)
Dept: CARDIOLOGY | Facility: CLINIC | Age: 83
Discharge: HOME | End: 2023-10-11
Attending: INTERNAL MEDICINE
Payer: COMMERCIAL

## 2023-10-11 VITALS — BODY MASS INDEX: 29.92 KG/M2 | WEIGHT: 186.2 LBS | OXYGEN SATURATION: 97 % | HEIGHT: 66 IN | HEART RATE: 66 BPM

## 2023-10-11 VITALS — HEART RATE: 68 BPM | DIASTOLIC BLOOD PRESSURE: 74 MMHG | SYSTOLIC BLOOD PRESSURE: 130 MMHG

## 2023-10-11 DIAGNOSIS — I44.7 LEFT BUNDLE BRANCH BLOCK: Primary | Chronic | ICD-10-CM

## 2023-10-11 DIAGNOSIS — I27.20 PULMONARY HYPERTENSION (H): ICD-10-CM

## 2023-10-11 DIAGNOSIS — R06.02 SOB (SHORTNESS OF BREATH): ICD-10-CM

## 2023-10-11 LAB
CV STRESS MAX HR HE: 84
NUC STRESS EJECTION FRACTION: 58 %
RATE PRESSURE PRODUCT: NORMAL
STRESS ECHO BASELINE DIASTOLIC HE: 74
STRESS ECHO BASELINE HR: 68 BPM
STRESS ECHO BASELINE SYSTOLIC BP: 130
STRESS ECHO CALCULATED PERCENT HR: 62 %
STRESS ECHO LAST STRESS DIASTOLIC BP: 76
STRESS ECHO LAST STRESS SYSTOLIC BP: 132
STRESS ECHO TARGET HR: 136

## 2023-10-11 PROCEDURE — 250N000011 HC RX IP 250 OP 636: Performed by: INTERNAL MEDICINE

## 2023-10-11 PROCEDURE — 93017 CV STRESS TEST TRACING ONLY: CPT

## 2023-10-11 PROCEDURE — A9502 TC99M TETROFOSMIN: HCPCS | Performed by: INTERNAL MEDICINE

## 2023-10-11 PROCEDURE — 343N000001 HC RX 343: Performed by: INTERNAL MEDICINE

## 2023-10-11 PROCEDURE — 78452 HT MUSCLE IMAGE SPECT MULT: CPT | Mod: 26 | Performed by: INTERNAL MEDICINE

## 2023-10-11 PROCEDURE — 93016 CV STRESS TEST SUPVJ ONLY: CPT | Performed by: INTERNAL MEDICINE

## 2023-10-11 PROCEDURE — 93018 CV STRESS TEST I&R ONLY: CPT | Performed by: INTERNAL MEDICINE

## 2023-10-11 RX ORDER — AMINOPHYLLINE 25 MG/ML
50-100 INJECTION, SOLUTION INTRAVENOUS
Status: DISCONTINUED | OUTPATIENT
Start: 2023-10-11 | End: 2023-10-12 | Stop reason: HOSPADM

## 2023-10-11 RX ORDER — REGADENOSON 0.08 MG/ML
0.4 INJECTION, SOLUTION INTRAVENOUS ONCE
Status: COMPLETED | OUTPATIENT
Start: 2023-10-11 | End: 2023-10-11

## 2023-10-11 RX ORDER — ACYCLOVIR 200 MG/1
0-1 CAPSULE ORAL
Status: DISCONTINUED | OUTPATIENT
Start: 2023-10-11 | End: 2023-10-12 | Stop reason: HOSPADM

## 2023-10-11 RX ORDER — ALBUTEROL SULFATE 90 UG/1
2 AEROSOL, METERED RESPIRATORY (INHALATION) EVERY 5 MIN PRN
Status: DISCONTINUED | OUTPATIENT
Start: 2023-10-11 | End: 2023-10-12 | Stop reason: HOSPADM

## 2023-10-11 RX ORDER — CAFFEINE CITRATE 20 MG/ML
60 SOLUTION INTRAVENOUS
Status: DISCONTINUED | OUTPATIENT
Start: 2023-10-11 | End: 2023-10-12 | Stop reason: HOSPADM

## 2023-10-11 RX ADMIN — TETROFOSMIN 9.22 MILLICURIE: 1.38 INJECTION, POWDER, LYOPHILIZED, FOR SOLUTION INTRAVENOUS at 10:50

## 2023-10-11 RX ADMIN — TETROFOSMIN 3.28 MILLICURIE: 1.38 INJECTION, POWDER, LYOPHILIZED, FOR SOLUTION INTRAVENOUS at 09:22

## 2023-10-11 RX ADMIN — REGADENOSON 0.4 MG: 0.08 INJECTION, SOLUTION INTRAVENOUS at 10:46

## 2023-10-12 LAB — FIO2-PRE: 0.21 %

## 2023-10-24 DIAGNOSIS — R31.0 GROSS HEMATURIA: Primary | ICD-10-CM

## 2023-10-24 DIAGNOSIS — N18.30 CKD STAGE 3 DUE TO TYPE 2 DIABETES MELLITUS (H): ICD-10-CM

## 2023-10-24 DIAGNOSIS — E11.22 CKD STAGE 3 DUE TO TYPE 2 DIABETES MELLITUS (H): ICD-10-CM

## 2023-10-24 DIAGNOSIS — R76.8 POSITIVE ANA (ANTINUCLEAR ANTIBODY): ICD-10-CM

## 2023-10-24 DIAGNOSIS — I27.20 PULMONARY HYPERTENSION (H): ICD-10-CM

## 2023-10-25 ENCOUNTER — LAB (OUTPATIENT)
Dept: LAB | Facility: CLINIC | Age: 83
End: 2023-10-25
Payer: COMMERCIAL

## 2023-10-25 DIAGNOSIS — F51.01 PRIMARY INSOMNIA: Chronic | ICD-10-CM

## 2023-10-25 DIAGNOSIS — I27.20 PULMONARY HYPERTENSION (H): ICD-10-CM

## 2023-10-25 DIAGNOSIS — R76.8 POSITIVE ANA (ANTINUCLEAR ANTIBODY): ICD-10-CM

## 2023-10-25 DIAGNOSIS — N18.30 CKD STAGE 3 DUE TO TYPE 2 DIABETES MELLITUS (H): ICD-10-CM

## 2023-10-25 DIAGNOSIS — E11.22 CKD STAGE 3 DUE TO TYPE 2 DIABETES MELLITUS (H): ICD-10-CM

## 2023-10-25 DIAGNOSIS — R31.0 GROSS HEMATURIA: ICD-10-CM

## 2023-10-25 LAB
ALBUMIN SERPL BCG-MCNC: 4.2 G/DL (ref 3.5–5.2)
ANION GAP SERPL CALCULATED.3IONS-SCNC: 10 MMOL/L (ref 7–15)
BUN SERPL-MCNC: 18.1 MG/DL (ref 8–23)
CALCIUM SERPL-MCNC: 9.5 MG/DL (ref 8.8–10.2)
CHLORIDE SERPL-SCNC: 105 MMOL/L (ref 98–107)
CREAT SERPL-MCNC: 1.09 MG/DL (ref 0.51–0.95)
CREAT UR-MCNC: 85.9 MG/DL
DEPRECATED HCO3 PLAS-SCNC: 25 MMOL/L (ref 22–29)
EGFRCR SERPLBLD CKD-EPI 2021: 50 ML/MIN/1.73M2
ERYTHROCYTE [DISTWIDTH] IN BLOOD BY AUTOMATED COUNT: 12.6 % (ref 10–15)
GLUCOSE SERPL-MCNC: 132 MG/DL (ref 70–99)
HCT VFR BLD AUTO: 40.4 % (ref 35–47)
HGB BLD-MCNC: 13.4 G/DL (ref 11.7–15.7)
MCH RBC QN AUTO: 30.9 PG (ref 26.5–33)
MCHC RBC AUTO-ENTMCNC: 33.2 G/DL (ref 31.5–36.5)
MCV RBC AUTO: 93 FL (ref 78–100)
MICROALBUMIN UR-MCNC: 145 MG/L
MICROALBUMIN/CREAT UR: 168.8 MG/G CR (ref 0–25)
PHOSPHATE SERPL-MCNC: 3 MG/DL (ref 2.5–4.5)
PLATELET # BLD AUTO: 182 10E3/UL (ref 150–450)
POTASSIUM SERPL-SCNC: 4.2 MMOL/L (ref 3.4–5.3)
RBC # BLD AUTO: 4.33 10E6/UL (ref 3.8–5.2)
SODIUM SERPL-SCNC: 140 MMOL/L (ref 135–145)
WBC # BLD AUTO: 7.9 10E3/UL (ref 4–11)

## 2023-10-25 PROCEDURE — 85027 COMPLETE CBC AUTOMATED: CPT

## 2023-10-25 PROCEDURE — 86225 DNA ANTIBODY NATIVE: CPT

## 2023-10-25 PROCEDURE — 86160 COMPLEMENT ANTIGEN: CPT

## 2023-10-25 PROCEDURE — 36415 COLL VENOUS BLD VENIPUNCTURE: CPT

## 2023-10-25 PROCEDURE — 82570 ASSAY OF URINE CREATININE: CPT

## 2023-10-25 PROCEDURE — 82043 UR ALBUMIN QUANTITATIVE: CPT

## 2023-10-25 PROCEDURE — 86160 COMPLEMENT ANTIGEN: CPT | Mod: 59

## 2023-10-25 PROCEDURE — 80069 RENAL FUNCTION PANEL: CPT

## 2023-10-25 RX ORDER — TRAZODONE HYDROCHLORIDE 100 MG/1
TABLET ORAL
Qty: 90 TABLET | Refills: 0 | Status: SHIPPED | OUTPATIENT
Start: 2023-10-25 | End: 2023-11-14

## 2023-10-26 LAB
C3 SERPL-MCNC: 156 MG/DL (ref 81–157)
C4 SERPL-MCNC: 26 MG/DL (ref 13–39)
DSDNA AB SER-ACNC: 2.7 IU/ML

## 2023-10-28 NOTE — PROGRESS NOTES
Althea is a 83 year old who is being evaluated via a billable telephone visit.      Platform used for Video Visit: Coaxis    Review Of Systems  Skin: NEGATIVE  Eyes:Ears/Nose/Throat: NEGATIVE  Respiratory: NEGATIVE  Cardiovascular:Edema in ankles  Gastrointestinal: NEGATIVE  Genitourinary:NEGATIVE   Musculoskeletal: NEGATIVE  Neurologic: NEGATIVE  Psychiatric: NEGATIVE  Hematologic/Lymphatic/Immunologic: NEGATIVE  Endocrine:  Diabetes    Telephone number of patient: [phone]    Patients  11/1/23 181/81  Suri Olsen      Telephone visit x 30 minutes with patient permission [phone]    Faculty Attestation  Marshall Bruno M.D.          Impression  1. Pulmonary fibrosis  2  Exertional shortness of breath and chest discomfort  3. LBBB  4. DM2  5. + AMBROSE    Plan: :     Patient has negative nuclear stress test for ischemia suggesting that atypical chest discomfort is not ischemia related  Patient has pulmonary fibrosis, and we discussed RHC to explore notion of pulmonary hypertension.  She has no echocardiographic evidence of elevated PA pressure or RV dysfunction.  She would like to defer RHC and return to see us in 6 months  Her BP is poorly controlled and in interim added additional amlodipine 2.5 mgs in PM and asked her to see Dr. Phillips in 2-3 weeks.  Discussed ankle edema and support stockings    83 year old female seen for possible PAH complicating pulmonary fibrosis,atypical chest discomfort with exertion and abnormal LV wall motion most likely to know LBBB.            Component Ref Range & Units  9:47 AM     6 min walk (FT) ft 860    6 Min Walk (M) m 262                         Pt has a history of pulmonary fibrosis, DM2, ischemic heart disease, aortic valve insufficiency, DM2            Wt Readings from Last 24 Encounters:   10/05/23 85.8 kg (189 lb 3.2 oz)   09/20/23 83.5 kg (184 lb)   08/02/23 82.5 kg (181 lb 12.8 oz)   07/11/23 81.2 kg (179 lb)   05/03/23 81.5 kg (179 lb 9.6 oz)   03/14/23 80.7 kg  (178 lb)   02/13/23 80.4 kg (177 lb 3.2 oz)   02/09/23 83.5 kg (184 lb)   01/18/23 82.6 kg (182 lb)   12/13/22 78.9 kg (174 lb)   10/26/22 81.6 kg (180 lb)   10/22/22 83.5 kg (184 lb)   10/18/22 81.6 kg (180 lb)   06/29/22 83 kg (182 lb 14.4 oz)   05/18/22 83.7 kg (184 lb 9.6 oz)   04/06/22 86.2 kg (190 lb)   01/10/22 86.2 kg (190 lb)   12/28/21 83.9 kg (185 lb)   10/11/21 83.2 kg (183 lb 6.4 oz)   10/05/21 84.2 kg (185 lb 9.6 oz)   09/17/21 86.6 kg (191 lb)   09/03/21 86.6 kg (191 lb)   08/25/21 84.4 kg (186 lb)   08/06/21 84.8 kg (186 lb 14.4 oz)        Current Outpatient Medications   Medication    acetaminophen (TYLENOL) 325 MG tablet    albuterol (PROAIR HFA/PROVENTIL HFA/VENTOLIN HFA) 108 (90 Base) MCG/ACT inhaler    amLODIPine (NORVASC) 5 MG tablet    Ascorbic Acid (VITAMIN C PO)    ASPIRIN 81 MG OR TABS    benzonatate (TESSALON) 200 MG capsule    blood glucose (ACCU-CHEK SOFTCLIX) lancing device    blood glucose (NO BRAND SPECIFIED) test strip    blood glucose monitoring (ACCU-CHEK FASTCLIX) lancets    blood glucose monitoring (NO BRAND SPECIFIED) meter device kit    blood glucose monitoring (SOFTCLIX) lancets    cetirizine (ZYRTEC) 10 MG tablet    cholecalciferol (VITAMIN D) 1000 UNIT tablet    COMPOUNDED NON-CONTROLLED SUBSTANCE (CMPD RX) - PHARMACY TO MIX COMPOUNDED MEDICATION    estradiol (ESTRACE) 0.1 MG/GM vaginal cream    famotidine (PEPCID) 20 MG tablet    fluticasone (FLONASE) 50 MCG/ACT nasal spray    glipiZIDE (GLUCOTROL XL) 5 MG 24 hr tablet    lisinopril (ZESTRIL) 40 MG tablet    Melatonin 10 MG TABS tablet    metoprolol succinate ER (TOPROL XL) 100 MG 24 hr tablet    multivitamin w/minerals (MULTI-VITAMIN) tablet    nitroGLYcerin (NITROSTAT) 0.4 MG sublingual tablet    OVER-THE-COUNTER    Probiotic Product (PROBIOTIC DAILY PO)    rosuvastatin (CRESTOR) 5 MG tablet    senna (SENOKOT) 8.6 MG tablet    traZODone (DESYREL) 100 MG tablet    trospium (SANCTURA XR) 60 MG CP24 24 hr capsule     No  current facility-administered medications for this visit.      francis: ALEJANDRO GAMEZ  MRN: [id number]  : 1940  Study Date: 10/04/2023 09:00 AM  Age: 83 yrs  Gender: Female  Patient Location: [facility]  Reason For Study: Pulmonary HTN (H), YATES (dyspnea on exertion)  Ordering Physician: REBEKAH FINNEY  Referring Physician: REBEKAH FINNEY  Performed By: Mandy Vargas  HR: 64     ______________________________________________________________________________  Procedure  Complete Echo Adult.     ______________________________________________________________________________  Interpretation Summary     1. The left ventricle is normal in structure, function and size. The visual  ejection fraction is estimated at 60%.  2. The right ventricle is normal in structure, function and size.  3. No valve disease.     No changes from echo 2020.     ______________________________________________________________________________  Left Ventricle  The left ventricle is normal in structure, function and size. There is normal  left ventricular wall thickness. The visual ejection fraction is estimated at  60%. Left ventricular diastolic function is indeterminate. Normal left  ventricular wall motion. Septal motion is consistent with conduction  abnormality.     Right Ventricle  The right ventricle is normal in structure, function and size.     Atria  The left atrium is severely dilated. The right atrium is mildly dilated. There  is no atrial shunt seen.     Mitral Valve  There is no mitral regurgitation noted.     Tricuspid Valve  There is trace tricuspid regurgitation.     Aortic Valve  There is trace aortic regurgitation.     Pulmonic Valve  The pulmonic valve is normal in structure and function.     Vessels  Normal ascending, transverse (arch), and descending aorta. The inferior vena  cava was normal in size with preserved respiratory variability.     Pericardium  There is no pericardial effusion.     Rhythm  The  "rhythm was sinus with wide QRS. Possible 1st degree AV block.     ______________________________________________________________________________  MMode/2D Measurements & Calculations  IVSd: 1.1 cm  LVIDd: 4.2 cm  LVIDs: 3.1 cm  LVPWd: 1.1 cm  FS: 27.3 %  LV mass(C)d: 157.4 grams  Ao root diam: 3.2 cm  LA dimension: 4.6 cm  asc Aorta Diam: 3.2 cm  LA/Ao: 1.4  LA Volume (BP): 61.4 ml     RWT: 0.54  TAPSE: 1.9 cm     Doppler Measurements & Calculations  MV E max pérez: 74.4 cm/sec  MV A max pérez: 121.1 cm/sec  MV E/A: 0.61  MV dec slope: 330.2 cm/sec2  MV dec time: 0.23 sec  PA acc time: 0.10 sec  E/E' av.2  Lateral E/e': 14.9  Medial E/e': 17.5  RV S Pérez: 11.9 cm/sec      23 09:50 10/05/23   /64 134/70 157/76 !   Temp 98  F (36.7  C) 97.7  F (36.5  C)    Pulse 67 71 70   Resp 13 14    SpO2 97 % 95 %    Height 1.676 m (5' 6\") 1.676 m (5' 6\") 1.676 m (5' 6\")   BMI (Calculated) 29.34 29.7 30.54   Weight (lbs) 181 lb 12.8 oz 184 lb 189 lb 3.2 oz     EXAM: NM LUNG SCAN VENTILATION AND PERFUSION  LOCATION: [facility]  DATE: 10/4/2023     INDICATION: r o chronic PE; Pulmonary embolism; Female sex; Not pregnant; No imaging to r o PE in the last 24 hours; Pulmonary Embolism Rule Out Criteria (PERC) score > 0; Revised [place] Score (RGS) not >= 11; No D dimer result available; D dimer not   ordered. Pulmonary hypertension.  COMPARISON: Chest x-ray 10/04/2023.  TECHNIQUE: 51.3 mCi Tc-99m DTPA inhaled. 6.4 mCi Tc-99m MAA, IV. Standard planar imaging during perfusion and ventilation portions of exam.     FINDINGS: Normal pulmonary perfusion. Areas of heterogeneous ventilation with clumping of DTPA particles which can be seen with turbulent airflow secondary to bronchiectasis or emphysema. No mismatched segmental perfusion defect.                                                                      IMPRESSION:     No evidence of chronic pulmonary thromboembolic disease.     Latest " Reference Range & Units Most Recent   Sodium 135 - 145 mmol/L 141  10/4/23 08:18   Potassium 3.4 - 5.3 mmol/L 4.0  10/4/23 08:18   Chloride 98 - 107 mmol/L 103  10/4/23 08:18   Carbon Dioxide (CO2) 22 - 29 mmol/L 23  10/4/23 08:18   Urea Nitrogen 8.0 - 23.0 mg/dL 13.3  10/4/23 08:18   Creatinine 0.51 - 0.95 mg/dL 0.96 (H)  10/4/23 08:18   GFR Estimate >60 mL/min/1.73m2 58 (L)  10/4/23 08:18   Calcium 8.8 - 10.2 mg/dL 9.3  10/4/23 08:18   Anion Gap 7 - 15 mmol/L 15  10/4/23 08:18   Albumin 3.5 - 5.2 g/dL 4.2  10/4/23 08:18   Protein Total 6.4 - 8.3 g/dL 7.4  10/4/23 08:18   Alkaline Phosphatase 35 - 104 U/L 69  10/4/23 08:18   ALT 0 - 50 U/L 30  10/4/23 08:18   AST 0 - 45 U/L 35  10/4/23 08:18   Albumin Urine mg/g Cr 0.00 - 25.00 mg/g Cr 53.88 (H)  4/25/23 10:56   Albumin Urine mg/L mg/L 69.5  4/25/23 10:56   Bilirubin Direct 0.00 - 0.30 mg/dL <0.20  10/4/23 08:18   Bilirubin Total <=1.2 mg/dL 0.6  10/4/23 08:18   Cholesterol <200 mg/dL 126  10/4/23 08:18   CRP Inflammation <5.00 mg/L 3.15  10/4/23 08:18   Creatinine Urine mg/dL  mg/dL 129.0  4/25/23 10:56  129.0  4/25/23 10:56   Glucose 70 - 99 mg/dL 136 (H)  10/4/23 08:18   HDL Cholesterol >=50 mg/dL 27 (L)  10/4/23 08:18   Hemoglobin A1C 0.0 - 5.6 % 6.1 (H)  8/2/23 11:30   Interleukin 6 Blood <3.01 pg/mL 9.99 (H)  10/4/23 08:18   Iron 37 - 145 ug/dL 90  10/4/23 08:18   Iron Binding Capacity 240 - 430 ug/dL 245  10/4/23 08:18   Iron Sat Index 15 - 46 % 37  10/4/23 08:18   LDL Cholesterol Calculated <=100 mg/dL 57  10/4/23 08:18   Non HDL Cholesterol <130 mg/dL 99  10/4/23 08:18   N-Terminal Pro Bnp 0 - 1,800 pg/mL 134  10/4/23 08:18   Rheumatoid Factor <12 IU/mL <6  10/4/23 08:18   Soluble Transferrin Receptor 1.9 - 4.4 mg/L 2.9  10/4/23 08:18   Triglycerides <150 mg/dL 210 (H)  10/4/23 08:18   TSH 0.30 - 4.20 uIU/mL 1.24  10/4/23 08:18   GLUCOSE BY METER POCT 70 - 99 mg/dL 98  10/22/22 22:31   WBC 4.0 - 11.0 10e3/uL 7.9  10/4/23 08:18   Hemoglobin 11.7 - 15.7 g/dL  13.1  10/4/23 08:18   Hematocrit 35.0 - 47.0 % 38.7  10/4/23 08:18   Platelet Count 150 - 450 10e3/uL 159  10/4/23 08:18   RBC Count 3.80 - 5.20 10e6/uL 4.19  10/4/23 08:18   MCV 78 - 100 fL 92  10/4/23 08:18   MCH 26.5 - 33.0 pg 31.3  10/4/23 08:18   MCHC 31.5 - 36.5 g/dL 33.9  10/4/23 08:18   RDW 10.0 - 15.0 % 12.9  10/4/23 08:18   INR 0.85 - 1.15   0.85 - 1.15  1.08  10/4/23 08:18  1.07  10/4/23 08:18   Thrombin Time 13.0 - 19.0 Seconds 18.2  10/4/23 08:18   LUPUS ANTICOAGULANT PANEL  Rpt !  10/4/23 08:18

## 2023-11-01 ENCOUNTER — OFFICE VISIT (OUTPATIENT)
Dept: NEPHROLOGY | Facility: CLINIC | Age: 83
End: 2023-11-01
Payer: COMMERCIAL

## 2023-11-01 VITALS
SYSTOLIC BLOOD PRESSURE: 181 MMHG | DIASTOLIC BLOOD PRESSURE: 81 MMHG | OXYGEN SATURATION: 95 % | BODY MASS INDEX: 30.3 KG/M2 | WEIGHT: 187.7 LBS | HEART RATE: 65 BPM

## 2023-11-01 DIAGNOSIS — E11.22 CKD STAGE 3 DUE TO TYPE 2 DIABETES MELLITUS (H): Primary | ICD-10-CM

## 2023-11-01 DIAGNOSIS — N18.30 CKD STAGE 3 DUE TO TYPE 2 DIABETES MELLITUS (H): Primary | ICD-10-CM

## 2023-11-01 DIAGNOSIS — E55.9 VITAMIN D DEFICIENCY: ICD-10-CM

## 2023-11-01 PROCEDURE — 99214 OFFICE O/P EST MOD 30 MIN: CPT | Performed by: INTERNAL MEDICINE

## 2023-11-01 RX ORDER — POLYETHYLENE GLYCOL 3350 17 G/17G
1 POWDER, FOR SOLUTION ORAL PRN
COMMUNITY
End: 2024-02-13

## 2023-11-01 ASSESSMENT — PAIN SCALES - GENERAL: PAINLEVEL: NO PAIN (0)

## 2023-11-01 NOTE — PATIENT INSTRUCTIONS
It was a pleasure taking care of you today.  I've included a brief summary of our discussion and care plan from today's visit below.  Please review this information with your primary care provider.    My recommendations are summarized as follows:  -Keep the amount of sodium in your diet at 2.4 g/day (also see instructions attached in that regard)  -Keep a Blood Pressure diary by taking your blood pressure twice a day as instructed (also see instructions attached in that regard).Please make sure that you are using a validated blood pressure device by checking that it is the case at: https://www.validatebp.org/  -Avoid all NSAID's. Examples include Ibuprofen (Advil, Motrin), naprosyn (Aleve), celebrex among others. Acetaminophen (Tylenol) is ok with maximum dose in 24 hours of 3200 mg.  -Do not exceed one alcoholic drink per day.  -If for any reason, you are at risk of volume depletion/dehydration (high grade fevers, severe vomiting or diarrhea) stop lisinopril till you get better    - Return to Nephrology Clinic in one year to review your progress.     To schedule imaging please call [phone]. To schedule your lab appointment at [facility] 1st floor lab call [phone]    Who do I call with any questions after my visit?  Please be in touch if there are any further questions that arise following today's visit.  There are multiple ways to contact your nephrology care team.      During business hours, you may reach your Nephrology RN Care Coordinator, Esthela Vasquez at . To schedule or reschedule an appointment, please call [phone].    You can always send a secure message through [email or web address]  [email or web address] messages are answered by your nurse or doctor typically within 24 hours.  Please allow extra time on weekends and holidays.      For urgent/emergent questions after business hours, you may reach the on-call Nephrology Fellow by contacting the [facility] at [phone].      How will I get the results of any tests ordered?    You will receive all of your results.  If you have signed up for [employer], any tests ordered at your visit will be available to you after your physician reviews them.  Typically this takes 1-2 weeks.  If there are urgent results that require a change in your care plan, your physician or nurse will call you to discuss the next steps.      What is [employer]?  [employer] is a secure way for you to access all of your healthcare records from the [facility].  It is a web based computer program, so you can sign on to it from any location.  It also allows you to send secure messages to your care team.  I recommend signing up for [employer] access if you have not already done so and are comfortable with using a computer.      How do I schedule a follow-up visit?  If you did not schedule a follow-up visit today, please call [phone] to schedule a follow-up office visit.      Sincerely,    Virginia Sexton MD  [facility]  Division of Nephrology and Hypertension
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
## 2023-11-01 NOTE — NURSING NOTE
Chief Complaint   Patient presents with    RECHECK     CKD stage 3 due to type 2 diabetes mellitus (H) +2 more       Vitals:    11/01/23 0849   BP: (!) 181/81   BP Location: Left arm   Patient Position: Sitting   Cuff Size: Adult Regular   Pulse: 65   SpO2: 95%   Weight: 85.1 kg (187 lb 11.2 oz)       Body mass index is 30.3 kg/m .    Edgar Ahn MA

## 2023-11-02 ENCOUNTER — VIRTUAL VISIT (OUTPATIENT)
Dept: CARDIOLOGY | Facility: CLINIC | Age: 83
End: 2023-11-02
Attending: INTERNAL MEDICINE
Payer: COMMERCIAL

## 2023-11-02 DIAGNOSIS — R06.02 SOB (SHORTNESS OF BREATH): ICD-10-CM

## 2023-11-02 DIAGNOSIS — I27.20 PULMONARY HYPERTENSION (H): ICD-10-CM

## 2023-11-02 DIAGNOSIS — I10 PRIMARY HYPERTENSION: ICD-10-CM

## 2023-11-02 PROCEDURE — 99443 PR PHYSICIAN TELEPHONE EVALUATION 21-30 MIN: CPT | Mod: 93 | Performed by: INTERNAL MEDICINE

## 2023-11-02 RX ORDER — AMLODIPINE BESYLATE 5 MG/1
TABLET ORAL
Qty: 135 TABLET | Refills: 3 | Status: SHIPPED | OUTPATIENT
Start: 2023-11-02 | End: 2023-11-14

## 2023-11-02 NOTE — PATIENT INSTRUCTIONS
Medication Changes:  - Increase amlodipine to 5 mg in the morning and 2.5 mg in the evening    Patient Instructions:  1. Continue staying active and eat a heart healthy diet.    2. Remember to weigh yourself daily after voiding and before you consume any food or beverages and log the numbers.  If you have gained 2 pounds overnight or 5 pounds in a week contact us immediately for medication adjustments or further instructions.    3. Please call us immediately if you have any syncope (fainting or passing out), chest pain, edema (swelling or weight gain), or decline in your functional status (general decline in how you are feeling).    Follow up Appointment Information:  - Return to clinic in May with labs prior    Results:  Component      Latest Ref Rng 10/25/2023  10:15 AM   Sodium      135 - 145 mmol/L 140    Potassium      3.4 - 5.3 mmol/L 4.2    Chloride      98 - 107 mmol/L 105    Carbon Dioxide (CO2)      22 - 29 mmol/L 25    Anion Gap      7 - 15 mmol/L 10    Glucose      70 - 99 mg/dL 132 (H)    Urea Nitrogen      8.0 - 23.0 mg/dL 18.1    Creatinine      0.51 - 0.95 mg/dL 1.09 (H)    GFR Estimate      >60 mL/min/1.73m2 50 (L)    Calcium      8.8 - 10.2 mg/dL 9.5    Albumin      3.5 - 5.2 g/dL 4.2    Phosphorus      2.5 - 4.5 mg/dL 3.0    WBC      4.0 - 11.0 10e3/uL 7.9    RBC Count      3.80 - 5.20 10e6/uL 4.33    Hemoglobin      11.7 - 15.7 g/dL 13.4    Hematocrit      35.0 - 47.0 % 40.4    MCV      78 - 100 fL 93    MCH      26.5 - 33.0 pg 30.9    MCHC      31.5 - 36.5 g/dL 33.2    RDW      10.0 - 15.0 % 12.6    Platelet Count      150 - 450 10e3/uL 182    Creatinine Urine      mg/dL    Albumin Urine mg/L      mg/L    Albumin Urine mg/g Cr      0.00 - 25.00 mg/g Cr    Complement C3      81 - 157 mg/dL 156    Complement C4      13 - 39 mg/dL 26    DNA-ds      <10.0 IU/mL 2.7       Legend:  (H) High  (L) Low      For scheduling at [facility] please call [phone]  For scheduling at the [facility] please call  [phone]  We are located on the second floor [address] at the [facility].  Our address is     [address],   [address]  [place]  [address]    Thank you for allowing us to be a part of your care here at the [facility]    If you have questions or concerns please contact us at:    Nikki Mirza RN, BSN     Nurse Coordinator       Pulmonary Hypertension     [facility]   (Phone)[phone]  (Fax)[phone]    ** Please note that you will NOT receive a reminder call regarding your scheduled testing, reminder calls are for provider appointments only.  If you are scheduled for testing within the [facility] system you may receive a call regarding pre-registration for billing purposes only.**     Remember to weigh yourself daily after voiding and before you consume any food or beverages and log the numbers.  If you have gained/lost 2 pounds overnight or 5 pounds in a week contact us immediately for medication adjustments or further instructions.    Support Group:  Pulmonary Hypertension Association  [email or web address].phassociation.org/  **Look at the Events Tab** They even have Support Groups that you can call into    [facility] PH Support Group  Second Saturday of the Month from 1-3 PM   Location: [facility] [address] [facility] [address]  Leader: Fiorella Carwford  Phone: [phone]  Email: [email or web address]     Great Videos about Pulmonary Hypertension!!  Scan ME!    Website: [email or web address]

## 2023-11-06 ENCOUNTER — TELEPHONE (OUTPATIENT)
Dept: INTERNAL MEDICINE | Facility: CLINIC | Age: 83
End: 2023-11-06
Payer: COMMERCIAL

## 2023-11-06 NOTE — TELEPHONE ENCOUNTER
Reason for Call:  Appointment Request    Patient requesting this type of appt: Chronic Diease Management/Medication/Follow-Up    Requested provider: Du Phillips    Reason patient unable to be scheduled: Not within requested timeframe    When does patient want to be seen/preferred time: 2-3 WEEKS    Comments: PATIENT NEEDS BLOOD PRESSURE AND MED CHECK ADVISED BY HER CARDIOLOGIST     Could we send this information to you in [facility] or would you prefer to receive a phone call?:   Patient would prefer a phone call   Okay to leave a detailed message?: Yes at Cell number on file:    Telephone Information:   Mobile [phone]       Call taken on 11/6/2023 at 11:30 AM by Halley Snowden

## 2023-11-14 ENCOUNTER — OFFICE VISIT (OUTPATIENT)
Dept: INTERNAL MEDICINE | Facility: CLINIC | Age: 83
End: 2023-11-14
Payer: COMMERCIAL

## 2023-11-14 VITALS
HEIGHT: 66 IN | WEIGHT: 188.2 LBS | RESPIRATION RATE: 20 BRPM | BODY MASS INDEX: 30.25 KG/M2 | DIASTOLIC BLOOD PRESSURE: 70 MMHG | HEART RATE: 77 BPM | OXYGEN SATURATION: 93 % | SYSTOLIC BLOOD PRESSURE: 150 MMHG | TEMPERATURE: 97.8 F

## 2023-11-14 DIAGNOSIS — E11.21 TYPE 2 DIABETES MELLITUS WITH DIABETIC NEPHROPATHY, WITHOUT LONG-TERM CURRENT USE OF INSULIN (H): ICD-10-CM

## 2023-11-14 DIAGNOSIS — I10 PRIMARY HYPERTENSION: Primary | ICD-10-CM

## 2023-11-14 DIAGNOSIS — Z23 NEED FOR PROPHYLACTIC VACCINATION AND INOCULATION AGAINST INFLUENZA: ICD-10-CM

## 2023-11-14 DIAGNOSIS — E78.2 MIXED HYPERLIPIDEMIA: ICD-10-CM

## 2023-11-14 DIAGNOSIS — F51.01 PRIMARY INSOMNIA: Chronic | ICD-10-CM

## 2023-11-14 PROCEDURE — 91320 SARSCV2 VAC 30MCG TRS-SUC IM: CPT | Performed by: INTERNAL MEDICINE

## 2023-11-14 PROCEDURE — 90480 ADMN SARSCOV2 VAC 1/ONLY CMP: CPT | Performed by: INTERNAL MEDICINE

## 2023-11-14 PROCEDURE — 99214 OFFICE O/P EST MOD 30 MIN: CPT | Mod: 25 | Performed by: INTERNAL MEDICINE

## 2023-11-14 PROCEDURE — 90686 IIV4 VACC NO PRSV 0.5 ML IM: CPT | Performed by: INTERNAL MEDICINE

## 2023-11-14 PROCEDURE — G0008 ADMIN INFLUENZA VIRUS VAC: HCPCS | Mod: 59 | Performed by: INTERNAL MEDICINE

## 2023-11-14 RX ORDER — METOPROLOL SUCCINATE 100 MG/1
TABLET, EXTENDED RELEASE ORAL
Qty: 90 TABLET | Refills: 1 | Status: CANCELLED | OUTPATIENT
Start: 2023-11-14

## 2023-11-14 RX ORDER — RESPIRATORY SYNCYTIAL VIRUS VACCINE 120MCG/0.5
0.5 KIT INTRAMUSCULAR ONCE
Qty: 1 EACH | Refills: 0 | Status: CANCELLED | OUTPATIENT
Start: 2023-11-14 | End: 2023-11-14

## 2023-11-14 RX ORDER — LISINOPRIL 40 MG/1
40 TABLET ORAL DAILY
Qty: 90 TABLET | Refills: 1 | Status: CANCELLED | OUTPATIENT
Start: 2023-11-14

## 2023-11-14 RX ORDER — TRAZODONE HYDROCHLORIDE 100 MG/1
TABLET ORAL
Qty: 90 TABLET | Refills: 1 | Status: SHIPPED | OUTPATIENT
Start: 2023-11-14 | End: 2024-07-18

## 2023-11-14 RX ORDER — ALBUTEROL SULFATE 90 UG/1
2 AEROSOL, METERED RESPIRATORY (INHALATION) EVERY 6 HOURS
Qty: 18 G | Refills: 0 | Status: CANCELLED | OUTPATIENT
Start: 2023-11-14

## 2023-11-14 RX ORDER — AMLODIPINE BESYLATE 5 MG/1
5 TABLET ORAL 2 TIMES DAILY
Start: 2023-11-14 | End: 2024-01-16

## 2023-11-14 ASSESSMENT — PAIN SCALES - GENERAL: PAINLEVEL: NO PAIN (0)
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
## 2023-11-27 ENCOUNTER — TRANSFERRED RECORDS (OUTPATIENT)
Dept: HEALTH INFORMATION MANAGEMENT | Facility: CLINIC | Age: 83
End: 2023-11-27
Payer: COMMERCIAL

## 2023-11-27 LAB — RETINOPATHY: NEGATIVE

## 2023-11-28 ENCOUNTER — TRANSFERRED RECORDS (OUTPATIENT)
Dept: HEALTH INFORMATION MANAGEMENT | Facility: CLINIC | Age: 83
End: 2023-11-28
Payer: COMMERCIAL

## 2023-12-15 ENCOUNTER — ALLIED HEALTH/NURSE VISIT (OUTPATIENT)
Dept: FAMILY MEDICINE | Facility: CLINIC | Age: 83
End: 2023-12-15
Payer: COMMERCIAL

## 2023-12-15 ENCOUNTER — LAB (OUTPATIENT)
Dept: LAB | Facility: CLINIC | Age: 83
End: 2023-12-15
Payer: COMMERCIAL

## 2023-12-15 VITALS
BODY MASS INDEX: 30.31 KG/M2 | TEMPERATURE: 97 F | HEART RATE: 56 BPM | OXYGEN SATURATION: 97 % | RESPIRATION RATE: 16 BRPM | DIASTOLIC BLOOD PRESSURE: 78 MMHG | WEIGHT: 187.8 LBS | SYSTOLIC BLOOD PRESSURE: 138 MMHG

## 2023-12-15 DIAGNOSIS — E55.9 VITAMIN D DEFICIENCY: ICD-10-CM

## 2023-12-15 DIAGNOSIS — Z01.30 BLOOD PRESSURE CHECK: Primary | ICD-10-CM

## 2023-12-15 DIAGNOSIS — E11.22 CKD STAGE 3 DUE TO TYPE 2 DIABETES MELLITUS (H): ICD-10-CM

## 2023-12-15 DIAGNOSIS — N18.30 CKD STAGE 3 DUE TO TYPE 2 DIABETES MELLITUS (H): ICD-10-CM

## 2023-12-15 LAB
ALBUMIN MFR UR ELPH: 6.3 MG/DL
ALBUMIN SERPL BCG-MCNC: 4 G/DL (ref 3.5–5.2)
ALBUMIN UR-MCNC: NEGATIVE MG/DL
ALP SERPL-CCNC: 61 U/L (ref 40–150)
ALT SERPL W P-5'-P-CCNC: 44 U/L (ref 0–50)
ANION GAP SERPL CALCULATED.3IONS-SCNC: 11 MMOL/L (ref 7–15)
APPEARANCE UR: CLEAR
AST SERPL W P-5'-P-CCNC: 37 U/L (ref 0–45)
BASOPHILS # BLD AUTO: 0.1 10E3/UL (ref 0–0.2)
BASOPHILS NFR BLD AUTO: 1 %
BILIRUB SERPL-MCNC: 0.4 MG/DL
BILIRUB UR QL STRIP: NEGATIVE
BUN SERPL-MCNC: 19.6 MG/DL (ref 8–23)
CALCIUM SERPL-MCNC: 9.3 MG/DL (ref 8.8–10.2)
CHLORIDE SERPL-SCNC: 104 MMOL/L (ref 98–107)
COLOR UR AUTO: YELLOW
CREAT SERPL-MCNC: 1.16 MG/DL (ref 0.51–0.95)
CREAT UR-MCNC: 49 MG/DL
CREAT UR-MCNC: 49 MG/DL
DEPRECATED HCO3 PLAS-SCNC: 27 MMOL/L (ref 22–29)
EGFRCR SERPLBLD CKD-EPI 2021: 46 ML/MIN/1.73M2
EOSINOPHIL # BLD AUTO: 0.4 10E3/UL (ref 0–0.7)
EOSINOPHIL NFR BLD AUTO: 4 %
ERYTHROCYTE [DISTWIDTH] IN BLOOD BY AUTOMATED COUNT: 12.9 % (ref 10–15)
GLUCOSE SERPL-MCNC: 101 MG/DL (ref 70–99)
GLUCOSE UR STRIP-MCNC: NEGATIVE MG/DL
HCT VFR BLD AUTO: 42.4 % (ref 35–47)
HGB BLD-MCNC: 14 G/DL (ref 11.7–15.7)
HGB UR QL STRIP: ABNORMAL
IMM GRANULOCYTES # BLD: 0 10E3/UL
IMM GRANULOCYTES NFR BLD: 0 %
KETONES UR STRIP-MCNC: NEGATIVE MG/DL
LEUKOCYTE ESTERASE UR QL STRIP: NEGATIVE
LYMPHOCYTES # BLD AUTO: 4.7 10E3/UL (ref 0.8–5.3)
LYMPHOCYTES NFR BLD AUTO: 44 %
MCH RBC QN AUTO: 30.9 PG (ref 26.5–33)
MCHC RBC AUTO-ENTMCNC: 33 G/DL (ref 31.5–36.5)
MCV RBC AUTO: 94 FL (ref 78–100)
MICROALBUMIN UR-MCNC: 20.3 MG/L
MICROALBUMIN/CREAT UR: 41.43 MG/G CR (ref 0–25)
MONOCYTES # BLD AUTO: 0.9 10E3/UL (ref 0–1.3)
MONOCYTES NFR BLD AUTO: 8 %
NEUTROPHILS # BLD AUTO: 4.5 10E3/UL (ref 1.6–8.3)
NEUTROPHILS NFR BLD AUTO: 43 %
NITRATE UR QL: NEGATIVE
PH UR STRIP: 6 [PH] (ref 5–7)
PLATELET # BLD AUTO: 193 10E3/UL (ref 150–450)
POTASSIUM SERPL-SCNC: 4.1 MMOL/L (ref 3.4–5.3)
PROT SERPL-MCNC: 7.1 G/DL (ref 6.4–8.3)
PROT/CREAT 24H UR: 0.13 MG/MG CR (ref 0–0.2)
RBC # BLD AUTO: 4.53 10E6/UL (ref 3.8–5.2)
RBC #/AREA URNS AUTO: ABNORMAL /HPF
SODIUM SERPL-SCNC: 142 MMOL/L (ref 135–145)
SP GR UR STRIP: 1.01 (ref 1–1.03)
SQUAMOUS #/AREA URNS AUTO: ABNORMAL /LPF
UROBILINOGEN UR STRIP-ACNC: 0.2 E.U./DL
VIT D+METAB SERPL-MCNC: 48 NG/ML (ref 20–50)
WBC # BLD AUTO: 10.5 10E3/UL (ref 4–11)
WBC #/AREA URNS AUTO: ABNORMAL /HPF

## 2023-12-15 PROCEDURE — 80053 COMPREHEN METABOLIC PANEL: CPT

## 2023-12-15 PROCEDURE — 82570 ASSAY OF URINE CREATININE: CPT

## 2023-12-15 PROCEDURE — 99207 PR NO CHARGE NURSE ONLY: CPT

## 2023-12-15 PROCEDURE — 81001 URINALYSIS AUTO W/SCOPE: CPT

## 2023-12-15 PROCEDURE — 36415 COLL VENOUS BLD VENIPUNCTURE: CPT

## 2023-12-15 PROCEDURE — 84156 ASSAY OF PROTEIN URINE: CPT

## 2023-12-15 PROCEDURE — 85025 COMPLETE CBC W/AUTO DIFF WBC: CPT

## 2023-12-15 PROCEDURE — 82306 VITAMIN D 25 HYDROXY: CPT

## 2023-12-15 PROCEDURE — 82043 UR ALBUMIN QUANTITATIVE: CPT

## 2023-12-15 ASSESSMENT — PAIN SCALES - GENERAL: PAINLEVEL: NO PAIN (0)

## 2023-12-15 NOTE — PROGRESS NOTES
Dimple Baxter is a 83 year old patient who comes in today for a Blood Pressure check.  Initial BP:  /78   Pulse 56   Temp 97  F (36.1  C) (Tympanic)   Resp 16   Wt 85.2 kg (187 lb 12.8 oz)   LMP  (LMP Unknown)   SpO2 97%   BMI 30.31 kg/m       56  Disposition: results routed to provider

## 2024-01-04 ENCOUNTER — OFFICE VISIT (OUTPATIENT)
Dept: URGENT CARE | Facility: URGENT CARE | Age: 84
End: 2024-01-04
Payer: COMMERCIAL

## 2024-01-04 VITALS
HEART RATE: 72 BPM | BODY MASS INDEX: 30.34 KG/M2 | OXYGEN SATURATION: 95 % | WEIGHT: 188 LBS | TEMPERATURE: 98.2 F | DIASTOLIC BLOOD PRESSURE: 78 MMHG | SYSTOLIC BLOOD PRESSURE: 152 MMHG

## 2024-01-04 DIAGNOSIS — H61.21 IMPACTED CERUMEN OF RIGHT EAR: Primary | ICD-10-CM

## 2024-01-04 PROCEDURE — 69209 REMOVE IMPACTED EAR WAX UNI: CPT | Mod: RT | Performed by: NURSE PRACTITIONER

## 2024-01-04 NOTE — PROGRESS NOTES
Chief Complaint   Patient presents with    Urgent Care     Right ear pain          ICD-10-CM    1. Impacted cerumen of right ear  H61.21 MS REMOVAL IMPACTED CERUMEN IRRIGATION/LVG UNILAT      Irrigated right ear with water and removed a large amount of cerumen.  Patient's pain resolved once this was removed.  Follow-up as needed      Subjective     Dimple Baxter is an 83 year old female who presents to clinic today for right ear discomfort that started today      ROS: 10 point ROS neg other than the symptoms noted above in the HPI.       Objective    BP (!) 152/78   Pulse 72   Temp 98.2  F (36.8  C)   Wt 85.3 kg (188 lb)   LMP  (LMP Unknown)   SpO2 95%   BMI 30.34 kg/m    Nurses notes and VS have been reviewed.    Physical Exam     Alert and oriented, left tympanic membrane and canal are normal, right tympanic membrane is obstructed by cerumen after irrigation the tympanic membrane appears normal        ANIBAL Floyd, CNP  [facility] Provider    The use of Dragon/[employer] dictation services may have been used to construct the content in this note; any grammatical or spelling errors are non-intentional. Please contact the author of this note directly if you are in need of any clarification.

## 2024-01-12 ENCOUNTER — VIRTUAL VISIT (OUTPATIENT)
Dept: UROLOGY | Facility: CLINIC | Age: 84
End: 2024-01-12
Payer: COMMERCIAL

## 2024-01-12 DIAGNOSIS — R39.15 URINARY URGENCY: ICD-10-CM

## 2024-01-12 DIAGNOSIS — N39.0 RECURRENT UTI: ICD-10-CM

## 2024-01-12 DIAGNOSIS — R31.0 GROSS HEMATURIA: Primary | ICD-10-CM

## 2024-01-12 DIAGNOSIS — N39.41 URGENCY INCONTINENCE: ICD-10-CM

## 2024-01-12 DIAGNOSIS — R35.0 URINARY FREQUENCY: ICD-10-CM

## 2024-01-12 PROCEDURE — 99214 OFFICE O/P EST MOD 30 MIN: CPT | Mod: 95 | Performed by: PHYSICIAN ASSISTANT

## 2024-01-12 RX ORDER — ESTRADIOL 0.1 MG/G
2 CREAM VAGINAL
Qty: 42.5 G | Refills: 3 | Status: SHIPPED | OUTPATIENT
Start: 2024-01-12 | End: 2024-02-13

## 2024-01-12 ASSESSMENT — PAIN SCALES - GENERAL: PAINLEVEL: NO PAIN (0)

## 2024-01-12 NOTE — NURSING NOTE
Pt had prescription sent to wrong pharmacy. Pt would like a new prescription for estradiol (ESTRACE) 0.1 MG/GM vaginal cream  at her regular pharmacy, [facility], entered in chart    Is the patient currently in the state of [place]? YES    Visit mode:VIDEO    If the visit is dropped, the patient can be reconnected by: VIDEO VISIT: Text to cell phone:   Telephone Information:   Mobile [phone]       Will anyone else be joining the visit? NO  (If patient encounters technical issues they should call [phone]    How would you like to obtain your AVS? MyChart    Are changes needed to the allergy or medication list? No    Reason for visit: RECHECK (6 month follow-up )    Jeanette PEREZ

## 2024-01-12 NOTE — PATIENT INSTRUCTIONS
Continue estrogen cream; renewed today. Contact clinic if expensive.    Continue trospium.     Placed order for you to leave a urine sample in 5/2024. You can leave this urine sample at the nearest [facility] lab at that time. This is to monitor for blood in your urine.     Follow-up in 1 year or sooner if needed.     Contact clinic if you develop symptoms of a UTI in the future.
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
## 2024-01-13 ENCOUNTER — HEALTH MAINTENANCE LETTER (OUTPATIENT)
Age: 84
End: 2024-01-13

## 2024-01-16 ENCOUNTER — VIRTUAL VISIT (OUTPATIENT)
Dept: INTERNAL MEDICINE | Facility: CLINIC | Age: 84
End: 2024-01-16
Payer: COMMERCIAL

## 2024-01-16 DIAGNOSIS — E11.21 TYPE 2 DIABETES MELLITUS WITH DIABETIC NEPHROPATHY, WITHOUT LONG-TERM CURRENT USE OF INSULIN (H): ICD-10-CM

## 2024-01-16 DIAGNOSIS — I10 PRIMARY HYPERTENSION: Primary | ICD-10-CM

## 2024-01-16 DIAGNOSIS — E78.2 MIXED HYPERLIPIDEMIA: ICD-10-CM

## 2024-01-16 PROCEDURE — 99214 OFFICE O/P EST MOD 30 MIN: CPT | Mod: 95 | Performed by: INTERNAL MEDICINE

## 2024-01-16 RX ORDER — AMLODIPINE BESYLATE 5 MG/1
5 TABLET ORAL 2 TIMES DAILY
Qty: 180 TABLET | Refills: 0 | Status: SHIPPED | OUTPATIENT
Start: 2024-01-16 | End: 2024-02-13

## 2024-01-16 RX ORDER — GLIPIZIDE 5 MG/1
5 TABLET, FILM COATED, EXTENDED RELEASE ORAL DAILY
Qty: 90 TABLET | Refills: 1 | Status: SHIPPED | OUTPATIENT
Start: 2024-01-16 | End: 2024-08-05

## 2024-01-16 RX ORDER — LISINOPRIL 40 MG/1
40 TABLET ORAL DAILY
Qty: 90 TABLET | Refills: 1 | Status: SHIPPED | OUTPATIENT
Start: 2024-01-16 | End: 2024-08-05

## 2024-01-16 RX ORDER — METOPROLOL SUCCINATE 100 MG/1
TABLET, EXTENDED RELEASE ORAL
Qty: 90 TABLET | Refills: 0 | Status: SHIPPED | OUTPATIENT
Start: 2024-01-16 | End: 2024-02-13
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
## 2024-01-24 DIAGNOSIS — E11.21 TYPE 2 DIABETES MELLITUS WITH DIABETIC NEPHROPATHY, WITHOUT LONG-TERM CURRENT USE OF INSULIN (H): ICD-10-CM

## 2024-01-24 RX ORDER — LANCETS
EACH MISCELLANEOUS
Qty: 102 EACH | Refills: 0 | Status: SHIPPED | OUTPATIENT
Start: 2024-01-24 | End: 2024-04-30

## 2024-02-04 DIAGNOSIS — N39.41 URGENCY INCONTINENCE: ICD-10-CM

## 2024-02-06 RX ORDER — TROSPIUM CHLORIDE ER 60 MG/1
60 CAPSULE ORAL EVERY MORNING
Qty: 90 CAPSULE | Refills: 3 | Status: SHIPPED | OUTPATIENT
Start: 2024-02-06

## 2024-02-13 ENCOUNTER — OFFICE VISIT (OUTPATIENT)
Dept: INTERNAL MEDICINE | Facility: CLINIC | Age: 84
End: 2024-02-13
Payer: COMMERCIAL

## 2024-02-13 VITALS
HEART RATE: 74 BPM | DIASTOLIC BLOOD PRESSURE: 70 MMHG | WEIGHT: 192.6 LBS | OXYGEN SATURATION: 96 % | SYSTOLIC BLOOD PRESSURE: 130 MMHG | BODY MASS INDEX: 30.95 KG/M2 | HEIGHT: 66 IN | RESPIRATION RATE: 13 BRPM | TEMPERATURE: 96.2 F

## 2024-02-13 DIAGNOSIS — J84.10 PULMONARY FIBROSIS (H): ICD-10-CM

## 2024-02-13 DIAGNOSIS — Z12.31 VISIT FOR SCREENING MAMMOGRAM: Primary | ICD-10-CM

## 2024-02-13 DIAGNOSIS — R21 RASH: ICD-10-CM

## 2024-02-13 DIAGNOSIS — N18.31 STAGE 3A CHRONIC KIDNEY DISEASE (H): ICD-10-CM

## 2024-02-13 DIAGNOSIS — E11.21 TYPE 2 DIABETES MELLITUS WITH DIABETIC NEPHROPATHY, WITHOUT LONG-TERM CURRENT USE OF INSULIN (H): ICD-10-CM

## 2024-02-13 DIAGNOSIS — I27.20 PULMONARY HYPERTENSION (H): ICD-10-CM

## 2024-02-13 DIAGNOSIS — E78.2 MIXED HYPERLIPIDEMIA: ICD-10-CM

## 2024-02-13 DIAGNOSIS — I10 PRIMARY HYPERTENSION: ICD-10-CM

## 2024-02-13 LAB — HBA1C MFR BLD: 6.3 % (ref 0–5.6)

## 2024-02-13 PROCEDURE — 80048 BASIC METABOLIC PNL TOTAL CA: CPT | Performed by: INTERNAL MEDICINE

## 2024-02-13 PROCEDURE — 99215 OFFICE O/P EST HI 40 MIN: CPT | Performed by: INTERNAL MEDICINE

## 2024-02-13 PROCEDURE — 83036 HEMOGLOBIN GLYCOSYLATED A1C: CPT | Performed by: INTERNAL MEDICINE

## 2024-02-13 PROCEDURE — 36415 COLL VENOUS BLD VENIPUNCTURE: CPT | Performed by: INTERNAL MEDICINE

## 2024-02-13 RX ORDER — AMLODIPINE BESYLATE 5 MG/1
5 TABLET ORAL 2 TIMES DAILY
Qty: 180 TABLET | Refills: 1 | Status: SHIPPED | OUTPATIENT
Start: 2024-02-13 | End: 2024-08-05

## 2024-02-13 RX ORDER — NYSTATIN 100000 [USP'U]/G
POWDER TOPICAL 3 TIMES DAILY
Qty: 60 G | Refills: 3 | Status: SHIPPED | OUTPATIENT
Start: 2024-02-13

## 2024-02-13 RX ORDER — METOPROLOL SUCCINATE 100 MG/1
TABLET, EXTENDED RELEASE ORAL
Qty: 90 TABLET | Refills: 1 | Status: SHIPPED | OUTPATIENT
Start: 2024-02-13 | End: 2024-08-05

## 2024-02-13 NOTE — NURSING NOTE
"BP (!) 144/78   Pulse 74   Temp (!) 96.2  F (35.7  C) (Tympanic)   Resp 13   Ht 1.676 m (5' 6\")   Wt 87.4 kg (192 lb 9.6 oz)   LMP  (LMP Unknown)   SpO2 96%   BMI 31.09 kg/m      "
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
## 2024-02-14 LAB
ANION GAP SERPL CALCULATED.3IONS-SCNC: 11 MMOL/L (ref 7–15)
BUN SERPL-MCNC: 13.1 MG/DL (ref 8–23)
CALCIUM SERPL-MCNC: 9.8 MG/DL (ref 8.8–10.2)
CHLORIDE SERPL-SCNC: 102 MMOL/L (ref 98–107)
CREAT SERPL-MCNC: 1.03 MG/DL (ref 0.51–0.95)
DEPRECATED HCO3 PLAS-SCNC: 25 MMOL/L (ref 22–29)
EGFRCR SERPLBLD CKD-EPI 2021: 53 ML/MIN/1.73M2
GLUCOSE SERPL-MCNC: 114 MG/DL (ref 70–99)
POTASSIUM SERPL-SCNC: 4.3 MMOL/L (ref 3.4–5.3)
SODIUM SERPL-SCNC: 138 MMOL/L (ref 135–145)

## 2024-03-22 NOTE — PROGRESS NOTES
DISCHARGE  Reason for Discharge: Patient has failed to schedule further appointments.    Equipment Issued:     Discharge Plan: discharge PT    Referring Provider:  Uan Giordano

## 2024-03-27 ENCOUNTER — ANCILLARY PROCEDURE (OUTPATIENT)
Dept: MAMMOGRAPHY | Facility: CLINIC | Age: 84
End: 2024-03-27
Attending: INTERNAL MEDICINE
Payer: COMMERCIAL

## 2024-03-27 DIAGNOSIS — Z12.31 VISIT FOR SCREENING MAMMOGRAM: ICD-10-CM

## 2024-03-27 PROCEDURE — 77067 SCR MAMMO BI INCL CAD: CPT | Mod: TC | Performed by: RADIOLOGY

## 2024-03-27 PROCEDURE — 77063 BREAST TOMOSYNTHESIS BI: CPT | Mod: TC | Performed by: RADIOLOGY

## 2024-03-28 NOTE — TELEPHONE ENCOUNTER
Controlled Substance Refill Request for oxyCODONE (ROXICODONE) 5 MG IR tablet    Problem List Complete:  Yes    Last Written Prescription Date:  7/12/2017  Last Fill Quantity: 120,   # refills: 0    Last Office Visit with [facility] primary care provider: 2/7/2017    Clinic visit frequency required: Q 6  months     Future Office visit:   Next 5 appointments (look out 90 days)     Sep 01, 2017 10:50 AM CDT   Return Visit with Suri Morrison PA-C   [facility] ([facility])    [address] [place] [id number]   [phone]                  Controlled substance agreement on file: Yes:  Date 7/8/2015.     Processing:  Patient will  in clinic     checked in past 6 months?  Yes 5/22/2017    
Reason for Call:  Medication or medication refill:    Do you use a [facility]?  Name of the pharmacy and phone number for the current request:  Patient will  at      Name of the medication requested: oxyCODONE (ROXICODONE) 5 MG IR tablet    Other request: Dimple Baxter would like a call when the prescription is ready to be picked up. If you call and Dimple Baxter does not answer please leave her a detailed message.     Can we leave a detailed message on this number? YES    Phone number patient can be reached at: Cell number on file:    Telephone Information:   Mobile [phone]       Best Time: ASAP    Call taken on 8/15/2017 at 9:53 AM by Tiffany Kline      
Rx in outgoing fax box for   Please let her know she is due for OV  
Rx left at , patient notified that she is due for ov.  Britney Cordero, CMA    
no

## 2024-04-30 DIAGNOSIS — E11.21 TYPE 2 DIABETES MELLITUS WITH DIABETIC NEPHROPATHY, WITHOUT LONG-TERM CURRENT USE OF INSULIN (H): ICD-10-CM

## 2024-04-30 RX ORDER — LANCETS
EACH MISCELLANEOUS
Qty: 102 EACH | Refills: 0 | Status: SHIPPED | OUTPATIENT
Start: 2024-04-30

## 2024-04-30 NOTE — TELEPHONE ENCOUNTER
Should have passed protocol.  Had a recent office visit and an upcoming one with prescribing provider

## 2024-06-10 ENCOUNTER — LAB (OUTPATIENT)
Dept: LAB | Facility: CLINIC | Age: 84
End: 2024-06-10
Payer: COMMERCIAL

## 2024-06-10 DIAGNOSIS — R31.0 GROSS HEMATURIA: ICD-10-CM

## 2024-06-10 LAB
ALBUMIN UR-MCNC: ABNORMAL MG/DL
APPEARANCE UR: CLEAR
BACTERIA #/AREA URNS HPF: ABNORMAL /HPF
BILIRUB UR QL STRIP: NEGATIVE
COLOR UR AUTO: YELLOW
GLUCOSE UR STRIP-MCNC: >=1000 MG/DL
HGB UR QL STRIP: ABNORMAL
HYALINE CASTS #/AREA URNS LPF: ABNORMAL /LPF
KETONES UR STRIP-MCNC: NEGATIVE MG/DL
LEUKOCYTE ESTERASE UR QL STRIP: NEGATIVE
NITRATE UR QL: NEGATIVE
PH UR STRIP: 5.5 [PH] (ref 5–7)
RBC #/AREA URNS AUTO: ABNORMAL /HPF
SP GR UR STRIP: 1.02 (ref 1–1.03)
UROBILINOGEN UR STRIP-ACNC: 0.2 E.U./DL
WBC #/AREA URNS AUTO: ABNORMAL /HPF

## 2024-06-10 PROCEDURE — 81001 URINALYSIS AUTO W/SCOPE: CPT

## 2024-07-04 NOTE — PROGRESS NOTES
Marshall Bruno M.D.    30 minute telephone visit for follow up with patient at home and I in clinic          Impression  1. Pulmonary fibrosis  2  Exertional shortness of breath and chest discomfort  3. LBBB  4. DM2  5. + AMBROSE    Plan: :     Patient is doing well, and will schedule should she not be doing well.    Patient has negative nuclear stress test for ischemia suggesting that atypical chest discomfort is not ischemia related  Patient has pulmonary fibrosis, and we discussed RHC to explore notion of pulmonary hypertension.  She has no echocardiographic evidence of elevated PA pressure or RV dysfunction.  She would like to continue to defer RHC and return to see us in 6 months  Her BP is poorly controlled and in interim added additional amlodipine 2.5 mgs in PM and asked her to see Dr. Phillips in 2-3 weeks.  Discussed ankle edema and support stockings    84 year old female seen for possible PAH complicating pulmonary fibrosis,atypical chest discomfort with exertion and abnormal LV wall motion most likely to know LBBB.            Component Ref Range & Units  9:47 AM     6 min walk (FT) ft 860    6 Min Walk (M) m 262                         Pt has a history of pulmonary fibrosis, DM2, ischemic heart disease, aortic valve insufficiency, DM2            Wt Readings from Last 24 Encounters:   10/05/23 85.8 kg (189 lb 3.2 oz)   09/20/23 83.5 kg (184 lb)   08/02/23 82.5 kg (181 lb 12.8 oz)   07/11/23 81.2 kg (179 lb)   05/03/23 81.5 kg (179 lb 9.6 oz)   03/14/23 80.7 kg (178 lb)   02/13/23 80.4 kg (177 lb 3.2 oz)   02/09/23 83.5 kg (184 lb)   01/18/23 82.6 kg (182 lb)   12/13/22 78.9 kg (174 lb)   10/26/22 81.6 kg (180 lb)   10/22/22 83.5 kg (184 lb)   10/18/22 81.6 kg (180 lb)   06/29/22 83 kg (182 lb 14.4 oz)   05/18/22 83.7 kg (184 lb 9.6 oz)   04/06/22 86.2 kg (190 lb)   01/10/22 86.2 kg (190 lb)   12/28/21 83.9 kg (185 lb)   10/11/21 83.2 kg (183 lb 6.4 oz)   10/05/21 84.2 kg (185 lb 9.6 oz)   09/17/21  86.6 kg (191 lb)   09/03/21 86.6 kg (191 lb)   08/25/21 84.4 kg (186 lb)   08/06/21 84.8 kg (186 lb 14.4 oz)      Current Outpatient Medications   Medication Sig Dispense Refill    albuterol (PROAIR HFA/PROVENTIL HFA/VENTOLIN HFA) 108 (90 Base) MCG/ACT inhaler Inhale 2 puffs into the lungs every 6 hours 18 g 0    amLODIPine (NORVASC) 5 MG tablet Take 1 tablet (5 mg) by mouth 2 times daily 180 tablet 1    Ascorbic Acid (VITAMIN C PO)       ASPIRIN 81 MG OR TABS take 1 x daily with food 0 0    blood glucose (ACCU-CHEK GUIDE) test strip Use to test blood sugar 1 times daily. 100 strip 1    blood glucose (ACCU-CHEK SOFTCLIX) lancing device Lancing device to be used with lancets. 1 each 0    blood glucose monitoring (ACCU-CHEK FASTCLIX) lancets test blood glucose once daily 102 each 0    blood glucose monitoring (NO BRAND SPECIFIED) meter device kit Use to test blood sugar once daily  as directed. 1 kit 0    blood glucose monitoring (SOFTCLIX) lancets Use to test blood sugar One times daily. 100 each 1    cetirizine (ZYRTEC) 10 MG tablet Take 1 tablet (10 mg) by mouth daily 90 tablet 1    cholecalciferol (VITAMIN D) 1000 UNIT tablet Take 1 tablet (1,000 Units) by mouth daily 100 tablet 3    COMPOUNDED NON-CONTROLLED SUBSTANCE (CMPD RX) - PHARMACY TO MIX COMPOUNDED MEDICATION Apply 1-2 grams of estrogen cream vaginally 3x/week at night. (Patient taking differently: Apply 1-2 grams of estrogen cream vaginally 3x/week at night. Through Urologist) 40 g 3    famotidine (PEPCID) 20 MG tablet Take 1 tablet (20 mg) by mouth 2 times daily as needed (heartburn) 180 tablet 0    fluticasone (FLONASE) 50 MCG/ACT nasal spray Spray 2 sprays into both nostrils daily 16 g 5    glipiZIDE (GLUCOTROL XL) 5 MG 24 hr tablet Take 1 tablet (5 mg) by mouth daily 90 tablet 1    lisinopril (ZESTRIL) 40 MG tablet Take 1 tablet (40 mg) by mouth daily 90 tablet 1    Melatonin 10 MG TABS tablet Take 10 mg by mouth At Bedtime Take 10 mg by mouth at  bedtime      metoprolol succinate ER (TOPROL XL) 100 MG 24 hr tablet TAKE ONE TABLET BY MOUTH ONE TIME DAILY 90 tablet 1    multivitamin w/minerals (MULTI-VITAMIN) tablet Take 1 tablet by mouth daily      nitroGLYcerin (NITROSTAT) 0.4 MG sublingual tablet For chest pain place 1 tablet under the tongue every 5 minutes for 3 doses. If symptoms persist 5 minutes after 1st dose call 911. 25 tablet 0    nystatin (MYCOSTATIN) 818684 UNIT/GM external powder Apply topically 3 times daily 60 g 3    OVER-THE-COUNTER For Lactose intolerance      Probiotic Product (PROBIOTIC DAILY PO)       rosuvastatin (CRESTOR) 5 MG tablet Take 1 tablet (5 mg) by mouth daily 90 tablet 3    traZODone (DESYREL) 100 MG tablet TAKE ONE TABLET AT BEDTIME AS NEEDED FOR SLEEP 90 tablet 1    trospium (SANCTURA XR) 60 MG CP24 24 hr capsule TAKE ONE CAPSULE BY MOUTH IN THE MORNING (Patient taking differently: Take 60 mg by mouth every morning Through Urologist) 90 capsule 3    UNABLE TO FIND MEDICATION NAME: Avastin - eye injection - treated for blocked vein as needed.       No current facility-administered medications for this visit.      francis: VERA ALEJANDRO E  MRN: [id number]  : 1940  Study Date: 10/04/2023 09:00 AM  Age: 83 yrs  Gender: Female  Patient Location: [facility]  Reason For Study: Pulmonary HTN (H), YATES (dyspnea on exertion)  Ordering Physician: REBEKAH FINNEY  Referring Physician: REBEKAH FINNEY  Performed By: Mandy Vargas  HR: 64     ______________________________________________________________________________  Procedure  Complete Echo Adult.     ______________________________________________________________________________  Interpretation Summary     1. The left ventricle is normal in structure, function and size. The visual  ejection fraction is estimated at 60%.  2. The right ventricle is normal in structure, function and size.  3. No valve disease.     No changes from echo 2020.      ______________________________________________________________________________  Left Ventricle  The left ventricle is normal in structure, function and size. There is normal  left ventricular wall thickness. The visual ejection fraction is estimated at  60%. Left ventricular diastolic function is indeterminate. Normal left  ventricular wall motion. Septal motion is consistent with conduction  abnormality.     Right Ventricle  The right ventricle is normal in structure, function and size.     Atria  The left atrium is severely dilated. The right atrium is mildly dilated. There  is no atrial shunt seen.     Mitral Valve  There is no mitral regurgitation noted.     Tricuspid Valve  There is trace tricuspid regurgitation.     Aortic Valve  There is trace aortic regurgitation.     Pulmonic Valve  The pulmonic valve is normal in structure and function.     Vessels  Normal ascending, transverse (arch), and descending aorta. The inferior vena  cava was normal in size with preserved respiratory variability.     Pericardium  There is no pericardial effusion.     Rhythm  The rhythm was sinus with wide QRS. Possible 1st degree AV block.     ______________________________________________________________________________  MMode/2D Measurements & Calculations  IVSd: 1.1 cm  LVIDd: 4.2 cm  LVIDs: 3.1 cm  LVPWd: 1.1 cm  FS: 27.3 %  LV mass(C)d: 157.4 grams  Ao root diam: 3.2 cm  LA dimension: 4.6 cm  asc Aorta Diam: 3.2 cm  LA/Ao: 1.4  LA Volume (BP): 61.4 ml     RWT: 0.54  TAPSE: 1.9 cm     Doppler Measurements & Calculations  MV E max pérez: 74.4 cm/sec  MV A max pérez: 121.1 cm/sec  MV E/A: 0.61  MV dec slope: 330.2 cm/sec2  MV dec time: 0.23 sec  PA acc time: 0.10 sec  E/E' av.2  Lateral E/e': 14.9  Medial E/e': 17.5  RV S Pérez: 11.9 cm/sec      Latest Reference Range & Units 24 11:38   Sodium 135 - 145 mmol/L 138   Potassium 3.4 - 5.3 mmol/L 4.3   Chloride 98 - 107 mmol/L 102   Carbon Dioxide (CO2) 22 - 29 mmol/L 25    Urea Nitrogen 8.0 - 23.0 mg/dL 13.1   Creatinine 0.51 - 0.95 mg/dL 1.03 (H)   GFR Estimate >60 mL/min/1.73m2 53 (L)   Calcium 8.8 - 10.2 mg/dL 9.8   Anion Gap 7 - 15 mmol/L 11   Glucose 70 - 99 mg/dL 114 (H)   Hemoglobin A1C 0.0 - 5.6 % 6.3 (H)       EXAM: NM LUNG SCAN VENTILATION AND PERFUSION  LOCATION: [facility]  DATE: 10/4/2023     INDICATION: r o chronic PE; Pulmonary embolism; Female sex; Not pregnant; No imaging to r o PE in the last 24 hours; Pulmonary Embolism Rule Out Criteria (PERC) score > 0; Revised [place] Score (RGS) not >= 11; No D dimer result available; D dimer not   ordered. Pulmonary hypertension.  COMPARISON: Chest x-ray 10/04/2023.  TECHNIQUE: 51.3 mCi Tc-99m DTPA inhaled. 6.4 mCi Tc-99m MAA, IV. Standard planar imaging during perfusion and ventilation portions of exam.     FINDINGS: Normal pulmonary perfusion. Areas of heterogeneous ventilation with clumping of DTPA particles which can be seen with turbulent airflow secondary to bronchiectasis or emphysema. No mismatched segmental perfusion defect.                                                                      IMPRESSION:     No evidence of chronic pulmonary thromboembolic disease.

## 2024-07-11 ENCOUNTER — VIRTUAL VISIT (OUTPATIENT)
Dept: CARDIOLOGY | Facility: CLINIC | Age: 84
End: 2024-07-11
Attending: INTERNAL MEDICINE
Payer: COMMERCIAL

## 2024-07-11 DIAGNOSIS — I27.20 PULMONARY HYPERTENSION (H): ICD-10-CM

## 2024-07-11 DIAGNOSIS — R06.02 SOB (SHORTNESS OF BREATH): ICD-10-CM

## 2024-07-11 PROCEDURE — 99443 PR PHYSICIAN TELEPHONE EVALUATION 21-30 MIN: CPT | Mod: 93 | Performed by: INTERNAL MEDICINE

## 2024-07-11 NOTE — PATIENT INSTRUCTIONS
You were seen today in the Pulmonary Hypertension Clinic at the [facility].     Cardiology Provider you saw during your visit:    Dr. Bruno    Medication Changes:  None      Follow-up:   You do not need to continue to follow with us.  If you or your primary MD want you to be seen again, please just call & make an appointment.    Please call us immediately if you have any syncope (fainting or passing out), chest pain, edema (swelling or weight gain), or decline in your functional status (general decline in how you are feeling).    If you have emergent concerns after hours or on the weekend, please call our on-call Cardiologist at [phone], option [phone]. For emergencies call [phone].     Thank you for allowing us to be a part of your care here at the [facility]    If you have questions or concerns please contact us at:    Nikki Mirza RN (P: [phone])    Nurse Coordinator       Pulmonary Hypertension     [facility]         [facility]   (Prior Auths & Pt Assistance)   ()  Clinic   Clinic   Pulmonary Hypertension   Pulmonary Hypertension  [facility]  [facility]  (P)[phone]    (P) [phone]  (F) [phone]

## 2024-07-11 NOTE — LETTER
7/11/2024    Du Phillips MD  [address] [facility] [address]    RE: Dimple Baxter       Dear Colleague,     I had the pleasure of seeing Dimple Baxter in the [facility].        Marshall Bruno M.D.    30 minute telephone visit for follow up with patient at home and I in clinic          Impression  1. Pulmonary fibrosis  2  Exertional shortness of breath and chest discomfort  3. LBBB  4. DM2  5. + AMBROSE    Plan: :     Patient is doing well, and will schedule should she not be doing well.    Patient has negative nuclear stress test for ischemia suggesting that atypical chest discomfort is not ischemia related  Patient has pulmonary fibrosis, and we discussed RHC to explore notion of pulmonary hypertension.  She has no echocardiographic evidence of elevated PA pressure or RV dysfunction.  She would like to continue to defer RHC and return to see us in 6 months  Her BP is poorly controlled and in interim added additional amlodipine 2.5 mgs in PM and asked her to see Dr. Phillips in 2-3 weeks.  Discussed ankle edema and support stockings    84 year old female seen for possible PAH complicating pulmonary fibrosis,atypical chest discomfort with exertion and abnormal LV wall motion most likely to know LBBB.            Component Ref Range & Units  9:47 AM     6 min walk (FT) ft 860    6 Min Walk (M) m 262                         Pt has a history of pulmonary fibrosis, DM2, ischemic heart disease, aortic valve insufficiency, DM2            Wt Readings from Last 24 Encounters:   10/05/23 85.8 kg (189 lb 3.2 oz)   09/20/23 83.5 kg (184 lb)   08/02/23 82.5 kg (181 lb 12.8 oz)   07/11/23 81.2 kg (179 lb)   05/03/23 81.5 kg (179 lb 9.6 oz)   03/14/23 80.7 kg (178 lb)   02/13/23 80.4 kg (177 lb 3.2 oz)   02/09/23 83.5 kg (184 lb)   01/18/23 82.6 kg (182 lb)   12/13/22 78.9 kg (174 lb)   10/26/22 81.6 kg (180 lb)   10/22/22 83.5 kg (184 lb)   10/18/22 81.6 kg (180 lb)   06/29/22 83 kg  (182 lb 14.4 oz)   05/18/22 83.7 kg (184 lb 9.6 oz)   04/06/22 86.2 kg (190 lb)   01/10/22 86.2 kg (190 lb)   12/28/21 83.9 kg (185 lb)   10/11/21 83.2 kg (183 lb 6.4 oz)   10/05/21 84.2 kg (185 lb 9.6 oz)   09/17/21 86.6 kg (191 lb)   09/03/21 86.6 kg (191 lb)   08/25/21 84.4 kg (186 lb)   08/06/21 84.8 kg (186 lb 14.4 oz)      Current Outpatient Medications   Medication Sig Dispense Refill    albuterol (PROAIR HFA/PROVENTIL HFA/VENTOLIN HFA) 108 (90 Base) MCG/ACT inhaler Inhale 2 puffs into the lungs every 6 hours 18 g 0    amLODIPine (NORVASC) 5 MG tablet Take 1 tablet (5 mg) by mouth 2 times daily 180 tablet 1    Ascorbic Acid (VITAMIN C PO)       ASPIRIN 81 MG OR TABS take 1 x daily with food 0 0    blood glucose (ACCU-CHEK GUIDE) test strip Use to test blood sugar 1 times daily. 100 strip 1    blood glucose (ACCU-CHEK SOFTCLIX) lancing device Lancing device to be used with lancets. 1 each 0    blood glucose monitoring (ACCU-CHEK FASTCLIX) lancets test blood glucose once daily 102 each 0    blood glucose monitoring (NO BRAND SPECIFIED) meter device kit Use to test blood sugar once daily  as directed. 1 kit 0    blood glucose monitoring (SOFTCLIX) lancets Use to test blood sugar One times daily. 100 each 1    cetirizine (ZYRTEC) 10 MG tablet Take 1 tablet (10 mg) by mouth daily 90 tablet 1    cholecalciferol (VITAMIN D) 1000 UNIT tablet Take 1 tablet (1,000 Units) by mouth daily 100 tablet 3    COMPOUNDED NON-CONTROLLED SUBSTANCE (CMPD RX) - PHARMACY TO MIX COMPOUNDED MEDICATION Apply 1-2 grams of estrogen cream vaginally 3x/week at night. (Patient taking differently: Apply 1-2 grams of estrogen cream vaginally 3x/week at night. Through Urologist) 40 g 3    famotidine (PEPCID) 20 MG tablet Take 1 tablet (20 mg) by mouth 2 times daily as needed (heartburn) 180 tablet 0    fluticasone (FLONASE) 50 MCG/ACT nasal spray Spray 2 sprays into both nostrils daily 16 g 5    glipiZIDE (GLUCOTROL XL) 5 MG 24 hr tablet Take  1 tablet (5 mg) by mouth daily 90 tablet 1    lisinopril (ZESTRIL) 40 MG tablet Take 1 tablet (40 mg) by mouth daily 90 tablet 1    Melatonin 10 MG TABS tablet Take 10 mg by mouth At Bedtime Take 10 mg by mouth at bedtime      metoprolol succinate ER (TOPROL XL) 100 MG 24 hr tablet TAKE ONE TABLET BY MOUTH ONE TIME DAILY 90 tablet 1    multivitamin w/minerals (MULTI-VITAMIN) tablet Take 1 tablet by mouth daily      nitroGLYcerin (NITROSTAT) 0.4 MG sublingual tablet For chest pain place 1 tablet under the tongue every 5 minutes for 3 doses. If symptoms persist 5 minutes after 1st dose call 911. 25 tablet 0    nystatin (MYCOSTATIN) 708653 UNIT/GM external powder Apply topically 3 times daily 60 g 3    OVER-THE-COUNTER For Lactose intolerance      Probiotic Product (PROBIOTIC DAILY PO)       rosuvastatin (CRESTOR) 5 MG tablet Take 1 tablet (5 mg) by mouth daily 90 tablet 3    traZODone (DESYREL) 100 MG tablet TAKE ONE TABLET AT BEDTIME AS NEEDED FOR SLEEP 90 tablet 1    trospium (SANCTURA XR) 60 MG CP24 24 hr capsule TAKE ONE CAPSULE BY MOUTH IN THE MORNING (Patient taking differently: Take 60 mg by mouth every morning Through Urologist) 90 capsule 3    UNABLE TO FIND MEDICATION NAME: Avastin - eye injection - treated for blocked vein as needed.       No current facility-administered medications for this visit.      francis: ALEJANDRO GAMEZ  MRN: [id number]  : 1940  Study Date: 10/04/2023 09:00 AM  Age: 83 yrs  Gender: Female  Patient Location: [facility]  Reason For Study: Pulmonary HTN (H), YATES (dyspnea on exertion)  Ordering Physician: REBEKAH FINNEY  Referring Physician: REBEKAH FINNEY  Performed By: Mandy Vargas  HR: 64     ______________________________________________________________________________  Procedure  Complete Echo Adult.     ______________________________________________________________________________  Interpretation Summary     1. The left ventricle is normal in structure,  function and size. The visual  ejection fraction is estimated at 60%.  2. The right ventricle is normal in structure, function and size.  3. No valve disease.     No changes from echo 2020.     ______________________________________________________________________________  Left Ventricle  The left ventricle is normal in structure, function and size. There is normal  left ventricular wall thickness. The visual ejection fraction is estimated at  60%. Left ventricular diastolic function is indeterminate. Normal left  ventricular wall motion. Septal motion is consistent with conduction  abnormality.     Right Ventricle  The right ventricle is normal in structure, function and size.     Atria  The left atrium is severely dilated. The right atrium is mildly dilated. There  is no atrial shunt seen.     Mitral Valve  There is no mitral regurgitation noted.     Tricuspid Valve  There is trace tricuspid regurgitation.     Aortic Valve  There is trace aortic regurgitation.     Pulmonic Valve  The pulmonic valve is normal in structure and function.     Vessels  Normal ascending, transverse (arch), and descending aorta. The inferior vena  cava was normal in size with preserved respiratory variability.     Pericardium  There is no pericardial effusion.     Rhythm  The rhythm was sinus with wide QRS. Possible 1st degree AV block.     ______________________________________________________________________________  MMode/2D Measurements & Calculations  IVSd: 1.1 cm  LVIDd: 4.2 cm  LVIDs: 3.1 cm  LVPWd: 1.1 cm  FS: 27.3 %  LV mass(C)d: 157.4 grams  Ao root diam: 3.2 cm  LA dimension: 4.6 cm  asc Aorta Diam: 3.2 cm  LA/Ao: 1.4  LA Volume (BP): 61.4 ml     RWT: 0.54  TAPSE: 1.9 cm     Doppler Measurements & Calculations  MV E max pérez: 74.4 cm/sec  MV A max pérez: 121.1 cm/sec  MV E/A: 0.61  MV dec slope: 330.2 cm/sec2  MV dec time: 0.23 sec  PA acc time: 0.10 sec  E/E' av.2  Lateral E/e': 14.9  Medial E/e': 17.5  RV S Pérez: 11.9  cm/sec      Latest Reference Range & Units 02/13/24 11:38   Sodium 135 - 145 mmol/L 138   Potassium 3.4 - 5.3 mmol/L 4.3   Chloride 98 - 107 mmol/L 102   Carbon Dioxide (CO2) 22 - 29 mmol/L 25   Urea Nitrogen 8.0 - 23.0 mg/dL 13.1   Creatinine 0.51 - 0.95 mg/dL 1.03 (H)   GFR Estimate >60 mL/min/1.73m2 53 (L)   Calcium 8.8 - 10.2 mg/dL 9.8   Anion Gap 7 - 15 mmol/L 11   Glucose 70 - 99 mg/dL 114 (H)   Hemoglobin A1C 0.0 - 5.6 % 6.3 (H)       EXAM: NM LUNG SCAN VENTILATION AND PERFUSION  LOCATION: [facility]  DATE: 10/4/2023     INDICATION: r o chronic PE; Pulmonary embolism; Female sex; Not pregnant; No imaging to r o PE in the last 24 hours; Pulmonary Embolism Rule Out Criteria (PERC) score > 0; Revised [place] Score (RGS) not >= 11; No D dimer result available; D dimer not   ordered. Pulmonary hypertension.  COMPARISON: Chest x-ray 10/04/2023.  TECHNIQUE: 51.3 mCi Tc-99m DTPA inhaled. 6.4 mCi Tc-99m MAA, IV. Standard planar imaging during perfusion and ventilation portions of exam.     FINDINGS: Normal pulmonary perfusion. Areas of heterogeneous ventilation with clumping of DTPA particles which can be seen with turbulent airflow secondary to bronchiectasis or emphysema. No mismatched segmental perfusion defect.                                                                      IMPRESSION:     No evidence of chronic pulmonary thromboembolic disease.        reviewed      Thank you for allowing me to participate in the care of your patient.      Sincerely,     Marshall Bruno MD     [facility]  cc:   Marshall Bruno MD  [address] [place] [id number]

## 2024-07-18 DIAGNOSIS — F51.01 PRIMARY INSOMNIA: Chronic | ICD-10-CM

## 2024-07-18 RX ORDER — TRAZODONE HYDROCHLORIDE 100 MG/1
TABLET ORAL
Qty: 90 TABLET | Refills: 1 | Status: SHIPPED | OUTPATIENT
Start: 2024-07-18

## 2024-08-04 DIAGNOSIS — I10 PRIMARY HYPERTENSION: ICD-10-CM

## 2024-08-04 DIAGNOSIS — E78.2 MIXED HYPERLIPIDEMIA: ICD-10-CM

## 2024-08-04 DIAGNOSIS — E11.21 TYPE 2 DIABETES MELLITUS WITH DIABETIC NEPHROPATHY, WITHOUT LONG-TERM CURRENT USE OF INSULIN (H): ICD-10-CM

## 2024-08-05 ENCOUNTER — OFFICE VISIT (OUTPATIENT)
Dept: INTERNAL MEDICINE | Facility: CLINIC | Age: 84
End: 2024-08-05
Payer: COMMERCIAL

## 2024-08-05 VITALS
HEART RATE: 73 BPM | SYSTOLIC BLOOD PRESSURE: 124 MMHG | RESPIRATION RATE: 12 BRPM | DIASTOLIC BLOOD PRESSURE: 68 MMHG | WEIGHT: 193.2 LBS | HEIGHT: 66 IN | OXYGEN SATURATION: 96 % | BODY MASS INDEX: 31.05 KG/M2 | TEMPERATURE: 97 F

## 2024-08-05 DIAGNOSIS — E78.2 MIXED HYPERLIPIDEMIA: ICD-10-CM

## 2024-08-05 DIAGNOSIS — Z00.00 ENCOUNTER FOR MEDICARE ANNUAL WELLNESS EXAM: ICD-10-CM

## 2024-08-05 DIAGNOSIS — I10 PRIMARY HYPERTENSION: ICD-10-CM

## 2024-08-05 DIAGNOSIS — N39.46 MIXED STRESS AND URGE URINARY INCONTINENCE: ICD-10-CM

## 2024-08-05 DIAGNOSIS — E11.21 TYPE 2 DIABETES MELLITUS WITH DIABETIC NEPHROPATHY, WITHOUT LONG-TERM CURRENT USE OF INSULIN (H): Primary | ICD-10-CM

## 2024-08-05 DIAGNOSIS — R74.8 ELEVATED LIVER ENZYMES: ICD-10-CM

## 2024-08-05 DIAGNOSIS — I27.20 PULMONARY HYPERTENSION (H): ICD-10-CM

## 2024-08-05 LAB
ALBUMIN SERPL BCG-MCNC: 4.2 G/DL (ref 3.5–5.2)
ALP SERPL-CCNC: 62 U/L (ref 40–150)
ALT SERPL W P-5'-P-CCNC: 65 U/L (ref 0–50)
ANION GAP SERPL CALCULATED.3IONS-SCNC: 11 MMOL/L (ref 7–15)
AST SERPL W P-5'-P-CCNC: 54 U/L (ref 0–45)
BILIRUB SERPL-MCNC: 0.5 MG/DL
BUN SERPL-MCNC: 19.6 MG/DL (ref 8–23)
CALCIUM SERPL-MCNC: 9 MG/DL (ref 8.8–10.4)
CHLORIDE SERPL-SCNC: 104 MMOL/L (ref 98–107)
CHOLEST SERPL-MCNC: 155 MG/DL
CREAT SERPL-MCNC: 1.06 MG/DL (ref 0.51–0.95)
EGFRCR SERPLBLD CKD-EPI 2021: 51 ML/MIN/1.73M2
FASTING STATUS PATIENT QL REPORTED: NO
FASTING STATUS PATIENT QL REPORTED: NO
GLUCOSE SERPL-MCNC: 239 MG/DL (ref 70–99)
HBA1C MFR BLD: 7.2 % (ref 0–5.6)
HCO3 SERPL-SCNC: 23 MMOL/L (ref 22–29)
HDLC SERPL-MCNC: 32 MG/DL
HGB BLD-MCNC: 13.8 G/DL (ref 11.7–15.7)
LDLC SERPL CALC-MCNC: 56 MG/DL
NONHDLC SERPL-MCNC: 123 MG/DL
POTASSIUM SERPL-SCNC: 4.2 MMOL/L (ref 3.4–5.3)
PROT SERPL-MCNC: 7.2 G/DL (ref 6.4–8.3)
SODIUM SERPL-SCNC: 138 MMOL/L (ref 135–145)
TRIGL SERPL-MCNC: 335 MG/DL

## 2024-08-05 PROCEDURE — 80053 COMPREHEN METABOLIC PANEL: CPT | Performed by: INTERNAL MEDICINE

## 2024-08-05 PROCEDURE — 36415 COLL VENOUS BLD VENIPUNCTURE: CPT | Performed by: INTERNAL MEDICINE

## 2024-08-05 PROCEDURE — G0439 PPPS, SUBSEQ VISIT: HCPCS | Performed by: INTERNAL MEDICINE

## 2024-08-05 PROCEDURE — 83036 HEMOGLOBIN GLYCOSYLATED A1C: CPT | Performed by: INTERNAL MEDICINE

## 2024-08-05 PROCEDURE — 99213 OFFICE O/P EST LOW 20 MIN: CPT | Mod: 25 | Performed by: INTERNAL MEDICINE

## 2024-08-05 PROCEDURE — 80061 LIPID PANEL: CPT | Performed by: INTERNAL MEDICINE

## 2024-08-05 PROCEDURE — 85018 HEMOGLOBIN: CPT | Performed by: INTERNAL MEDICINE

## 2024-08-05 RX ORDER — ROSUVASTATIN CALCIUM 5 MG/1
5 TABLET, COATED ORAL DAILY
Qty: 90 TABLET | Refills: 1 | Status: SHIPPED | OUTPATIENT
Start: 2024-08-05

## 2024-08-05 RX ORDER — METOPROLOL SUCCINATE 100 MG/1
TABLET, EXTENDED RELEASE ORAL
Qty: 90 TABLET | Refills: 1 | Status: SHIPPED | OUTPATIENT
Start: 2024-08-05

## 2024-08-05 RX ORDER — LISINOPRIL 40 MG/1
40 TABLET ORAL DAILY
Qty: 90 TABLET | Refills: 0 | OUTPATIENT
Start: 2024-08-05

## 2024-08-05 RX ORDER — LISINOPRIL 40 MG/1
40 TABLET ORAL DAILY
Qty: 90 TABLET | Refills: 1 | Status: SHIPPED | OUTPATIENT
Start: 2024-08-05

## 2024-08-05 RX ORDER — AMLODIPINE BESYLATE 5 MG/1
5 TABLET ORAL 2 TIMES DAILY
Qty: 180 TABLET | Refills: 1 | Status: SHIPPED | OUTPATIENT
Start: 2024-08-05

## 2024-08-05 RX ORDER — GLIPIZIDE 5 MG/1
5 TABLET, FILM COATED, EXTENDED RELEASE ORAL DAILY
Qty: 90 TABLET | Refills: 1 | Status: SHIPPED | OUTPATIENT
Start: 2024-08-05

## 2024-08-05 RX ORDER — GLIPIZIDE 5 MG/1
5 TABLET, FILM COATED, EXTENDED RELEASE ORAL DAILY
Qty: 90 TABLET | Refills: 0 | OUTPATIENT
Start: 2024-08-05

## 2024-08-05 SDOH — HEALTH STABILITY: PHYSICAL HEALTH: ON AVERAGE, HOW MANY MINUTES DO YOU ENGAGE IN EXERCISE AT THIS LEVEL?: 0 MIN

## 2024-08-05 SDOH — HEALTH STABILITY: PHYSICAL HEALTH: ON AVERAGE, HOW MANY DAYS PER WEEK DO YOU ENGAGE IN MODERATE TO STRENUOUS EXERCISE (LIKE A BRISK WALK)?: 0 DAYS

## 2024-08-05 ASSESSMENT — SOCIAL DETERMINANTS OF HEALTH (SDOH): HOW OFTEN DO YOU GET TOGETHER WITH FRIENDS OR RELATIVES?: ONCE A WEEK

## 2024-08-05 NOTE — LETTER
August 19, 2024      Dimple Baxter  [facility] [address]        Dear ,    We are writing to inform you of your test results.    Diabetic test A1c stable, continue same medications at this time, continue to follow ADA diet and regular exercise.  Lipids-high triglycerides, continue to follow low-fat diet regular exercise and continue Crestor.  Liver test AST and ALT higher than normal, decrease any alcohol intake, avoid Tylenol products and or any other herbal products, will repeat liver test in 4 weeks.  Please make a lab only appointment    Resulted Orders   HEMOGLOBIN A1C   Result Value Ref Range    Hemoglobin A1C 7.2 (H) 0.0 - 5.6 %      Comment:      Normal <5.7%   Prediabetes 5.7-6.4%    Diabetes 6.5% or higher     Note: Adopted from ADA consensus guidelines.   Hemoglobin   Result Value Ref Range    Hemoglobin 13.8 11.7 - 15.7 g/dL   Comprehensive metabolic panel   Result Value Ref Range    Sodium 138 135 - 145 mmol/L    Potassium 4.2 3.4 - 5.3 mmol/L    Carbon Dioxide (CO2) 23 22 - 29 mmol/L    Anion Gap 11 7 - 15 mmol/L    Urea Nitrogen 19.6 8.0 - 23.0 mg/dL    Creatinine 1.06 (H) 0.51 - 0.95 mg/dL    GFR Estimate 51 (L) >60 mL/min/1.73m2      Comment:      eGFR calculated using 2021 CKD-EPI equation.    Calcium 9.0 8.8 - 10.4 mg/dL      Comment:      Reference intervals for this test were updated on 7/16/2024 to reflect our healthy population more accurately. There may be differences in the flagging of prior results with similar values performed with this method. Those prior results can be interpreted in the context of the updated reference intervals.    Chloride 104 98 - 107 mmol/L    Glucose 239 (H) 70 - 99 mg/dL    Alkaline Phosphatase 62 40 - 150 U/L    AST 54 (H) 0 - 45 U/L    ALT 65 (H) 0 - 50 U/L    Protein Total 7.2 6.4 - 8.3 g/dL    Albumin 4.2 3.5 - 5.2 g/dL    Bilirubin Total 0.5 <=1.2 mg/dL    Patient Fasting > 8hrs? No    Lipid panel reflex to direct LDL Fasting    Result Value Ref Range    Cholesterol 155 <200 mg/dL    Triglycerides 335 (H) <150 mg/dL    Direct Measure HDL 32 (L) >=50 mg/dL    LDL Cholesterol Calculated 56 <=100 mg/dL    Non HDL Cholesterol 123 <130 mg/dL    Patient Fasting > 8hrs? No     Narrative    Cholesterol  Desirable:  <200 mg/dL    Triglycerides  Normal:  Less than 150 mg/dL  Borderline High:  150-199 mg/dL  High:  200-499 mg/dL  Very High:  Greater than or equal to 500 mg/dL    Direct Measure HDL  Female:  Greater than or equal to 50 mg/dL   Male:  Greater than or equal to 40 mg/dL    LDL Cholesterol  Desirable:  <100mg/dL  Above Desirable:  100-129 mg/dL   Borderline High:  130-159 mg/dL   High:  160-189 mg/dL   Very High:  >= 190 mg/dL    Non HDL Cholesterol  Desirable:  130 mg/dL  Above Desirable:  130-159 mg/dL  Borderline High:  160-189 mg/dL  High:  190-219 mg/dL  Very High:  Greater than or equal to 220 mg/dL       If you have any questions or concerns, please call the clinic at the number listed above.     Sincerely,    Du Phillips MD
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
## 2024-08-05 NOTE — PROGRESS NOTES
Preventive Care Visit  [facility]  Du Phillips MD, Internal Medicine  Aug 5, 2024  {Provider  Link to SmartSet :569729}    {PROVIDER CHARTING PREFERENCE:864150}    Carolann Musa is a 84 year old, presenting for the following:  Medicare Visit (fasting)        8/5/2024    10:48 AM   Additional Questions   Roomed by edwige   Accompanied by self         8/5/2024    10:48 AM   Patient Reported Additional Medications   Patient reports taking the following new medications none     {ROOMER if patient is in their first year of Medicare a vision screen is required click here to document the Vison screen and then refresh the note to pull in results  :799026}    Health Care Directive  Patient does not have a Health Care Directive or Living Will: Discussed advance care planning with patient; information given to patient to review.    HPI  ***  {MA/LPN/RN Pre-Provider Visit Orders- hCG/UA/Strep (Optional):605683}  {SUPERLIST (Optional):711160}  {additonal problems for provider to add (Optional):793472}       No data to display                   No data to display                   No data to display                  9/20/2023   Social Factors   Worry food won't last until get money to buy more No   Food not last or not have enough money for food? No   Do you have housing? (Housing is defined as stable permanent housing and does not include staying ouside in a car, in a tent, in an abandoned building, in an overnight shelter, or couch-surfing.) Yes   Are you worried about losing your housing? No   Lack of transportation? No   Unable to get utilities (heat,electricity)? No             No data to display                   No data to display                   No data to display                     No data to display                   {Rooming Staff Patient needs a PHQ as part of the AWV.  Use this link to complete and then refresh the note to pull results Link to PHQ2 Assessment  :473557}  {USE TO PULL IN PHQ RESULTS FOR TODAY:656735}       No data to display              Social History     Tobacco Use    Smoking status: Former     Current packs/day: 0.00     Average packs/day: 1 pack/day for 2.0 years (2.0 ttl pk-yrs)     Types: Cigarettes     Start date:      Quit date: 1963     Years since quittin.6    Smokeless tobacco: Never   Vaping Use    Vaping status: Never Used   Substance Use Topics    Alcohol use: Yes     Comment: occ    Drug use: No     {Provider  If there are gaps in the social history shown above, please follow the link to update and then refresh the note Link to Social and Substance History :974688}      3/27/2024   LAST FHS-7 RESULTS   1st degree relative breast or ovarian cancer No   Any relative bilateral breast cancer No   Any male have breast cancer No   Any ONE woman have BOTH breast AND ovarian cancer Yes   Any woman with breast cancer before 50yrs No   2 or more relatives with breast AND/OR ovarian cancer No   2 or more relatives with breast AND/OR bowel cancer No        {If any of the questions to the FHS7 are answered yes, consider referral for genetic counseling.    Additional indications for genetic referral include personal history of breast or ovarian cancer, genetic mutation in 1st degree relative which increases risk of breast cancer including BRCA1, BRCA2, KEKE, PALB 2, TP53, CHEK2, PTEN, CDH1, STK11 (per ACS) and/or 1st degree relative with history of pancreatic or high-risk prostate cancer (per NCCN):600533}   {Mammogram Decision Support (Optional):597215}      {Link to Fracture Risk Assessment Tool (Optional):165064}    {Provider  REQUIRED FOR AWV Use the storyboard to review patient history, after sections have been marked as reviewed, refresh note to capture documentation:815664}  {Provider   REQUIRED AWV use this link to review and update sexual activity history  after section has been marked as reviewed, refresh note to capture  "documentation:527501}  Reviewed and updated as needed this visit by Provider                    {HISTORY OPTIONS (Optional):830792}  Current providers sharing in care for this patient include:  Patient Care Team:  Du Phillips MD as PCP - General (Internal Medicine)  Du Phillips MD as Assigned PCP  Mirna Adorno PA-C as Physician Assistant (Urology)  Chu Lara MD as Assigned Neuroscience Provider  Laina Kramer RD as Diabetes Educator (Dietitian, Registered)  Virginia Sexton MD as Assigned Nephrology Provider  Marshall Bruno MD as Assigned Heart and Vascular Provider  Una Giordano PA-C as Assigned Surgical Provider    The following health maintenance items are reviewed in Epic and correct as of today:  Health Maintenance   Topic Date Due    URINE DRUG SCREEN  Never done    MEDICARE ANNUAL WELLNESS VISIT  02/19/2020    COVID-19 Vaccine (7 - 2023-24 season) 03/14/2024    A1C  08/13/2024    INFLUENZA VACCINE (1) 09/01/2024    ANNUAL REVIEW OF HM ORDERS  09/20/2024    LIPID  10/04/2024    EYE EXAM  11/27/2024    MICROALBUMIN  12/15/2024    HEMOGLOBIN  12/15/2024    BMP  02/13/2025    DIABETIC FOOT EXAM  02/13/2025    FALL RISK ASSESSMENT  02/13/2025    MAMMO SCREENING  03/27/2025    ADVANCE CARE PLANNING  02/26/2026    DEXA  05/18/2026    DTAP/TDAP/TD IMMUNIZATION (3 - Td or Tdap) 08/18/2033    MIGRAINE ACTION PLAN  Completed    PHQ-2 (once per calendar year)  Completed    Pneumococcal Vaccine: 65+ Years  Completed    URINALYSIS  Completed    ZOSTER IMMUNIZATION  Completed    RSV VACCINE (Pregnancy & 60+)  Completed    IPV IMMUNIZATION  Aged Out    HPV IMMUNIZATION  Aged Out    MENINGITIS IMMUNIZATION  Aged Out    RSV MONOCLONAL ANTIBODY  Aged Out    COLORECTAL CANCER SCREENING  Discontinued       {ROS Picklists (Optional):310165}     Objective    Exam  /68   Pulse 73   Temp 97  F (36.1  C) (Tympanic)   Resp 12   Ht 1.676 m (5' 6\")   " "Wt 87.6 kg (193 lb 3.2 oz)   LMP  (LMP Unknown)   SpO2 96%   BMI 31.18 kg/m     Estimated body mass index is 31.18 kg/m  as calculated from the following:    Height as of this encounter: 1.676 m (5' 6\").    Weight as of this encounter: 87.6 kg (193 lb 3.2 oz).    Physical Exam  {Exam Choices (Optional):029895}        8/5/2024   Mini Cog   Clock Draw Score 2 Normal   3 Item Recall 3 objects recalled   Mini Cog Total Score 5        {A Mini-Cog total score of 0-2 suggests the possibility of dementia, score of 3-5 suggests no dementia:965028}         Signed Electronically by: Du Phillips MD  {Email feedback regarding this note to [email or web address]   :579309}  "

## 2024-08-05 NOTE — NURSING NOTE
"/68   Pulse 73   Temp 97  F (36.1  C) (Tympanic)   Resp 12   Ht 1.676 m (5' 6\")   Wt 87.6 kg (193 lb 3.2 oz)   LMP  (LMP Unknown)   SpO2 96%   BMI 31.18 kg/m      "

## 2024-09-18 ENCOUNTER — LAB (OUTPATIENT)
Dept: LAB | Facility: CLINIC | Age: 84
End: 2024-09-18
Payer: COMMERCIAL

## 2024-09-18 DIAGNOSIS — R74.8 ELEVATED LIVER ENZYMES: ICD-10-CM

## 2024-09-18 LAB
ALBUMIN SERPL BCG-MCNC: 4.2 G/DL (ref 3.5–5.2)
ALP SERPL-CCNC: 69 U/L (ref 40–150)
ALT SERPL W P-5'-P-CCNC: 42 U/L (ref 0–50)
AST SERPL W P-5'-P-CCNC: 55 U/L (ref 0–45)
BILIRUB DIRECT SERPL-MCNC: <0.2 MG/DL (ref 0–0.3)
BILIRUB SERPL-MCNC: 0.5 MG/DL
PROT SERPL-MCNC: 7.6 G/DL (ref 6.4–8.3)

## 2024-09-18 PROCEDURE — 80076 HEPATIC FUNCTION PANEL: CPT

## 2024-09-18 PROCEDURE — 36415 COLL VENOUS BLD VENIPUNCTURE: CPT

## 2024-09-25 ENCOUNTER — LAB (OUTPATIENT)
Dept: LAB | Facility: CLINIC | Age: 84
End: 2024-09-25
Payer: COMMERCIAL

## 2024-09-25 DIAGNOSIS — N18.30 CKD STAGE 3 DUE TO TYPE 2 DIABETES MELLITUS (H): ICD-10-CM

## 2024-09-25 DIAGNOSIS — E11.22 CKD STAGE 3 DUE TO TYPE 2 DIABETES MELLITUS (H): ICD-10-CM

## 2024-09-25 DIAGNOSIS — E55.9 VITAMIN D DEFICIENCY: ICD-10-CM

## 2024-09-25 LAB
ALBUMIN MFR UR ELPH: 42.4 MG/DL
ALBUMIN SERPL BCG-MCNC: 4.3 G/DL (ref 3.5–5.2)
ALBUMIN UR-MCNC: 30 MG/DL
ALP SERPL-CCNC: 70 U/L (ref 40–150)
ALT SERPL W P-5'-P-CCNC: 40 U/L (ref 0–50)
ANION GAP SERPL CALCULATED.3IONS-SCNC: 13 MMOL/L (ref 7–15)
APPEARANCE UR: CLEAR
AST SERPL W P-5'-P-CCNC: 47 U/L (ref 0–45)
BACTERIA #/AREA URNS HPF: ABNORMAL /HPF
BASOPHILS # BLD AUTO: 0.1 10E3/UL (ref 0–0.2)
BASOPHILS NFR BLD AUTO: 1 %
BILIRUB SERPL-MCNC: 0.4 MG/DL
BILIRUB UR QL STRIP: NEGATIVE
BUN SERPL-MCNC: 21.8 MG/DL (ref 8–23)
CALCIUM SERPL-MCNC: 9.3 MG/DL (ref 8.8–10.4)
CHLORIDE SERPL-SCNC: 100 MMOL/L (ref 98–107)
COLOR UR AUTO: YELLOW
CREAT SERPL-MCNC: 1.03 MG/DL (ref 0.51–0.95)
CREAT UR-MCNC: 136 MG/DL
CREAT UR-MCNC: 136 MG/DL
EGFRCR SERPLBLD CKD-EPI 2021: 53 ML/MIN/1.73M2
EOSINOPHIL # BLD AUTO: 0.1 10E3/UL (ref 0–0.7)
EOSINOPHIL NFR BLD AUTO: 1 %
ERYTHROCYTE [DISTWIDTH] IN BLOOD BY AUTOMATED COUNT: 13 % (ref 10–15)
GLUCOSE SERPL-MCNC: 227 MG/DL (ref 70–99)
GLUCOSE UR STRIP-MCNC: NEGATIVE MG/DL
HCO3 SERPL-SCNC: 23 MMOL/L (ref 22–29)
HCT VFR BLD AUTO: 39.7 % (ref 35–47)
HGB BLD-MCNC: 13.5 G/DL (ref 11.7–15.7)
HGB UR QL STRIP: ABNORMAL
IMM GRANULOCYTES # BLD: 0.1 10E3/UL
IMM GRANULOCYTES NFR BLD: 1 %
KETONES UR STRIP-MCNC: NEGATIVE MG/DL
LEUKOCYTE ESTERASE UR QL STRIP: NEGATIVE
LYMPHOCYTES # BLD AUTO: 2.5 10E3/UL (ref 0.8–5.3)
LYMPHOCYTES NFR BLD AUTO: 28 %
MCH RBC QN AUTO: 32 PG (ref 26.5–33)
MCHC RBC AUTO-ENTMCNC: 34 G/DL (ref 31.5–36.5)
MCV RBC AUTO: 94 FL (ref 78–100)
MICROALBUMIN UR-MCNC: 137 MG/L
MICROALBUMIN/CREAT UR: 100.74 MG/G CR (ref 0–25)
MONOCYTES # BLD AUTO: 0.3 10E3/UL (ref 0–1.3)
MONOCYTES NFR BLD AUTO: 3 %
NEUTROPHILS # BLD AUTO: 5.8 10E3/UL (ref 1.6–8.3)
NEUTROPHILS NFR BLD AUTO: 67 %
NITRATE UR QL: NEGATIVE
PH UR STRIP: 6 [PH] (ref 5–7)
PLATELET # BLD AUTO: 181 10E3/UL (ref 150–450)
POTASSIUM SERPL-SCNC: 4.3 MMOL/L (ref 3.4–5.3)
PROT SERPL-MCNC: 7.7 G/DL (ref 6.4–8.3)
PROT/CREAT 24H UR: 0.31 MG/MG CR (ref 0–0.2)
RBC # BLD AUTO: 4.22 10E6/UL (ref 3.8–5.2)
RBC #/AREA URNS AUTO: ABNORMAL /HPF
SODIUM SERPL-SCNC: 136 MMOL/L (ref 135–145)
SP GR UR STRIP: 1.02 (ref 1–1.03)
SQUAMOUS #/AREA URNS AUTO: ABNORMAL /LPF
UROBILINOGEN UR STRIP-ACNC: 0.2 E.U./DL
VIT D+METAB SERPL-MCNC: 50 NG/ML (ref 20–50)
WBC # BLD AUTO: 8.8 10E3/UL (ref 4–11)
WBC #/AREA URNS AUTO: ABNORMAL /HPF

## 2024-09-25 PROCEDURE — 80053 COMPREHEN METABOLIC PANEL: CPT

## 2024-09-25 PROCEDURE — 81001 URINALYSIS AUTO W/SCOPE: CPT

## 2024-09-25 PROCEDURE — 82570 ASSAY OF URINE CREATININE: CPT

## 2024-09-25 PROCEDURE — 82043 UR ALBUMIN QUANTITATIVE: CPT

## 2024-09-25 PROCEDURE — 82306 VITAMIN D 25 HYDROXY: CPT

## 2024-09-25 PROCEDURE — 84156 ASSAY OF PROTEIN URINE: CPT

## 2024-09-25 PROCEDURE — 36415 COLL VENOUS BLD VENIPUNCTURE: CPT

## 2024-09-25 PROCEDURE — 85025 COMPLETE CBC W/AUTO DIFF WBC: CPT

## 2024-10-02 ENCOUNTER — OFFICE VISIT (OUTPATIENT)
Dept: NEPHROLOGY | Facility: CLINIC | Age: 84
End: 2024-10-02
Payer: COMMERCIAL

## 2024-10-02 VITALS
WEIGHT: 193 LBS | BODY MASS INDEX: 31.15 KG/M2 | HEART RATE: 64 BPM | OXYGEN SATURATION: 95 % | SYSTOLIC BLOOD PRESSURE: 124 MMHG | DIASTOLIC BLOOD PRESSURE: 75 MMHG | RESPIRATION RATE: 20 BRPM

## 2024-10-02 DIAGNOSIS — N18.30 CKD STAGE 3 DUE TO TYPE 2 DIABETES MELLITUS (H): Primary | ICD-10-CM

## 2024-10-02 DIAGNOSIS — E11.22 CKD STAGE 3 DUE TO TYPE 2 DIABETES MELLITUS (H): Primary | ICD-10-CM

## 2024-10-02 PROCEDURE — 99214 OFFICE O/P EST MOD 30 MIN: CPT | Performed by: INTERNAL MEDICINE

## 2024-10-02 RX ORDER — METFORMIN HYDROCHLORIDE 500 MG/1
500 TABLET, EXTENDED RELEASE ORAL
Qty: 90 TABLET | Refills: 3 | Status: SHIPPED | OUTPATIENT
Start: 2024-10-02 | End: 2025-09-27
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
## 2024-10-02 NOTE — NURSING NOTE
Chief Complaint   Patient presents with    RECHECK     CKD stage 3 due to type 2 diabetes mellitus (H) +1 more       Vitals:    10/02/24 0906   BP: 124/75   BP Location: Left arm   Patient Position: Sitting   Cuff Size: Adult Large   Pulse: 64   Resp: 20   SpO2: 95%   Weight: 87.5 kg (193 lb)       Body mass index is 31.15 kg/m .

## 2024-12-01 ENCOUNTER — TRANSFERRED RECORDS (OUTPATIENT)
Dept: MULTI SPECIALTY CLINIC | Facility: CLINIC | Age: 84
End: 2024-12-01

## 2024-12-01 LAB — RETINOPATHY: NORMAL

## 2024-12-27 DIAGNOSIS — F51.01 PRIMARY INSOMNIA: Chronic | ICD-10-CM

## 2024-12-30 RX ORDER — TRAZODONE HYDROCHLORIDE 100 MG/1
TABLET ORAL
Qty: 90 TABLET | Refills: 1 | Status: SHIPPED | OUTPATIENT
Start: 2024-12-30

## 2025-01-14 ENCOUNTER — VIRTUAL VISIT (OUTPATIENT)
Dept: UROLOGY | Facility: CLINIC | Age: 85
End: 2025-01-14
Payer: COMMERCIAL

## 2025-01-14 DIAGNOSIS — N39.41 URGENCY INCONTINENCE: Primary | ICD-10-CM

## 2025-01-14 DIAGNOSIS — Z87.898 HISTORY OF GROSS HEMATURIA: ICD-10-CM

## 2025-01-14 DIAGNOSIS — Z87.440 PERSONAL HISTORY OF URINARY TRACT INFECTION: ICD-10-CM

## 2025-01-14 RX ORDER — ESTRADIOL 0.1 MG/G
CREAM VAGINAL
COMMUNITY
Start: 2024-04-04

## 2025-01-14 RX ORDER — FLUTICASONE FUROATE AND VILANTEROL TRIFENATATE 100; 25 UG/1; UG/1
POWDER RESPIRATORY (INHALATION)
COMMUNITY
Start: 2025-01-08
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
## 2025-01-14 NOTE — NURSING NOTE
Current patient location: [address] [place] [id number]    Is the patient currently in the state of [place]? YES    Visit mode: VIDEO    If the visit is dropped, the patient can be reconnected by:VIDEO VISIT: Text to cell phone:   Telephone Information:   Mobile [phone]       Will anyone else be joining the visit? NO  (If patient encounters technical issues they should call [phone]    Are changes needed to the allergy or medication list? Yes yes reconciled     Are refills needed on medications prescribed by this physician? NO    Rooming Documentation:  Not applicable    Reason for visit: RECHECK (1 year follow-up )    Brandie PEREZ

## 2025-01-17 NOTE — PROGRESS NOTES
"Masha Chou N (77 y.o. Female)   Duarte Duffy, RN Case Manager  [phone]      Date of Birth   1947    Social Security Number       Address   [address] [place], [place] [address]    Home Phone   [phone]    MRN   [id number]       Jehovah's witness   Jainism    Marital Status                               Admission Date   1/12/25    Admission Type   Emergency    Admitting Provider   Christine Guevara MD    Attending Provider   Christine Guevara MD    Department, Room/Bed   [facility] 3H, S375/1       Discharge Date       Discharge Disposition       Discharge Destination                                 Attending Provider: Christine Guevara MD    Allergies: Contrast Dye (Echo Or Unknown Ct/mr), Shellfish-derived Products, Fluoxetine, Mushroom, Mushroom Extract Complex (Do Not Select), Doxycycline, Sulfamethoxazole-trimethoprim, Bupropion, Levofloxacin, Penicillins, Prednisone & Diphenhydramine    Isolation: None   Infection: None   Code Status: CPR    Ht: 142.2 cm (56\")   Wt: 109 kg (240 lb)    Admission Cmt: None   Principal Problem: Falls [R29.6]                   Active Insurance as of 1/12/2025       Primary Coverage       Payor Plan Insurance Group Employer/Plan Group    ANTHEM MEDICARE REPLACEMENT ANTHEM MED ADV HMO [id number]       Payor Plan Address Payor Plan Phone Number Payor Plan Fax Number Effective Dates    [address] [phone]  1/1/2025 - None Entered    [facility] [id number]         Subscriber Name Subscriber Birth Date Member ID       MASHA CHOU N 1947 [id number]                     Emergency Contacts        (Rel.) Home Phone Work Phone Mobile Phone    DONALD NOVA (Daughter) [phone] -- [phone]              Vital Signs (last day)       Date/Time Temp Temp src Pulse Resp BP Patient Position SpO2    01/17/25 0904 -- -- 91 -- 137/42 -- --    01/17/25 0752 -- -- 85 18 -- -- 97    01/17/25 " PRIMARY CARDIOLOGIST:  Dr. Cedillo.      REASON FOR VISIT:  Dyslipidemia and hypertension followup.      HISTORY OF PRESENT ILLNESS:  Ms. Baxter is a delightful 77-year-old woman who follows with our clinic for history of left bundle branch block, hypertension, hyperlipidemia and an abnormal stress test.  In 07/2009 she underwent a nuclear stress test which had an anterior defect versus breast attenuation.  This was repeated in 2010 and at that point it was normal.  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  She has had a difficult time tolerating statins and now has been on just 2.5 mg of Crestor daily.  Zetia has become cost prohibitive at about $700 a month.      Today, she comes in feeling well.  She gets a little short of breath at the top of the stairs but nothing more than she would expect.  She denies chest pain, orthopnea or PND.  She has not had pedal edema.  She is doing okay on the Crestor.      SOCIAL HISTORY:  She is under quite a bit of stress.  She has a daughter who has MS and is suffering with what sounds like some depression and delusions.  She has a grandson that they also live together that is an alcoholic.  She has a 2-pack-year history of smoking, quit in 1963.  Occasional alcohol use.  She is not exercising at this time, although has intentions to go back to the [facility].      PHYSICAL EXAMINATION:     VITAL SIGNS:  Blood pressure 179/73, pulse 55, weight 185, BMI of 30.    GENERAL:  She is normocephalic, atraumatic, in no acute distress.   HEENT:  Sclerae are anicteric, no xanthelasma.   HEART:  Regular in rate and rhythm with a 1/6 systolic murmur heard best at the left sternal border.  Her carotids are without bruit.   LUNGS:  Clear bilaterally.   EXTREMITIES:  Without peripheral edema.      ASSESSMENT AND PLAN:   1.  Hypertension, markedly elevated today.  The patient is currently maintained on  0742 97.6 (36.4) Oral 87 18 150/83 Lying 97    01/17/25 0305 98 (36.7) Oral 77 20 122/84 Lying 100    01/17/25 0036 97.5 (36.4) Oral 74 20 150/72 Lying 96    01/16/25 2012 -- -- 65 20 -- -- 98    01/16/25 1904 98.4 (36.9) Oral 75 20 139/59 Lying 97    01/16/25 1554 -- -- 80 18 -- Lying 94    01/16/25 1539 98.3 (36.8) Oral 84 16 130/87 Lying 96    01/16/25 1148 -- -- -- 16 143/70 Sitting --    01/16/25 1141 98.6 (37) Oral 92 18 89/79 Sitting 93    01/16/25 1131 -- -- 95 18 -- Lying 97    01/16/25 0751 98.2 (36.8) Oral 92 18 147/87 Lying 94    01/16/25 0724 -- -- 88 18 -- Lying 95    01/16/25 0449 97.3 (36.3) Oral 79 18 126/73 Lying 96          Current Facility-Administered Medications   Medication Dose Route Frequency Provider Last Rate Last Admin    acetaminophen (TYLENOL) tablet 650 mg  650 mg Oral Q4H PRN Rosanna Mckeon APRN   650 mg at 01/16/25 2045    Or    acetaminophen (TYLENOL) 160 MG/5ML oral solution 650 mg  650 mg Oral Q4H PRN Rosanna Mckeon APRN        Or    acetaminophen (TYLENOL) suppository 650 mg  650 mg Rectal Q4H PRN Rosanna Mckeon APRN        albuterol (PROVENTIL) nebulizer solution 0.083% 2.5 mg/3mL  2.5 mg Nebulization Q4H PRN Rosanna Mckeon APRN        aspirin EC tablet 81 mg  81 mg Oral Daily Rosanna Mckeon APRN   81 mg at 01/17/25 0904    atorvastatin (LIPITOR) tablet 20 mg  20 mg Oral Daily Rosanna Mckeon APRN   20 mg at 01/17/25 0904    sennosides-docusate (PERICOLACE) 8.6-50 MG per tablet 2 tablet  2 tablet Oral BID PRN Rosanna Mckeon APRN        And    polyethylene glycol (MIRALAX) packet 17 g  17 g Oral Daily PRN Rosanna Mckeon APRN        And    bisacodyl (DULCOLAX) EC tablet 5 mg  5 mg Oral Daily PRN Rosanna Mckeon APRN        And    bisacodyl (DULCOLAX) suppository 10 mg  10 mg Rectal Daily PRN Rosanna Mckeon APRN        buPROPion XL (WELLBUTRIN XL) 24 hr tablet 150 mg  150 mg Oral Daily Rosanna Mckeon APRN    quinapril 40 mg, Toprol- mg and hydrochlorothiazide 25 mg daily.  She denies any pain today.  She is under significant stress at home, but she says that this is not even that high of a level for her.  She was previously on amlodipine, and she stopped it for unclear reasons -- she does not recall why.  At this point, we are going to restart her on 5 mg of amlodipine and, given that her blood pressure is so high, have her seen back in clinic next week.  There is no room to increase her Toprol, as her heart rate is already 55.  If she remains elevated, could consider 10 mg of amlodipine versus switching her to carvedilol 12.5 mg b.i.d.   2.  Hyperlipidemia.  Labs are still pending.  Will continue Crestor 2.5 mg daily for now.   3.  GERD per primary.      We will see her back in 1 week to reassess blood pressures.  Labs are still pending.  We will address those when they return.      Thank you for allowing me to participate in her care.      EDI Swain PA-C             D: 2017 15:45   T: 2017 20:26   MT: ROEL      Name:     ALEJANDRO GAMEZ   MRN:      [id number]        Account:      [id number]   :      1940           Service Date: 2017      Document: [id number]     150 mg at 01/17/25 0904    Calcium Replacement - Follow Nurse / BPA Driven Protocol   Not Applicable PRN Rosanna Mckeon APRN        cholestyramine light packet 4 g  1 packet Oral Daily Rosanna Mckeon APRN   4 g at 01/17/25 0904    citalopram (CeleXA) tablet 40 mg  40 mg Oral QAM Rosanna Mckeon APRN   40 mg at 01/17/25 0904    dextrose (D50W) (25 g/50 mL) IV injection 25 g  25 g Intravenous Q15 Min PRN Rosanna Mckeon APRN        dextrose (GLUTOSE) oral gel 15 g  15 g Oral Q15 Min PRN Rosanna Mckeon APRN        Diclofenac Sodium (VOLTAREN) 1 % gel 2 g  2 g Topical 4x Daily PRN Rosanna Mckeon APRN        donepezil (ARICEPT) tablet 10 mg  10 mg Oral Daily Rosanna Mckeon APRN   10 mg at 01/17/25 0904    fluticasone (FLONASE) 50 MCG/ACT nasal spray 2 spray  2 spray Nasal Daily Rosanna Mckeon APRN   2 spray at 01/17/25 0908    glucagon (GLUCAGEN) injection 1 mg  1 mg Intramuscular Q15 Min PRN Rosanna Mckeon APRN        heparin (porcine) 5000 UNIT/ML injection 5,000 Units  5,000 Units Subcutaneous Q12H Rosanna Mckeon APRN   5,000 Units at 01/17/25 0904    insulin glargine (LANTUS, SEMGLEE) injection 20 Units  20 Units Subcutaneous Nightly Evette Gross APRN        Insulin Lispro (humaLOG) injection 2-7 Units  2-7 Units Subcutaneous 4x Daily AC & at Bedtime Rosanna Mckeon APRN   3 Units at 01/17/25 0903    Insulin Lispro (humaLOG) injection 7 Units  7 Units Subcutaneous TID With Meals Evette Gross APRN        ipratropium-albuterol (DUO-NEB) nebulizer solution 3 mL  3 mL Nebulization 4x Daily - RT Christine Guevara MD   3 mL at 01/17/25 0752    lactobacillus acidophilus (RISAQUAD) capsule 1 capsule  1 capsule Oral Daily Christine Guevara MD   1 capsule at 01/17/25 0904    Magnesium Standard Dose Replacement - Follow Nurse / BPA Driven Protocol   Not Applicable Rosanna Golden, APRN         nitroglycerin (NITROSTAT) SL tablet 0.4 mg  0.4 mg Sublingual Q5 Min PRN Rosanna Mckeon W, APRN        ondansetron ODT (ZOFRAN-ODT) disintegrating tablet 4 mg  4 mg Translingual Q6H PRN Christine Guevara MD   4 mg at 25 1231    Phosphorus Replacement - Follow Nurse / BPA Driven Protocol   Not Applicable PRN Denny Mckeona W, APRN        Potassium Replacement - Follow Nurse / BPA Driven Protocol   Not Applicable PRN Linda, Rosanna W, APRN        sodium chloride 0.9 % flush 10 mL  10 mL Intravenous Q12H Mckeon, Rosanna W, APRN        sodium chloride 0.9 % flush 10 mL  10 mL Intravenous PRN Mckeon, Rosanna W, APRN        sodium chloride 0.9 % infusion 40 mL  40 mL Intravenous PRN Denny Mckeona W, APRN        verapamil SR (CALAN-SR) CR tablet 240 mg  240 mg Oral Daily Rosanna Mckeon, APRN   240 mg at 25 0904        Physician Progress Notes (last 24 hours)        Christine Guevara MD at 25 1410              [facility] Medicine Services  PROGRESS NOTE    Patient Name: Msaha Chou  : 1947  MRN: [id number]    Date of Admission: 2025  Primary Care Physician: Kary Wu MD    Subjective   Subjective     CC:  Left knee pain    HPI:  Patient doing well this morning.  She reports her diarrhea has improved today. So remains on her home 2L.       Objective   Objective     Vital Signs:   Temp:  [97.3 °F (36.3 °C)-98.7 °F (37.1 °C)] 98.6 °F (37 °C)  Heart Rate:  [69-95] 92  Resp:  [16-18] 16  BP: ()/(61-87) 143/70  Flow (L/min) (Oxygen Therapy):  [2-4] 4     Physical Exam  Constitutional:       General: She is not in acute distress.  Cardiovascular:      Rate and Rhythm: Normal rate and regular rhythm.      Heart sounds: Normal heart sounds.   Pulmonary:      Effort: Pulmonary effort is normal. No respiratory distress.      Breath sounds: Normal breath sounds. No wheezing or rhonchi.   Abdominal:       Palpations: Abdomen is soft.      Tenderness: There is no abdominal tenderness.   Musculoskeletal:      Right lower leg: No edema.      Left lower leg: No edema.   Neurological:      General: No focal deficit present.      Mental Status: She is alert. Mental status is at baseline.   Psychiatric:         Mood and Affect: Mood normal.         Thought Content: Thought content normal.          Results Reviewed:  LAB RESULTS:      Lab 01/13/25  0646 01/12/25 2037   WBC 8.72 9.27   HEMOGLOBIN 12.3 13.0   HEMATOCRIT 40.3 41.1   PLATELETS 172 183   NEUTROS ABS 4.54 5.98   IMMATURE GRANS (ABS) 0.14* 0.13*   LYMPHS ABS 2.99 2.32   MONOS ABS 0.82 0.71   EOS ABS 0.16 0.09   MCV 95.5 93.6         Lab 01/15/25  1618 01/13/25  0646 01/12/25 2037   SODIUM 133* 144 139   POTASSIUM 5.1 5.1 5.4*   CHLORIDE 96* 105 100   CO2 27.0 32.0* 27.0   ANION GAP 10.0 7.0 12.0   BUN 24* 31* 31*   CREATININE 1.17* 1.23* 1.16*   EGFR 47.9* 45.1* 48.4*   GLUCOSE 295* 158* 220*   CALCIUM 9.7 9.4 9.7   MAGNESIUM  --  1.9 1.7   HEMOGLOBIN A1C  --  9.00*  --          Lab 01/12/25 2037   TOTAL PROTEIN 6.3   ALBUMIN 4.0   GLOBULIN 2.3   ALT (SGPT) 22   AST (SGOT) 19   BILIRUBIN 0.5   ALK PHOS 83                     Brief Urine Lab Results  (Last result in the past 365 days)        Color   Clarity   Blood   Leuk Est   Nitrite   Protein   CREAT   Urine HCG        01/13/25 0722 Yellow   Clear   Negative   Trace   Negative   Negative                   Microbiology Results Abnormal       None            No radiology results from the last 24 hrs    Results for orders placed during the hospital encounter of 06/25/21    Adult Transthoracic Echo Complete W/ Cont if Necessary Per Protocol    Interpretation Summary  · Estimated left ventricular EF = 70% Left ventricular ejection fraction appears to be 66 - 70%. Left ventricular systolic function is normal.  · Left ventricular diastolic function is consistent with (grade I) impaired relaxation.  · Left ventricular  wall thickness is consistent with hypertrophy. Sigmoid-shaped ventricular septum is present.  · LVOT turbulence without gradient.      Current medications:  Scheduled Meds:aspirin, 81 mg, Oral, Daily  atorvastatin, 20 mg, Oral, Daily  buPROPion XL, 150 mg, Oral, Daily  cefuroxime, 500 mg, Oral, Q12H  cholestyramine light, 1 packet, Oral, Daily  citalopram, 40 mg, Oral, QAM  donepezil, 10 mg, Oral, Daily  fluticasone, 2 spray, Nasal, Daily  heparin (porcine), 5,000 Units, Subcutaneous, Q12H  insulin glargine, 10 Units, Subcutaneous, Nightly  insulin lispro, 2-7 Units, Subcutaneous, 4x Daily AC & at Bedtime  Insulin Lispro, 3 Units, Subcutaneous, TID With Meals  ipratropium-albuterol, 3 mL, Nebulization, 4x Daily - RT  lactobacillus acidophilus, 1 capsule, Oral, Daily  sodium chloride, 10 mL, Intravenous, Q12H  verapamil SR, 240 mg, Oral, Daily      Continuous Infusions:   PRN Meds:.  acetaminophen **OR** acetaminophen **OR** acetaminophen    albuterol    senna-docusate sodium **AND** polyethylene glycol **AND** bisacodyl **AND** bisacodyl    Calcium Replacement - Follow Nurse / BPA Driven Protocol    dextrose    dextrose    Diclofenac Sodium    glucagon (human recombinant)    Magnesium Standard Dose Replacement - Follow Nurse / BPA Driven Protocol    nitroglycerin    ondansetron ODT    Phosphorus Replacement - Follow Nurse / BPA Driven Protocol    Potassium Replacement - Follow Nurse / BPA Driven Protocol    sodium chloride    sodium chloride    Assessment & Plan   Assessment & Plan     Active Hospital Problems    Diagnosis  POA    **Falls [R29.6]  Not Applicable    Left knee pain [M25.562]  Unknown    Osteoarthritis of left knee [M17.12]  Unknown    Acute UTI (urinary tract infection) [N39.0]  Unknown    COPD exacerbation [J44.1]  Yes    Type 2 diabetes mellitus [E11.9]  Yes      Resolved Hospital Problems   No resolved problems to display.        Brief Hospital Course to date:  Masha Chou is a 77 y.o. female  with type 2 diabetes, hypertension, COPD, breast cancer, pulmonary hypertension, diastolic CHF who presented to the ED after having a near fall.    Left knee pain  Osteoarthritis  Generalized weakness  -- X-ray left knee shows mild to moderate tricompartmental osteoarthritis  -- Fall precautions  -- PT/OT consult, recommending SNF  -- Consult case management  -- Tylenol and Voltaren for pain    Diarrhea, chronic  -Intermittent diarrhea, patient reports typically associate with anxiety  -resolved today  -continue probiotic     Acute UTI (POA)  -- UA: Ketones trace, nitrite positive, leukocytes trace, WBC 3-5, bacteria 4+, squamous 3-6  -- Ceftin 5 days     CKD  -- baseline creatinine ~ 1.1-1.2   -- bmp in the am     COPD  JUAN F  Asthma  --Does not appear to be in acute exacerbation.  No sputum production and no wheezing  -- CPAP nightly  -- DuoNebs, will change from as needed to scheduled     T2DM  -- A1c in the a.m.  --lantus 15u nightly, 5u mealtime with SSI, adjust as needed    Expected Discharge Location and Transportation: SNF to long-term care  Expected Discharge   Expected Discharge Date: 2025; Expected Discharge Time:      VTE Prophylaxis:  Pharmacologic VTE prophylaxis orders are present.         AM-PAC 6 Clicks Score (PT): 16 (25 6003)    CODE STATUS:   Code Status and Medical Interventions: CPR (Attempt to Resuscitate); Full Support   Ordered at: 25 7494     Level Of Support Discussed With:    Patient     Code Status (Patient has no pulse and is not breathing):    CPR (Attempt to Resuscitate)     Medical Interventions (Patient has pulse or is breathing):    Full Support       Christine Guevara MD  25        Electronically signed by Christine Guevara MD at 25 1417          Physical Therapy Notes (most recent note)        Alejandra Crowder PTA at 25 1303  Version 1 of 1         Patient Name: Masha Chou  : 1947    MRN: [id number]                               Today's Date: 1/14/2025       Admit Date: 1/12/2025    Visit Dx:     ICD-10-CM ICD-9-CM   1. Falls  R29.6 V15.88   2. Acute cystitis without hematuria  N30.00 595.0     Patient Active Problem List   Diagnosis    Chest pain    Type 2 diabetes mellitus    Pulmonary HTN    Heart failure    COPD exacerbation    Chronic respiratory failure with hypoxia    Falls    Left knee pain    Osteoarthritis of left knee    Acute UTI (urinary tract infection)     Past Medical History:   Diagnosis Date    Asthma     Cancer     BILATERAL BREAST     COPD (chronic obstructive pulmonary disease)     Diabetes mellitus     Heart failure     Hypertension     Pulmonary hypertension      Past Surgical History:   Procedure Laterality Date    BREAST SURGERY      CHOLECYSTECTOMY      HYSTERECTOMY      JOINT REPLACEMENT      RIGHT KNEE    KELOID EXCISION        General Information       Row Name 01/14/25 1357          Physical Therapy Time and Intention    Document Type therapy note (daily note)  -AS     Mode of Treatment physical therapy  -AS       Row Name 01/14/25 1351          General Information    Patient Profile Reviewed yes  -AS     Existing Precautions/Restrictions fall;oxygen therapy device and L/min;other (see comments)  complaints of nausea/vomitting  -AS     Barriers to Rehab medically complex;previous functional deficit  -AS       Row Name 01/14/25 1353          Cognition    Orientation Status (Cognition) oriented x 3;verbal cues/prompts needed for orientation  -AS       Row Name 01/14/25 1353          Safety Issues/Impairments Affecting Functional Mobility    Safety Issues Affecting Function (Mobility) awareness of need for assistance;insight into deficits/self-awareness;safety precautions follow-through/compliance;sequencing abilities;positioning of assistive device  -AS     Impairments Affecting Function (Mobility) balance;endurance/activity tolerance;pain;shortness of breath;strength  -AS     Comment,  Safety Issues/Impairments (Mobility) alert and following commands, needs encouragement to participate in therapy d/t nausea, agreed to UI and exercises  -AS               User Key  (r) = Recorded By, (t) = Taken By, (c) = Cosigned By      Initials Name Provider Type    AS Alejandra Crowder PTA Physical Therapist Assistant                   Mobility       Row Name 01/14/25 1354          Bed Mobility    Supine-Sit [place] (Bed Mobility) contact guard;1 person assist  -AS     Assistive Device (Bed Mobility) head of bed elevated  -AS     Comment, (Bed Mobility) increased time and effort to reach EOB  -AS       Row Name 01/14/25 1356          Transfers    Comment, (Transfers) cues for hand placement  -AS       Row Name 01/14/25 1358          Bed-Chair Transfer    Bed-Chair [place] (Transfers) verbal cues;minimum assist (75% patient effort);1 person assist  -AS     Assistive Device (Bed-Chair Transfers) walker, front-wheeled  -AS       Row Name 01/14/25 1355          Sit-Stand Transfer    Sit-Stand [place] (Transfers) verbal cues;contact guard;1 person assist  -AS     Assistive Device (Sit-Stand Transfers) walker, front-wheeled  -AS     Comment, (Sit-Stand Transfer) mulitple cues for hand placement  -AS       Row Name 01/14/25 1356          Gait/Stairs (Locomotion)    [place] Level (Gait) verbal cues;minimum assist (75% patient effort);1 person assist  -AS     Assistive Device (Gait) walker, front-wheeled  -AS     Distance in Feet (Gait) 6  -AS     Deviations/Abnormal Patterns (Gait) bilateral deviations;base of support, wide;leighann decreased;gait speed decreased;stride length decreased  -AS     Bilateral Gait Deviations heel strike decreased  -AS     Comment, (Gait/Stairs) patient took 6 steps bed>recliner with min assist x1, mild unsteadiness and mutliple cues to keep walker close. Assist needed to control walker, furhter distance limited by nausea. Nursing notified and aware.  -AS                User Key  (r) = Recorded By, (t) = Taken By, (c) = Cosigned By      Initials Name Provider Type    AS Alejandra Crowder PTA Physical Therapist Assistant                   Obj/Interventions       Row Name 01/14/25 1401          Motor Skills    Therapeutic Exercise knee;ankle  -AS       [facility] Name 01/14/25 1401          Knee (Therapeutic Exercise)    Knee (Therapeutic Exercise) strengthening exercise  -AS     Knee Strengthening (Therapeutic Exercise) bilateral;marching while seated;LAQ (long arc quad);sitting;10 repetitions  -AS       [facility] Name 01/14/25 1401          Ankle (Therapeutic Exercise)    Ankle (Therapeutic Exercise) AROM (active range of motion)  -AS     Ankle AROM (Therapeutic Exercise) bilateral;dorsiflexion;plantarflexion;sitting;10 repetitions  -AS       [facility] Name 01/14/25 1401          Balance    Dynamic Standing Balance verbal cues;minimal assist;1-person assist  -AS     Position/Device Used, Standing Balance supported;walker, front-wheeled  -AS     Comment, Balance mild unsteadiness, no LOB  -AS               User Key  (r) = Recorded By, (t) = Taken By, (c) = Cosigned By      Initials Name Provider Type    AS Alejandra Crowder PTA Physical Therapist Assistant                   Goals/Plan    No documentation.                  Clinical Impression       Row Name 01/14/25 1401          Pain    Pretreatment Pain Rating 0/10 - no pain  -AS     Posttreatment Pain Rating 0/10 - no pain  -AS       [facility] Name 01/14/25 1401          Plan of Care Review    Plan of Care Reviewed With patient  -AS     Progress no change  -AS     Outcome Evaluation patient took 6 steps bed>recliner with min assist x1, mild unsteadiness and mutliple cues to keep walker close. Assist needed to control walker, furhter distance limited by nausea. Nursing notified and aware. Completed B LE exercises with kathleen for technique. Recommend SNF at D/C for best functional outcome.  -AS       Row Name 01/14/25 1401          Positioning and  Restraints    Pre-Treatment Position in bed  -AS     Post Treatment Position chair  -AS     In Chair reclined;call light within reach;notified nsg;encouraged to call for assist;exit alarm on;waffle cushion;legs elevated  -AS               User Key  (r) = Recorded By, (t) = Taken By, (c) = Cosigned By      Initials Name Provider Type    AS Alejandra Crowder PTA Physical Therapist Assistant                   Outcome Measures       Row Name 01/14/25 1406 01/14/25 0800       How much help from another person do you currently need...    Turning from your back to your side while in flat bed without using bedrails? 3  -AS 3  -LH    Moving from lying on back to sitting on the side of a flat bed without bedrails? 3  -AS 3  -LH    Moving to and from a bed to a chair (including a wheelchair)? 3  -AS 3  -LH    Standing up from a chair using your arms (e.g., wheelchair, bedside chair)? 3  -AS 3  -LH    Climbing 3-5 steps with a railing? 2  -AS 3  -LH    To walk in hospital room? 3  -AS 3  -LH    AM-[facility] 6 Clicks Score (PT) 17  -AS 18  -LH    Highest Level of Mobility Goal 5 --> Static standing  -AS 6 --> Walk 10 steps or more  -      Row Name 01/14/25 1406          Functional Assessment    Outcome Measure Options AM-PAC 6 Clicks Basic Mobility (PT)  -AS               User Key  (r) = Recorded By, (t) = Taken By, (c) = Cosigned By      Initials Name Provider Type    AS Alejandra Crowder PTA Physical Therapist Assistant     Ruchi Linton RN Registered Nurse                                 Physical Therapy Education       Title: PT OT SLP Therapies (In Progress)       Topic: Physical Therapy (In Progress)       Point: Mobility training (In Progress)       Learning Progress Summary            Patient Acceptance, E, NR by AS at 1/14/2025 1406                      Point: Home exercise program (In Progress)       Learning Progress Summary            Patient Acceptance, E, NR by AS at 1/14/2025 1406    Acceptance, E, VU by CK at  1/13/2025 1056                      Point: Body mechanics (In Progress)       Learning Progress Summary            Patient Acceptance, E, NR by AS at 1/14/2025 1406    Acceptance, E, VU by CK at 1/13/2025 1056                      Point: Precautions (In Progress)       Learning Progress Summary            Patient Acceptance, E, NR by AS at 1/14/2025 1406    Acceptance, E, VU by CK at 1/13/2025 1056                                      User Key       Initials Effective Dates Name Provider Type Discipline    AS 12/13/24 -  Alejandra Crowder PTA Physical Therapist Assistant PT    CK 02/06/24 -  Loretta Webber PT Physical Therapist PT                  PT Recommendation and Plan     Progress: no change  Outcome Evaluation: patient took 6 steps bed>recliner with min assist x1, mild unsteadiness and mutliple cues to keep walker close. Assist needed to control walker, furhter distance limited by nausea. Nursing notified and aware. Completed B LE exercises with kathleen for technique. Recommend SNF at D/C for best functional outcome.     Time Calculation:         PT Charges       Row Name 01/14/25 1407             Time Calculation    Start Time 1303  -AS      PT Received On 01/14/25  -AS      PT Goal Re-Cert Due Date 01/23/25  -AS         Timed Charges    41871 - PT Therapeutic Exercise Minutes 9  -AS      61946 - Gait Training Minutes  14  -AS         Total Minutes    Timed Charges Total Minutes 23  -AS       Total Minutes 23  -AS                User Key  (r) = Recorded By, (t) = Taken By, (c) = Cosigned By      Initials Name Provider Type    AS Alejandra Crowder PTA Physical Therapist Assistant                  Therapy Charges for Today       Code Description Service Date Service Provider Modifiers Qty    31019117343 HC PT THER PROC EA 15 MIN 1/14/2025 Alejandra Crowder PTA GP 1    12960519250 HC GAIT TRAINING EA 15 MIN 1/14/2025 Alejandra Crowder PTA GP 1    93050296915 HC PT THER SUPP EA 15 MIN 1/14/2025  Alejandra Crowder PTA GP 2            PT G-Codes  Outcome Measure Options: AM-PAC 6 Clicks Basic Mobility (PT)  AM-PAC 6 Clicks Score (PT): 17  AM-PAC 6 Clicks Score (OT): 15       Alejandra Crowder PTA  2025      Electronically signed by Alejandra Crowder PTA at 25 1407          Occupational Therapy Notes (most recent note)        Lindsey Sandoval, OT at 01/15/25 1549          Patient Name: Masha Chou  : 1947    MRN: [id number]                              Today's Date: 1/15/2025       Admit Date: 2025    Visit Dx:     ICD-10-CM ICD-9-CM   1. Falls  R29.6 V15.88   2. Acute cystitis without hematuria  N30.00 595.0     Patient Active Problem List   Diagnosis    Chest pain    Type 2 diabetes mellitus    Pulmonary HTN    Heart failure    COPD exacerbation    Chronic respiratory failure with hypoxia    Falls    Left knee pain    Osteoarthritis of left knee    Acute UTI (urinary tract infection)     Past Medical History:   Diagnosis Date    Asthma     Cancer     BILATERAL BREAST     COPD (chronic obstructive pulmonary disease)     Diabetes mellitus     Heart failure     Hypertension     Pulmonary hypertension      Past Surgical History:   Procedure Laterality Date    BREAST SURGERY      CHOLECYSTECTOMY      HYSTERECTOMY      JOINT REPLACEMENT      RIGHT KNEE    KELOID EXCISION        General Information       Row Name 01/15/25 1634          OT Time and Intention    Subjective Information complains of;weakness  -TB     Document Type therapy note (daily note)  -TB     Mode of Treatment occupational therapy;individual therapy  -TB     Patient Effort good  -TB     Symptoms Noted During/After Treatment none  -TB       Row Name 01/15/25 1634          General Information    Patient Profile Reviewed yes  -TB     Existing Precautions/Restrictions fall;oxygen therapy device and L/min;other (see comments)  Incontinent  -TB     Barriers to Rehab medically complex;previous functional deficit   -TB       Row Name 01/15/25 1634          Occupational Profile    Reason for Services/Referral (Occupational Profile) Occupational decline  -TB       Row Name 01/15/25 1634          Cognition    Orientation Status (Cognition) oriented x 3  -TB       Row Name 01/15/25 1634          Safety Issues/Impairments Affecting Functional Mobility    Safety Issues Affecting Function (Mobility) insight into deficits/self-awareness;awareness of need for assistance;safety precaution awareness;safety precautions follow-through/compliance;sequencing abilities;ability to follow commands  -TB     Impairments Affecting Function (Mobility) balance;endurance/activity tolerance;pain;shortness of breath;strength  -TB     Cognitive Impairments, Mobility Safety/Performance awareness, need for assistance;insight into deficits/self-awareness;safety precaution awareness;safety precaution follow-through;sequencing abilities  -TB     Comment, Safety Issues/Impairments (Mobility) Pt able to stand and transfer with SPC and Min Ax1.  -TB               User Key  (r) = Recorded By, (t) = Taken By, (c) = Cosigned By      Initials Name Provider Type    TB Lindsey Sandoval OT Occupational Therapist                     Mobility/ADL's       Row Name 01/15/25 1636          Bed Mobility    Bed Mobility rolling left;rolling right;scooting/bridging;supine-sit  -TB     Rolling Left [place] (Bed Mobility) minimum assist (75% patient effort);verbal cues  -TB     Rolling Right [place] (Bed Mobility) minimum assist (75% patient effort);verbal cues  -TB     Scooting/Bridging [place] (Bed Mobility) standby assist;verbal cues  -TB     Supine-Sit [place] (Bed Mobility) minimum assist (75% patient effort);verbal cues  -TB     Bed Mobility, Safety Issues decreased use of arms for pushing/pulling;decreased use of legs for bridging/pushing;impaired trunk control for bed mobility  -TB     Assistive Device (Bed Mobility) head of bed elevated;bed  rails  -TB     Comment, (Bed Mobility) Rolling to complete toileting hygiene following episode of bowel incontinence. Cues and assist to sequence transition to EOB sitting.  -       Row Name 01/15/25 1636          Transfers    Transfers sit-stand transfer;stand-sit transfer;bed-chair transfer  -TB     Comment, (Transfers) Education and cues for hand placement and sequencing with SPC  -       Row Name 01/15/25 1636          Bed-Chair Transfer    Bed-Chair [place] (Transfers) minimum assist (75% patient effort);1 person assist;verbal cues  -TB     Assistive Device (Bed-Chair Transfers) cane, straight  -       Row Name 01/15/25 1636          Sit-Stand Transfer    Sit-Stand [place] (Transfers) minimum assist (75% patient effort);1 person assist;verbal cues  -TB     Assistive Device (Sit-Stand Transfers) cane, straight  -       Row Name 01/15/25 1636          Stand-Sit Transfer    Stand-Sit [place] (Transfers) contact guard;1 person assist;verbal cues  -     Assistive Device (Stand-Sit Transfers) cane, straight  -       Row Name 01/15/25 1636          Functional Mobility    Functional Mobility- Ind. Level minimum assist (75% patient effort);1 person;verbal cues required  -     Functional Mobility- Device cane, straight  -     Functional Mobility-Distance (Feet) 5  -TB     Functional Mobility- Safety Issues balance decreased during turns;sequencing ability decreased  -TB     Functional Mobility- Comment Pt is up in room and transfering with SPC. Good effort. No LOB.  -TB     Patient was able to Ambulate yes  -       Row Name 01/15/25 1636          Activities of Daily Living    BADL Assessment/Intervention bathing;lower body dressing;toileting;feeding;grooming  -       Row Name 01/15/25 1636          Lower Body Dressing Assessment/Training    [place] Level (Lower Body Dressing) don;socks;dependent (less than 25% patient effort)  -       Row Name 01/15/25 1636          Bathing  Assessment/Intervention    [place] Level (Bathing) dependent (less than 25% patient effort);perineal area  -TB     Position (Bathing) supine  -TB     Comment, (Bathing) following episode of bowel incontinence  -TB       Row Name 01/15/25 1636          Toileting Assessment/Training    [place] Level (Toileting) toileting skills;dependent (less than 25% patient effort)  -TB     Position (Toileting) supine  -TB     Comment, (Toileting) following episode of bowel incontinence  -TB       Row Name 01/15/25 1636          Self-Feeding Assessment/Training    [place] Level (Feeding) independent;liquids to mouth  -TB     Position (Feeding) supported sitting  -TB       Row Name 01/15/25 1636          Grooming Assessment/Training    [place] Level (Grooming) set up;hair care, combing/brushing  -TB     Position (Grooming) supported sitting  -TB               User Key  (r) = Recorded By, (t) = Taken By, (c) = Cosigned By      Initials Name Provider Type     Lindsey Sandoval OT Occupational Therapist                   Obj/Interventions       Row Name 01/15/25 1642          Balance    Balance Assessment sitting static balance;sitting dynamic balance;sit to stand dynamic balance;standing static balance;standing dynamic balance  -TB     Static Sitting Balance supervision  -TB     Dynamic Sitting Balance standby assist  -TB     Position, Sitting Balance unsupported;sitting in chair;sitting edge of bed  -TB     Sit to Stand Dynamic Balance minimal assist;1-person assist;verbal cues  -TB     Static Standing Balance contact guard;verbal cues;1-person assist  -TB     Dynamic Standing Balance minimal assist;verbal cues;1-person assist  -TB     Position/Device Used, Standing Balance supported;cane, straight  -TB     Balance Interventions sitting;standing;sit to stand;supported;static;dynamic;dynamic reaching;occupation based/functional task;UE activity with balance activity  -TB     Comment, Balance No LOB  -TB                User Key  (r) = Recorded By, (t) = Taken By, (c) = Cosigned By      Initials Name Provider Type    TB Lindsey Sandoval, OT Occupational Therapist                   Goals/Plan    No documentation.                  Clinical Impression       Row Name 01/15/25 1642          Pain Assessment    Pretreatment Pain Rating 0/10 - no pain  -TB     Posttreatment Pain Rating 0/10 - no pain  -TB     Pre/Posttreatment Pain Comment Pt denies pain pre/post  -TB       Row Name 01/15/25 1642          Plan of Care Review    Plan of Care Reviewed With patient  -TB     Progress no change  -TB     Outcome Evaluation Pt participates in therapy with good effort. Remains below her baseline with strength and activity tolerance deficits. Dependent with LB bathing and toileting hygiene following incontinent episode. Dependent to don socks for OOB activity. Min A supine to sit. Pt able to stand and take steps from EOB to UIC with SPC support and Min Ax1. Set-up simple grooming and self-feeding. OT will continue to follow IP. Recommend SNF when pt is medically ready.  -TB       Row Name 01/15/25 1642          Therapy Plan Review/Discharge Plan (OT)    Anticipated Discharge Disposition (OT) skilled nursing facility  -TB       Row Name 01/15/25 1642          Vital Signs    Pre Systolic BP Rehab --  RN cleared OT  -TB     Pre SpO2 (%) 92  -TB     O2 Delivery Pre Treatment supplemental O2  -TB     Intra SpO2 (%) 88  -TB     O2 Delivery Intra Treatment supplemental O2  -TB     Post SpO2 (%) 96  -TB     O2 Delivery Post Treatment supplemental O2  -TB     Pre Patient Position Supine  -TB     Intra Patient Position Standing  -TB     Post Patient Position Sitting  -TB       Row Name 01/15/25 1642          Positioning and Restraints    Pre-Treatment Position in bed  -TB     Post Treatment Position chair  -TB     In Chair notified nsg;reclined;call light within reach;encouraged to call for assist;exit alarm on;waffle cushion;legs elevated   -TB               User Key  (r) = Recorded By, (t) = Taken By, (c) = Cosigned By      Initials Name Provider Type     Lindsey Sandoval, OT Occupational Therapist                   Outcome Measures       Row Name 01/15/25 1405          How much help from another person do you currently need...    Turning from your back to your side while in flat bed without using bedrails? 3  -MH     Moving from lying on back to sitting on the side of a flat bed without bedrails? 3  -MH     Moving to and from a bed to a chair (including a wheelchair)? 3  -MH     Standing up from a chair using your arms (e.g., wheelchair, bedside chair)? 3  -MH     Climbing 3-5 steps with a railing? 3  -MH     To walk in hospital room? 3  -MH     AM-PAC 6 Clicks Score (PT) 18  -MH     Highest Level of Mobility Goal 6 --> Walk 10 steps or more  -               User Key  (r) = Recorded By, (t) = Taken By, (c) = Cosigned By      Initials Name Provider Type     [place], RN Registered Nurse                    Occupational Therapy Education       Title: PT OT SLP Therapies (In Progress)       Topic: Occupational Therapy (In Progress)       Point: ADL training (Done)       Description:   Instruct learner(s) on proper safety adaptation and remediation techniques during self care or transfers.   Instruct in proper use of assistive devices.                  Learning Progress Summary            Patient Acceptance, E,D, VU,NR by TB at 1/15/2025 1646    Acceptance, E, VU by AN at 1/13/2025 1058                      Point: Home exercise program (Not Started)       Description:   Instruct learner(s) on appropriate technique for monitoring, assisting and/or progressing therapeutic exercises/activities.                  Learner Progress:  Not documented in this visit.              Point: Precautions (Done)       Description:   Instruct learner(s) on prescribed precautions during self-care and functional transfers.                  Learning Progress  Summary            Patient Acceptance, E, VU by AN at 1/13/2025 1058                      Point: Body mechanics (Done)       Description:   Instruct learner(s) on proper positioning and spine alignment during self-care, functional mobility activities and/or exercises.                  Learning Progress Summary            Patient Acceptance, E, VU by AN at 1/13/2025 1058                                      User Key       Initials Effective Dates Name Provider Type Discipline     07/11/23 -  Lindsey Sandoval OT Occupational Therapist OT    [id number] 09/21/21 -  Anne Marie Cid OT Occupational Therapist OT                  OT Recommendation and Plan     Plan of Care Review  Plan of Care Reviewed With: patient  Progress: no change  Outcome Evaluation: Pt participates in therapy with good effort. Remains below her baseline with strength and activity tolerance deficits. Dependent with LB bathing and toileting hygiene following incontinent episode. Dependent to don socks for OOB activity. Min A supine to sit. Pt able to stand and take steps from EOB to UIC with SPC support and Min Ax1. Set-up simple grooming and self-feeding. OT will continue to follow IP. Recommend SNF when pt is medically ready.     Time Calculation:         Time Calculation- OT       Row Name 01/15/25 1549             Time Calculation- OT    OT Start Time 1549  -TB      OT Received On 01/15/25  -TB      OT Goal Re-Cert Due Date 01/23/25  -TB         Timed Charges    39726 - OT Self Care/Mgmt Minutes 25  -TB         Total Minutes    Timed Charges Total Minutes 25  -TB       Total Minutes 25  -TB                User Key  (r) = Recorded By, (t) = Taken By, (c) = Cosigned By      Initials Name Provider Type     Lindesy Sandoval OT Occupational Therapist                  Therapy Charges for Today       Code Description Service Date Service Provider Modifiers Qty    72915317853  OT SELF CARE/MGMT/TRAIN EA 15 MIN 1/15/2025 Lindsey Sandoval  Soo, OT GO 2                 Lindsey Sandoval, REMY  1/15/2025    Electronically signed by Lindsey Sandoval, OT at 01/15/25 3672

## 2025-01-27 DIAGNOSIS — N39.41 URGENCY INCONTINENCE: ICD-10-CM

## 2025-01-27 RX ORDER — TROSPIUM CHLORIDE ER 60 MG/1
60 CAPSULE ORAL EVERY MORNING
Qty: 90 CAPSULE | Refills: 3 | Status: SHIPPED | OUTPATIENT
Start: 2025-01-27

## 2025-01-31 ENCOUNTER — TELEPHONE (OUTPATIENT)
Dept: INTERNAL MEDICINE | Facility: CLINIC | Age: 85
End: 2025-01-31
Payer: COMMERCIAL

## 2025-01-31 DIAGNOSIS — I10 PRIMARY HYPERTENSION: ICD-10-CM

## 2025-02-02 RX ORDER — AMLODIPINE BESYLATE 5 MG/1
5 TABLET ORAL 2 TIMES DAILY
Qty: 60 TABLET | Refills: 0 | Status: SHIPPED | OUTPATIENT
Start: 2025-02-02

## 2025-02-02 NOTE — TELEPHONE ENCOUNTER
Medication refilled, patient is due for diabetes and blood pressure follow-up appointments this month.  Please schedule on any of my same-day or approval only slot

## 2025-02-05 DIAGNOSIS — E11.21 TYPE 2 DIABETES MELLITUS WITH DIABETIC NEPHROPATHY, WITHOUT LONG-TERM CURRENT USE OF INSULIN (H): ICD-10-CM

## 2025-02-05 DIAGNOSIS — E78.2 MIXED HYPERLIPIDEMIA: ICD-10-CM

## 2025-02-05 DIAGNOSIS — I10 PRIMARY HYPERTENSION: ICD-10-CM

## 2025-02-05 RX ORDER — LISINOPRIL 40 MG/1
40 TABLET ORAL DAILY
Qty: 90 TABLET | Refills: 1 | Status: SHIPPED | OUTPATIENT
Start: 2025-02-05

## 2025-02-05 RX ORDER — BLOOD SUGAR DIAGNOSTIC
STRIP MISCELLANEOUS
Qty: 100 STRIP | Refills: 1 | Status: SHIPPED | OUTPATIENT
Start: 2025-02-05

## 2025-02-05 RX ORDER — METOPROLOL SUCCINATE 100 MG/1
TABLET, EXTENDED RELEASE ORAL
Qty: 90 TABLET | Refills: 1 | Status: SHIPPED | OUTPATIENT
Start: 2025-02-05

## 2025-02-05 RX ORDER — ROSUVASTATIN CALCIUM 5 MG/1
5 TABLET, COATED ORAL DAILY
Qty: 30 TABLET | Refills: 0 | Status: SHIPPED | OUTPATIENT
Start: 2025-02-05

## 2025-02-22 ENCOUNTER — HEALTH MAINTENANCE LETTER (OUTPATIENT)
Age: 85
End: 2025-02-22

## 2025-02-25 ENCOUNTER — PATIENT OUTREACH (OUTPATIENT)
Dept: CARE COORDINATION | Facility: CLINIC | Age: 85
End: 2025-02-25
Payer: COMMERCIAL

## 2025-02-26 ENCOUNTER — OFFICE VISIT (OUTPATIENT)
Dept: INTERNAL MEDICINE | Facility: CLINIC | Age: 85
End: 2025-02-26
Payer: COMMERCIAL

## 2025-02-26 VITALS
WEIGHT: 194.3 LBS | TEMPERATURE: 97.3 F | OXYGEN SATURATION: 94 % | RESPIRATION RATE: 13 BRPM | HEIGHT: 66 IN | BODY MASS INDEX: 31.23 KG/M2 | DIASTOLIC BLOOD PRESSURE: 70 MMHG | SYSTOLIC BLOOD PRESSURE: 132 MMHG | HEART RATE: 81 BPM

## 2025-02-26 DIAGNOSIS — N18.31 STAGE 3A CHRONIC KIDNEY DISEASE (H): ICD-10-CM

## 2025-02-26 DIAGNOSIS — F51.01 PRIMARY INSOMNIA: Chronic | ICD-10-CM

## 2025-02-26 DIAGNOSIS — E11.21 TYPE 2 DIABETES MELLITUS WITH DIABETIC NEPHROPATHY, WITHOUT LONG-TERM CURRENT USE OF INSULIN (H): Primary | ICD-10-CM

## 2025-02-26 DIAGNOSIS — E78.2 MIXED HYPERLIPIDEMIA: ICD-10-CM

## 2025-02-26 DIAGNOSIS — I10 PRIMARY HYPERTENSION: ICD-10-CM

## 2025-02-26 LAB
ALBUMIN SERPL BCG-MCNC: 4.1 G/DL (ref 3.5–5.2)
ALP SERPL-CCNC: 66 U/L (ref 40–150)
ALT SERPL W P-5'-P-CCNC: 54 U/L (ref 0–50)
ANION GAP SERPL CALCULATED.3IONS-SCNC: 11 MMOL/L (ref 7–15)
AST SERPL W P-5'-P-CCNC: 58 U/L (ref 0–45)
BILIRUB SERPL-MCNC: 0.4 MG/DL
BUN SERPL-MCNC: 16.9 MG/DL (ref 8–23)
CALCIUM SERPL-MCNC: 9.3 MG/DL (ref 8.8–10.4)
CHLORIDE SERPL-SCNC: 103 MMOL/L (ref 98–107)
CREAT SERPL-MCNC: 1.1 MG/DL (ref 0.51–0.95)
EGFRCR SERPLBLD CKD-EPI 2021: 49 ML/MIN/1.73M2
EST. AVERAGE GLUCOSE BLD GHB EST-MCNC: 143 MG/DL
GLUCOSE SERPL-MCNC: 183 MG/DL (ref 70–99)
HBA1C MFR BLD: 6.6 % (ref 0–5.6)
HCO3 SERPL-SCNC: 24 MMOL/L (ref 22–29)
POTASSIUM SERPL-SCNC: 4.5 MMOL/L (ref 3.4–5.3)
PROT SERPL-MCNC: 7.5 G/DL (ref 6.4–8.3)
SODIUM SERPL-SCNC: 138 MMOL/L (ref 135–145)

## 2025-02-26 PROCEDURE — 80053 COMPREHEN METABOLIC PANEL: CPT | Performed by: INTERNAL MEDICINE

## 2025-02-26 PROCEDURE — 3078F DIAST BP <80 MM HG: CPT | Performed by: INTERNAL MEDICINE

## 2025-02-26 PROCEDURE — G2211 COMPLEX E/M VISIT ADD ON: HCPCS | Performed by: INTERNAL MEDICINE

## 2025-02-26 PROCEDURE — 83036 HEMOGLOBIN GLYCOSYLATED A1C: CPT | Performed by: INTERNAL MEDICINE

## 2025-02-26 PROCEDURE — 99214 OFFICE O/P EST MOD 30 MIN: CPT | Performed by: INTERNAL MEDICINE

## 2025-02-26 PROCEDURE — 3075F SYST BP GE 130 - 139MM HG: CPT | Performed by: INTERNAL MEDICINE

## 2025-02-26 PROCEDURE — 36415 COLL VENOUS BLD VENIPUNCTURE: CPT | Performed by: INTERNAL MEDICINE

## 2025-02-26 RX ORDER — TRAZODONE HYDROCHLORIDE 100 MG/1
TABLET ORAL
Qty: 90 TABLET | Refills: 1 | Status: SHIPPED | OUTPATIENT
Start: 2025-02-26

## 2025-02-26 RX ORDER — ROSUVASTATIN CALCIUM 5 MG/1
5 TABLET, COATED ORAL DAILY
Qty: 90 TABLET | Refills: 1 | Status: SHIPPED | OUTPATIENT
Start: 2025-02-26

## 2025-02-26 RX ORDER — GLIPIZIDE 5 MG/1
5 TABLET, FILM COATED, EXTENDED RELEASE ORAL DAILY
Qty: 90 TABLET | Refills: 1 | Status: SHIPPED | OUTPATIENT
Start: 2025-02-26

## 2025-02-26 NOTE — Clinical Note
Please abstract the following data from this visit with this patient into the appropriate field in Epic:   Eye exam with ophthalmology on this date: Yes- Date of last eye exam: 12/24,  Location:  [facility]
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
## 2025-02-26 NOTE — NURSING NOTE
"/70   Pulse 81   Temp 97.3  F (36.3  C) (Tympanic)   Resp 13   Ht 1.676 m (5' 6\")   Wt 88.1 kg (194 lb 4.8 oz)   LMP  (LMP Unknown)   SpO2 94%   BMI 31.36 kg/m      "

## 2025-03-25 ENCOUNTER — PATIENT OUTREACH (OUTPATIENT)
Dept: CARE COORDINATION | Facility: CLINIC | Age: 85
End: 2025-03-25
Payer: COMMERCIAL

## 2025-04-16 ENCOUNTER — VIRTUAL VISIT (OUTPATIENT)
Dept: INTERNAL MEDICINE | Facility: CLINIC | Age: 85
End: 2025-04-16
Payer: COMMERCIAL

## 2025-04-16 DIAGNOSIS — I10 PRIMARY HYPERTENSION: ICD-10-CM

## 2025-04-16 DIAGNOSIS — R74.8 ELEVATED LIVER ENZYMES: Primary | ICD-10-CM

## 2025-04-16 DIAGNOSIS — N18.31 STAGE 3A CHRONIC KIDNEY DISEASE (H): ICD-10-CM

## 2025-04-16 DIAGNOSIS — J84.10 PULMONARY FIBROSIS (H): ICD-10-CM

## 2025-04-16 PROCEDURE — 98006 SYNCH AUDIO-VIDEO EST MOD 30: CPT | Performed by: INTERNAL MEDICINE

## 2025-04-16 RX ORDER — HYDRALAZINE HYDROCHLORIDE 25 MG/1
25 TABLET, FILM COATED ORAL 2 TIMES DAILY
Qty: 60 TABLET | Refills: 0 | Status: SHIPPED | OUTPATIENT
Start: 2025-04-16

## 2025-04-16 RX ORDER — METOPROLOL SUCCINATE 100 MG/1
50 TABLET, EXTENDED RELEASE ORAL DAILY
Status: SHIPPED
Start: 2025-04-16
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
## 2025-04-21 ENCOUNTER — TRANSFERRED RECORDS (OUTPATIENT)
Dept: HEALTH INFORMATION MANAGEMENT | Facility: CLINIC | Age: 85
End: 2025-04-21
Payer: COMMERCIAL

## 2025-04-21 LAB — RETINOPATHY: NEGATIVE

## 2025-05-05 ENCOUNTER — ANCILLARY PROCEDURE (OUTPATIENT)
Dept: ULTRASOUND IMAGING | Facility: CLINIC | Age: 85
End: 2025-05-05
Attending: INTERNAL MEDICINE
Payer: COMMERCIAL

## 2025-05-05 DIAGNOSIS — R74.8 ELEVATED LIVER ENZYMES: ICD-10-CM

## 2025-05-05 PROCEDURE — 76705 ECHO EXAM OF ABDOMEN: CPT

## 2025-05-14 ENCOUNTER — OFFICE VISIT (OUTPATIENT)
Dept: INTERNAL MEDICINE | Facility: CLINIC | Age: 85
End: 2025-05-14
Payer: COMMERCIAL

## 2025-05-14 VITALS
BODY MASS INDEX: 30.94 KG/M2 | OXYGEN SATURATION: 95 % | WEIGHT: 191.7 LBS | HEART RATE: 83 BPM | TEMPERATURE: 97.5 F | SYSTOLIC BLOOD PRESSURE: 122 MMHG | RESPIRATION RATE: 14 BRPM | DIASTOLIC BLOOD PRESSURE: 70 MMHG

## 2025-05-14 DIAGNOSIS — I10 PRIMARY HYPERTENSION: ICD-10-CM

## 2025-05-14 DIAGNOSIS — R68.83 CHILLS: Primary | ICD-10-CM

## 2025-05-14 DIAGNOSIS — R09.81 NASAL CONGESTION: ICD-10-CM

## 2025-05-14 PROBLEM — M32.13: Status: ACTIVE | Noted: 2025-04-11

## 2025-05-14 PROBLEM — R91.8 OTHER NONSPECIFIC ABNORMAL FINDING OF LUNG FIELD: Status: ACTIVE | Noted: 2025-05-14

## 2025-05-14 PROBLEM — M32.9 SYSTEMIC LUPUS ERYTHEMATOSUS, UNSPECIFIED (H): Status: ACTIVE | Noted: 2025-05-14

## 2025-05-14 PROBLEM — J84.10 FIBROSIS OF LUNG (H): Status: ACTIVE | Noted: 2025-05-14

## 2025-05-14 PROBLEM — J41.0 SIMPLE CHRONIC BRONCHITIS (H): Status: ACTIVE | Noted: 2025-04-11

## 2025-05-14 PROBLEM — R06.9 UNSPECIFIED ABNORMALITIES OF BREATHING: Status: ACTIVE | Noted: 2019-03-28

## 2025-05-14 PROBLEM — I36.1 NONRHEUMATIC TRICUSPID (VALVE) INSUFFICIENCY: Status: ACTIVE | Noted: 2025-05-14

## 2025-05-14 PROBLEM — J84.112 IDIOPATHIC FIBROSING ALVEOLITIS (H): Status: ACTIVE | Noted: 2025-05-14

## 2025-05-14 PROBLEM — Z77.22 SECOND HAND SMOKE EXPOSURE: Status: ACTIVE | Noted: 2024-10-11

## 2025-05-14 PROBLEM — I25.9 CHRONIC ISCHEMIC HEART DISEASE, UNSPECIFIED: Status: ACTIVE | Noted: 2025-05-14

## 2025-05-14 PROBLEM — J44.1 ACUTE EXACERBATION OF CHRONIC OBSTRUCTIVE PULMONARY DISEASE (COPD) (H): Status: ACTIVE | Noted: 2025-03-12

## 2025-05-14 PROBLEM — M19.90 OSTEOARTHROSIS: Status: ACTIVE | Noted: 2025-05-14

## 2025-05-14 PROBLEM — J30.9 ALLERGIC RHINITIS, UNSPECIFIED: Status: ACTIVE | Noted: 2025-05-14

## 2025-05-14 PROBLEM — R05.3 CHRONIC COUGH: Status: ACTIVE | Noted: 2024-10-11

## 2025-05-14 PROBLEM — I35.1 NONRHEUMATIC AORTIC (VALVE) INSUFFICIENCY: Status: ACTIVE | Noted: 2025-05-14

## 2025-05-14 PROBLEM — N18.9 CHRONIC KIDNEY DISEASE: Status: ACTIVE | Noted: 2025-05-14

## 2025-05-14 PROBLEM — R91.1 LUNG NODULE SEEN ON IMAGING STUDY: Status: ACTIVE | Noted: 2025-03-12

## 2025-05-14 PROBLEM — K76.0 FATTY (CHANGE OF) LIVER, NOT ELSEWHERE CLASSIFIED: Status: ACTIVE | Noted: 2025-05-14

## 2025-05-14 PROBLEM — J20.8 ACUTE BRONCHITIS DUE TO OTHER SPECIFIED ORGANISMS: Status: ACTIVE | Noted: 2025-04-11

## 2025-05-14 PROBLEM — K76.0 STEATOSIS OF LIVER: Status: ACTIVE | Noted: 2025-05-14

## 2025-05-14 PROBLEM — J47.9 BRONCHIECTASIS WITHOUT COMPLICATION (H): Status: ACTIVE | Noted: 2025-04-11

## 2025-05-14 PROBLEM — E11.65 TYPE 2 DIABETES MELLITUS WITH HYPERGLYCEMIA (H): Status: ACTIVE | Noted: 2025-05-14

## 2025-05-14 PROBLEM — R94.2 ABNORMAL RESULTS OF PULMONARY FUNCTION STUDIES: Status: ACTIVE | Noted: 2025-05-14

## 2025-05-14 LAB
FLUAV AG SPEC QL IA: NEGATIVE
FLUBV AG SPEC QL IA: NEGATIVE

## 2025-05-14 PROCEDURE — 99214 OFFICE O/P EST MOD 30 MIN: CPT | Performed by: INTERNAL MEDICINE

## 2025-05-14 PROCEDURE — G2211 COMPLEX E/M VISIT ADD ON: HCPCS | Performed by: INTERNAL MEDICINE

## 2025-05-14 PROCEDURE — 3078F DIAST BP <80 MM HG: CPT | Performed by: INTERNAL MEDICINE

## 2025-05-14 PROCEDURE — 3074F SYST BP LT 130 MM HG: CPT | Performed by: INTERNAL MEDICINE

## 2025-05-14 PROCEDURE — 1126F AMNT PAIN NOTED NONE PRSNT: CPT | Performed by: INTERNAL MEDICINE

## 2025-05-14 PROCEDURE — 87804 INFLUENZA ASSAY W/OPTIC: CPT | Performed by: INTERNAL MEDICINE

## 2025-05-14 PROCEDURE — 87635 SARS-COV-2 COVID-19 AMP PRB: CPT | Performed by: INTERNAL MEDICINE

## 2025-05-14 RX ORDER — PREDNISONE 10 MG/1
TABLET ORAL
COMMUNITY
Start: 2025-04-11

## 2025-05-14 RX ORDER — HYDROCHLOROTHIAZIDE 25 MG/1
25 TABLET ORAL
COMMUNITY

## 2025-05-14 RX ORDER — BENZONATATE 200 MG/1
1 CAPSULE ORAL EVERY 8 HOURS
COMMUNITY
Start: 2025-04-11

## 2025-05-14 RX ORDER — BEVACIZUMAB 100 MG/4ML
INJECTION, SOLUTION INTRAVENOUS PRN
COMMUNITY

## 2025-05-14 RX ORDER — METOPROLOL SUCCINATE 50 MG/1
50 TABLET, EXTENDED RELEASE ORAL DAILY
Qty: 90 TABLET | Refills: 1 | Status: SHIPPED | OUTPATIENT
Start: 2025-05-14

## 2025-05-14 RX ORDER — AZITHROMYCIN 250 MG/1
TABLET, FILM COATED ORAL
COMMUNITY
Start: 2025-04-11

## 2025-05-14 RX ORDER — HYDRALAZINE HYDROCHLORIDE 25 MG/1
25 TABLET, FILM COATED ORAL 2 TIMES DAILY
Qty: 180 TABLET | Refills: 1 | Status: SHIPPED | OUTPATIENT
Start: 2025-05-14

## 2025-05-14 RX ORDER — LISINOPRIL 40 MG/1
TABLET ORAL
COMMUNITY
Start: 2025-05-10

## 2025-05-14 RX ORDER — GUAIFENESIN 600 MG/1
TABLET, EXTENDED RELEASE ORAL EVERY 12 HOURS
COMMUNITY
Start: 2025-04-11

## 2025-05-14 RX ORDER — ACETAMINOPHEN 325 MG/1
325-650 TABLET ORAL
COMMUNITY

## 2025-05-14 ASSESSMENT — PAIN SCALES - GENERAL: PAINLEVEL_OUTOF10: NO PAIN (0)
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
## 2025-05-15 LAB — SARS-COV-2 RNA RESP QL NAA+PROBE: NEGATIVE

## 2025-05-17 ENCOUNTER — RESULTS FOLLOW-UP (OUTPATIENT)
Dept: INTERNAL MEDICINE | Facility: CLINIC | Age: 85
End: 2025-05-17

## 2025-05-19 DIAGNOSIS — I10 PRIMARY HYPERTENSION: ICD-10-CM

## 2025-05-19 RX ORDER — AMLODIPINE BESYLATE 5 MG/1
5 TABLET ORAL 2 TIMES DAILY
Qty: 180 TABLET | Refills: 0 | Status: SHIPPED | OUTPATIENT
Start: 2025-05-19

## 2025-06-02 ENCOUNTER — RESULTS FOLLOW-UP (OUTPATIENT)
Dept: INTERNAL MEDICINE | Facility: CLINIC | Age: 85
End: 2025-06-02

## 2025-07-07 ENCOUNTER — PATIENT OUTREACH (OUTPATIENT)
Dept: CARE COORDINATION | Facility: CLINIC | Age: 85
End: 2025-07-07
Payer: COMMERCIAL

## 2025-07-21 ENCOUNTER — PATIENT OUTREACH (OUTPATIENT)
Dept: CARE COORDINATION | Facility: CLINIC | Age: 85
End: 2025-07-21
Payer: COMMERCIAL

## 2025-08-11 ENCOUNTER — TELEPHONE (OUTPATIENT)
Dept: INTERNAL MEDICINE | Facility: CLINIC | Age: 85
End: 2025-08-11
Payer: COMMERCIAL

## 2025-08-20 DIAGNOSIS — E11.21 TYPE 2 DIABETES MELLITUS WITH DIABETIC NEPHROPATHY, WITHOUT LONG-TERM CURRENT USE OF INSULIN (H): ICD-10-CM

## 2025-08-20 RX ORDER — GLIPIZIDE 5 MG/1
5 TABLET, FILM COATED, EXTENDED RELEASE ORAL DAILY
Qty: 15 TABLET | Refills: 0 | Status: SHIPPED | OUTPATIENT
Start: 2025-08-20

## 2025-08-27 DIAGNOSIS — E78.2 MIXED HYPERLIPIDEMIA: ICD-10-CM

## 2025-08-27 DIAGNOSIS — I10 PRIMARY HYPERTENSION: ICD-10-CM

## 2025-08-27 DIAGNOSIS — E11.21 TYPE 2 DIABETES MELLITUS WITH DIABETIC NEPHROPATHY, WITHOUT LONG-TERM CURRENT USE OF INSULIN (H): ICD-10-CM

## 2025-08-28 RX ORDER — GLIPIZIDE 5 MG/1
5 TABLET, FILM COATED, EXTENDED RELEASE ORAL DAILY
Qty: 90 TABLET | Refills: 0 | OUTPATIENT
Start: 2025-08-28

## 2025-08-28 RX ORDER — AMLODIPINE BESYLATE 5 MG/1
5 TABLET ORAL 2 TIMES DAILY
Qty: 180 TABLET | Refills: 0 | OUTPATIENT
Start: 2025-08-28

## 2025-08-28 RX ORDER — ROSUVASTATIN CALCIUM 5 MG/1
5 TABLET, COATED ORAL DAILY
Qty: 90 TABLET | Refills: 0 | OUTPATIENT
Start: 2025-08-28

## (undated) DEVICE — KIT ENDO TURNOVER/PROCEDURE W/CLEAN A SCOPE LINERS 103888

## (undated) RX ORDER — REGADENOSON 0.08 MG/ML
INJECTION, SOLUTION INTRAVENOUS
Status: DISPENSED
Start: 2023-10-11

## (undated) RX ORDER — FENTANYL CITRATE 50 UG/ML
INJECTION, SOLUTION INTRAMUSCULAR; INTRAVENOUS
Status: DISPENSED
Start: 2019-04-10